# Patient Record
Sex: FEMALE | Race: WHITE | NOT HISPANIC OR LATINO | Employment: PART TIME | URBAN - METROPOLITAN AREA
[De-identification: names, ages, dates, MRNs, and addresses within clinical notes are randomized per-mention and may not be internally consistent; named-entity substitution may affect disease eponyms.]

---

## 2017-01-03 ENCOUNTER — GENERIC CONVERSION - ENCOUNTER (OUTPATIENT)
Dept: OTHER | Facility: OTHER | Age: 67
End: 2017-01-03

## 2017-04-26 ENCOUNTER — OFFICE VISIT (OUTPATIENT)
Dept: URGENT CARE | Age: 67
End: 2017-04-26
Payer: MEDICARE

## 2017-04-26 PROCEDURE — 99213 OFFICE O/P EST LOW 20 MIN: CPT | Performed by: FAMILY MEDICINE

## 2017-04-26 PROCEDURE — G0463 HOSPITAL OUTPT CLINIC VISIT: HCPCS | Performed by: FAMILY MEDICINE

## 2017-05-31 ENCOUNTER — GENERIC CONVERSION - ENCOUNTER (OUTPATIENT)
Dept: OTHER | Facility: OTHER | Age: 67
End: 2017-05-31

## 2017-06-01 ENCOUNTER — TRANSCRIBE ORDERS (OUTPATIENT)
Dept: ADMINISTRATIVE | Age: 67
End: 2017-06-01

## 2017-06-01 ENCOUNTER — APPOINTMENT (OUTPATIENT)
Dept: LAB | Age: 67
End: 2017-06-01
Payer: MEDICARE

## 2017-06-01 DIAGNOSIS — L27.0 DERMATITIS MEDICAMENTOSA DUE TO INGESTED DRUGS: ICD-10-CM

## 2017-06-01 DIAGNOSIS — L27.0 DERMATITIS MEDICAMENTOSA DUE TO INGESTED DRUGS: Primary | ICD-10-CM

## 2017-06-01 DIAGNOSIS — B09 VIRAL EXANTHEM, UNSPECIFIED: ICD-10-CM

## 2017-06-01 LAB
ALBUMIN SERPL BCP-MCNC: 3.8 G/DL (ref 3.5–5)
ALP SERPL-CCNC: 87 U/L (ref 46–116)
ALT SERPL W P-5'-P-CCNC: 11 U/L (ref 12–78)
ANION GAP SERPL CALCULATED.3IONS-SCNC: 5 MMOL/L (ref 4–13)
AST SERPL W P-5'-P-CCNC: 20 U/L (ref 5–45)
BASOPHILS # BLD AUTO: 0.05 THOUSANDS/ΜL (ref 0–0.1)
BASOPHILS NFR BLD AUTO: 1 % (ref 0–1)
BILIRUB SERPL-MCNC: 0.58 MG/DL (ref 0.2–1)
BUN SERPL-MCNC: 12 MG/DL (ref 5–25)
CALCIUM SERPL-MCNC: 8.9 MG/DL (ref 8.3–10.1)
CHLORIDE SERPL-SCNC: 105 MMOL/L (ref 100–108)
CO2 SERPL-SCNC: 32 MMOL/L (ref 21–32)
CREAT SERPL-MCNC: 0.8 MG/DL (ref 0.6–1.3)
EOSINOPHIL # BLD AUTO: 1.32 THOUSAND/ΜL (ref 0–0.61)
EOSINOPHIL NFR BLD AUTO: 21 % (ref 0–6)
ERYTHROCYTE [DISTWIDTH] IN BLOOD BY AUTOMATED COUNT: 13.1 % (ref 11.6–15.1)
GFR SERPL CREATININE-BSD FRML MDRD: >60 ML/MIN/1.73SQ M
GLUCOSE P FAST SERPL-MCNC: 95 MG/DL (ref 65–99)
HCT VFR BLD AUTO: 42.2 % (ref 34.8–46.1)
HGB BLD-MCNC: 13.9 G/DL (ref 11.5–15.4)
LYMPHOCYTES # BLD AUTO: 1.62 THOUSANDS/ΜL (ref 0.6–4.47)
LYMPHOCYTES NFR BLD AUTO: 26 % (ref 14–44)
MCH RBC QN AUTO: 31.8 PG (ref 26.8–34.3)
MCHC RBC AUTO-ENTMCNC: 32.9 G/DL (ref 31.4–37.4)
MCV RBC AUTO: 97 FL (ref 82–98)
MONOCYTES # BLD AUTO: 0.65 THOUSAND/ΜL (ref 0.17–1.22)
MONOCYTES NFR BLD AUTO: 11 % (ref 4–12)
NEUTROPHILS # BLD AUTO: 2.58 THOUSANDS/ΜL (ref 1.85–7.62)
NEUTS SEG NFR BLD AUTO: 41 % (ref 43–75)
NRBC BLD AUTO-RTO: 0 /100 WBCS
PLATELET # BLD AUTO: 211 THOUSANDS/UL (ref 149–390)
PMV BLD AUTO: 11 FL (ref 8.9–12.7)
POTASSIUM SERPL-SCNC: 4.1 MMOL/L (ref 3.5–5.3)
PROT SERPL-MCNC: 7 G/DL (ref 6.4–8.2)
RBC # BLD AUTO: 4.37 MILLION/UL (ref 3.81–5.12)
SODIUM SERPL-SCNC: 142 MMOL/L (ref 136–145)
WBC # BLD AUTO: 6.22 THOUSAND/UL (ref 4.31–10.16)

## 2017-06-01 PROCEDURE — 85025 COMPLETE CBC W/AUTO DIFF WBC: CPT

## 2017-06-01 PROCEDURE — 80053 COMPREHEN METABOLIC PANEL: CPT

## 2017-06-01 PROCEDURE — 36415 COLL VENOUS BLD VENIPUNCTURE: CPT

## 2017-08-26 ENCOUNTER — OFFICE VISIT (OUTPATIENT)
Dept: URGENT CARE | Age: 67
End: 2017-08-26
Payer: MEDICARE

## 2017-08-26 PROCEDURE — 99213 OFFICE O/P EST LOW 20 MIN: CPT | Performed by: FAMILY MEDICINE

## 2017-08-26 PROCEDURE — G0463 HOSPITAL OUTPT CLINIC VISIT: HCPCS | Performed by: FAMILY MEDICINE

## 2017-12-05 ENCOUNTER — ALLSCRIPTS OFFICE VISIT (OUTPATIENT)
Dept: OTHER | Facility: OTHER | Age: 67
End: 2017-12-05

## 2017-12-05 DIAGNOSIS — M81.0 AGE-RELATED OSTEOPOROSIS WITHOUT CURRENT PATHOLOGICAL FRACTURE: ICD-10-CM

## 2017-12-05 DIAGNOSIS — E78.5 HYPERLIPIDEMIA: ICD-10-CM

## 2017-12-05 DIAGNOSIS — Z12.31 ENCOUNTER FOR SCREENING MAMMOGRAM FOR MALIGNANT NEOPLASM OF BREAST: ICD-10-CM

## 2017-12-05 DIAGNOSIS — E55.9 VITAMIN D DEFICIENCY: ICD-10-CM

## 2018-01-11 NOTE — PROGRESS NOTES
Assessment    1  Parkinson's disease (332 0) (G20)   · Dr Roseanne Chao,  neuro   2  Anxiety (300 00) (F41 9)   3  Esophagitis, reflux (530 11) (K21 0)   4  Encounter for preventive health examination (V70 0) (Z00 00)   5  Osteoporosis (733 00) (M81 0)    Plan  Anxiety    · (1) TSH; Status:Active; Requested for:29Nov2016;   Need for pneumococcal vaccination    · Prevnar 13 Intramuscular Suspension  Osteoporosis    · (1) VITAMIN D 25-HYDROXY; Status:Active; Requested for:29Nov2016;    · * DXA BONE DENSITY SPINE HIP AND PELVIS; Status:Canceled - Scheduling;   Parkinson's disease    · Carbidopa-Levodopa-Entacapone -200 MG Oral Tablet; TAKE 1 TABLET 3  TIMES DAILY   · (1) CBC/PLT/DIFF; Status:Active; Requested for:29Nov2016;    · (1) COMPREHENSIVE METABOLIC PANEL; Status:Active; Requested for:29Nov2016;   Screening, lipid    · (1) LIPID PANEL, FASTING; Status:Active; Requested for:29Nov2016;     Discussion/Summary  health maintenance visit Currently, she eats an adequate diet  cervical cancer screening is current Breast cancer screening: mammogram has been ordered  Colorectal cancer screening: colorectal cancer screening is current  Osteoporosis screening: bone mineral density testing has been ordered  Screening lab work includes hemoglobin, glucose, lipid profile, thyroid function testing and 25-hydroxyvitamin D  The immunizations are needed  Advice and education were given regarding aerobic exercise  Patient discussion: discussed with the patient  1  Parkinson's disease  Sees neuro q6 months  2  Osteoporosis  Not on supplements, due for bone density  3  GERD  Discussed diet, may take PPI prn   4  Anxiety  Minimal use of Xanax  5  HM  Prevnar today, declined flu vaccine  Mammogram due, PAPs updated  Retrieve colonoscopy report (need records release)  Follow up in 1 yr or prn  Chief Complaint  pt here for physical      History of Present Illness  HM, Adult Female:  The patient is being seen for a health maintenance evaluation  General Health: The patient's health since the last visit is described as fair  She has regular dental visits  She denies vision problems  Immunizations status: not up to date  Lifestyle:  She does not have any weight concerns  She does not exercise regularly  Reproductive health:  she reports normal menses  Screening: Cervical cancer screening includes a pap smear performed   Breast cancer screening includes a mammogram performed last year  Colorectal cancer screening includes a colonoscopy performed within the past ten years  Metabolic screening includes lipid profile performed within the past five years, uncertain timing of her last glucose screening, uncertain timing of her last thyroid function test and DEXA performed 2013  Cardiovascular risk factors: no obesity and no sedentary lifestyle  Safety elements used: seat belt  HPI: Ms Joseluis Guevara feels well, no complaints  Her ropinirole was recently adjusted by her neurologist, sees them every 6 months  She denies any actual falls, has several trips and stumbles  She reports her legs sometimes gets left behind and her body is still moving forward  She has occasional reflux symptoms, takes Prilosec for a week with good relief  Gastroesophageal Reflux Disease (Brief): The patient is being seen for a routine clinic follow-up of gastroesophageal reflux disease  Symptoms:  heartburn  No associated symptoms are reported  Current treatment includes proton pump inhibitors  By report, there is good compliance with treatment and good symptom control  Parkinsons Disease (Brief): The patient is being seen for a routine clinic follow-up of Parkinson's disease  Symptoms:  no frequent falls and no gait disturbance  Osteoporosis (Follow-Up): She has no complications  Symptoms: The patient is currently asymptomatic     Home precautions: Osteoporosis counseling and education was reviewed with patient and caregivers, including prevention of falls  Medications: The patient is not currently on any medication for her osteoporosis  The patient is due for a DXA  Review of Systems    Constitutional: not feeling poorly and not feeling tired  Eyes: no eyesight problems  ENT: no nasal discharge  Cardiovascular: no chest pain, no palpitations and no lower extremity edema  Respiratory: no shortness of breath and no cough  Gastrointestinal: no abdominal pain and no constipation  Genitourinary: no dysuria and no incontinence  Musculoskeletal: no arthralgias  Integumentary: no rashes  Neurological: no headache and no dizziness  Psychiatric: no sleep disturbances  Endocrine: muscle weakness, but as noted in HPI  no feelings of weakness      Active Problems    1  Anxiety (300 00) (F41 9)   2  Discharge of vagina (623 5) (N89 8)   3  Encounter for screening mammogram for breast cancer (V76 12) (Z12 31)   4  Esophagitis, reflux (530 11) (K21 0)   5  Laboratory examination ordered as part of a routine general medical examination   (V72 62) (Z00 00)   6  Menopause (627 2)   7  Osteoporosis (733 00) (M81 0)   8  Parkinson's disease (332 0) (G20)   9  Visit for screening mammogram (V76 12) (Z12 31)   10   Well female exam with routine gynecological exam (V72 31) (Z01 419)    Past Medical History    · History of Diverticulosis (562 10) (K57 90)   · History of Exposure to bat without known bite (V01 9) (Z20 9)   · History of External Hemorrhoids (455 3)   · History of edema (V13 89) (Y43 328)   · History of Shoulder pain, acute, right (719 41) (M25 511)   · History of Urge incontinence of urine (788 31) (N39 41)    Surgical History    · History of  Section   · History of Colonoscopy (Fiberoptic)   · History of Oral Surgery Tooth Extraction   · History of Tonsillectomy    Family History  Mother    · Family history of Family Health Status Of Mother - Alive   · Family history of Frontal lobe dementia   · Family history of Osteoporosis (V17 81)  Family History    · Family history of Congestive Heart Failure   · Family history of Father  At Age 76    Social History    · Being A Social Drinker   · Exercise habits   · Marital History - Currently    · Never A Smoker   · Working Part-time    Current Meds   1  Amantadine HCl - 100 MG Oral Tablet; Therapy: [id number] to Recorded   2  Azilect 1 MG Oral Tablet; Therapy: 09KYO1637 to (Evaluate:49Ysc7683) Recorded   3  Carbidopa-Levodopa  MG Oral Tablet; TAKE 0 5 TABLET 3 TIMES DAILY; Therapy: 41Iei1629 to Recorded   4  LORazepam 0 5 MG Oral Tablet; TAKE 1/2 TO 1 TABLET TWICE DAILY AS NEEDED; Therapy: 38VOY8900 to (Evaluate:2017); Last Rx:2016 Ordered   5  Omeprazole 20 MG Oral Capsule Delayed Release; Therapy: (Recorded:2016) to Recorded   6  ROPINIRole HCl ER 2 MG Oral Tablet Extended Release 24 Hour; ONE TABLET WITH 8   MG TO EQUAL 10MG; Therapy: 64VQX0847 to Recorded    Allergies    1  No Known Drug Allergies    Vitals   Recorded: 67WVB3391 02:26PM   Heart Rate 78   Respiration 18   Systolic 434   Diastolic 78   Height 5 ft 4 in   Weight 132 lb 2 08 oz   BMI Calculated 22 68   BSA Calculated 1 65   O2 Saturation 98     Physical Exam    Constitutional   General appearance: No acute distress, well appearing and well nourished  comfortable, clothing appropriate and well hydrated  Head and Face   Head and face: Normal     Eyes   Pupils and irises: Equal, round, reactive to light  Ears, Nose, Mouth, and Throat   External inspection of ears and nose: Normal     Otoscopic examination: Tympanic membranes translucent with normal light reflex  Canals patent without erythema  Nasal mucosa, septum, and turbinates: Normal without edema or erythema  Oropharynx: Normal with no erythema, edema, exudate or lesions  Neck   Neck: Supple, symmetric, trachea midline, no masses  Thyroid: Normal, no thyromegaly      Pulmonary   Respiratory effort: No increased work of breathing or signs of respiratory distress  Auscultation of lungs: Clear to auscultation  Cardiovascular   Auscultation of heart: Normal rate and rhythm, normal S1 and S2, no murmurs  Examination of extremities for edema and/or varicosities: Normal     Abdomen   Abdomen: Non-tender, no masses  Lymphatic   Palpation of lymph nodes in neck: No lymphadenopathy  no posterior cervical node enlargement and no supraclavicular node enlargement  Palpation of lymph nodes in other areas: No lymphadenopathy  no anterior cervical node enlargement and no submandibular node enlargement  Musculoskeletal   Gait and station: Normal     Skin   Skin and subcutaneous tissue: Normal without rashes or lesions  Neurologic   Cranial nerves: Cranial nerves II-XII intact  Cortical function: Normal mental status      Psychiatric   Orientation to person, place, and time: Normal     Mood and affect: Normal        Signatures   Electronically signed by : Fabian Horn MD; Nov 29 2016  4:42PM EST                       (Author)

## 2018-01-12 NOTE — RESULT NOTES
Message   Dr Joseph Holloway is out of office but blood test results are back   kidney, blood count & thyroid test results look fine  liver test results and cholesterol results look okay as well with cholesterol level of 207  vitamin D level is moderately low at 10 (normal is )  is patient taking vitamin D currently? if yes, what dose? if no, recommend prescription vitamin D medication once a week for 12 weeks  let me know     Verified Results  (1) CBC/PLT/DIFF 34HFJ2786 09:17AM Marylee Paterson   TW Order Number: [id number]     Test Name Result Flag Reference   WBC COUNT 5 87 Thousand/uL  4 31-10 16   RBC COUNT 4 43 Million/uL  3 81-5 12   HEMOGLOBIN 13 7 g/dL  11 5-15 4   HEMATOCRIT 41 7 %  34 8-46  1   MCV 94 fL  82-98   MCH 30 9 pg  26 8-34 3   MCHC 32 9 g/dL  31 4-37 4   RDW 12 1 %  11 6-15 1   MPV 11 0 fL  8 9-12 7   PLATELET COUNT 902 Thousands/uL  149-390   nRBC AUTOMATED 0 /100 WBCs     NEUTROPHILS RELATIVE PERCENT 58 %  43-75   LYMPHOCYTES RELATIVE PERCENT 23 %  14-44   MONOCYTES RELATIVE PERCENT 11 %  4-12   EOSINOPHILS RELATIVE PERCENT 7 % H 0-6   BASOPHILS RELATIVE PERCENT 1 %  0-1   NEUTROPHILS ABSOLUTE COUNT 3 44 Thousands/?L  1 85-7 62   LYMPHOCYTES ABSOLUTE COUNT 1 34 Thousands/?L  0 60-4 47   MONOCYTES ABSOLUTE COUNT 0 63 Thousand/?L  0 17-1 22   EOSINOPHILS ABSOLUTE COUNT 0 40 Thousand/?L  0 00-0 61   BASOPHILS ABSOLUTE COUNT 0 05 Thousands/?L  0 00-0 10   - Patient Instructions: This bloodwork is non-fasting  Please drink two glasses of water morning of bloodwork  - Patient Instructions: This bloodwork is non-fasting  Please drink two glasses of water morning of bloodwork       (1) COMPREHENSIVE METABOLIC PANEL 02MKN3760 02:65MF Marylee Paterson   TW Order Number: [id number]     Test Name Result Flag Reference   GLUCOSE,RANDM 94 mg/dL     If the patient is fasting, the ADA then defines impaired fasting glucose as > 100 mg/dL and diabetes as > or equal to 123 mg/dL  SODIUM 140 mmol/L  136-145   POTASSIUM 4 1 mmol/L  3 5-5 3   CHLORIDE 105 mmol/L  100-108   CARBON DIOXIDE 29 mmol/L  21-32   ANION GAP (CALC) 6 mmol/L  4-13   BLOOD UREA NITROGEN 13 mg/dL  5-25   CREATININE 0 86 mg/dL  0 60-1 30   Standardized to IDMS reference method   CALCIUM 8 9 mg/dL  8 3-10 1   BILI, TOTAL 0 66 mg/dL  0 20-1 00   ALK PHOSPHATAS 117 U/L H    ALT (SGPT) 11 U/L L 12-78   AST(SGOT) 16 U/L  5-45   ALBUMIN 3 8 g/dL  3 5-5 0   TOTAL PROTEIN 7 5 g/dL  6 4-8 2   eGFR Non-African American      >60 0 ml/min/1 73sq m   - Patient Instructions: This is a fasting blood test  Water,black tea or black  coffee only after 9:00pm the night before test Drink 2 glasses of water the morning of test - Patient Instructions: This bloodwork is non-fasting  Please drink two glasses of   water morning of bloodwork  National Kidney Disease Education Program recommendations are as follows:  GFR calculation is accurate only with a steady state creatinine  Chronic Kidney disease less than 60 ml/min/1 73 sq  meters  Kidney failure less than 15 ml/min/1 73 sq  meters  (1) LIPID PANEL, FASTING [id number] 09:17AM Domingo GUILLORY Order Number: [id number]     Test Name Result Flag Reference   CHOLESTEROL 207 mg/dL H    HDL,DIRECT 69 mg/dL H 40-60   Specimen collection should occur prior to Metamizole administration due to the potential for falsely depressed results  LDL CHOLESTEROL CALCULATED 110 mg/dL H 0-100   - Patient Instructions: This is a fasting blood test  Water,black tea or black  coffee only after 9:00pm the night before test   Drink 2 glasses of water the morning of test     - Patient Instructions: This is a fasting blood test  Water,black tea or black  coffee only after 9:00pm the night before test Drink 2 glasses of water the morning of test - Patient Instructions: This bloodwork is non-fasting  Please drink two glasses of   water morning of bloodwork  Triglyceride:         Normal              <150 mg/dl       Borderline High    150-199 mg/dl       High               200-499 mg/dl       Very High          >499 mg/dl  Cholesterol:         Desirable        <200 mg/dl      Borderline High  200-239 mg/dl      High             >239 mg/dl  HDL Cholesterol:        High    >59 mg/dL      Low     <41 mg/dL  LDL CALCULATED:    This screening LDL is a calculated result  It does not have the accuracy of the Direct Measured LDL in the monitoring of patients with hyperlipidemia and/or statin therapy  Direct Measure LDL (QOV590) must be ordered separately in these patients  TRIGLYCERIDES 141 mg/dL  <=150   Specimen collection should occur prior to N-Acetylcysteine or Metamizole administration due to the potential for falsely depressed results  (1) TSH [id number] 09:17AM Patti GUILLORY Order Number: [id number]     Test Name Result Flag Reference   TSH 3 140 uIU/mL  0 358-3 740   - Patient Instructions: This bloodwork is non-fasting  Please drink two glasses of water morning of bloodwork  - Patient Instructions: This is a fasting blood test  Water,black tea or black  coffee only after 9:00pm the night before test Drink 2 glasses of water the morning of test - Patient Instructions: This bloodwork is non-fasting  Please drink two glasses of   water morning of bloodwork  Patients undergoing fluorescein dye angiography may retain small amounts of fluorescein in the body for 48-72 hours post procedure  Samples containing fluorescein can produce falsely depressed TSH values  If the patient had this procedure,a specimen should be resubmitted post fluorescein clearance            The recommended reference ranges for TSH during pregnancy are as follows:  First trimester 0 1 to 2 5 uIU/mL  Second trimester  0 2 to 3 0 uIU/mL  Third trimester 0 3 to 3 0 uIU/m     (1) VITAMIN D 25-HYDROXY [id number] 09:17AM Patti GUILLORY Order Number: [id number]     Test Name Result Flag Reference   VIT D 25-HYDROX 10 7 ng/mL L 30 0-100 0   This assay is a certified procedure of the CDC Vitamin D Standardization Certification Program (VDSCP)     Deficiency <20ng/ml   Insufficiency 20-30ng/ml   Sufficient  ng/ml     *Patients undergoing fluorescein dye angiography may retain small amounts of fluorescein in the body for 48-72 hours post procedure  Samples containing fluorescein can produce falsely elevated Vitamin D values  If the patient had this procedure, a specimen should be resubmitted post fluorescein clearance

## 2018-01-16 NOTE — MISCELLANEOUS
Message   Recorded as Task   Date: 12/30/2016 05:30 PM, Created By: Valeria Jimenez   Task Name: Call Patient with results   Assigned To: Colin Del Rio   Regarding Patient: Francheska Valderrama, Status: In Progress   Comment:    Colin Del Rio - 30 Dec 2016 5:30 PM     Patient Phone: [phone]      Dr Tim Pereira is out of office but blood test results are back   kidney, blood count & thyroid test results look fine  liver test results and cholesterol results look okay as well with cholesterol level of 207  vitamin D level is moderately low at 10 (normal is )  is patient taking vitamin D currently? if yes, what dose? if no, recommend prescription vitamin D medication once a week for 12 weeks    let me know   Jocelynn Dubose - 03 Jan 2017 8:33 AM     TASK REASSIGNED: Previously Assigned To Jocelynn Awad - 56 Jan 2017 9:06 AM     TASK IN PROGRESS   Jocelynn Dubose - 03 Jan 2017 9:08 AM     TASK EDITED  pt notified and states no she is not taking vit D currently and would like the prescription for the weekly Vitamin D sent to [facility] (retail on file)        Plan  Osteoporosis, Vitamin D deficiency    · Vitamin D (Ergocalciferol) 06913 UNIT Oral Capsule; TAKE 1 CAPSULE Weekly  FOR TOTAL OF 12 WEEKS    Signatures   Electronically signed by : Walt Baig DO; Felix  3 2017  9:10AM EST                       (Author)

## 2018-01-23 VITALS
SYSTOLIC BLOOD PRESSURE: 118 MMHG | OXYGEN SATURATION: 98 % | WEIGHT: 132.13 LBS | HEART RATE: 70 BPM | HEIGHT: 64 IN | DIASTOLIC BLOOD PRESSURE: 70 MMHG | TEMPERATURE: 97.8 F | RESPIRATION RATE: 18 BRPM | BODY MASS INDEX: 22.56 KG/M2

## 2018-01-23 NOTE — PROGRESS NOTES
Assessment    1  Encounter for preventive health examination (V70 0) (Z00 00)   2  Parkinson's disease (332 0) (G20)   · Dr Kaitlin Burgess,  neuro   3  Osteoporosis (733 00) (M81 0)   4  Hyperlipidemia (272 4) (E78 5)   5  Vitamin D deficiency (268 9) (E55 9)    Plan  Encounter for screening mammogram for breast cancer    · * MAMMO SCREENING BILATERAL W CAD; Status:Active; Requested for:43Asr1194;   Hyperlipidemia    · (1) CBC/PLT/DIFF; Status:Active; Requested for:31Tha0459;    · (1) COMPREHENSIVE METABOLIC PANEL; Status:Active; Requested for:47Ncp9021;    · (1) LIPID PANEL, FASTING; Status:Active; Requested for:89Uay8520;    · (1) TSH; Status:Active; Requested for:63Cba6896;   Need for immunization against influenza    · Fluzone High-Dose 0 5 ML Intramuscular Suspension Prefilled Syringe  Osteoporosis, Vitamin D deficiency    · (1) VITAMIN D 25-HYDROXY; Status:Active; Requested for:80Owr2476;   Parkinson's disease    · From  Amantadine HCl - 100 MG Oral Tablet  To Amantadine HCl - 100 MG  Oral Tablet Take 1 tablet daily   · From  Azilect 1 MG Oral Tablet  To Rasagiline Mesylate 1 MG Oral Tablet  (Azilect) TAKE 1 TABLET DAILY   · From  Carbidopa-Levodopa-Entacapone -200 MG Oral Tablet TAKE 1  TABLET 3 TIMES DAILY To Carbidopa-Levodopa-Entacapone -200 MG Oral  Tablet TAKE 1 TABLET 5 TIMES DAILY   · From  ROPINIRole HCl ER 8 MG Oral Tablet Extended Release 24 Hour  To  ROPINIRole HCl ER 8 MG Oral Tablet Extended Release 24 Hour TAKE 1 TABLET Daily    Discussion/Summary  health maintenance visit Currently, she eats an adequate diet  cervical cancer screening is current Breast cancer screening: mammogram has been ordered  Colorectal cancer screening: colorectal cancer screening is current and the next colonoscopy is due ? Dara Soulier Osteoporosis screening: bone mineral density testing is current  Screening lab work includes hemoglobin, glucose, lipid profile, thyroid function testing and 25-hydroxyvitamin D   The immunizations are needed and immunizations will be given as outlined in the orders  Advice and education were given regarding aerobic exercise and vitamin D supplements  Patient discussion: discussed with the patient  1  Parkinson's disease  Sees neuro q6 months  2  Osteoporosis, vitamin-D deficiency, Hx of fracture  Strongly recommend treatment, discussed treatment options  Encouraged to take calcium and vitamin-D supplements daily  Discuss weight-bearing exercises  Bone density due in 2018  3  Hyperlipidemia  Not on medication, due for lipids  4  GERD  Minimal symptoms  5  Anxiety  6  HM  Flu vaccine today  Mammogram due, PAPs updated  ?colonoscopy report (need records release)  Follow up in 1 yr or prn  Chief Complaint  pt is here for annual physical      History of Present Illness  HM, Adult Female: The patient is being seen for a health maintenance evaluation  General Health: The patient's health since the last visit is described as fair  Immunizations status: not up to date  Lifestyle:  She does not have any weight concerns  She does not exercise regularly  Reproductive health:  she reports normal menses  Screening: Cervical cancer screening includes a pap smear performed last year  Breast cancer screening includes a mammogram performed last year  Colorectal cancer screening includes a colonoscopy performed within the past ten years  Metabolic screening includes lipid profile performed within the past five years, glucose screening performed last year, thyroid function test performed last year and DEXA performed within the past five years  Cardiovascular risk factors: no hypertension, no obesity and no sedentary lifestyle  Safety elements used: seat belt  HPI: Ms Cesia Olivier is feeling well  She reports that she fractured her sternum sometime ago  She reports she was pressing on the chair leaning over to get something when she fell   She is reluctant to take any medication for her osteoporosis  She takes calcium daily, did not continue on daily D3 supplements  She sees her neurologist every 6 months  She takes her carbidopa about 5 times a day  Denies any chest pain, shortness of breath, palpitations or other symptoms  Parkinsons Disease (Brief): The patient is being seen for a routine clinic follow-up of Parkinson's disease  Symptoms:  tremor at rest, but no frequent falls  Hyperlipidemia (Follow-Up): She has no significant interval events  Symptoms: The patient is currently asymptomatic  The patient is due for a lipid panel  Vitamin D Deficiency: The patient is being seen for follow-up of vitamin D deficiency  Disease type: vitamin D deficiency  The patient is not currently being treated for this problem  By report, there is poor compliance with treatment  The patient is currently asymptomatic  Review of Systems    Constitutional: not feeling poorly and not feeling tired  Eyes: no eyesight problems  Cardiovascular: no chest pain, no palpitations and no lower extremity edema  Respiratory: no shortness of breath and no cough  Gastrointestinal: no abdominal pain and no constipation  Genitourinary: no dysuria and no incontinence  Musculoskeletal: no arthralgias  Integumentary: no rashes  Neurological: no headache, no dizziness, no limb weakness and no difficulty walking  Psychiatric: no sleep disturbances  no feelings of weakness      Active Problems    1  Anxiety (300 00) (F41 9)   2  Encounter for screening mammogram for breast cancer (V76 12) (Z12 31)   3  Esophagitis, reflux (530 11) (K21 0)   4  Laboratory examination ordered as part of a routine general medical examination   (V72 62) (Z00 00)   5  Menopause (627 2)   6  Osteoporosis (733 00) (M81 0)   7  Parkinson's disease (332 0) (G20)   8  Vitamin D deficiency (268 9) (E55 9)   9   Well female exam with routine gynecological exam (V72 31) (Z01 419)    Past Medical History    · History of Diverticulosis (562 10) (K57 90)   · History of Dry scalp (701 1) (R23 8)   · History of Exposure to bat without known bite (V01 9) (Z20 9)   · History of External Hemorrhoids (455 3)   · History of edema (V13 89) (M35 639)   · History of herpes zoster (V12 09) (Z86 19)   · History of vaginal discharge (V13 29) (Z87 42)   · History of Internal hordeolum of left eye (373 12) (H00 026)   · History of Rash (782 1) (R21)   · History of Shoulder pain, acute, right (719 41) (M25 511)   · History of Sternal fracture (807 2) (S22 20XA)   · History of Urge incontinence of urine (788 31) (N39 41)    Surgical History    · History of  Section   · History of Colonoscopy (Fiberoptic)   · History of Oral Surgery Tooth Extraction   · History of Tonsillectomy    Family History  Mother    · Family history of Family Health Status Of Mother - Alive   · Family history of Frontal lobe dementia   · Family history of Osteoporosis (V17 81)  Family History    · Family history of Congestive Heart Failure   · Family history of Father  At Age 76    Social History    · Denies alcohol consumption (V49 89) (Z78 9)   · Denied: History of Drug use   · Exercise habits   · Marital History - Currently    · Never A Smoker   · Working Part-time   · teaches at [employer], [address]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    Current Meds   1  Amantadine HCl - 100 MG Oral Tablet; Therapy: 86WFZ9387 to Recorded   2  Azilect 1 MG Oral Tablet; Therapy: 19EDV6265 to (Evaluate:00Nxg7065) Recorded   3  Calcium-Magnesium-Vitamin D [id number] MG-MG-UNIT TABS; Therapy: (Claudis All) to Recorded   4  Carbidopa-Levodopa-Entacapone -200 MG Oral Tablet; TAKE 1 TABLET 3 TIMES   DAILY; Therapy: 56LIP4115 to (Evaluate:34Aww9984); Last Rx:2016 Ordered   5  LORazepam 0 5 MG Oral Tablet; TAKE 1/2 TO 1 TABLET TWICE DAILY AS NEEDED; Therapy: 15LHN6595 to (Evaluate:2017); Last Rx:95Bza8593 Ordered   6   Omeprazole 20 MG Oral Capsule Delayed Release; Therapy: (Recorded:29Nov2016) to Recorded   7  ROPINIRole HCl ER 8 MG Oral Tablet Extended Release 24 Hour; Therapy: (Young Frieze) to Recorded   8  Vitamin D (Ergocalciferol) 41544 UNIT Oral Capsule; TAKE 1 CAPSULE Weekly FOR   TOTAL OF 12 WEEKS; Therapy: 83MNV7549 to (Last Rx:03Jan2017)  Requested for: 95JID0815 Ordered    Allergies    1  Doxycycline Monohydrate CAPS   2  Sulfa Antibiotics    Vitals   Recorded: 39HYE6573 02:00PM   Temperature 97 8 F   Heart Rate 70   Respiration 18   Systolic 311   Diastolic 70   Height 5 ft 3 75 in   Weight 132 lb 2 oz   BMI Calculated 22 86   BSA Calculated 1 64   O2 Saturation 98     Physical Exam    Constitutional   General appearance: No acute distress, well appearing and well nourished  appears healthy, comfortable, clothing appropriate and well hydrated  Head and Face   Head and face: Normal     Eyes   Pupils and irises: Equal, round, reactive to light  Ears, Nose, Mouth, and Throat   External inspection of ears and nose: Normal     Otoscopic examination: Tympanic membranes translucent with normal light reflex  Canals patent without erythema  Oropharynx: Normal with no erythema, edema, exudate or lesions  Neck   Neck: Supple, symmetric, trachea midline, no masses  Pulmonary   Respiratory effort: No increased work of breathing or signs of respiratory distress  Auscultation of lungs: Clear to auscultation  Cardiovascular   Auscultation of heart: Normal rate and rhythm, normal S1 and S2, no murmurs  Examination of extremities for edema and/or varicosities: Normal     Abdomen   Abdomen: Non-tender, no masses  Lymphatic   Palpation of lymph nodes in neck: No lymphadenopathy  Musculoskeletal   Gait and station: Normal     Skin   Skin and subcutaneous tissue: Normal without rashes or lesions  Neurologic   Cortical function: Normal mental status      Psychiatric   Orientation to person, place, and time: Normal     Mood and affect: Normal  Signatures   Electronically signed by : Mikhail Vang MD; Dec  5 2017  2:54PM EST                       (Author)

## 2018-03-27 ENCOUNTER — APPOINTMENT (OUTPATIENT)
Dept: LAB | Age: 68
End: 2018-03-27
Payer: MEDICARE

## 2018-03-27 ENCOUNTER — TRANSCRIBE ORDERS (OUTPATIENT)
Dept: ADMINISTRATIVE | Age: 68
End: 2018-03-27

## 2018-03-27 ENCOUNTER — HOSPITAL ENCOUNTER (OUTPATIENT)
Dept: RADIOLOGY | Age: 68
Discharge: HOME | End: 2018-03-27
Payer: MEDICARE

## 2018-03-27 DIAGNOSIS — E55.9 VITAMIN D DEFICIENCY: ICD-10-CM

## 2018-03-27 DIAGNOSIS — E78.5 HYPERLIPIDEMIA: ICD-10-CM

## 2018-03-27 DIAGNOSIS — Z12.31 ENCOUNTER FOR SCREENING MAMMOGRAM FOR MALIGNANT NEOPLASM OF BREAST: ICD-10-CM

## 2018-03-27 DIAGNOSIS — M81.0 AGE-RELATED OSTEOPOROSIS WITHOUT CURRENT PATHOLOGICAL FRACTURE: ICD-10-CM

## 2018-03-27 LAB
25(OH)D3 SERPL-MCNC: 23.6 NG/ML (ref 30–100)
ALBUMIN SERPL BCP-MCNC: 4 G/DL (ref 3.5–5)
ALP SERPL-CCNC: 94 U/L (ref 46–116)
ALT SERPL W P-5'-P-CCNC: <6 U/L (ref 12–78)
ANION GAP SERPL CALCULATED.3IONS-SCNC: 7 MMOL/L (ref 4–13)
AST SERPL W P-5'-P-CCNC: 14 U/L (ref 5–45)
BASOPHILS # BLD AUTO: 0.05 THOUSANDS/ΜL (ref 0–0.1)
BASOPHILS NFR BLD AUTO: 1 % (ref 0–1)
BILIRUB SERPL-MCNC: 0.54 MG/DL (ref 0.2–1)
BUN SERPL-MCNC: 17 MG/DL (ref 5–25)
CALCIUM SERPL-MCNC: 8.7 MG/DL (ref 8.3–10.1)
CHLORIDE SERPL-SCNC: 103 MMOL/L (ref 100–108)
CHOLEST SERPL-MCNC: 192 MG/DL (ref 50–200)
CO2 SERPL-SCNC: 30 MMOL/L (ref 21–32)
CREAT SERPL-MCNC: 0.89 MG/DL (ref 0.6–1.3)
EOSINOPHIL # BLD AUTO: 0.37 THOUSAND/ΜL (ref 0–0.61)
EOSINOPHIL NFR BLD AUTO: 7 % (ref 0–6)
ERYTHROCYTE [DISTWIDTH] IN BLOOD BY AUTOMATED COUNT: 12.3 % (ref 11.6–15.1)
GFR SERPL CREATININE-BSD FRML MDRD: 68 ML/MIN/1.73SQ M
GLUCOSE P FAST SERPL-MCNC: 92 MG/DL (ref 65–99)
HCT VFR BLD AUTO: 42.4 % (ref 34.8–46.1)
HDLC SERPL-MCNC: 59 MG/DL (ref 40–60)
HGB BLD-MCNC: 14.2 G/DL (ref 11.5–15.4)
LDLC SERPL CALC-MCNC: 98 MG/DL (ref 0–100)
LYMPHOCYTES # BLD AUTO: 1.5 THOUSANDS/ΜL (ref 0.6–4.47)
LYMPHOCYTES NFR BLD AUTO: 27 % (ref 14–44)
MCH RBC QN AUTO: 31.4 PG (ref 26.8–34.3)
MCHC RBC AUTO-ENTMCNC: 33.5 G/DL (ref 31.4–37.4)
MCV RBC AUTO: 94 FL (ref 82–98)
MONOCYTES # BLD AUTO: 0.5 THOUSAND/ΜL (ref 0.17–1.22)
MONOCYTES NFR BLD AUTO: 9 % (ref 4–12)
NEUTROPHILS # BLD AUTO: 3.08 THOUSANDS/ΜL (ref 1.85–7.62)
NEUTS SEG NFR BLD AUTO: 56 % (ref 43–75)
NRBC BLD AUTO-RTO: 0 /100 WBCS
PLATELET # BLD AUTO: 211 THOUSANDS/UL (ref 149–390)
PMV BLD AUTO: 11.1 FL (ref 8.9–12.7)
POTASSIUM SERPL-SCNC: 3.9 MMOL/L (ref 3.5–5.3)
PROT SERPL-MCNC: 7.3 G/DL (ref 6.4–8.2)
RBC # BLD AUTO: 4.52 MILLION/UL (ref 3.81–5.12)
SODIUM SERPL-SCNC: 140 MMOL/L (ref 136–145)
TRIGL SERPL-MCNC: 173 MG/DL
TSH SERPL DL<=0.05 MIU/L-ACNC: 4.51 UIU/ML (ref 0.36–3.74)
WBC # BLD AUTO: 5.51 THOUSAND/UL (ref 4.31–10.16)

## 2018-03-27 PROCEDURE — 80061 LIPID PANEL: CPT

## 2018-03-27 PROCEDURE — 82306 VITAMIN D 25 HYDROXY: CPT

## 2018-03-27 PROCEDURE — 84443 ASSAY THYROID STIM HORMONE: CPT

## 2018-03-27 PROCEDURE — 85025 COMPLETE CBC W/AUTO DIFF WBC: CPT

## 2018-03-27 PROCEDURE — 36415 COLL VENOUS BLD VENIPUNCTURE: CPT

## 2018-03-27 PROCEDURE — 80053 COMPREHEN METABOLIC PANEL: CPT

## 2018-03-27 PROCEDURE — 77067 SCR MAMMO BI INCL CAD: CPT

## 2018-03-28 ENCOUNTER — TELEPHONE (OUTPATIENT)
Dept: INTERNAL MEDICINE CLINIC | Facility: CLINIC | Age: 68
End: 2018-03-28

## 2018-03-28 NOTE — TELEPHONE ENCOUNTER
Thyroid test a bit abnormal, if feeling well, would just monitor  If feeling more tired, slugging or constipated, can start medication  Make sure taking vitamin D3 2000 units daily  Triglycerides (kind of cholesterol), is a bit high, rest of cholesterol within normal     Rest of labs ok

## 2018-07-23 ENCOUNTER — TELEPHONE (OUTPATIENT)
Dept: INTERNAL MEDICINE CLINIC | Facility: CLINIC | Age: 68
End: 2018-07-23

## 2018-07-23 PROBLEM — F41.1 ANXIETY STATE: Status: ACTIVE | Noted: 2018-01-03

## 2018-07-23 PROBLEM — E55.9 VITAMIN D DEFICIENCY: Status: ACTIVE | Noted: 2017-01-03

## 2018-07-23 PROBLEM — E78.5 HYPERLIPIDEMIA: Status: ACTIVE | Noted: 2017-12-05

## 2018-07-23 NOTE — TELEPHONE ENCOUNTER
I received orders to check for vitamins and minerals ordered by your nutritionist      I would not order all these tests, can have them order it

## 2018-07-24 NOTE — TELEPHONE ENCOUNTER
Patient notified  Patient states for insurance purposes it would need to be ordered by family doctor

## 2019-01-07 ENCOUNTER — TELEPHONE (OUTPATIENT)
Dept: INTERNAL MEDICINE CLINIC | Facility: CLINIC | Age: 69
End: 2019-01-07

## 2019-05-28 ENCOUNTER — OFFICE VISIT (OUTPATIENT)
Dept: INTERNAL MEDICINE CLINIC | Facility: CLINIC | Age: 69
End: 2019-05-28
Payer: MEDICARE

## 2019-05-28 VITALS
TEMPERATURE: 97.7 F | SYSTOLIC BLOOD PRESSURE: 124 MMHG | BODY MASS INDEX: 22.4 KG/M2 | WEIGHT: 131.2 LBS | RESPIRATION RATE: 18 BRPM | DIASTOLIC BLOOD PRESSURE: 70 MMHG | OXYGEN SATURATION: 98 % | HEIGHT: 64 IN | HEART RATE: 80 BPM

## 2019-05-28 DIAGNOSIS — Z00.00 HEALTH MAINTENANCE EXAMINATION: ICD-10-CM

## 2019-05-28 DIAGNOSIS — Z11.59 NEED FOR HEPATITIS C SCREENING TEST: ICD-10-CM

## 2019-05-28 DIAGNOSIS — E55.9 VITAMIN D DEFICIENCY: ICD-10-CM

## 2019-05-28 DIAGNOSIS — R94.6 ABNORMAL THYROID FUNCTION TEST: ICD-10-CM

## 2019-05-28 DIAGNOSIS — K21.00 ESOPHAGITIS, REFLUX: ICD-10-CM

## 2019-05-28 DIAGNOSIS — E78.49 OTHER HYPERLIPIDEMIA: Primary | ICD-10-CM

## 2019-05-28 DIAGNOSIS — G20 PARKINSON DISEASE (HCC): ICD-10-CM

## 2019-05-28 DIAGNOSIS — Z12.31 ENCOUNTER FOR SCREENING MAMMOGRAM FOR BREAST CANCER: ICD-10-CM

## 2019-05-28 DIAGNOSIS — M81.8 OTHER OSTEOPOROSIS WITHOUT CURRENT PATHOLOGICAL FRACTURE: ICD-10-CM

## 2019-05-28 PROCEDURE — 99214 OFFICE O/P EST MOD 30 MIN: CPT | Performed by: INTERNAL MEDICINE

## 2019-05-28 PROCEDURE — G0438 PPPS, INITIAL VISIT: HCPCS | Performed by: INTERNAL MEDICINE

## 2019-05-28 RX ORDER — CALCIUM CARBONATE 200(500)MG
1 TABLET,CHEWABLE ORAL 2 TIMES DAILY
Qty: 60 TABLET | Refills: 0
Start: 2019-05-28 | End: 2020-07-02

## 2019-05-28 RX ORDER — AMANTADINE HYDROCHLORIDE 100 MG/1
100 CAPSULE, GELATIN COATED ORAL DAILY
COMMUNITY
Start: 2018-09-12 | End: 2020-11-12 | Stop reason: SDUPTHER

## 2019-05-28 RX ORDER — RASAGILINE 1 MG/1
1 TABLET ORAL DAILY
COMMUNITY
Start: 2012-11-28 | End: 2020-11-12 | Stop reason: SDUPTHER

## 2019-05-28 RX ORDER — OMEPRAZOLE 20 MG/1
20 CAPSULE, DELAYED RELEASE ORAL DAILY PRN
Qty: 90 CAPSULE | Refills: 0 | Status: SHIPPED | OUTPATIENT
Start: 2019-05-28 | End: 2019-08-23 | Stop reason: SDUPTHER

## 2019-05-28 RX ORDER — ACETAMINOPHEN 160 MG
2000 TABLET,DISINTEGRATING ORAL DAILY
Qty: 90 CAPSULE | Refills: 0
Start: 2019-05-28 | End: 2019-08-09

## 2019-05-28 RX ORDER — LORAZEPAM 1 MG/1
0.5 TABLET ORAL AS NEEDED
COMMUNITY
Start: 2019-04-24 | End: 2020-05-11 | Stop reason: SDUPTHER

## 2019-05-28 RX ORDER — CARBIDOPA, LEVODOPA AND ENTACAPONE 12.5; 200; 5 MG/1; MG/1; MG/1
TABLET, FILM COATED ORAL
Refills: 11 | COMMUNITY
Start: 2019-05-17 | End: 2020-11-12 | Stop reason: SDUPTHER

## 2019-05-28 RX ORDER — MELOXICAM 15 MG/1
TABLET ORAL
COMMUNITY
Start: 2018-09-06 | End: 2019-07-12 | Stop reason: HOSPADM

## 2019-05-28 RX ORDER — OMEPRAZOLE 20 MG/1
CAPSULE, DELAYED RELEASE ORAL
COMMUNITY
End: 2019-05-28 | Stop reason: SDUPTHER

## 2019-05-28 RX ORDER — ROPINIROLE 8 MG/1
8 TABLET, FILM COATED, EXTENDED RELEASE ORAL
COMMUNITY
Start: 2019-05-06 | End: 2020-11-12 | Stop reason: SDUPTHER

## 2019-07-01 ENCOUNTER — TELEPHONE (OUTPATIENT)
Dept: INTERNAL MEDICINE CLINIC | Facility: CLINIC | Age: 69
End: 2019-07-01

## 2019-07-01 DIAGNOSIS — M25.551 PAIN OF BOTH HIP JOINTS: Primary | ICD-10-CM

## 2019-07-01 DIAGNOSIS — M25.552 PAIN OF BOTH HIP JOINTS: Primary | ICD-10-CM

## 2019-07-01 RX ORDER — TIZANIDINE 2 MG/1
2 TABLET ORAL 2 TIMES DAILY PRN
Qty: 30 TABLET | Refills: 0 | Status: ON HOLD | OUTPATIENT
Start: 2019-07-01 | End: 2019-07-07 | Stop reason: CLARIF

## 2019-07-01 RX ORDER — CYCLOBENZAPRINE HCL 5 MG
5 TABLET ORAL 3 TIMES DAILY PRN
Qty: 30 TABLET | Refills: 0 | Status: SHIPPED | OUTPATIENT
Start: 2019-07-01 | End: 2019-07-07

## 2019-07-01 NOTE — TELEPHONE ENCOUNTER
Patient notified  Patient states she was hoping to get an order for a muscle relaxant for the pain  Can this be ordered as well? States they did a CT scan in the ER

## 2019-07-01 NOTE — TELEPHONE ENCOUNTER
I sent a muscle relaxant to the pharmacy, needed to change this because it interacts with your Azilect  If the alternative one reacts also, the other muscle relaxant while likely be the same

## 2019-07-01 NOTE — TELEPHONE ENCOUNTER
Patient has been taking Ibuprofen 600mg every 4 hours and it doesn't seem to be helping  She would like to try the muscle relaxant  She also wants to know if Tylenol would help at all either  [facility]

## 2019-07-01 NOTE — TELEPHONE ENCOUNTER
You can take ibuprofen 200 to 400 mg 3 times a day for 3 days then as needed for pain  Please take with food  Afterwards, may take as needed only  I can also give you a muscle relaxant to see if that will help, but if it is mainly on the hips, ibuprofen should help with the inflammation

## 2019-07-01 NOTE — TELEPHONE ENCOUNTER
You can alternate Tylenol and ibuprofen, please take ibuprofen with food  I can refer you to physical therapy and / or Ortho for your hips    Did they do x-rays at the ER?

## 2019-07-01 NOTE — TELEPHONE ENCOUNTER
Patient called and she was in the ER @ [facility] bilateral for pain   She said it was muscular and was given only tylenlol OTC and to use bengay      Tylenol is not helping with her pain and was looking to see if she can get something a little stronger

## 2019-07-07 ENCOUNTER — HOSPITAL ENCOUNTER (INPATIENT)
Facility: HOSPITAL | Age: 69
LOS: 4 days | Discharge: HOME | DRG: 517 | End: 2019-07-12
Attending: EMERGENCY MEDICINE | Admitting: INTERNAL MEDICINE
Payer: MEDICARE

## 2019-07-07 ENCOUNTER — APPOINTMENT (EMERGENCY)
Dept: RADIOLOGY | Facility: HOSPITAL | Age: 69
DRG: 517 | End: 2019-07-07
Payer: MEDICARE

## 2019-07-07 DIAGNOSIS — M25.551 RIGHT HIP PAIN: Primary | ICD-10-CM

## 2019-07-07 DIAGNOSIS — M54.50 RIGHT LOW BACK PAIN: ICD-10-CM

## 2019-07-07 DIAGNOSIS — M54.9 BACK PAIN: ICD-10-CM

## 2019-07-07 PROBLEM — R26.2 AMBULATORY DYSFUNCTION: Status: ACTIVE | Noted: 2019-07-07

## 2019-07-07 LAB
ALBUMIN SERPL BCP-MCNC: 3.7 G/DL (ref 3.5–5)
ALP SERPL-CCNC: 103 U/L (ref 46–116)
ALT SERPL W P-5'-P-CCNC: 9 U/L (ref 12–78)
AMPHETAMINES SERPL QL SCN: NEGATIVE
ANION GAP SERPL CALCULATED.3IONS-SCNC: 6 MMOL/L (ref 4–13)
AST SERPL W P-5'-P-CCNC: 15 U/L (ref 5–45)
BARBITURATES UR QL: NEGATIVE
BENZODIAZ UR QL: NEGATIVE
BILIRUB SERPL-MCNC: 0.46 MG/DL (ref 0.2–1)
BUN SERPL-MCNC: 17 MG/DL (ref 5–25)
CALCIUM SERPL-MCNC: 8.7 MG/DL (ref 8.3–10.1)
CHLORIDE SERPL-SCNC: 107 MMOL/L (ref 100–108)
CO2 SERPL-SCNC: 27 MMOL/L (ref 21–32)
COCAINE UR QL: NEGATIVE
CREAT SERPL-MCNC: 0.76 MG/DL (ref 0.6–1.3)
ERYTHROCYTE [DISTWIDTH] IN BLOOD BY AUTOMATED COUNT: 11.8 % (ref 11.6–15.1)
GFR SERPL CREATININE-BSD FRML MDRD: 81 ML/MIN/1.73SQ M
GLUCOSE SERPL-MCNC: 105 MG/DL (ref 65–140)
HCT VFR BLD AUTO: 41.1 % (ref 34.8–46.1)
HGB BLD-MCNC: 13.7 G/DL (ref 11.5–15.4)
MCH RBC QN AUTO: 31 PG (ref 26.8–34.3)
MCHC RBC AUTO-ENTMCNC: 33.3 G/DL (ref 31.4–37.4)
MCV RBC AUTO: 93 FL (ref 82–98)
METHADONE UR QL: NEGATIVE
OPIATES UR QL SCN: NEGATIVE
PCP UR QL: NEGATIVE
PLATELET # BLD AUTO: 243 THOUSANDS/UL (ref 149–390)
PMV BLD AUTO: 10 FL (ref 8.9–12.7)
POTASSIUM SERPL-SCNC: 3.7 MMOL/L (ref 3.5–5.3)
PROT SERPL-MCNC: 6.9 G/DL (ref 6.4–8.2)
RBC # BLD AUTO: 4.42 MILLION/UL (ref 3.81–5.12)
SODIUM SERPL-SCNC: 140 MMOL/L (ref 136–145)
THC UR QL: NEGATIVE
WBC # BLD AUTO: 8.94 THOUSAND/UL (ref 4.31–10.16)

## 2019-07-07 PROCEDURE — 99284 EMERGENCY DEPT VISIT MOD MDM: CPT

## 2019-07-07 PROCEDURE — 80053 COMPREHEN METABOLIC PANEL: CPT | Performed by: PHYSICIAN ASSISTANT

## 2019-07-07 PROCEDURE — 80307 DRUG TEST PRSMV CHEM ANLYZR: CPT | Performed by: PHYSICIAN ASSISTANT

## 2019-07-07 PROCEDURE — 99284 EMERGENCY DEPT VISIT MOD MDM: CPT | Performed by: EMERGENCY MEDICINE

## 2019-07-07 PROCEDURE — 85027 COMPLETE CBC AUTOMATED: CPT | Performed by: PHYSICIAN ASSISTANT

## 2019-07-07 PROCEDURE — 99220 PR INITIAL OBSERVATION CARE/DAY 70 MINUTES: CPT | Performed by: INTERNAL MEDICINE

## 2019-07-07 PROCEDURE — 73502 X-RAY EXAM HIP UNI 2-3 VIEWS: CPT

## 2019-07-07 RX ORDER — PANTOPRAZOLE SODIUM 20 MG/1
20 TABLET, DELAYED RELEASE ORAL
Status: DISCONTINUED | OUTPATIENT
Start: 2019-07-08 | End: 2019-07-12 | Stop reason: HOSPADM

## 2019-07-07 RX ORDER — OXYCODONE HYDROCHLORIDE 5 MG/1
2.5 TABLET ORAL EVERY 6 HOURS PRN
Status: DISCONTINUED | OUTPATIENT
Start: 2019-07-07 | End: 2019-07-09

## 2019-07-07 RX ORDER — CALCIUM CARBONATE 200(500)MG
500 TABLET,CHEWABLE ORAL 2 TIMES DAILY
Status: DISCONTINUED | OUTPATIENT
Start: 2019-07-07 | End: 2019-07-12 | Stop reason: HOSPADM

## 2019-07-07 RX ORDER — CARBIDOPA, LEVODOPA AND ENTACAPONE 12.5; 200; 5 MG/1; MG/1; MG/1
1 TABLET, FILM COATED ORAL
Status: DISCONTINUED | OUTPATIENT
Start: 2019-07-07 | End: 2019-07-07

## 2019-07-07 RX ORDER — MELATONIN
2000 DAILY
Status: DISCONTINUED | OUTPATIENT
Start: 2019-07-07 | End: 2019-07-12 | Stop reason: HOSPADM

## 2019-07-07 RX ORDER — LORAZEPAM 0.5 MG/1
0.5 TABLET ORAL DAILY PRN
Status: DISCONTINUED | OUTPATIENT
Start: 2019-07-07 | End: 2019-07-12 | Stop reason: HOSPADM

## 2019-07-07 RX ORDER — RASAGILINE 1 MG/1
1 TABLET ORAL DAILY
Status: DISCONTINUED | OUTPATIENT
Start: 2019-07-07 | End: 2019-07-12 | Stop reason: HOSPADM

## 2019-07-07 RX ORDER — ENTACAPONE 200 MG/1
200 TABLET ORAL
Status: DISCONTINUED | OUTPATIENT
Start: 2019-07-07 | End: 2019-07-12 | Stop reason: HOSPADM

## 2019-07-07 RX ORDER — ACETAMINOPHEN 325 MG/1
975 TABLET ORAL EVERY 8 HOURS SCHEDULED
Status: DISCONTINUED | OUTPATIENT
Start: 2019-07-07 | End: 2019-07-12 | Stop reason: HOSPADM

## 2019-07-07 RX ORDER — ROPINIROLE 8 MG/1
8 TABLET, FILM COATED, EXTENDED RELEASE ORAL
Status: DISCONTINUED | OUTPATIENT
Start: 2019-07-07 | End: 2019-07-12 | Stop reason: HOSPADM

## 2019-07-07 RX ORDER — MUSCLE RUB CREAM 100; 150 MG/G; MG/G
CREAM TOPICAL 4 TIMES DAILY PRN
Status: DISCONTINUED | OUTPATIENT
Start: 2019-07-07 | End: 2019-07-12 | Stop reason: HOSPADM

## 2019-07-07 RX ORDER — ACETAMINOPHEN 325 MG/1
650 TABLET ORAL ONCE
Status: COMPLETED | OUTPATIENT
Start: 2019-07-07 | End: 2019-07-07

## 2019-07-07 RX ORDER — METHOCARBAMOL 500 MG/1
500 TABLET, FILM COATED ORAL EVERY 6 HOURS SCHEDULED
Status: DISCONTINUED | OUTPATIENT
Start: 2019-07-07 | End: 2019-07-09

## 2019-07-07 RX ORDER — TIZANIDINE 4 MG/1
2 TABLET ORAL 2 TIMES DAILY PRN
Status: DISCONTINUED | OUTPATIENT
Start: 2019-07-07 | End: 2019-07-12 | Stop reason: HOSPADM

## 2019-07-07 RX ORDER — LIDOCAINE 50 MG/G
2 PATCH TOPICAL DAILY
Status: DISCONTINUED | OUTPATIENT
Start: 2019-07-07 | End: 2019-07-12 | Stop reason: HOSPADM

## 2019-07-07 RX ORDER — AMANTADINE HYDROCHLORIDE 100 MG/1
100 CAPSULE, GELATIN COATED ORAL DAILY
Status: DISCONTINUED | OUTPATIENT
Start: 2019-07-07 | End: 2019-07-12 | Stop reason: HOSPADM

## 2019-07-07 RX ORDER — IBUPROFEN 400 MG/1
400 TABLET ORAL ONCE
Status: COMPLETED | OUTPATIENT
Start: 2019-07-07 | End: 2019-07-07

## 2019-07-07 RX ADMIN — CARBIDOPA AND LEVODOPA 0.5 TABLET: 25; 100 TABLET ORAL at 21:51

## 2019-07-07 RX ADMIN — ACETAMINOPHEN 975 MG: 325 TABLET ORAL at 21:50

## 2019-07-07 RX ADMIN — ENTACAPONE 200 MG: 200 TABLET ORAL at 19:05

## 2019-07-07 RX ADMIN — OXYCODONE HYDROCHLORIDE 2.5 MG: 5 TABLET ORAL at 23:32

## 2019-07-07 RX ADMIN — LIDOCAINE 2 PATCH: 50 PATCH CUTANEOUS at 16:02

## 2019-07-07 RX ADMIN — ACETAMINOPHEN 975 MG: 325 TABLET ORAL at 16:01

## 2019-07-07 RX ADMIN — ACETAMINOPHEN 650 MG: 325 TABLET ORAL at 10:55

## 2019-07-07 RX ADMIN — METHOCARBAMOL 500 MG: 500 TABLET, FILM COATED ORAL at 18:30

## 2019-07-07 RX ADMIN — ROPINIROLE HYDROCHLORIDE 8 MG: 8 TABLET, FILM COATED, EXTENDED RELEASE ORAL at 21:52

## 2019-07-07 RX ADMIN — ENOXAPARIN SODIUM 40 MG: 40 INJECTION SUBCUTANEOUS at 16:02

## 2019-07-07 RX ADMIN — CARBIDOPA AND LEVODOPA 0.5 TABLET: 25; 100 TABLET ORAL at 17:07

## 2019-07-07 RX ADMIN — ENTACAPONE 200 MG: 200 TABLET ORAL at 21:52

## 2019-07-07 RX ADMIN — OXYCODONE HYDROCHLORIDE 2.5 MG: 5 TABLET ORAL at 17:19

## 2019-07-07 RX ADMIN — IBUPROFEN 400 MG: 400 TABLET ORAL at 10:57

## 2019-07-07 RX ADMIN — CARBIDOPA AND LEVODOPA 0.5 TABLET: 25; 100 TABLET ORAL at 19:05

## 2019-07-07 RX ADMIN — ENTACAPONE 200 MG: 200 TABLET ORAL at 17:07

## 2019-07-07 RX ADMIN — METHOCARBAMOL 500 MG: 500 TABLET, FILM COATED ORAL at 16:01

## 2019-07-07 NOTE — ED NOTES
When attempting to walk patient without her husbands assistance, patient was too uncomfortable and unable to let go of husbands arm  Patient states she's unable to go home with this much pain  Dr Jabari Almonte made aware        Anderson Scheuermann  07/07/19 0420

## 2019-07-07 NOTE — NURSING NOTE
Pt called out c/o "hot flash" and "I feel like I'm going to pass out "  Pt appears anxious  BP 90/60 (sitting); HR 66; 97 9  Pain 10/10 with movement  Pt assisted to lay back in bed  Pt also reports dizziness has happened prior to admission with severe pain  Per MAR and dayshift DAVIN Ventura Samples, pt received scheduled robaxin upon arrival to floor at [phone] and again at scheduled time of 1830 with prn dose of 2 5 oxycodone at 1719  N  Lanny Kent PA-C notified  At bedside to evaluate pt  Pt reports after several minutes in bed her dizziness has subsided

## 2019-07-07 NOTE — QUICK NOTE
Was contacted by patient's nurse that patient complained of feeling like she was going to pass out  BP 90/60 sitting, HR 66, temp 97 9  I came up to see the patient  Patient seen with her daughter and nurse present  Patient reports that this episode happened this evening when she was sitting up saying goodbye to her family who was leaving  She reports that she was having these episodes for the last few days, especially when her pain is bad  Lying, her current pain level is a 0/10  Pain gets up to 15/10  She reports the pain beginning around her waist line b/l and occasionally shoots up her back  She denies bowel or bladder incontinence  Pain does not go down her legs  Denies numbness or tingling  Pain is worse with movement  Her above symptoms are improved since she laid down  Of note, patient also received 500mg Robaxin at 16:01 and 18:30 as well as oxy 2 5mg at 17:19  On exam, patient is anxious appearing  She reports feeling anxious, but no more so than what she would expect being in her situation - daughter agrees patient not more anxious  Heart RRR  Lungs CTA  Abdomen soft and non-tender  Speech is clear  Tongue midline  Patient reports equal and intact sensation to light touch on b/l UE and LE  Shoulder shrug,  strength, elbow flexion and extension, and hip flexion 5/5 b/l  Flexing hips active and her keeping up against resistance off the bed does not exacerbate pain  Right hip/pelvix XR on admission with no acute osseous abnormality  No labs were drawn  Will check labs now  Check XR thoracolumbar spine

## 2019-07-07 NOTE — ASSESSMENT & PLAN NOTE
Back pain of 2-3 weeks duration worsened by weight-bearing and ambulation  Limiting her activities of daily living  Likely musculoskeletal  Will provided with analgesics, muscle relaxants, heating pad, lidocaine patch  Supportive care  Physical therapy

## 2019-07-07 NOTE — H&P
H&P- Krunal Martell 1950, 80 y o  female MRN: [id number]    Unit/Bed#: [facility] 925-01 Encounter: [id number]    Primary Care Provider: Amandeep Alves MD   Date and time admitted to hospital: 7/7/2019  9:57 AM        * Back pain  Assessment & Plan  Back pain of 2-3 weeks duration worsened by weight-bearing and ambulation  Limiting her activities of daily living  Likely musculoskeletal  Will provided with analgesics, muscle relaxants, heating pad, lidocaine patch  Supportive care  Physical therapy    Ambulatory dysfunction  Assessment & Plan  Worsened due to back pain  Safe ambulation fall precautions  Physical therapy    Parkinson's disease ([facility] [address] )  Assessment & Plan  Continue antiparkinsonian medications  Supportive care    Osteoporosis  Assessment & Plan  Continue vit D, calcium      VTE Prophylaxis: Enoxaparin (Lovenox)  / sequential compression device   Code Status:  Full code  POLST: There is no POLST form on file for this patient (pre-hospital)  Discussion with family:  Discussed with the patient, spouse at bedside updated    Anticipated Length of Stay:  Patient will be admitted on an Observation basis with an anticipated length of stay of  Less than 2 midnights  Chief Complaint:      Low back pain, hip pain  Ambulatory dysfunction    History of Present Illness:    Krunal Martell is a 80 y o  female who presents with hip pain, low back pain especially on the right side with ambulatory dysfunction since 2-3 weeks  Patient presents with worsening hip pain especially on the right side which is now radiating to her back which is worsened by mobility, weight-bearing  She reports pain is severe, no radiation down the legs to her lower extremities or feet, she describes the pain as "electric shocks, pins being pricked into her back" patient reports no aggravating or relieving factors  She has tried NSAIDs with minimal relief  She reports inability to bear weight and ambulate due to the pain  She also reports pain is limiting her activities of daily living  Denies lower extremity weakness or sensory symptoms  Denies history of fever chills sweats or constitutional symptoms  Denies urinary symptoms  Denies abdominal pain nausea vomiting or diarrhea  Denies rash arthritis arthralgia myalgias  Denies chest pain shortness of breath palpitations presyncope syncope  Denies cough chest tightness wheezing  Review of Systems:    Review of Systems   All other systems reviewed and are negative  Past Medical and Surgical History:     Past Medical History:   Diagnosis Date    GERD (gastroesophageal reflux disease)     Parkinson disease ([facility] [address] )        Past Surgical History:   Procedure Laterality Date    TONSILLECTOMY         Meds/Allergies:    Prior to Admission medications    Medication Sig Start Date End Date Taking?  Authorizing Provider   amantadine (SYMMETREL) 100 mg capsule Take 100 mg by mouth daily 9/12/18  Yes Historical Provider, MD   calcium carbonate (TUMS) 500 mg chewable tablet Chew 1 tablet (500 mg total) 2 (two) times a day 5/28/19  Yes Devang Lechuga MD   carbidopa-levodopa-entacapone (STALEVO) 12 5- MG per tablet TAKE 1 TABLET BY MOUTH 6 (SIX) TIMES A DAY  5/17/19  Yes Historical Provider, MD   Cholecalciferol (VITAMIN D3) 2000 units capsule Take 1 capsule (2,000 Units total) by mouth daily 5/28/19  Yes Devang Lechuga MD   COD LIVER OIL PO Take by mouth   Yes Historical Provider, MD   LORazepam (ATIVAN) 1 mg tablet Take 0 5 mg by mouth 4/24/19  Yes Historical Provider, MD   meloxicam (MOBIC) 15 mg tablet TAKE 1 TABLET (15 MG TOTAL) BY MOUTH AS NEEDED FOR MODERATE PAIN (PAIN SCORE 4-6)  9/6/18  Yes Historical Provider, MD   omeprazole (PriLOSEC) 20 mg delayed release capsule Take 1 capsule (20 mg total) by mouth daily as needed (reflux) 5/28/19  Yes Devang Lechuga MD   rasagiline (AZILECT) 1 MG Take 1 mg by mouth daily 11/28/12  Yes Historical Provider, MD rOPINIRole (REQUIP XL) 8 MG 24 hr tablet Take 8 mg by mouth 5/6/19 5/5/20 Yes Historical Provider, MD   tiZANidine (ZANAFLEX) 2 mg tablet Take 1 tablet (2 mg total) by mouth 2 (two) times a day as needed for muscle spasms 7/1/19 7/7/19 Yes Sergio Hughes MD   cyclobenzaprine (FLEXERIL) 5 mg tablet Take 1 tablet (5 mg total) by mouth 3 (three) times a day as needed for muscle spasms 7/1/19 7/7/19  Sergio Hughes MD     I have reviewed home medications with patient personally  Allergies: Allergies   Allergen Reactions    Sulfa Antibiotics      Other reaction(s): family history - anaphylaxis  Category: Allergy;     Doxycycline Rash     Category:  Allergy;        Social History:     Marital Status: /Civil Union   Occupation:   Patient Pre-hospital Living Situation: home  Patient Pre-hospital Level of Mobility:  Ambulatory dysfunction  Patient Pre-hospital Diet Restrictions: no  Substance Use History:   Social History     Substance and Sexual Activity   Alcohol Use Yes    Comment: Rare     Social History     Tobacco Use   Smoking Status Never Smoker   Smokeless Tobacco Never Used     Social History     Substance and Sexual Activity   Drug Use Never       Family History:    Family History   Problem Relation Age of Onset    Dementia Mother     Alcohol abuse Neg Hx     Mental illness Neg Hx     Substance Abuse Neg Hx     Depression Neg Hx        Physical Exam:     Vitals:   Blood Pressure: 110/89 (07/07/19 1330)  Pulse: 80 (07/07/19 1330)  Temperature: 98 7 °F (37 1 °C) (07/07/19 1126)  Temp Source: Oral (07/07/19 1126)  Respirations: 18 (07/07/19 1330)  Weight - Scale: 59 9 kg (132 lb) (07/07/19 0954)  SpO2: 98 % (07/07/19 1330)    Physical Exam    Comfortably lying in bed  Mucous members are moist  Neck supple  Lungs clear to auscultation diminished breath sounds bases  Heart sounds S1 and S2 noted, no murmurs appreciable  Abdomen soft nontender no organs palpable  Awake alert obeys simple commands  Pulses noted  Musculoskeletal - low back tenderness, para muscle spasm noted  No rash  Pulses noted  No pedal edema        Additional Data:     Lab Results: I have personally reviewed pertinent reports  Imaging: I have personally reviewed pertinent reports  XR hip/pelv 2-3 vws right   Final Result by Vera Hanson MD (07/07 1330)      No acute osseous abnormality  Workstation performed: [id number]               Allscripts / Epic Records Reviewed: Yes     ** Please Note: This note has been constructed using a voice recognition system   **

## 2019-07-07 NOTE — ED ATTENDING ATTESTATION
Lisa Ballard, saw and evaluated the patient  I have discussed the patient with the resident/non-physician practitioner and agree with the resident's/non-physician practitioner's findings, Plan of Care, and MDM as documented in the resident's/non-physician practitioner's note, except where noted  All available labs and Radiology studies were reviewed  I was present for key portions of any procedure(s) performed by the resident/non-physician practitioner and I was immediately available to provide assistance  At this point I agree with the current assessment done in the Emergency Department  I have conducted an independent evaluation of this patient a history and physical is as follows:    80-year-old female accompanied by her   Eleven days ago the patient began having some mild right hip discomfort, and discomfort in her right low back area, worse when she tries to get up or ambulate, better with remaining still  No nausea or vomiting, no fever, no chills, no bladder or bowel incontinence, saddle anesthesia, no leg weakness  No dysuria or hematuria  She was taking ibuprofen occasionally without significant relief  She normally ambulates independently  One week ago she went to an outside emergency department, reportedly had unremarkable CT laboratory studies and urinalysis, was discharged with a hip strain, instructed to use Tylenol and ibuprofen alternating which she has been doing without significant relief  She has undergone 2 chiropractic treatments which has been mildly but not completely helpful  She also called her primary care physician and was started on a muscle relaxer has taken 3 doses, which has not been effective  She presents today because she noticed increasing difficulty getting up from a seated position, was unable to ambulate because the pain and her  had to help her into the vehicle  At rest she has no discomfort      General:  Patient is well-appearing  Head: Atraumatic  Eyes:  Conjunctiva pink, Extraocular muscle intact  ENT:  Mucous members are moist  Neck:  Supple  Cardiac:  S1-S2, without murmurs  Lungs:  Clear to auscultation bilaterally  Abdomen:  Soft, nontender, normal bowel sounds, no CVA tenderness, no tympany, no rigidity, no guarding  Extremities:  She has no midline thoracic, lumbar sacral tenderness or step-offs or deformities  She has slight discomfort at the extremes of flexion of the right hip, no pain with extension, no pain with internal or external rotation, no warmth or redness the hip  There is no bony tenderness to the hip, femur, knee, tibia, fibula, foot or ankle  She can do a straight leg raise  Reflexes are 2/4 at the bilateral knees and ankles  Toes are downgoing  Neurologic:  Awake, fluent speech, normal comprehension  Skin:  Pink warm and dry  Psychiatric:  Alert, pleasant, cooperative    Right hip x-ray interpreted by me shows no fracture, no dislocation, no acute Pathology  After oral analgesia patient was unable to ambulate independently  It is unsafe for her to go home        Critical Care Time  Procedures

## 2019-07-07 NOTE — ED PROVIDER NOTES
History  Chief Complaint   Patient presents with    Hip Pain     pt c/o hip pain and low back pain since last wednesday  was seen at [facility] and told it was muscular  states pain has gotten worse    Back Pain     Ms Bernardo Bonner is a 80 y o  F with a PMHx of osteoporosis and Parkinson's disease who presents with R hip pain that radiates to the lower back  Describes that it is worse with getting up, standing, and walking  Improves with rest  The pain has been present on-and-off for months, but was getting better until 11 days ago (Wednesday)  This morning, it got so bad that she required assistance by her  to ambulate (she can usually ambulate on her own)  She has tried tylenol, 2 chiropractic treatments, and tizanidine with minimal relief  She has history of fractures including a sternal fracture after leaning on a chair and a vertebral fracture incidentally found on a CT scan done at [facility] for abdominal pain 1 week ago  She has negative ROS otherwise  Hip Pain   Associated symptoms: no rash    Back Pain   Associated symptoms: no numbness and no weakness        Prior to Admission Medications   Prescriptions Last Dose Informant Patient Reported? Taking? COD LIVER OIL PO 7/7/2019 at Unknown time Self Yes Yes   Sig: Take by mouth   Cholecalciferol (VITAMIN D3) 2000 units capsule 7/7/2019 at Unknown time  No Yes   Sig: Take 1 capsule (2,000 Units total) by mouth daily   LORazepam (ATIVAN) 1 mg tablet Past Month at Unknown time Self Yes Yes   Sig: Take 0 5 mg by mouth   amantadine (SYMMETREL) 100 mg capsule 7/7/2019 at Unknown time Self Yes Yes   Sig: Take 100 mg by mouth daily   calcium carbonate (TUMS) 500 mg chewable tablet Past Week at Unknown time  No Yes   Sig: Chew 1 tablet (500 mg total) 2 (two) times a day   carbidopa-levodopa-entacapone (STALEVO) 12 5- MG per tablet 7/7/2019 at Unknown time Self Yes Yes   Sig: TAKE 1 TABLET BY MOUTH 6 (SIX) TIMES A DAY     meloxicam (MOBIC) 15 mg tablet Past Week at Unknown time Self Yes Yes   Sig: TAKE 1 TABLET (15 MG TOTAL) BY MOUTH AS NEEDED FOR MODERATE PAIN (PAIN SCORE 4-6)  omeprazole (PriLOSEC) 20 mg delayed release capsule 7/7/2019 at Unknown time  No Yes   Sig: Take 1 capsule (20 mg total) by mouth daily as needed (reflux)   rOPINIRole (REQUIP XL) 8 MG 24 hr tablet 7/7/2019 at Unknown time Self Yes Yes   Sig: Take 8 mg by mouth   rasagiline (AZILECT) 1 MG 7/7/2019 at Unknown time Self Yes Yes   Sig: Take 1 mg by mouth daily      Facility-Administered Medications: None       Past Medical History:   Diagnosis Date    GERD (gastroesophageal reflux disease)     Parkinson disease ([address] )        Past Surgical History:   Procedure Laterality Date    TONSILLECTOMY         Family History   Problem Relation Age of Onset    Dementia Mother     Alcohol abuse Neg Hx     Mental illness Neg Hx     Substance Abuse Neg Hx     Depression Neg Hx      I have reviewed and agree with the history as documented  Social History     Tobacco Use    Smoking status: Never Smoker    Smokeless tobacco: Never Used   Substance Use Topics    Alcohol use: Yes     Comment: Rare    Drug use: Never        Review of Systems   Constitutional: Negative  HENT: Negative  Eyes: Negative  Respiratory: Negative  Cardiovascular: Negative  Genitourinary: Negative  Musculoskeletal: Positive for arthralgias (R hip pain) and back pain  Negative for joint swelling  Skin: Negative for color change, rash and wound  Neurological: Positive for tremors (Parkinsons)  Negative for weakness and numbness  History of Parkinson's disease  Later, upon walking, described a shooting "lightning" pain from her R lower back and to her R buttock   Psychiatric/Behavioral: Negative  All other systems reviewed and are negative        Physical Exam  ED Triage Vitals [07/07/19 0954]   Temperature Pulse Respirations Blood Pressure SpO2   98 1 °F (36 7 °C) 86 20 125/75 98 %      Temp Source Heart Rate Source Patient Position - Orthostatic VS BP Location FiO2 (%)   Tympanic Monitor Sitting Left arm --      Pain Score       Worst Possible Pain             Orthostatic Vital Signs  Vitals:    07/07/19 0954 07/07/19 1126 07/07/19 1230 07/07/19 1330   BP: 125/75 103/64 116/70 110/89   Pulse: 86 63 69 80   Patient Position - Orthostatic VS: Sitting Lying         Physical Exam   Constitutional: She is oriented to person, place, and time  She appears well-developed and well-nourished  HENT:   Head: Normocephalic and atraumatic  Eyes: EOM are normal    Neck: Normal range of motion  Neck supple  Cardiovascular: Normal rate, regular rhythm and normal heart sounds  Exam reveals no friction rub  No murmur heard  Pulmonary/Chest: Effort normal and breath sounds normal  No respiratory distress  She has no wheezes  She has no rales  Abdominal: Soft  Bowel sounds are normal  She exhibits no distension  There is no tenderness  There is no guarding  Musculoskeletal: Normal range of motion  She exhibits no edema, tenderness or deformity  Right shoulder: She exhibits pain  She exhibits normal range of motion, no bony tenderness, no swelling, no deformity, no laceration, normal pulse and normal strength  ROM normal on passive extension of R hip, although pain is present at the end-point of hip flexion  No tenderness on palpation of spine or hip where pain is present  Great pain and difficulty with ambulation  Neurological: She is alert and oriented to person, place, and time  No sensory deficit  Coordination normal    Pt has history of Parkinson's disease   Skin: Skin is warm  No rash noted  No erythema  Psychiatric: She has a normal mood and affect   Her behavior is normal  Judgment and thought content normal        ED Medications  Medications   amantadine (SYMMETREL) capsule 100 mg (100 mg Oral Not Given 7/7/19 7528)   LORazepam (ATIVAN) tablet 0 5 mg (has no administration in time range)   pantoprazole (PROTONIX) EC tablet 20 mg (has no administration in time range)   calcium carbonate (TUMS) chewable tablet 500 mg (has no administration in time range)   cholecalciferol (VITAMIN D3) tablet 2,000 Units (2,000 Units Oral Not Given 7/7/19 1559)   tiZANidine (ZANAFLEX) tablet 2 mg (has no administration in time range)   rasagiline (AZILECT) tablet 1 mg (1 mg Oral Not Given 7/7/19 1600)   rOPINIRole (REQUIP XL) 24 hr tablet 8 mg (has no administration in time range)   enoxaparin (LOVENOX) subcutaneous injection 40 mg (40 mg Subcutaneous Given 7/7/19 1602)   acetaminophen (TYLENOL) tablet 975 mg (975 mg Oral Given 7/7/19 1601)   oxyCODONE (ROXICODONE) IR tablet 2 5 mg (has no administration in time range)   menthol-methyl salicylate (BENGAY) 61-84 % cream (has no administration in time range)   lidocaine (LIDODERM) 5 % patch 2 patch (2 patches Topical Medication Applied 7/7/19 1602)   methocarbamol (ROBAXIN) tablet 500 mg (500 mg Oral Given 7/7/19 1601)   carbidopa-levodopa (SINEMET)  mg per tablet 0 5 tablet (has no administration in time range)   entacapone (COMTAN) tablet 200 mg (has no administration in time range)   acetaminophen (TYLENOL) tablet 650 mg (650 mg Oral Given 7/7/19 1055)   ibuprofen (MOTRIN) tablet 400 mg (400 mg Oral Given 7/7/19 1057)       Diagnostic Studies  Results Reviewed     None                 XR hip/pelv 2-3 vws right   Final Result by Lisa Coon MD (07/07 1330)      No acute osseous abnormality  Workstation performed: [id number]               Procedures  Procedures        ED Course           Identification of Seniors at Risk      Most Recent Value   (ISAR) Identification of Seniors at Risk   Before the illness or injury that brought you to the Emergency, did you need someone to help you on a regular basis?   0 Filed at: 07/07/2019 0956   In the last 24 hours, have you needed more help than usual?  1 Filed at: 07/07/2019 7898   Have you been hospitalized for one or more nights during the past 6 months? 0 Filed at: 07/07/2019 0956   In general, do you see well?  0 Filed at: 07/07/2019 0956   In general, do you have serious problems with your memory? 0 Filed at: 07/07/2019 5947   Do you take more than three different medications every day? 1 Filed at: 07/07/2019 0956   ISAR Score  2 Filed at: 07/07/2019 8939                          [facility]  Number of Diagnoses or Management Options  Right hip pain:   Right low back pain:   Diagnosis management comments: DDx: Hip fracture, muscular pain, vs sciatica  Given patient's history of osteoporosis and fracture, it is recommended that she be evaluated for possible R hip fracture through XR  Patient has been given advil 400mg and acetaminophen 650mg and continues to have significant pain  XR of right hip appears normal  Patient will be admitted to observation       Disposition  Final diagnoses:   Right hip pain   Right low back pain     Time reflects when diagnosis was documented in both MDM as applicable and the Disposition within this note     Time User Action Codes Description Comment    7/7/2019 12:29 PM Antoni Irving [M25 551] Right hip pain     7/7/2019 12:29 PM Nathan, [address] [place] [address] [M54 5] Right low back pain       ED Disposition     ED Disposition Condition Date/Time Comment    Admit Stable Sun Jul 7, 2019 12:41 PM Case was discussed with VAHID and the patient's admission status was agreed to be Admission Status: observation status to the service of Dr Ap Jeronimo          Follow-up Information    None         Current Discharge Medication List      CONTINUE these medications which have NOT CHANGED    Details   amantadine (SYMMETREL) 100 mg capsule Take 100 mg by mouth daily      calcium carbonate (TUMS) 500 mg chewable tablet Chew 1 tablet (500 mg total) 2 (two) times a day  Qty: 60 tablet, Refills: 0    Associated Diagnoses: Other osteoporosis without current pathological fracture      carbidopa-levodopa-entacapone (STALEVO) 12 5- MG per tablet TAKE 1 TABLET BY MOUTH 6 (SIX) TIMES A DAY  Refills: 11      Cholecalciferol (VITAMIN D3) 2000 units capsule Take 1 capsule (2,000 Units total) by mouth daily  Qty: 90 capsule, Refills: 0    Associated Diagnoses: Other osteoporosis without current pathological fracture; Vitamin D deficiency      COD LIVER OIL PO Take by mouth      LORazepam (ATIVAN) 1 mg tablet Take 0 5 mg by mouth      meloxicam (MOBIC) 15 mg tablet TAKE 1 TABLET (15 MG TOTAL) BY MOUTH AS NEEDED FOR MODERATE PAIN (PAIN SCORE 4-6)  omeprazole (PriLOSEC) 20 mg delayed release capsule Take 1 capsule (20 mg total) by mouth daily as needed (reflux)  Qty: 90 capsule, Refills: 0    Associated Diagnoses: Esophagitis, reflux      rasagiline (AZILECT) 1 MG Take 1 mg by mouth daily      rOPINIRole (REQUIP XL) 8 MG 24 hr tablet Take 8 mg by mouth         STOP taking these medications       tiZANidine (ZANAFLEX) 2 mg tablet Comments:   Reason for Stopping:             No discharge procedures on file  ED Provider  Attending physically available and evaluated Suze Fisher I managed the patient along with the ED Attending      Electronically Signed by         Christianne Morgan MD  07/07/19 Tamie Morgan MD  07/07/19 Tamie Morgan MD  07/07/19 7845

## 2019-07-08 ENCOUNTER — APPOINTMENT (OUTPATIENT)
Dept: RADIOLOGY | Facility: HOSPITAL | Age: 69
DRG: 517 | End: 2019-07-08
Payer: MEDICARE

## 2019-07-08 LAB
ANION GAP SERPL CALCULATED.3IONS-SCNC: 4 MMOL/L (ref 4–13)
BUN SERPL-MCNC: 18 MG/DL (ref 5–25)
CALCIUM SERPL-MCNC: 9.1 MG/DL (ref 8.3–10.1)
CHLORIDE SERPL-SCNC: 108 MMOL/L (ref 100–108)
CO2 SERPL-SCNC: 25 MMOL/L (ref 21–32)
CREAT SERPL-MCNC: 0.69 MG/DL (ref 0.6–1.3)
GFR SERPL CREATININE-BSD FRML MDRD: 90 ML/MIN/1.73SQ M
GLUCOSE SERPL-MCNC: 92 MG/DL (ref 65–140)
PLATELET # BLD AUTO: 254 THOUSANDS/UL (ref 149–390)
PMV BLD AUTO: 10.1 FL (ref 8.9–12.7)
POTASSIUM SERPL-SCNC: 3.7 MMOL/L (ref 3.5–5.3)
SODIUM SERPL-SCNC: 137 MMOL/L (ref 136–145)

## 2019-07-08 PROCEDURE — 80048 BASIC METABOLIC PNL TOTAL CA: CPT | Performed by: INTERNAL MEDICINE

## 2019-07-08 PROCEDURE — 85049 AUTOMATED PLATELET COUNT: CPT | Performed by: INTERNAL MEDICINE

## 2019-07-08 PROCEDURE — 72080 X-RAY EXAM THORACOLMB 2/> VW: CPT

## 2019-07-08 PROCEDURE — 99232 SBSQ HOSP IP/OBS MODERATE 35: CPT | Performed by: INTERNAL MEDICINE

## 2019-07-08 RX ORDER — HYDROMORPHONE HCL/PF 1 MG/ML
0.2 SYRINGE (ML) INJECTION EVERY 4 HOURS PRN
Status: DISCONTINUED | OUTPATIENT
Start: 2019-07-08 | End: 2019-07-09

## 2019-07-08 RX ADMIN — ENOXAPARIN SODIUM 40 MG: 40 INJECTION SUBCUTANEOUS at 09:04

## 2019-07-08 RX ADMIN — VITAMIN D, TAB 1000IU (100/BT) 2000 UNITS: 25 TAB at 09:02

## 2019-07-08 RX ADMIN — ENTACAPONE 200 MG: 200 TABLET ORAL at 06:00

## 2019-07-08 RX ADMIN — CARBIDOPA AND LEVODOPA 0.5 TABLET: 25; 100 TABLET ORAL at 12:48

## 2019-07-08 RX ADMIN — METHOCARBAMOL 500 MG: 500 TABLET, FILM COATED ORAL at 17:55

## 2019-07-08 RX ADMIN — TIZANIDINE 2 MG: 4 TABLET ORAL at 12:51

## 2019-07-08 RX ADMIN — LIDOCAINE 2 PATCH: 50 PATCH CUTANEOUS at 09:04

## 2019-07-08 RX ADMIN — METHOCARBAMOL 500 MG: 500 TABLET, FILM COATED ORAL at 00:11

## 2019-07-08 RX ADMIN — AMANTADINE HYDROCHLORIDE 100 MG: 100 CAPSULE, LIQUID FILLED ORAL at 09:03

## 2019-07-08 RX ADMIN — CARBIDOPA AND LEVODOPA 0.5 TABLET: 25; 100 TABLET ORAL at 06:00

## 2019-07-08 RX ADMIN — ENTACAPONE 200 MG: 200 TABLET ORAL at 19:59

## 2019-07-08 RX ADMIN — ENTACAPONE 200 MG: 200 TABLET ORAL at 16:21

## 2019-07-08 RX ADMIN — METHOCARBAMOL 500 MG: 500 TABLET, FILM COATED ORAL at 12:08

## 2019-07-08 RX ADMIN — CARBIDOPA AND LEVODOPA 0.5 TABLET: 25; 100 TABLET ORAL at 09:02

## 2019-07-08 RX ADMIN — ACETAMINOPHEN 975 MG: 325 TABLET ORAL at 22:12

## 2019-07-08 RX ADMIN — ENTACAPONE 200 MG: 200 TABLET ORAL at 22:12

## 2019-07-08 RX ADMIN — OXYCODONE HYDROCHLORIDE 2.5 MG: 5 TABLET ORAL at 12:07

## 2019-07-08 RX ADMIN — CARBIDOPA AND LEVODOPA 0.5 TABLET: 25; 100 TABLET ORAL at 19:59

## 2019-07-08 RX ADMIN — ENTACAPONE 200 MG: 200 TABLET ORAL at 12:48

## 2019-07-08 RX ADMIN — CARBIDOPA AND LEVODOPA 0.5 TABLET: 25; 100 TABLET ORAL at 16:21

## 2019-07-08 RX ADMIN — PANTOPRAZOLE SODIUM 20 MG: 20 TABLET, DELAYED RELEASE ORAL at 05:22

## 2019-07-08 RX ADMIN — OXYCODONE HYDROCHLORIDE 2.5 MG: 5 TABLET ORAL at 05:59

## 2019-07-08 RX ADMIN — OXYCODONE HYDROCHLORIDE 2.5 MG: 5 TABLET ORAL at 19:58

## 2019-07-08 RX ADMIN — ENTACAPONE 200 MG: 200 TABLET ORAL at 09:04

## 2019-07-08 RX ADMIN — ACETAMINOPHEN 975 MG: 325 TABLET ORAL at 16:21

## 2019-07-08 RX ADMIN — ACETAMINOPHEN 975 MG: 325 TABLET ORAL at 05:22

## 2019-07-08 RX ADMIN — CARBIDOPA AND LEVODOPA 0.5 TABLET: 25; 100 TABLET ORAL at 22:14

## 2019-07-08 RX ADMIN — METHOCARBAMOL 500 MG: 500 TABLET, FILM COATED ORAL at 05:22

## 2019-07-08 NOTE — PHYSICAL THERAPY NOTE
Physical Therapy Cancellation Note      PT orders received, chart reviewed  Pt currently pending orthopedic surgery consult and thoracolumbar XR review  PT to continue to follow and evaluate pending results when able      Carmen Franco, PT

## 2019-07-08 NOTE — SOCIAL WORK
Initial interview:     CM met with the patient and her Dtr Diane, at bedside, to review the CM role and discuss possible dc needs  Pt lives with  in a 2 story home in [place], [place]  [address]  2nd floor bedroom and bathroom  Pt is independent, works part-time and drives  Pt uses a walking stick, as needed  No other DME  No hx of VNA  or IP rehab  Pt denied drug, etoh or mental illness history  No POA or LW  Prescriptions are filled at [place] [address]  Main contact: Dtjhon Bedolla [phone]    JA Plan - lives w , home via Dtr  [facility] consult and PT/OT evals for dc needs  CM reviewed d/c planning process including the following: identifying help at home, patient preference for d/c planning needs, Discharge Lounge, [facility] Meds to Bed program, availability of treatment team to discuss questions or concerns patient and/or family may have regarding understanding medications and recognizing signs and symptoms once discharged  CM also encouraged patient to follow up with all recommended appointments after discharge  Patient advised of importance for patient and family to participate in managing patients medical well being

## 2019-07-08 NOTE — PLAN OF CARE
Problem: CARDIOVASCULAR - ADULT  Goal: Maintains optimal cardiac output and hemodynamic stability  Description  INTERVENTIONS:  - Monitor I/O, vital signs and rhythm  - Monitor for S/S and trends of decreased cardiac output i e  bleeding, hypotension  - Administer and titrate ordered vasoactive medications to optimize hemodynamic stability  - Assess quality of pulses, skin color and temperature  - Assess for signs of decreased coronary artery perfusion - ex   Angina  - Instruct patient to report change in severity of symptoms  Outcome: Progressing  Goal: Absence of cardiac dysrhythmias or at baseline rhythm  Description  INTERVENTIONS:  - Continuous cardiac monitoring, monitor vital signs, obtain 12 lead EKG if indicated  - Administer antiarrhythmic and heart rate control medications as ordered  - Monitor electrolytes and administer replacement therapy as ordered  Outcome: Progressing     Problem: RESPIRATORY - ADULT  Goal: Achieves optimal ventilation and oxygenation  Description  INTERVENTIONS:  - Assess for changes in respiratory status  - Assess for changes in mentation and behavior  - Position to facilitate oxygenation and minimize respiratory effort  - Oxygen administration by appropriate delivery method based on oxygen saturation (per order) or ABGs  - Initiate smoking cessation education as indicated  - Encourage broncho-pulmonary hygiene including cough, deep breathe, Incentive Spirometry  - Assess the need for suctioning and aspirate as needed  - Assess and instruct to report SOB or any respiratory difficulty  - Respiratory Therapy support as indicated  Outcome: Progressing     Problem: METABOLIC, FLUID AND ELECTROLYTES - ADULT  Goal: Electrolytes maintained within normal limits  Description  INTERVENTIONS:  - Monitor labs and assess patient for signs and symptoms of electrolyte imbalances  - Administer electrolyte replacement as ordered  - Monitor response to electrolyte replacements, including repeat lab results as appropriate  - Instruct patient on fluid and nutrition as appropriate  Outcome: Progressing  Goal: Fluid balance maintained  Description  INTERVENTIONS:  - Monitor labs and assess for signs and symptoms of volume excess or deficit  - Monitor I/O and WT  - Instruct patient on fluid and nutrition as appropriate  Outcome: Progressing  Goal: Glucose maintained within target range  Description  INTERVENTIONS:  - Monitor Blood Glucose as ordered  - Assess for signs and symptoms of hyperglycemia and hypoglycemia  - Administer ordered medications to maintain glucose within target range  - Assess nutritional intake and initiate nutrition service referral as needed  Outcome: Progressing     Problem: SKIN/TISSUE INTEGRITY - ADULT  Goal: Skin integrity remains intact  Description  INTERVENTIONS  - Identify patients at risk for skin breakdown  - Assess and monitor skin integrity  - Assess and monitor nutrition and hydration status  - Monitor labs (i e  albumin)  - Assess for incontinence   - Turn and reposition patient  - Assist with mobility/ambulation  - Relieve pressure over bony prominences  - Avoid friction and shearing  - Provide appropriate hygiene as needed including keeping skin clean and dry  - Evaluate need for skin moisturizer/barrier cream  - Collaborate with interdisciplinary team (i e  Nutrition, Rehabilitation, etc )   - Patient/family teaching  Outcome: Progressing     Problem: MUSCULOSKELETAL - ADULT  Goal: Maintain or return mobility to safest level of function  Description  INTERVENTIONS:  - Assess patient's ability to carry out ADLs; assess patient's baseline for ADL function and identify physical deficits which impact ability to perform ADLs (bathing, care of mouth/teeth, toileting, grooming, dressing, etc )  - Assess/evaluate cause of self-care deficits   - Assess range of motion  - Assess patient's mobility; develop plan if impaired  - Assess patient's need for assistive devices and provide as appropriate  - Encourage maximum independence but intervene and supervise when necessary  - Involve family in performance of ADLs  - Assess for home care needs following discharge   - Request OT consult to assist with ADL evaluation and planning for discharge  - Provide patient education as appropriate  Outcome: Progressing  Goal: Maintain proper alignment of affected body part  Description  INTERVENTIONS:  - Support, maintain and protect limb and body alignment  - Provide pt/fam with appropriate education  Outcome: Progressing     Problem: PAIN - ADULT  Goal: Verbalizes/displays adequate comfort level or baseline comfort level  Description  Interventions:  - Encourage patient to monitor pain and request assistance  - Assess pain using appropriate pain scale  - Administer analgesics based on type and severity of pain and evaluate response  - Implement non-pharmacological measures as appropriate and evaluate response  - Consider cultural and social influences on pain and pain management  - Notify physician/advanced practitioner if interventions unsuccessful or patient reports new pain  Outcome: Progressing     Problem: INFECTION - ADULT  Goal: Absence or prevention of progression during hospitalization  Description  INTERVENTIONS:  - Assess and monitor for signs and symptoms of infection  - Monitor lab/diagnostic results  - Monitor all insertion sites, i e  indwelling lines, tubes, and drains  - Monitor endotracheal (as able) and nasal secretions for changes in amount and color  - [place] appropriate cooling/warming therapies per order  - Administer medications as ordered  - Instruct and encourage patient and family to use good hand hygiene technique  - Identify and instruct in appropriate isolation precautions for identified infection/condition  Outcome: Progressing  Goal: Absence of fever/infection during neutropenic period  Description  INTERVENTIONS:  - Monitor WBC  - Implement neutropenic guidelines  Outcome: Progressing     Problem: SAFETY ADULT  Goal: Patient will remain free of falls  Description  INTERVENTIONS:  - Assess patient frequently for physical needs  -  Identify cognitive and physical deficits and behaviors that affect risk of falls    -  [place] fall precautions as indicated by assessment   - Educate patient/family on patient safety including physical limitations  - Instruct patient to call for assistance with activity based on assessment  - Modify environment to reduce risk of injury  - Consider OT/PT consult to assist with strengthening/mobility  Outcome: Progressing  Goal: Maintain or return to baseline ADL function  Description  INTERVENTIONS:  -  Assess patient's ability to carry out ADLs; assess patient's baseline for ADL function and identify physical deficits which impact ability to perform ADLs (bathing, care of mouth/teeth, toileting, grooming, dressing, etc )  - Assess/evaluate cause of self-care deficits   - Assess range of motion  - Assess patient's mobility; develop plan if impaired  - Assess patient's need for assistive devices and provide as appropriate  - Encourage maximum independence but intervene and supervise when necessary  ¯ Involve family in performance of ADLs  ¯ Assess for home care needs following discharge   ¯ Request OT consult to assist with ADL evaluation and planning for discharge  ¯ Provide patient education as appropriate  Outcome: Progressing  Goal: Maintain or return mobility status to optimal level  Description  INTERVENTIONS:  - Assess patient's baseline mobility status (ambulation, transfers, stairs, etc )    - Identify cognitive and physical deficits and behaviors that affect mobility  - Identify mobility aids required to assist with transfers and/or ambulation (gait belt, sit-to-stand, lift, walker, cane, etc )  - [place] fall precautions as indicated by assessment  - Record patient progress and toleration of activity level on Mobility SBAR; progress patient to next Phase/Stage  - Instruct patient to call for assistance with activity based on assessment  - Request Rehabilitation consult to assist with strengthening/weightbearing, etc   Outcome: Progressing     Problem: DISCHARGE PLANNING  Goal: Discharge to home or other facility with appropriate resources  Description  INTERVENTIONS:  - Identify barriers to discharge w/patient and caregiver  - Arrange for needed discharge resources and transportation as appropriate  - Identify discharge learning needs (meds, wound care, etc )  - Arrange for interpretive services to assist at discharge as needed  - Refer to Case Management Department for coordinating discharge planning if the patient needs post-hospital services based on physician/advanced practitioner order or complex needs related to functional status, cognitive ability, or social support system  Outcome: Progressing     Problem: Knowledge Deficit  Goal: Patient/family/caregiver demonstrates understanding of disease process, treatment plan, medications, and discharge instructions  Description  Complete learning assessment and assess knowledge base    Interventions:  - Provide teaching at level of understanding  - Provide teaching via preferred learning methods  Outcome: Progressing

## 2019-07-08 NOTE — ASSESSMENT & PLAN NOTE
Back pain of 2-3 weeks duration worsened by weight-bearing and ambulation  Limiting her activities of daily living  Likely musculoskeletal  Will provided with analgesics, muscle relaxants, heating pad, lidocaine patch  Supportive care  Physical therapy  Consult Ortho  MRI

## 2019-07-08 NOTE — OCCUPATIONAL THERAPY NOTE
Occupational Therapy Cancellation Note    Orders received, chart reviewed  Pt currently pending read of X-ray thoracolumbar spine and pending orthopedic surgery consult  OT to hold pending results & pending any activity/WB restrictions   Thank you    Arash Solano, OT

## 2019-07-08 NOTE — UTILIZATION REVIEW
Initial Clinical Review    Admission: Date/Time/Statement: OBSERVATION 7/7/19 @ 1242    Orders Placed This Encounter   Procedures    Place in Observation     Standing Status:   Standing     Number of Occurrences:   1     Order Specific Question:   Admitting Physician     Answer:   Eliseo Russell     Order Specific Question:   Level of Care     Answer:   Med Surg [16]     ED Arrival Information     Expected Arrival Acuity Means of Arrival Escorted By Service Admission Type    - 7/7/2019 09:41 Urgent Walk-In Self Hospitalist Urgent    Arrival Complaint    -        Chief Complaint   Patient presents with    Hip Pain     pt c/o hip pain and low back pain since last wednesday  was seen at [facility] and told it was muscular  states pain has gotten worse    Back Pain     Assessment/Plan:    Rosie Briones is a 80 y o  female who presents with hip pain, low back pain especially on the right side with ambulatory dysfunction since 2-3 weeks  Patient presents with worsening hip pain especially on the right side which is now radiating to her back which is worsened by mobility, weight-bearing  She reports pain is severe, no radiation down the legs to her lower extremities or feet, she describes the pain as "electric shocks, pins being pricked into her back" patient reports no aggravating or relieving factors  She has tried NSAIDs with minimal relief  She reports inability to bear weight and ambulate due to the pain  She also reports pain is limiting her activities of daily living  Denies lower extremity weakness or sensory symptoms  Denies history of fever chills sweats or constitutional symptoms  Denies urinary symptoms  Denies abdominal pain nausea vomiting or diarrhea  Denies rash arthritis arthralgia myalgias  Denies chest pain shortness of breath palpitations presyncope syncope  Denies cough chest tightness wheezing      7/8 AFTERNOON - DIAGNOSTICS STILL PENDING - XRAY SPINE, MRI LS SPINE     * Back pain  Assessment & Plan  Back pain of 2-3 weeks duration worsened by weight-bearing and ambulation  Limiting her activities of daily living  Likely musculoskeletal  Will provided with analgesics, muscle relaxants, heating pad, lidocaine patch  Supportive care  Physical therapy     Ambulatory dysfunction  Assessment & Plan  Worsened due to back pain  Safe ambulation fall precautions  Physical therapy     Parkinson's disease ([facility] [address] )  Assessment & Plan  Continue antiparkinsonian medications  Supportive care     Osteoporosis  Assessment & Plan  Continue vit D, calcium      VTE Prophylaxis: Enoxaparin (Lovenox)  / sequential compression device   Code Status:  Full code  Anticipated Length of Stay:  Patient will be admitted on an Observation basis with an anticipated length of stay of  Less than 2 midnights        ED Triage Vitals [07/07/19 0954]   Temperature Pulse Respirations Blood Pressure SpO2   98 1 °F (36 7 °C) 86 20 125/75 98 %      Temp Source Heart Rate Source Patient Position - Orthostatic VS BP Location FiO2 (%)   Tympanic Monitor Sitting Left arm --      Pain Score       Worst Possible Pain        Wt Readings from Last 1 Encounters:   07/07/19 61 4 kg (135 lb 5 8 oz)     Additional Vital Signs:   07/07/19 2205  98 8 °F (37 1 °C)  82  20  114/67  95 %     07/07/19 1900  97 9 °F (36 6 °C)  66    90/60       07/07/19 1330    80  18  110/89  98 %  None (Room air)   07/07/19 1230    69  20  116/70  97 %  None (Room air)   07/07/19 1126  98 7 °F (37 1 °C)  63  20  103/64  97 %  None (Room air)   07/07/19 0954  98 1 °F (36 7 °C)  86  20  125/75  98 %  None (Room air)     Pertinent Labs/Diagnostic Test Results:     7/7 XRAY R HIP -  No acute osseous abnormality      7/7 XRAY SPINE THORACOLUMBAR - PENDING     Results from last 7 days   Lab Units 07/08/19  0552 07/07/19  2054   WBC Thousand/uL  --  8 94   HEMOGLOBIN g/dL  --  13 7   HEMATOCRIT %  --  41 1   PLATELETS Thousands/uL 254 243         Results from last 7 days   Lab Units 07/08/19  0553 07/07/19 2054   SODIUM mmol/L 137 140   POTASSIUM mmol/L 3 7 3 7   CHLORIDE mmol/L 108 107   CO2 mmol/L 25 27   ANION GAP mmol/L 4 6   BUN mg/dL 18 17   CREATININE mg/dL 0 69 0 76   EGFR ml/min/1 73sq m 90 81   CALCIUM mg/dL 9 1 8 7     Results from last 7 days   Lab Units 07/07/19  2054   AST U/L 15   ALT U/L 9*   ALK PHOS U/L 103   TOTAL PROTEIN g/dL 6 9   ALBUMIN g/dL 3 7   TOTAL BILIRUBIN mg/dL 0 46     Results from last 7 days   Lab Units 07/08/19  0553 07/07/19 2054   GLUCOSE RANDOM mg/dL 92 105     Results from last 7 days   Lab Units 07/07/19  2246   AMPH/METH  Negative   BARBITURATE UR  Negative   BENZODIAZEPINE UR  Negative   COCAINE UR  Negative   METHADONE URINE  Negative   OPIATE UR  Negative   PCP UR  Negative   THC UR  Negative     ED Treatment:   Medication Administration from 07/07/2019 0941 to 07/07/2019 1429       Date/Time Order Dose Route Action     07/07/2019 1055 acetaminophen (TYLENOL) tablet 650 mg 650 mg Oral Given     07/07/2019 1057 ibuprofen (MOTRIN) tablet 400 mg 400 mg Oral Given        Past Medical History:   Diagnosis Date    GERD (gastroesophageal reflux disease)     Parkinson disease ([facility])      Present on Admission:   Parkinson's disease ([address] )   Pain of both hip joints   Osteoporosis   Hyperlipidemia   Anxiety state    Admitting Diagnosis: Back pain [M54 9]  Right hip pain [M25 551]  Right low back pain [M54 5]     Age/Sex: 80 y o  female     Admission Orders:    Current Facility-Administered Medications:  acetaminophen 975 mg Oral Q8H [facility]    amantadine 100 mg Oral Daily    calcium carbonate 500 mg Oral BID    carbidopa-levodopa 0 5 tablet Oral 6x Daily    cholecalciferol 2,000 Units Oral Daily    enoxaparin 40 mg Subcutaneous Daily    entacapone 200 mg Oral 6x Daily    lidocaine 2 patch Topical Daily    LORazepam 0 5 mg Oral Daily PRN    menthol-methyl salicylate  Apply externally 4x Daily PRN    methocarbamol 500 mg Oral Q6H [facility] oxyCODONE 2 5 mg Oral Q6H PRN x2 7/7 and x 1 7/8   pantoprazole 20 mg Oral Early Morning    rasagiline 1 mg Oral Daily    rOPINIRole 8 mg Oral HS    tiZANidine 2 mg Oral BID PRN      OBSERVATION   SCDs  XRAY SPINE THORACOLUMBAR   REGULAR DIET   PT/OT EVAL/TX       Network Utilization Review Department  Phone: [phone]; Fax [phone]  [email or web address]  ATTENTION: Please call with any questions or concerns to [phone]  and carefully listen to the prompts so that you are directed to the right person  Send all requests for admission clinical reviews, approved or denied determinations and any other requests to fax [phone]   All voicemails are confidential

## 2019-07-08 NOTE — PROGRESS NOTES
Patient complaining of 10/10 pain in lower back  Morning medications given along with her prn dose of oxycodone  Patient resting comfortably in bed, no other complaints  Call button within reach and bed alarm on

## 2019-07-08 NOTE — RESTORATIVE TECHNICIAN NOTE
Restorative Specialist Mobility Note       Activity: Other (Comment)(Educated/encouraged pt to ambulate with assistance 3-4 x's/day, pt refused 2* waiting for x-ray results RN [address] aware   Pt callbell, phone/tray within reach )       Jo SCHULER, Restorative Technician, [employer]

## 2019-07-08 NOTE — PHYSICIAN ADVISOR
Current patient class: Observation  The patient is currently on Hospital Day: 2 at [address] [place] [address]      This patient was originally admitted to the hospital under observation status  After admission the patient was reevaluated and determined to require further hospitalization  The patient is now documented to require at least a 2nd midnight in the hospital  As such the patient is now expected to satisfy the 2 midnight benchmark and is therefore eligible for inpatient admission  After review of the relevant documentation, labs, vital signs and test results, the patient is appropriate for INPATIENT ADMISSION  Admission to the hospital as an inpatient is a complex decision making process which requires the practitioner to consider the patients presenting complaint, history and physical examination and all relevant testing  With this in mind, in this case, the patient was deemed appropriate for INPATIENT ADMISSION  After review of the documentation and testing available at the time of the admission I concur with this clinical determination of medical necessity  Rationale is as follows: The patient is a 52-year-old female who presented to the emergency room on 07/07/2019 with a chief complaint severe hip and back pain  This was progressive  Patient also has history of Parkinson's disease  She has difficulty with weight-bearing due to pain  MRI of the lumbar spine has been ordered and Neurosurgery consultation is pending  PT and OT services are consulted  Inpatient level of care is appropriate  Patient is expected to remain hospitalized over 2 midnights      The patients vitals on arrival were ED Triage Vitals [07/07/19 0954]   Temperature Pulse Respirations Blood Pressure SpO2   98 1 °F (36 7 °C) 86 20 125/75 98 %      Temp Source Heart Rate Source Patient Position - Orthostatic VS BP Location FiO2 (%)   Tympanic Monitor Sitting Left arm --      Pain Score Worst Possible Pain           Past Medical History:   Diagnosis Date    GERD (gastroesophageal reflux disease)     Parkinson disease ([address] )      Past Surgical History:   Procedure Laterality Date    TONSILLECTOMY         The patient was admitted to the hospital at N/A on N/A for the following diagnosis:  Back pain [M54 9]  Right hip pain [M25 551]  Right low back pain [M54 5]    Consults have been placed to:   IP CONSULT TO NEUROSURGERY    Vitals:    07/07/19 2205 07/07/19 2224 07/08/19 0725 07/08/19 1520   BP: 114/67  107/64 119/61   BP Location:   Left arm Left arm   Pulse: 82  79 79   Resp: 20  18 18   Temp: 98 8 °F (37 1 °C)  97 5 °F (36 4 °C) 97 7 °F (36 5 °C)   TempSrc:   Oral Oral   SpO2: 95%  96% 97%   Weight:  61 4 kg (135 lb 5 8 oz)     Height:  5' 3" (1 6 m)         Most recent labs:    Recent Labs     07/07/19 2054 07/08/19  0552 07/08/19  0553   WBC 8 94  --   --    HGB 13 7  --   --    HCT 41 1  --   --     254  --    K 3 7  --  3 7   CALCIUM 8 7  --  9 1   BUN 17  --  18   CREATININE 0 76  --  0 69   AST 15  --   --    ALT 9*  --   --    ALKPHOS 103  --   --        Scheduled Meds:  Current Facility-Administered Medications:  acetaminophen 975 mg Oral Q8H [facility] Nancy Courtney MD   amantadine 100 mg Oral Daily Nancy Courtney MD   calcium carbonate 500 mg Oral BID Nancy Courtney MD   carbidopa-levodopa 0 5 tablet Oral 6x Daily Nancy Courtney MD   cholecalciferol 2,000 Units Oral Daily Nancy Courtney MD   enoxaparin 40 mg Subcutaneous Daily Nancy Courtney MD   entacapone 200 mg Oral 6x Daily Nancy Courtney MD   HYDROmorphone 0 2 mg Intravenous Q4H PRN Hetul Vincent, DO   lidocaine 2 patch Topical Daily Nancy Courtney MD   LORazepam 0 5 mg Oral Daily PRN Nancy Courtney MD   menthol-methyl salicylate  Apply externally 4x Daily PRN Nancy Courtney MD   methocarbamol 500 mg Oral Q6H [facility] Nancy Courtney MD   oxyCODONE 2 5 mg Oral Q6H PRN Nathan Munguia MD   pantoprazole 20 mg Oral Early Morning Nathan Munguia MD   rasagiline 1 mg Oral Daily Nathan Munguia MD   rOPINIRole 8 mg Oral HS Nathan Munguia MD   tiZANidine 2 mg Oral BID PRN Nathan Munguia MD     Continuous Infusions:   PRN Meds:  HYDROmorphone    LORazepam    menthol-methyl salicylate    oxyCODONE    tiZANidine    Surgical procedures (if appropriate):

## 2019-07-08 NOTE — PROGRESS NOTES
Progress Note - Marcos Lan 1950, 80 y o  female MRN: [id number]    Unit/Bed#: [facility] 925-01 Encounter: [id number]    Primary Care Provider: Jeffrey West MD   Date and time admitted to hospital: 2019  9:57 AM        * Back pain  Assessment & Plan  Back pain of 2-3 weeks duration worsened by weight-bearing and ambulation  Limiting her activities of daily living  Likely musculoskeletal  Will provided with analgesics, muscle relaxants, heating pad, lidocaine patch  Supportive care  Physical therapy  Consult Ortho  MRI    Ambulatory dysfunction  Assessment & Plan  Worsened due to back pain  Safe ambulation fall precautions  Physical therapy    Parkinson's disease ([facility] [address] )  Assessment & Plan  Continue antiparkinsonian medications  Supportive care    Osteoporosis  Assessment & Plan  Continue vit D, calcium      VTE Pharmacologic Prophylaxis:   Pharmacologic: Enoxaparin (Lovenox)  Mechanical VTE Prophylaxis in Place: No    Patient Centered Rounds: I have performed bedside rounds with nursing staff today  Time Spent for Care: 15 minutes  More than 50% of total time spent on counseling and coordination of care as described above  Current Length of Stay: 0 day(s)    Current Patient Status: Observation   Certification Statement: The patient will continue to require additional inpatient hospital stay due to Need to monitor symptoms    Discharge Plan:  Consult Ortho for continued home going pain  Code Status: Level 1 - Full Code      Subjective:   No acute distress    Objective:     Vitals:   Temp (24hrs), Av 1 °F (36 7 °C), Min:97 5 °F (36 4 °C), Max:98 8 °F (37 1 °C)    Temp:  [97 5 °F (36 4 °C)-98 8 °F (37 1 °C)] 97 5 °F (36 4 °C)  HR:  [66-82] 79  Resp:  [18-20] 18  BP: ()/(60-67) 107/64  SpO2:  [95 %-96 %] 96 %  Body mass index is 23 98 kg/m²  Input and Output Summary (last 24 hours):        Intake/Output Summary (Last 24 hours) at 2019 1527  Last data filed at 2019 1300  Gross per 24 hour   Intake 300 ml   Output 850 ml   Net -550 ml       Physical Exam:     Physical Exam   Constitutional: She is oriented to person, place, and time  HENT:   Head: Normocephalic and atraumatic  Cardiovascular: Exam reveals no friction rub  No murmur heard  Pulmonary/Chest: Effort normal  No stridor  No respiratory distress  Abdominal: Soft  Bowel sounds are normal  She exhibits no distension  There is no tenderness  Musculoskeletal: She exhibits no edema  Neurological: She is alert and oriented to person, place, and time  Additional Data:     Labs:    Results from last 7 days   Lab Units 07/08/19  0552 07/07/19 2054   WBC Thousand/uL  --  8 94   HEMOGLOBIN g/dL  --  13 7   HEMATOCRIT %  --  41 1   PLATELETS Thousands/uL 254 243     Results from last 7 days   Lab Units 07/08/19  0553 07/07/19 2054   POTASSIUM mmol/L 3 7 3 7   CHLORIDE mmol/L 108 107   CO2 mmol/L 25 27   BUN mg/dL 18 17   CREATININE mg/dL 0 69 0 76   CALCIUM mg/dL 9 1 8 7   ALK PHOS U/L  --  103   ALT U/L  --  9*   AST U/L  --  15           * I Have Reviewed All Lab Data Listed Above  * Additional Pertinent Lab Tests Reviewed:  All Labs Within Last 24 Hours Reviewed        Recent Cultures (last 7 days):           Last 24 Hours Medication List:     Current Facility-Administered Medications:  acetaminophen 975 mg Oral Q8H 900 Daniel Rosario MD   amantadine 100 mg Oral Daily David De La Garza MD   calcium carbonate 500 mg Oral BID David De La Garza MD   carbidopa-levodopa 0 5 tablet Oral 6x Daily David De La Garza MD   cholecalciferol 2,000 Units Oral Daily David De La Garza MD   enoxaparin 40 mg Subcutaneous Daily David De La Garza MD   entacapone 200 mg Oral 6x Daily David De La Garza MD   lidocaine 2 patch Topical Daily David De La Garza MD   LORazepam 0 5 mg Oral Daily PRN David De La Garza MD   menthol-methyl salicylate  Apply externally 4x Daily PRN Matilde Mayda Peralta MD   methocarbamol 500 mg Oral Q6H [facility] London Patton MD   oxyCODONE 2 5 mg Oral Q6H PRN London Patton MD   pantoprazole 20 mg Oral Early Morning London Patton MD   rasagiline 1 mg Oral Daily London Patton MD   rOPINIRole 8 mg Oral HS London Patton MD   tiZANidine 2 mg Oral BID PRN London Patton MD        Today, Patient Was Seen By: Amanda Moore DO    ** Please Note: Dictation voice to text software may have been used in the creation of this document   **

## 2019-07-09 ENCOUNTER — APPOINTMENT (INPATIENT)
Dept: RADIOLOGY | Facility: HOSPITAL | Age: 69
DRG: 517 | End: 2019-07-09
Payer: MEDICARE

## 2019-07-09 PROCEDURE — 99232 SBSQ HOSP IP/OBS MODERATE 35: CPT | Performed by: INTERNAL MEDICINE

## 2019-07-09 PROCEDURE — 99222 1ST HOSP IP/OBS MODERATE 55: CPT | Performed by: NEUROLOGICAL SURGERY

## 2019-07-09 PROCEDURE — 72148 MRI LUMBAR SPINE W/O DYE: CPT

## 2019-07-09 RX ORDER — OXYCODONE HYDROCHLORIDE 5 MG/1
TABLET ORAL
Status: COMPLETED
Start: 2019-07-09 | End: 2019-07-09

## 2019-07-09 RX ORDER — OXYCODONE HYDROCHLORIDE 5 MG/1
2.5 TABLET ORAL EVERY 4 HOURS PRN
Status: DISCONTINUED | OUTPATIENT
Start: 2019-07-09 | End: 2019-07-12 | Stop reason: HOSPADM

## 2019-07-09 RX ORDER — SENNOSIDES 8.6 MG
1 TABLET ORAL
Status: DISCONTINUED | OUTPATIENT
Start: 2019-07-09 | End: 2019-07-12 | Stop reason: HOSPADM

## 2019-07-09 RX ORDER — PANTOPRAZOLE SODIUM 20 MG/1
TABLET, DELAYED RELEASE ORAL
Status: COMPLETED
Start: 2019-07-09 | End: 2019-07-09

## 2019-07-09 RX ORDER — POLYETHYLENE GLYCOL 3350 17 G/17G
17 POWDER, FOR SOLUTION ORAL DAILY PRN
Status: DISCONTINUED | OUTPATIENT
Start: 2019-07-09 | End: 2019-07-12 | Stop reason: HOSPADM

## 2019-07-09 RX ORDER — METHOCARBAMOL 500 MG/1
TABLET, FILM COATED ORAL
Status: COMPLETED
Start: 2019-07-09 | End: 2019-07-09

## 2019-07-09 RX ORDER — DOCUSATE SODIUM 100 MG/1
100 CAPSULE, LIQUID FILLED ORAL 2 TIMES DAILY
Status: DISCONTINUED | OUTPATIENT
Start: 2019-07-09 | End: 2019-07-12 | Stop reason: HOSPADM

## 2019-07-09 RX ORDER — OXYCODONE HYDROCHLORIDE 5 MG/1
5 TABLET ORAL EVERY 4 HOURS PRN
Status: DISCONTINUED | OUTPATIENT
Start: 2019-07-09 | End: 2019-07-12 | Stop reason: HOSPADM

## 2019-07-09 RX ORDER — LORAZEPAM 1 MG/1
1 TABLET ORAL ONCE AS NEEDED
Status: COMPLETED | OUTPATIENT
Start: 2019-07-09 | End: 2019-07-09

## 2019-07-09 RX ORDER — ACETAMINOPHEN 325 MG/1
TABLET ORAL
Status: COMPLETED
Start: 2019-07-09 | End: 2019-07-09

## 2019-07-09 RX ORDER — HYDROMORPHONE HCL/PF 1 MG/ML
0.2 SYRINGE (ML) INJECTION EVERY 4 HOURS PRN
Status: DISCONTINUED | OUTPATIENT
Start: 2019-07-09 | End: 2019-07-12 | Stop reason: HOSPADM

## 2019-07-09 RX ADMIN — PANTOPRAZOLE SODIUM 20 MG: 20 TABLET, DELAYED RELEASE ORAL at 05:59

## 2019-07-09 RX ADMIN — ACETAMINOPHEN 975 MG: 325 TABLET ORAL at 22:15

## 2019-07-09 RX ADMIN — ENTACAPONE 200 MG: 200 TABLET ORAL at 22:15

## 2019-07-09 RX ADMIN — HYDROMORPHONE HYDROCHLORIDE 0.2 MG: 1 INJECTION, SOLUTION INTRAMUSCULAR; INTRAVENOUS; SUBCUTANEOUS at 11:36

## 2019-07-09 RX ADMIN — METHOCARBAMOL 500 MG: 500 TABLET, FILM COATED ORAL at 12:59

## 2019-07-09 RX ADMIN — CARBIDOPA AND LEVODOPA 0.5 TABLET: 25; 100 TABLET ORAL at 16:18

## 2019-07-09 RX ADMIN — OXYCODONE HYDROCHLORIDE: 5 TABLET ORAL at 04:15

## 2019-07-09 RX ADMIN — ENTACAPONE 200 MG: 200 TABLET ORAL at 16:18

## 2019-07-09 RX ADMIN — ENTACAPONE 200 MG: 200 TABLET ORAL at 09:06

## 2019-07-09 RX ADMIN — METHOCARBAMOL 500 MG: 500 TABLET, FILM COATED ORAL at 05:58

## 2019-07-09 RX ADMIN — ACETAMINOPHEN 975 MG: 325 TABLET ORAL at 15:32

## 2019-07-09 RX ADMIN — ENTACAPONE 200 MG: 200 TABLET ORAL at 13:00

## 2019-07-09 RX ADMIN — CARBIDOPA AND LEVODOPA 0.5 TABLET: 25; 100 TABLET ORAL at 06:00

## 2019-07-09 RX ADMIN — OXYCODONE HYDROCHLORIDE 5 MG: 5 TABLET ORAL at 15:32

## 2019-07-09 RX ADMIN — OXYCODONE HYDROCHLORIDE 2.5 MG: 5 TABLET ORAL at 04:15

## 2019-07-09 RX ADMIN — ACETAMINOPHEN 975 MG: 325 TABLET ORAL at 05:58

## 2019-07-09 RX ADMIN — CARBIDOPA AND LEVODOPA 0.5 TABLET: 25; 100 TABLET ORAL at 18:14

## 2019-07-09 RX ADMIN — LORAZEPAM 1 MG: 1 TABLET ORAL at 18:14

## 2019-07-09 RX ADMIN — ENTACAPONE 200 MG: 200 TABLET ORAL at 06:00

## 2019-07-09 RX ADMIN — METHOCARBAMOL 500 MG: 500 TABLET, FILM COATED ORAL at 00:01

## 2019-07-09 RX ADMIN — VITAMIN D, TAB 1000IU (100/BT) 2000 UNITS: 25 TAB at 09:05

## 2019-07-09 RX ADMIN — OXYCODONE HYDROCHLORIDE 2.5 MG: 5 TABLET ORAL at 10:16

## 2019-07-09 RX ADMIN — ENOXAPARIN SODIUM 40 MG: 40 INJECTION SUBCUTANEOUS at 09:05

## 2019-07-09 RX ADMIN — CARBIDOPA AND LEVODOPA 0.5 TABLET: 25; 100 TABLET ORAL at 09:22

## 2019-07-09 RX ADMIN — AMANTADINE HYDROCHLORIDE 100 MG: 100 CAPSULE, LIQUID FILLED ORAL at 09:07

## 2019-07-09 RX ADMIN — LIDOCAINE 2 PATCH: 50 PATCH CUTANEOUS at 09:05

## 2019-07-09 RX ADMIN — ENTACAPONE 200 MG: 200 TABLET ORAL at 18:15

## 2019-07-09 RX ADMIN — CARBIDOPA AND LEVODOPA 0.5 TABLET: 25; 100 TABLET ORAL at 12:59

## 2019-07-09 RX ADMIN — CARBIDOPA AND LEVODOPA 0.5 TABLET: 25; 100 TABLET ORAL at 22:15

## 2019-07-09 RX ADMIN — OXYCODONE HYDROCHLORIDE 5 MG: 5 TABLET ORAL at 22:17

## 2019-07-09 NOTE — NURSING NOTE
[facility]  Accudoses were down, medication was linked

## 2019-07-09 NOTE — OCCUPATIONAL THERAPY NOTE
Occupational Therapy Cancellation Note    Orders received, chart reviewed  Pt pending MRI lumbar spine & neurosurgery consult  OT to hold pending results & attempt evaluation as medically appropriate      Imelda Maguire, OT

## 2019-07-09 NOTE — PLAN OF CARE
Problem: CARDIOVASCULAR - ADULT  Goal: Maintains optimal cardiac output and hemodynamic stability  Description  INTERVENTIONS:  - Monitor I/O, vital signs and rhythm  - Monitor for S/S and trends of decreased cardiac output i e  bleeding, hypotension  - Administer and titrate ordered vasoactive medications to optimize hemodynamic stability  - Assess quality of pulses, skin color and temperature  - Assess for signs of decreased coronary artery perfusion - ex   Angina  - Instruct patient to report change in severity of symptoms  Outcome: Progressing  Goal: Absence of cardiac dysrhythmias or at baseline rhythm  Description  INTERVENTIONS:  - Continuous cardiac monitoring, monitor vital signs, obtain 12 lead EKG if indicated  - Administer antiarrhythmic and heart rate control medications as ordered  - Monitor electrolytes and administer replacement therapy as ordered  Outcome: Progressing     Problem: RESPIRATORY - ADULT  Goal: Achieves optimal ventilation and oxygenation  Description  INTERVENTIONS:  - Assess for changes in respiratory status  - Assess for changes in mentation and behavior  - Position to facilitate oxygenation and minimize respiratory effort  - Oxygen administration by appropriate delivery method based on oxygen saturation (per order) or ABGs  - Initiate smoking cessation education as indicated  - Encourage broncho-pulmonary hygiene including cough, deep breathe, Incentive Spirometry  - Assess the need for suctioning and aspirate as needed  - Assess and instruct to report SOB or any respiratory difficulty  - Respiratory Therapy support as indicated  Outcome: Progressing     Problem: METABOLIC, FLUID AND ELECTROLYTES - ADULT  Goal: Electrolytes maintained within normal limits  Description  INTERVENTIONS:  - Monitor labs and assess patient for signs and symptoms of electrolyte imbalances  - Administer electrolyte replacement as ordered  - Monitor response to electrolyte replacements, including repeat lab results as appropriate  - Instruct patient on fluid and nutrition as appropriate  Outcome: Progressing  Goal: Fluid balance maintained  Description  INTERVENTIONS:  - Monitor labs and assess for signs and symptoms of volume excess or deficit  - Monitor I/O and WT  - Instruct patient on fluid and nutrition as appropriate  Outcome: Progressing  Goal: Glucose maintained within target range  Description  INTERVENTIONS:  - Monitor Blood Glucose as ordered  - Assess for signs and symptoms of hyperglycemia and hypoglycemia  - Administer ordered medications to maintain glucose within target range  - Assess nutritional intake and initiate nutrition service referral as needed  Outcome: Progressing     Problem: SKIN/TISSUE INTEGRITY - ADULT  Goal: Skin integrity remains intact  Description  INTERVENTIONS  - Identify patients at risk for skin breakdown  - Assess and monitor skin integrity  - Assess and monitor nutrition and hydration status  - Monitor labs (i e  albumin)  - Assess for incontinence   - Turn and reposition patient  - Assist with mobility/ambulation  - Relieve pressure over bony prominences  - Avoid friction and shearing  - Provide appropriate hygiene as needed including keeping skin clean and dry  - Evaluate need for skin moisturizer/barrier cream  - Collaborate with interdisciplinary team (i e  Nutrition, Rehabilitation, etc )   - Patient/family teaching  Outcome: Progressing     Problem: MUSCULOSKELETAL - ADULT  Goal: Maintain or return mobility to safest level of function  Description  INTERVENTIONS:  - Assess patient's ability to carry out ADLs; assess patient's baseline for ADL function and identify physical deficits which impact ability to perform ADLs (bathing, care of mouth/teeth, toileting, grooming, dressing, etc )  - Assess/evaluate cause of self-care deficits   - Assess range of motion  - Assess patient's mobility; develop plan if impaired  - Assess patient's need for assistive devices and provide as appropriate  - Encourage maximum independence but intervene and supervise when necessary  - Involve family in performance of ADLs  - Assess for home care needs following discharge   - Request OT consult to assist with ADL evaluation and planning for discharge  - Provide patient education as appropriate  Outcome: Progressing  Goal: Maintain proper alignment of affected body part  Description  INTERVENTIONS:  - Support, maintain and protect limb and body alignment  - Provide pt/fam with appropriate education  Outcome: Progressing     Problem: PAIN - ADULT  Goal: Verbalizes/displays adequate comfort level or baseline comfort level  Description  Interventions:  - Encourage patient to monitor pain and request assistance  - Assess pain using appropriate pain scale  - Administer analgesics based on type and severity of pain and evaluate response  - Implement non-pharmacological measures as appropriate and evaluate response  - Consider cultural and social influences on pain and pain management  - Notify physician/advanced practitioner if interventions unsuccessful or patient reports new pain  Outcome: Progressing     Problem: INFECTION - ADULT  Goal: Absence or prevention of progression during hospitalization  Description  INTERVENTIONS:  - Assess and monitor for signs and symptoms of infection  - Monitor lab/diagnostic results  - Monitor all insertion sites, i e  indwelling lines, tubes, and drains  - Monitor endotracheal (as able) and nasal secretions for changes in amount and color  - [place] appropriate cooling/warming therapies per order  - Administer medications as ordered  - Instruct and encourage patient and family to use good hand hygiene technique  - Identify and instruct in appropriate isolation precautions for identified infection/condition  Outcome: Progressing  Goal: Absence of fever/infection during neutropenic period  Description  INTERVENTIONS:  - Monitor WBC  - Implement neutropenic guidelines  Outcome: Progressing     Problem: SAFETY ADULT  Goal: Patient will remain free of falls  Description  INTERVENTIONS:  - Assess patient frequently for physical needs  -  Identify cognitive and physical deficits and behaviors that affect risk of falls    -  [place] fall precautions as indicated by assessment   - Educate patient/family on patient safety including physical limitations  - Instruct patient to call for assistance with activity based on assessment  - Modify environment to reduce risk of injury  - Consider OT/PT consult to assist with strengthening/mobility  Outcome: Progressing  Goal: Maintain or return to baseline ADL function  Description  INTERVENTIONS:  -  Assess patient's ability to carry out ADLs; assess patient's baseline for ADL function and identify physical deficits which impact ability to perform ADLs (bathing, care of mouth/teeth, toileting, grooming, dressing, etc )  - Assess/evaluate cause of self-care deficits   - Assess range of motion  - Assess patient's mobility; develop plan if impaired  - Assess patient's need for assistive devices and provide as appropriate  - Encourage maximum independence but intervene and supervise when necessary  ¯ Involve family in performance of ADLs  ¯ Assess for home care needs following discharge   ¯ Request OT consult to assist with ADL evaluation and planning for discharge  ¯ Provide patient education as appropriate  Outcome: Progressing  Goal: Maintain or return mobility status to optimal level  Description  INTERVENTIONS:  - Assess patient's baseline mobility status (ambulation, transfers, stairs, etc )    - Identify cognitive and physical deficits and behaviors that affect mobility  - Identify mobility aids required to assist with transfers and/or ambulation (gait belt, sit-to-stand, lift, walker, cane, etc )  - [place] fall precautions as indicated by assessment  - Record patient progress and toleration of activity level on Mobility SBAR; progress patient to next Phase/Stage  - Instruct patient to call for assistance with activity based on assessment  - Request Rehabilitation consult to assist with strengthening/weightbearing, etc   Outcome: Progressing     Problem: DISCHARGE PLANNING  Goal: Discharge to home or other facility with appropriate resources  Description  INTERVENTIONS:  - Identify barriers to discharge w/patient and caregiver  - Arrange for needed discharge resources and transportation as appropriate  - Identify discharge learning needs (meds, wound care, etc )  - Arrange for interpretive services to assist at discharge as needed  - Refer to Case Management Department for coordinating discharge planning if the patient needs post-hospital services based on physician/advanced practitioner order or complex needs related to functional status, cognitive ability, or social support system  Outcome: Progressing     Problem: Knowledge Deficit  Goal: Patient/family/caregiver demonstrates understanding of disease process, treatment plan, medications, and discharge instructions  Description  Complete learning assessment and assess knowledge base  Interventions:  - Provide teaching at level of understanding  - Provide teaching via preferred learning methods  Outcome: Progressing     Problem: Potential for Falls  Goal: Patient will remain free of falls  Description  INTERVENTIONS:  - Assess patient frequently for physical needs  -  Identify cognitive and physical deficits and behaviors that affect risk of falls    -  [place] fall precautions as indicated by assessment   - Educate patient/family on patient safety including physical limitations  - Instruct patient to call for assistance with activity based on assessment  - Modify environment to reduce risk of injury  - Consider OT/PT consult to assist with strengthening/mobility  Outcome: Progressing

## 2019-07-09 NOTE — PHYSICAL THERAPY NOTE
Physical Therapy Cancellation Note    PT ORDERS RECEIVED, CHART REVIEWED  PATIENT IS PENDING MRI OF THE LUMBAR SPINE AND NEUROSURGERY CONSULT  PT WILL CONTINUE TO FOLLOW AND EVALUATE WHEN APPROPRIATE      Amanda Ceballos PT, DPT

## 2019-07-09 NOTE — CONSULTS
Consult- Holly Meyer 1950, 80 y o  female MRN: [id number]    [facility] Encounter: [id number]    Primary Care Provider: Devora Miller MD   Date and time admitted to hospital: 7/7/2019  9:57 AM      Inpatient consult to Neurosurgery  Consult performed by: Marina Pugh PA-C  Consult ordered by: El Morgan DO          * Back pain  Assessment & Plan  Patient reports she has bilateral hip pain as the main cause for her pain with intermittent radiation to her back  · Exam: GCS 15, AAO X 3, no TTP of cervical, thoracic/lumbar spine  BARBOZA, strength BLE: HF 4/5 pain inhibition, KF/PF/DF 5/5, BUE 4-5/5 pain inhibition, sensation intact to LT/PP X 4, reflexes intact, no drift bilat  · Continue regular neurologic checks  · Imaging reviewed personally and by attending  Final results as below  · Xray of Thoracolumbar spine 7/8/19: T12 and L1 mild decreased loss of height noted  Final read pending  MRI of Lumbar spine ordered  Will review when completed  Pain control per primary team    · Eval and mobilize per PT/OT  · DVT PPX: SCDs  Lovenox   · At this time no neurosurgical intervention is anticipated  Conservative management with pain management while awaiting MRI of lumbar spine is recommended  · Neurosurgery will see patient when imaging completed  Please call with any questions or concerns  History of Present Illness   History, ROS and PFSH  HPI: Holly Meyer is a 80 y o  female with PMH including Parrkin's disease, GERD who presented to with acute bilateral hip pain with intermittent radiation to her back  Patient denies radiation of pain down bilateral legs  Patient reports last  about November, she twisted the wrong way and had bilateral hip pain  Patient reports that she was fine for several months  Patient reports that about 2 weeks ago she was busy cleaning and gardening when the pain in her hips increased again    Patient reports sometimes the pain radiates to her back  Patient reports that at home she she ambulates independently without a cane or a walker  Patient denies weakness in bilateral hands or legs  She reports that some of the movement in her legs is limited by the pain  At present patient rates her bilateral hip pain with some radiation to her back as 15/10 on the pain scale  Patient reports her pain is made worse with movement  She reports she gets some relief with pain meds  Patient denies loss in control of her bowel or bladder movement  Patient denies chest pain, shortness of breath, nausea, vomiting, dysuria, or abdominal pain  Review of Systems   Constitutional: Negative for chills and fever  HENT: Negative for hearing loss  Eyes: Negative for pain and visual disturbance  Respiratory: Negative for chest tightness and shortness of breath  Cardiovascular: Negative for chest pain and palpitations  Gastrointestinal: Negative for abdominal pain, nausea and vomiting  Genitourinary: Negative for dysuria  Musculoskeletal: Negative for neck pain and neck stiffness  Bilateral hip pain with intermittent radiation to back  Skin: Negative for pallor and rash  Neurological: Negative for dizziness, light-headedness and numbness  Psychiatric/Behavioral: Negative for agitation, behavioral problems, confusion and decreased concentration         Historical Information   Past Medical History:   Diagnosis Date    GERD (gastroesophageal reflux disease)     Parkinson disease (HCC)      Past Surgical History:   Procedure Laterality Date    TONSILLECTOMY       Social History     Substance and Sexual Activity   Alcohol Use Yes    Comment: Rare     Social History     Substance and Sexual Activity   Drug Use Never     Social History     Tobacco Use   Smoking Status Never Smoker   Smokeless Tobacco Never Used     Family History   Problem Relation Age of Onset    Dementia Mother     Alcohol abuse Neg Hx     Mental illness Neg Hx     Substance Abuse Neg Hx     Depression Neg Hx        Meds/Allergies   all current active meds have been reviewed, current meds:   Current Facility-Administered Medications   Medication Dose Route Frequency    acetaminophen (TYLENOL) tablet 975 mg  975 mg Oral Q8H [address]    amantadine (SYMMETREL) capsule 100 mg  100 mg Oral Daily    calcium carbonate (TUMS) chewable tablet 500 mg  500 mg Oral BID    carbidopa-levodopa (SINEMET)  mg per tablet 0 5 tablet  0 5 tablet Oral 6x Daily    cholecalciferol (VITAMIN D3) tablet 2,000 Units  2,000 Units Oral Daily    docusate sodium (COLACE) capsule 100 mg  100 mg Oral BID    enoxaparin (LOVENOX) subcutaneous injection 40 mg  40 mg Subcutaneous Daily    entacapone (COMTAN) tablet 200 mg  200 mg Oral 6x Daily    HYDROmorphone (DILAUDID) injection 0 2 mg  0 2 mg Intravenous Q4H PRN    lidocaine (LIDODERM) 5 % patch 2 patch  2 patch Topical Daily    LORazepam (ATIVAN) tablet 0 5 mg  0 5 mg Oral Daily PRN    menthol-methyl salicylate (BENGAY) 17-88 % cream   Apply externally 4x Daily PRN    oxyCODONE (ROXICODONE) IR tablet 2 5 mg  2 5 mg Oral Q4H PRN    oxyCODONE (ROXICODONE) IR tablet 5 mg  5 mg Oral Q4H PRN    pantoprazole (PROTONIX) EC tablet 20 mg  20 mg Oral Early Morning    polyethylene glycol (MIRALAX) packet 17 g  17 g Oral Daily PRN    rasagiline (AZILECT) tablet 1 mg  1 mg Oral Daily    rOPINIRole (REQUIP XL) 24 hr tablet 8 mg  8 mg Oral HS    senna (SENOKOT) tablet 8 6 mg  1 tablet Oral HS    tiZANidine (ZANAFLEX) tablet 2 mg  2 mg Oral BID PRN    and PTA meds:   Prior to Admission Medications   Prescriptions Last Dose Informant Patient Reported? Taking?    COD LIVER OIL PO 7/7/2019 at Unknown time Self Yes Yes   Sig: Take by mouth   Cholecalciferol (VITAMIN D3) 2000 units capsule 7/7/2019 at Unknown time  No Yes   Sig: Take 1 capsule (2,000 Units total) by mouth daily   LORazepam (ATIVAN) 1 mg tablet Past Month at Unknown time Self Yes Yes Sig: Take 0 5 mg by mouth   amantadine (SYMMETREL) 100 mg capsule 7/7/2019 at Unknown time Self Yes Yes   Sig: Take 100 mg by mouth daily   calcium carbonate (TUMS) 500 mg chewable tablet Past Week at Unknown time  No Yes   Sig: Chew 1 tablet (500 mg total) 2 (two) times a day   carbidopa-levodopa-entacapone (STALEVO) 12 5- MG per tablet 7/7/2019 at Unknown time Self Yes Yes   Sig: TAKE 1 TABLET BY MOUTH 6 (SIX) TIMES A DAY  meloxicam (MOBIC) 15 mg tablet Past Week at Unknown time Self Yes Yes   Sig: TAKE 1 TABLET (15 MG TOTAL) BY MOUTH AS NEEDED FOR MODERATE PAIN (PAIN SCORE 4-6)  omeprazole (PriLOSEC) 20 mg delayed release capsule 7/7/2019 at Unknown time  No Yes   Sig: Take 1 capsule (20 mg total) by mouth daily as needed (reflux)   rOPINIRole (REQUIP XL) 8 MG 24 hr tablet 7/7/2019 at Unknown time Self Yes Yes   Sig: Take 8 mg by mouth   rasagiline (AZILECT) 1 MG 7/7/2019 at Unknown time Self Yes Yes   Sig: Take 1 mg by mouth daily      Facility-Administered Medications: None     Allergies   Allergen Reactions    Sulfa Antibiotics      Other reaction(s): family history - anaphylaxis  Category: Allergy;     Doxycycline Rash     Category: Allergy;        Objective   I/O       07/07 0701 - 07/08 0700 07/08 0701 - 07/09 0700 07/09 0701 - 07/10 0700    P  O   300 180    Total Intake(mL/kg)  300 (4 9) 180 (2 9)    Urine (mL/kg/hr) 750 100 (0 1)     Stool  0     Total Output 750 100     Net -750 +200 +180           Unmeasured Urine Occurrence  3 x 1 x    Unmeasured Stool Occurrence  1 x           Physical Exam   Constitutional: She is oriented to person, place, and time  She appears well-developed and well-nourished  No distress  HENT:   Head: Normocephalic and atraumatic  Eyes: Pupils are equal, round, and reactive to light  EOM are normal    Neck: Normal range of motion  Neck supple  No tracheal deviation present  Cardiovascular: Normal rate  Pulmonary/Chest: Effort normal    Abdominal: Soft  There is no tenderness  There is no guarding  Musculoskeletal: She exhibits no edema  Neurological: She is alert and oriented to person, place, and time  GCS 15, AAO X 3, no TTP of cervical, thoracic/lumbar spine  BARBOZA, strength BLE: HF 4/5 -pain inhibition, KF/PF/DF 5/5, BUE 4-5/5 -pain inhibition, sensation intact to LT/PP X 4, reflexes intact, no drift bilat  Skin: Skin is warm and dry  No rash noted  No pallor  Psychiatric: Her behavior is normal  Thought content normal      Neurologic Exam     Mental Status   Oriented to person, place, and time  Cranial Nerves     CN III, IV, VI   Pupils are equal, round, and reactive to light  Extraocular motions are normal        Vitals:Blood pressure 122/77, pulse 93, temperature 97 7 °F (36 5 °C), resp  rate 18, height 5' 3" (1 6 m), weight 61 4 kg (135 lb 5 8 oz), SpO2 98 %  ,Body mass index is 23 98 kg/m²  Lab Results:   Results from last 7 days   Lab Units 07/08/19  0552 07/07/19  2054   WBC Thousand/uL  --  8 94   HEMOGLOBIN g/dL  --  13 7   HEMATOCRIT %  --  41 1   PLATELETS Thousands/uL 254 243     Results from last 7 days   Lab Units 07/08/19  0553 07/07/19  2054   POTASSIUM mmol/L 3 7 3 7   CHLORIDE mmol/L 108 107   CO2 mmol/L 25 27   BUN mg/dL 18 17   CREATININE mg/dL 0 69 0 76   CALCIUM mg/dL 9 1 8 7   ALK PHOS U/L  --  103   ALT U/L  --  9*   AST U/L  --  15                   Imaging Studies: I have personally reviewed pertinent reports  and I have personally reviewed pertinent films in PACS     Xr Hip/pelv 2-3 Vws Right    Result Date: 7/7/2019  Impression: No acute osseous abnormality  Workstation performed: [id number]       EKG, Pathology, and Other Studies: I have personally reviewed pertinent reports        VTE Prophylaxis: Sequential compression device (Venodyne)  and Enoxaparin (Lovenox)    Code Status: Level 1 - Full Code  Advance Directive and Living Will:      Power of :    POLST:      Counseling / Coordination of Care  I spent 45 minutes with the patient  PLEASE NOTE:  This encounter may have been completed utilizing the [employer] Direct Speech Voice Recognition Software  Grammatical errors, random word insertions, pronoun errors and incomplete sentences are occasional consequences of the system due to software limitations, ambient noise and hardware issues  These may be missed even after proof reading prior to affixing electronic signature  Please do not hesitate to contact me directly if you have any questions or concerns about the content, text or information contained within the body of this dictation

## 2019-07-09 NOTE — ASSESSMENT & PLAN NOTE
Back pain of 2-3 weeks duration worsened by weight-bearing and ambulation  Unclear etiology  Suspect musculoskeletal  No alarming symptoms  Pending MRI lumbar spine  Follow-up on MRI  Await official read of thoracolumbar x-ray  Pain control with p r n  Oxycodone 2 5 and 5 mg for moderate to severe pain and Dilaudid IV for breakthrough pain  Continue lidocaine patch, aqua K, scheduled Tylenol  P r n  Muscle relaxant  PT OT evaluation

## 2019-07-09 NOTE — PROGRESS NOTES
Progress Note - Nicholas Cobb 1950, 80 y o  female MRN: [id number]    Unit/Bed#: [facility] 925-01 Encounter: [id number]    Primary Care Provider: Estefani Garcia MD   Date and time admitted to hospital: 7/7/2019  9:57 AM        * Back pain  Assessment & Plan  Back pain of 2-3 weeks duration worsened by weight-bearing and ambulation  Unclear etiology  Suspect musculoskeletal  No alarming symptoms  Pending MRI lumbar spine  Follow-up on MRI  Await official read of thoracolumbar x-ray  Pain control with p r n  Oxycodone 2 5 and 5 mg for moderate to severe pain and Dilaudid IV for breakthrough pain  Continue lidocaine patch, aqua K, scheduled Tylenol  P r n  Muscle relaxant  PT OT evaluation  Ambulatory dysfunction  Assessment & Plan  Worsened due to back pain  Safe ambulation fall precautions  Physical therapy    Parkinson's disease ([facility] [address] )  Assessment & Plan  Continue antiparkinsonian medications  Supportive care    Osteoporosis  Assessment & Plan  Continue vit D, calcium        VTE Pharmacologic Prophylaxis:   Pharmacologic: Enoxaparin (Lovenox)  Mechanical VTE Prophylaxis in Place: Yes    Patient Centered Rounds: I have performed bedside rounds with nursing staff today  Discussions with Specialists or Other Care Team Provider:      Education and Discussions with Family / Patient:  Discussed plan of care with patient and  at bedside    Time Spent for Care: 30 minutes  More than 50% of total time spent on counseling and coordination of care as described above  Current Length of Stay: 1 day(s)    Current Patient Status: Inpatient   Certification Statement: The patient will continue to require additional inpatient hospital stay due to Not medically stable    Discharge Plan:  Pending PT evaluation and MRI    Code Status: Level 1 - Full Code      Subjective:   Continues to have low back pain  Poorly managed with 2 5 mg oxycodone  No focal weakness or numbness    No fever or chills  No bowel bladder incontinence  Objective:     Vitals:   Temp (24hrs), Av 7 °F (36 5 °C), Min:97 7 °F (36 5 °C), Max:97 7 °F (36 5 °C)    Temp:  [97 7 °F (36 5 °C)] 97 7 °F (36 5 °C)  HR:  [42-93] 42  Resp:  [18] 18  BP: (113-122)/(70-77) 113/70  SpO2:  [98 %-99 %] 99 %  Body mass index is 23 98 kg/m²  Input and Output Summary (last 24 hours): Intake/Output Summary (Last 24 hours) at 2019 1748  Last data filed at 2019 1345  Gross per 24 hour   Intake 180 ml   Output    Net 180 ml       Physical Exam:     Physical Exam   Constitutional: She is oriented to person, place, and time  No distress  Eyes: Pupils are equal, round, and reactive to light  Cardiovascular: Normal rate, regular rhythm and normal heart sounds  No murmur heard  Pulmonary/Chest: Effort normal and breath sounds normal  No respiratory distress  She has no wheezes  She has no rales  Abdominal: Soft  Bowel sounds are normal  She exhibits no distension  There is no tenderness  Musculoskeletal: She exhibits no edema  No midline back tenderness   Neurological: She is alert and oriented to person, place, and time  motor strength 4/5 bilaterally in lower extremities  Skin: Skin is warm  Additional Data:     Labs:    Results from last 7 days   Lab Units 19  0552 19  2054   WBC Thousand/uL  --  8 94   HEMOGLOBIN g/dL  --  13 7   HEMATOCRIT %  --  41 1   PLATELETS Thousands/uL 254 243     Results from last 7 days   Lab Units 19  0553 19  2054   SODIUM mmol/L 137 140   POTASSIUM mmol/L 3 7 3 7   CHLORIDE mmol/L 108 107   CO2 mmol/L 25 27   BUN mg/dL 18 17   CREATININE mg/dL 0 69 0 76   ANION GAP mmol/L 4 6   CALCIUM mg/dL 9 1 8 7   ALBUMIN g/dL  --  3 7   TOTAL BILIRUBIN mg/dL  --  0 46   ALK PHOS U/L  --  103   ALT U/L  --  9*   AST U/L  --  15   GLUCOSE RANDOM mg/dL 92 105                           * I Have Reviewed All Lab Data Listed Above    * Additional Pertinent Lab Tests Reviewed: All Labs Within Last 24 Hours Reviewed    Imaging:    XR hip/pelv 2-3 vws right   Final Result by Matthew Heaton MD (07/07 1330)      No acute osseous abnormality  Workstation performed: [id number]         XR spine thoracolumbar 2 vw    (Results Pending)   MRI lumbar spine wo contrast    (Results Pending)       Recent Cultures (last 7 days):           Last 24 Hours Medication List:     Current Facility-Administered Medications:  acetaminophen 975 mg Oral Q8H [address] Eleanor Velasquez MD   amantadine 100 mg Oral Daily Eleanor Velasquez MD   calcium carbonate 500 mg Oral BID Eleanor Velasquez MD   carbidopa-levodopa 0 5 tablet Oral 6x Daily Eleanor Velasquez MD   cholecalciferol 2,000 Units Oral Daily Eleanor Velasquez MD   docusate sodium 100 mg Oral BID Jeremiah Alonzo MD   enoxaparin 40 mg Subcutaneous Daily Eleanor Velasquez MD   entacapone 200 mg Oral 6x Daily Eleanor Velasquez MD   HYDROmorphone 0 2 mg Intravenous Q4H PRN Jeremiah Alonzo MD   lidocaine 2 patch Topical Daily Eleanor Velasquez MD   LORazepam 0 5 mg Oral Daily PRN Eleanor Velasquez MD   LORazepam 1 mg Oral Once PRN Shanti Cameron PA-C   menthol-methyl salicylate  Apply externally 4x Daily PRN Eleanor Velasquez MD   oxyCODONE 2 5 mg Oral Q4H PRN Jeremiah Alonzo MD   oxyCODONE 5 mg Oral Q4H PRN Jeremiah Alonzo MD   pantoprazole 20 mg Oral Early Morning Eleanor Velasquez MD   polyethylene glycol 17 g Oral Daily PRN Jeremiah Alonzo MD   rasagiline 1 mg Oral Daily Eleanor Velasquez MD   rOPINIRole 8 mg Oral HS Eleanor Velasquez MD   senna 1 tablet Oral HS Jeremiah Alonzo MD   tiZANidine 2 mg Oral BID PRN Eleanor Velasquez MD        Today, Patient Was Seen By: Jeremiah Alonzo MD    ** Please Note: Dictation voice to text software may have been used in the creation of this document   **

## 2019-07-09 NOTE — UTILIZATION REVIEW
OBSERVATION 07/07/2019 @ 1242, CONVERTED TO INPATIENT ADMISSION 07/08/2019 @ 1930, DUE TO FURTHER DIAGNOSTIC WORKUP, REQUIRING AT LEAST 2 MIDNIGHT STAY  The patient is a 43-year-old female who presented to the emergency room on 07/07/2019 with a chief complaint severe hip and back pain  This was progressive  Patient also has history of Parkinson's disease  She has difficulty with weight-bearing due to pain  MRI of the lumbar spine has been ordered and Neurosurgery consultation is pending  PT and OT services are consulted  Inpatient level of care is appropriate  Patient is expected to remain hospitalized over 2 midnights     07/08/19 1930  Inpatient Admission Once     Transfer Service: General Medicine       Question Answer Comment   Admitting Physician MAGDALENE KAM    Level of Care Med Surg    Estimated length of stay More than 2 Midnights    Certification I certify that inpatient services are medically necessary for this patient for a duration of greater than two midnights  See H&P and MD Progress Notes for additional information about the patient's course of treatment

## 2019-07-09 NOTE — ASSESSMENT & PLAN NOTE
Patient reports she has bilateral hip pain as the main cause for her pain with intermittent radiation to her back  · Exam: GCS 15, AAO X 3, no TTP of cervical, thoracic/lumbar spine  BARBOZA, strength BLE: HF 4/5 pain inhibition, KF/PF/DF 5/5, BUE 4-5/5 pain inhibition, sensation intact to LT/PP X 4, reflexes intact, no drift bilat  · Continue regular neurologic checks  · Imaging reviewed personally and by attending  Final results as below  · Xray of Thoracolumbar spine 7/8/19: T12 and L1 mild decreased loss of height noted  Final read pending  MRI of Lumbar spine ordered  Will review when completed  Pain control per primary team    · Eval and mobilize per PT/OT  · DVT PPX: SCDs  Lovenox   · At this time no neurosurgical intervention is anticipated  Conservative management with pain management while awaiting MRI of lumbar spine is recommended  · Neurosurgery will see patient when imaging completed  Please call with any questions or concerns

## 2019-07-10 ENCOUNTER — APPOINTMENT (INPATIENT)
Dept: RADIOLOGY | Facility: HOSPITAL | Age: 69
DRG: 517 | End: 2019-07-10
Attending: RADIOLOGY
Payer: MEDICARE

## 2019-07-10 ENCOUNTER — ANESTHESIA (INPATIENT)
Dept: PERIOP | Facility: HOSPITAL | Age: 69
DRG: 517 | End: 2019-07-10
Payer: MEDICARE

## 2019-07-10 ENCOUNTER — ANESTHESIA EVENT (INPATIENT)
Dept: PERIOP | Facility: HOSPITAL | Age: 69
DRG: 517 | End: 2019-07-10
Payer: MEDICARE

## 2019-07-10 LAB
APTT PPP: 30 SECONDS (ref 23–37)
INR PPP: 1.03 (ref 0.84–1.19)
PROTHROMBIN TIME: 13.1 SECONDS (ref 11.6–14.5)

## 2019-07-10 PROCEDURE — 99233 SBSQ HOSP IP/OBS HIGH 50: CPT | Performed by: RADIOLOGY

## 2019-07-10 PROCEDURE — 22513 PERQ VERTEBRAL AUGMENTATION: CPT | Performed by: RADIOLOGY

## 2019-07-10 PROCEDURE — C1713 ANCHOR/SCREW BN/BN,TIS/BN: HCPCS

## 2019-07-10 PROCEDURE — 85730 THROMBOPLASTIN TIME PARTIAL: CPT | Performed by: RADIOLOGY

## 2019-07-10 PROCEDURE — 22515 PERQ VERTEBRAL AUGMENTATION: CPT | Performed by: RADIOLOGY

## 2019-07-10 PROCEDURE — 0QU03JZ SUPPLEMENT LUMBAR VERTEBRA WITH SYNTHETIC SUBSTITUTE, PERCUTANEOUS APPROACH: ICD-10-PCS | Performed by: RADIOLOGY

## 2019-07-10 PROCEDURE — 0PU43JZ SUPPLEMENT THORACIC VERTEBRA WITH SYNTHETIC SUBSTITUTE, PERCUTANEOUS APPROACH: ICD-10-PCS | Performed by: RADIOLOGY

## 2019-07-10 PROCEDURE — 0PS43ZZ REPOSITION THORACIC VERTEBRA, PERCUTANEOUS APPROACH: ICD-10-PCS | Performed by: RADIOLOGY

## 2019-07-10 PROCEDURE — 85610 PROTHROMBIN TIME: CPT | Performed by: RADIOLOGY

## 2019-07-10 PROCEDURE — 99232 SBSQ HOSP IP/OBS MODERATE 35: CPT | Performed by: INTERNAL MEDICINE

## 2019-07-10 PROCEDURE — 0QS03ZZ REPOSITION LUMBAR VERTEBRA, PERCUTANEOUS APPROACH: ICD-10-PCS | Performed by: RADIOLOGY

## 2019-07-10 RX ORDER — NEOSTIGMINE METHYLSULFATE 1 MG/ML
INJECTION INTRAVENOUS AS NEEDED
Status: DISCONTINUED | OUTPATIENT
Start: 2019-07-10 | End: 2019-07-10 | Stop reason: SURG

## 2019-07-10 RX ORDER — DIPHENHYDRAMINE HYDROCHLORIDE 50 MG/ML
12.5 INJECTION INTRAMUSCULAR; INTRAVENOUS ONCE AS NEEDED
Status: DISCONTINUED | OUTPATIENT
Start: 2019-07-10 | End: 2019-07-10 | Stop reason: HOSPADM

## 2019-07-10 RX ORDER — FENTANYL CITRATE/PF 50 MCG/ML
25 SYRINGE (ML) INJECTION
Status: DISCONTINUED | OUTPATIENT
Start: 2019-07-10 | End: 2019-07-10 | Stop reason: HOSPADM

## 2019-07-10 RX ORDER — HYDROMORPHONE HCL/PF 1 MG/ML
0.4 SYRINGE (ML) INJECTION
Status: DISCONTINUED | OUTPATIENT
Start: 2019-07-10 | End: 2019-07-10 | Stop reason: HOSPADM

## 2019-07-10 RX ORDER — LIDOCAINE HYDROCHLORIDE 10 MG/ML
INJECTION, SOLUTION INFILTRATION; PERINEURAL AS NEEDED
Status: DISCONTINUED | OUTPATIENT
Start: 2019-07-10 | End: 2019-07-10 | Stop reason: SURG

## 2019-07-10 RX ORDER — SODIUM CHLORIDE, SODIUM LACTATE, POTASSIUM CHLORIDE, CALCIUM CHLORIDE 600; 310; 30; 20 MG/100ML; MG/100ML; MG/100ML; MG/100ML
INJECTION, SOLUTION INTRAVENOUS CONTINUOUS PRN
Status: DISCONTINUED | OUTPATIENT
Start: 2019-07-10 | End: 2019-07-10 | Stop reason: SURG

## 2019-07-10 RX ORDER — PROPOFOL 10 MG/ML
INJECTION, EMULSION INTRAVENOUS AS NEEDED
Status: DISCONTINUED | OUTPATIENT
Start: 2019-07-10 | End: 2019-07-10 | Stop reason: SURG

## 2019-07-10 RX ORDER — CEFAZOLIN SODIUM 1 G/3ML
INJECTION, POWDER, FOR SOLUTION INTRAMUSCULAR; INTRAVENOUS AS NEEDED
Status: DISCONTINUED | OUTPATIENT
Start: 2019-07-10 | End: 2019-07-10 | Stop reason: SURG

## 2019-07-10 RX ORDER — GLYCOPYRROLATE 0.2 MG/ML
INJECTION INTRAMUSCULAR; INTRAVENOUS AS NEEDED
Status: DISCONTINUED | OUTPATIENT
Start: 2019-07-10 | End: 2019-07-10 | Stop reason: SURG

## 2019-07-10 RX ORDER — DEXAMETHASONE SODIUM PHOSPHATE 10 MG/ML
INJECTION, SOLUTION INTRAMUSCULAR; INTRAVENOUS AS NEEDED
Status: DISCONTINUED | OUTPATIENT
Start: 2019-07-10 | End: 2019-07-10 | Stop reason: SURG

## 2019-07-10 RX ORDER — ONDANSETRON 2 MG/ML
INJECTION INTRAMUSCULAR; INTRAVENOUS AS NEEDED
Status: DISCONTINUED | OUTPATIENT
Start: 2019-07-10 | End: 2019-07-10 | Stop reason: SURG

## 2019-07-10 RX ORDER — ONDANSETRON 2 MG/ML
4 INJECTION INTRAMUSCULAR; INTRAVENOUS ONCE AS NEEDED
Status: DISCONTINUED | OUTPATIENT
Start: 2019-07-10 | End: 2019-07-10 | Stop reason: HOSPADM

## 2019-07-10 RX ORDER — ROCURONIUM BROMIDE 10 MG/ML
INJECTION, SOLUTION INTRAVENOUS AS NEEDED
Status: DISCONTINUED | OUTPATIENT
Start: 2019-07-10 | End: 2019-07-10 | Stop reason: SURG

## 2019-07-10 RX ORDER — FENTANYL CITRATE 50 UG/ML
INJECTION, SOLUTION INTRAMUSCULAR; INTRAVENOUS AS NEEDED
Status: DISCONTINUED | OUTPATIENT
Start: 2019-07-10 | End: 2019-07-10 | Stop reason: SURG

## 2019-07-10 RX ADMIN — NEOSTIGMINE METHYLSULFATE 2 MG: 1 INJECTION, SOLUTION INTRAVENOUS at 17:27

## 2019-07-10 RX ADMIN — OXYCODONE HYDROCHLORIDE 5 MG: 5 TABLET ORAL at 20:11

## 2019-07-10 RX ADMIN — ENTACAPONE 200 MG: 200 TABLET ORAL at 09:31

## 2019-07-10 RX ADMIN — ACETAMINOPHEN 975 MG: 325 TABLET ORAL at 22:47

## 2019-07-10 RX ADMIN — PHENYLEPHRINE HYDROCHLORIDE 100 MCG: 10 INJECTION INTRAVENOUS at 17:00

## 2019-07-10 RX ADMIN — FENTANYL CITRATE 25 MCG: 50 INJECTION, SOLUTION INTRAMUSCULAR; INTRAVENOUS at 17:38

## 2019-07-10 RX ADMIN — DEXAMETHASONE SODIUM PHOSPHATE 4 MG: 10 INJECTION, SOLUTION INTRAMUSCULAR; INTRAVENOUS at 16:20

## 2019-07-10 RX ADMIN — ONDANSETRON 4 MG: 2 INJECTION INTRAMUSCULAR; INTRAVENOUS at 16:20

## 2019-07-10 RX ADMIN — ACETAMINOPHEN 975 MG: 325 TABLET ORAL at 13:38

## 2019-07-10 RX ADMIN — ENTACAPONE 200 MG: 200 TABLET ORAL at 22:50

## 2019-07-10 RX ADMIN — PANTOPRAZOLE SODIUM 20 MG: 20 TABLET, DELAYED RELEASE ORAL at 06:16

## 2019-07-10 RX ADMIN — PROPOFOL 120 MG: 10 INJECTION, EMULSION INTRAVENOUS at 16:00

## 2019-07-10 RX ADMIN — GLYCOPYRROLATE 0.4 MG: 0.2 INJECTION, SOLUTION INTRAMUSCULAR; INTRAVENOUS at 17:27

## 2019-07-10 RX ADMIN — PHENYLEPHRINE HYDROCHLORIDE 100 MCG: 10 INJECTION INTRAVENOUS at 16:37

## 2019-07-10 RX ADMIN — CARBIDOPA AND LEVODOPA 0.5 TABLET: 25; 100 TABLET ORAL at 06:16

## 2019-07-10 RX ADMIN — CARBIDOPA AND LEVODOPA 0.5 TABLET: 25; 100 TABLET ORAL at 16:00

## 2019-07-10 RX ADMIN — OXYCODONE HYDROCHLORIDE 5 MG: 5 TABLET ORAL at 10:04

## 2019-07-10 RX ADMIN — CARBIDOPA AND LEVODOPA 0.5 TABLET: 25; 100 TABLET ORAL at 09:29

## 2019-07-10 RX ADMIN — TIZANIDINE 2 MG: 4 TABLET ORAL at 09:45

## 2019-07-10 RX ADMIN — RASAGILINE 1 MG: 1 TABLET ORAL at 09:30

## 2019-07-10 RX ADMIN — ROPINIROLE HYDROCHLORIDE 8 MG: 8 TABLET, FILM COATED, EXTENDED RELEASE ORAL at 22:50

## 2019-07-10 RX ADMIN — FENTANYL CITRATE 25 MCG: 50 INJECTION, SOLUTION INTRAMUSCULAR; INTRAVENOUS at 16:42

## 2019-07-10 RX ADMIN — PHENYLEPHRINE HYDROCHLORIDE 100 MCG: 10 INJECTION INTRAVENOUS at 16:17

## 2019-07-10 RX ADMIN — AMANTADINE HYDROCHLORIDE 100 MG: 100 CAPSULE, LIQUID FILLED ORAL at 09:31

## 2019-07-10 RX ADMIN — ROCURONIUM BROMIDE 20 MG: 10 INJECTION, SOLUTION INTRAVENOUS at 16:23

## 2019-07-10 RX ADMIN — SODIUM CHLORIDE, SODIUM LACTATE, POTASSIUM CHLORIDE, AND CALCIUM CHLORIDE: .6; .31; .03; .02 INJECTION, SOLUTION INTRAVENOUS at 15:51

## 2019-07-10 RX ADMIN — OXYCODONE HYDROCHLORIDE 5 MG: 5 TABLET ORAL at 13:51

## 2019-07-10 RX ADMIN — ACETAMINOPHEN 975 MG: 325 TABLET ORAL at 06:17

## 2019-07-10 RX ADMIN — VITAMIN D, TAB 1000IU (100/BT) 2000 UNITS: 25 TAB at 09:29

## 2019-07-10 RX ADMIN — ENTACAPONE 200 MG: 200 TABLET ORAL at 16:00

## 2019-07-10 RX ADMIN — FENTANYL CITRATE 50 MCG: 50 INJECTION, SOLUTION INTRAMUSCULAR; INTRAVENOUS at 16:00

## 2019-07-10 RX ADMIN — ENTACAPONE 200 MG: 200 TABLET ORAL at 06:17

## 2019-07-10 RX ADMIN — CEFAZOLIN SODIUM 1000 MG: 1 INJECTION, POWDER, FOR SOLUTION INTRAMUSCULAR; INTRAVENOUS at 16:30

## 2019-07-10 RX ADMIN — ROPINIROLE HYDROCHLORIDE 8 MG: 8 TABLET, FILM COATED, EXTENDED RELEASE ORAL at 00:00

## 2019-07-10 RX ADMIN — PHENYLEPHRINE HYDROCHLORIDE 100 MCG: 10 INJECTION INTRAVENOUS at 17:04

## 2019-07-10 RX ADMIN — LIDOCAINE HYDROCHLORIDE 50 MG: 10 INJECTION, SOLUTION INFILTRATION; PERINEURAL at 16:00

## 2019-07-10 RX ADMIN — CARBIDOPA AND LEVODOPA 0.5 TABLET: 25; 100 TABLET ORAL at 13:39

## 2019-07-10 RX ADMIN — CARBIDOPA AND LEVODOPA 0.5 TABLET: 25; 100 TABLET ORAL at 22:47

## 2019-07-10 RX ADMIN — PHENYLEPHRINE HYDROCHLORIDE 100 MCG: 10 INJECTION INTRAVENOUS at 16:46

## 2019-07-10 RX ADMIN — SENNOSIDES 8.6 MG: 8.6 TABLET, FILM COATED ORAL at 22:47

## 2019-07-10 RX ADMIN — OXYCODONE HYDROCHLORIDE 5 MG: 5 TABLET ORAL at 06:17

## 2019-07-10 RX ADMIN — DOCUSATE SODIUM 100 MG: 100 CAPSULE, LIQUID FILLED ORAL at 18:00

## 2019-07-10 RX ADMIN — ROCURONIUM BROMIDE 10 MG: 10 INJECTION, SOLUTION INTRAVENOUS at 16:43

## 2019-07-10 RX ADMIN — ROCURONIUM BROMIDE 30 MG: 10 INJECTION, SOLUTION INTRAVENOUS at 16:00

## 2019-07-10 RX ADMIN — ENTACAPONE 200 MG: 200 TABLET ORAL at 13:39

## 2019-07-10 NOTE — PLAN OF CARE
Problem: CARDIOVASCULAR - ADULT  Goal: Maintains optimal cardiac output and hemodynamic stability  Description  INTERVENTIONS:  - Monitor I/O, vital signs and rhythm  - Monitor for S/S and trends of decreased cardiac output i e  bleeding, hypotension  - Administer and titrate ordered vasoactive medications to optimize hemodynamic stability  - Assess quality of pulses, skin color and temperature  - Assess for signs of decreased coronary artery perfusion - ex   Angina  - Instruct patient to report change in severity of symptoms  Outcome: Progressing  Goal: Absence of cardiac dysrhythmias or at baseline rhythm  Description  INTERVENTIONS:  - Continuous cardiac monitoring, monitor vital signs, obtain 12 lead EKG if indicated  - Administer antiarrhythmic and heart rate control medications as ordered  - Monitor electrolytes and administer replacement therapy as ordered  Outcome: Progressing     Problem: RESPIRATORY - ADULT  Goal: Achieves optimal ventilation and oxygenation  Description  INTERVENTIONS:  - Assess for changes in respiratory status  - Assess for changes in mentation and behavior  - Position to facilitate oxygenation and minimize respiratory effort  - Oxygen administration by appropriate delivery method based on oxygen saturation (per order) or ABGs  - Initiate smoking cessation education as indicated  - Encourage broncho-pulmonary hygiene including cough, deep breathe, Incentive Spirometry  - Assess the need for suctioning and aspirate as needed  - Assess and instruct to report SOB or any respiratory difficulty  - Respiratory Therapy support as indicated  Outcome: Progressing     Problem: METABOLIC, FLUID AND ELECTROLYTES - ADULT  Goal: Electrolytes maintained within normal limits  Description  INTERVENTIONS:  - Monitor labs and assess patient for signs and symptoms of electrolyte imbalances  - Administer electrolyte replacement as ordered  - Monitor response to electrolyte replacements, including repeat lab results as appropriate  - Instruct patient on fluid and nutrition as appropriate  Outcome: Progressing  Goal: Fluid balance maintained  Description  INTERVENTIONS:  - Monitor labs and assess for signs and symptoms of volume excess or deficit  - Monitor I/O and WT  - Instruct patient on fluid and nutrition as appropriate  Outcome: Progressing  Goal: Glucose maintained within target range  Description  INTERVENTIONS:  - Monitor Blood Glucose as ordered  - Assess for signs and symptoms of hyperglycemia and hypoglycemia  - Administer ordered medications to maintain glucose within target range  - Assess nutritional intake and initiate nutrition service referral as needed  Outcome: Progressing     Problem: SKIN/TISSUE INTEGRITY - ADULT  Goal: Skin integrity remains intact  Description  INTERVENTIONS  - Identify patients at risk for skin breakdown  - Assess and monitor skin integrity  - Assess and monitor nutrition and hydration status  - Monitor labs (i e  albumin)  - Assess for incontinence   - Turn and reposition patient  - Assist with mobility/ambulation  - Relieve pressure over bony prominences  - Avoid friction and shearing  - Provide appropriate hygiene as needed including keeping skin clean and dry  - Evaluate need for skin moisturizer/barrier cream  - Collaborate with interdisciplinary team (i e  Nutrition, Rehabilitation, etc )   - Patient/family teaching  Outcome: Progressing     Problem: MUSCULOSKELETAL - ADULT  Goal: Maintain or return mobility to safest level of function  Description  INTERVENTIONS:  - Assess patient's ability to carry out ADLs; assess patient's baseline for ADL function and identify physical deficits which impact ability to perform ADLs (bathing, care of mouth/teeth, toileting, grooming, dressing, etc )  - Assess/evaluate cause of self-care deficits   - Assess range of motion  - Assess patient's mobility; develop plan if impaired  - Assess patient's need for assistive devices and provide as appropriate  - Encourage maximum independence but intervene and supervise when necessary  - Involve family in performance of ADLs  - Assess for home care needs following discharge   - Request OT consult to assist with ADL evaluation and planning for discharge  - Provide patient education as appropriate  Outcome: Progressing  Goal: Maintain proper alignment of affected body part  Description  INTERVENTIONS:  - Support, maintain and protect limb and body alignment  - Provide pt/fam with appropriate education  Outcome: Progressing     Problem: PAIN - ADULT  Goal: Verbalizes/displays adequate comfort level or baseline comfort level  Description  Interventions:  - Encourage patient to monitor pain and request assistance  - Assess pain using appropriate pain scale  - Administer analgesics based on type and severity of pain and evaluate response  - Implement non-pharmacological measures as appropriate and evaluate response  - Consider cultural and social influences on pain and pain management  - Notify physician/advanced practitioner if interventions unsuccessful or patient reports new pain  Outcome: Progressing     Problem: INFECTION - ADULT  Goal: Absence or prevention of progression during hospitalization  Description  INTERVENTIONS:  - Assess and monitor for signs and symptoms of infection  - Monitor lab/diagnostic results  - Monitor all insertion sites, i e  indwelling lines, tubes, and drains  - Monitor endotracheal (as able) and nasal secretions for changes in amount and color  - [place] appropriate cooling/warming therapies per order  - Administer medications as ordered  - Instruct and encourage patient and family to use good hand hygiene technique  - Identify and instruct in appropriate isolation precautions for identified infection/condition  Outcome: Progressing  Goal: Absence of fever/infection during neutropenic period  Description  INTERVENTIONS:  - Monitor WBC  - Implement neutropenic guidelines  Outcome: Progressing     Problem: SAFETY ADULT  Goal: Patient will remain free of falls  Description  INTERVENTIONS:  - Assess patient frequently for physical needs  -  Identify cognitive and physical deficits and behaviors that affect risk of falls    -  Felch fall precautions as indicated by assessment   - Educate patient/family on patient safety including physical limitations  - Instruct patient to call for assistance with activity based on assessment  - Modify environment to reduce risk of injury  - Consider OT/PT consult to assist with strengthening/mobility  Outcome: Progressing  Goal: Maintain or return to baseline ADL function  Description  INTERVENTIONS:  -  Assess patient's ability to carry out ADLs; assess patient's baseline for ADL function and identify physical deficits which impact ability to perform ADLs (bathing, care of mouth/teeth, toileting, grooming, dressing, etc )  - Assess/evaluate cause of self-care deficits   - Assess range of motion  - Assess patient's mobility; develop plan if impaired  - Assess patient's need for assistive devices and provide as appropriate  - Encourage maximum independence but intervene and supervise when necessary  ¯ Involve family in performance of ADLs  ¯ Assess for home care needs following discharge   ¯ Request OT consult to assist with ADL evaluation and planning for discharge  ¯ Provide patient education as appropriate  Outcome: Progressing  Goal: Maintain or return mobility status to optimal level  Description  INTERVENTIONS:  - Assess patient's baseline mobility status (ambulation, transfers, stairs, etc )    - Identify cognitive and physical deficits and behaviors that affect mobility  - Identify mobility aids required to assist with transfers and/or ambulation (gait belt, sit-to-stand, lift, walker, cane, etc )  - Felch fall precautions as indicated by assessment  - Record patient progress and toleration of activity level on Mobility SBAR; progress patient to next Phase/Stage  - Instruct patient to call for assistance with activity based on assessment  - Request Rehabilitation consult to assist with strengthening/weightbearing, etc   Outcome: Progressing     Problem: DISCHARGE PLANNING  Goal: Discharge to home or other facility with appropriate resources  Description  INTERVENTIONS:  - Identify barriers to discharge w/patient and caregiver  - Arrange for needed discharge resources and transportation as appropriate  - Identify discharge learning needs (meds, wound care, etc )  - Arrange for interpretive services to assist at discharge as needed  - Refer to Case Management Department for coordinating discharge planning if the patient needs post-hospital services based on physician/advanced practitioner order or complex needs related to functional status, cognitive ability, or social support system  Outcome: Progressing     Problem: Knowledge Deficit  Goal: Patient/family/caregiver demonstrates understanding of disease process, treatment plan, medications, and discharge instructions  Description  Complete learning assessment and assess knowledge base  Interventions:  - Provide teaching at level of understanding  - Provide teaching via preferred learning methods  Outcome: Progressing     Problem: Potential for Falls  Goal: Patient will remain free of falls  Description  INTERVENTIONS:  - Assess patient frequently for physical needs  -  Identify cognitive and physical deficits and behaviors that affect risk of falls    -  [place] fall precautions as indicated by assessment   - Educate patient/family on patient safety including physical limitations  - Instruct patient to call for assistance with activity based on assessment  - Modify environment to reduce risk of injury  - Consider OT/PT consult to assist with strengthening/mobility  Outcome: Progressing

## 2019-07-10 NOTE — ASSESSMENT & PLAN NOTE
Patient reports she has bilateral hip pain and back pain with Acute/subacute age indeterminate T4,T5, T12 and L1 compression fxs  POD 1 s/p Kyphoplasty T12  And L1  · Exam: GCS 15, AAO X 3, mild TTP of thoracolumbar spine around incision site  BARBOZA, strength BUE 5/5, BLE: HF 4/5 pain inhibition, KF/PF/DF 5/5, sensation intact to LT X 4, reflexes intact, no drift bilat  · Small bilateral posterior thoracolumbar incisions, CDI  Dressings removed  · Continue regular neurologic checks  · Imaging reviewed personally and by attending  Final results as below  · Xray of Thoracolumbar spine 7/8/19: Age-indeterminate superior endplate compression fractures of T4, T5 and T12  Age inderterminate L1 compression fracture  · MRI of Lumbar spine 7/9/19: Acute/subacute compression fractures at the superior endplate of S82 and the inferior endplate of the L1 vertebral bodies  Mild bulging of the posterior cortex as well as osteophytic ridging at T11-T12 results in mild canal stenosis and indentation upon the thecal sac  Mild thickening of the cauda equina nerve roots may represent mild arachnoiditis in the setting of trauma or secondary to another inflammatory process  Diffuse annular disc bulge with osteophytic ridging and bilateral facet arthrosis resulting in mild right neural foraminal stenosis at the L4-L5 level    Pain control per primary team    · Eval and mobilize per PT/OT  · DVT PPX: SCDs  Okay for pharm dvt ppx from neurosurgery standpoint  · TLSO brace to use p r n  For comfort  · Discussed with pt post procedure instructions and precautions  Pt showed understanding  · No further neurosurgical intervention is anticipated at this time  · Neurosurgery will sign off and see patient as needed during the remainder of her hospitalization  Patient has a follow-up appointment scheduled with neurosurgery on Aug 9th 2019, no additional imaging required for this apt   Please call with any questions or concerns

## 2019-07-10 NOTE — ANESTHESIA PREPROCEDURE EVALUATION
Review of Systems/Medical History  Patient summary reviewed        Cardiovascular  Hyperlipidemia,    Pulmonary       GI/Hepatic    GERD ,             Endo/Other     GYN       Hematology   Musculoskeletal       Neurology      Comment: Parkinsons Psychology   Anxiety,              Physical Exam    Airway    Mallampati score: II  TM Distance: >3 FB  Neck ROM: full     Dental       Cardiovascular      Pulmonary      Other Findings        Anesthesia Plan  ASA Score- 2     Anesthesia Type- general with ASA Monitors  Additional Monitors:   Airway Plan: ETT  Plan Factors-    Induction- intravenous  Postoperative Plan-     Informed Consent- Anesthetic plan and risks discussed with patient  I personally reviewed this patient with the CRNA  Discussed and agreed on the Anesthesia Plan with the CRNA  Oksana Vidal

## 2019-07-10 NOTE — OCCUPATIONAL THERAPY NOTE
Occupational Therapy Cancellation Note    Orders received, chart reviewed  Per EMR, neurosurgery to perform IR kyphoplasty/vertebroplasty today  OT to sign off - please re-consult post-op as appropriate with updated activity orders   Thank you    Camila Lema OT

## 2019-07-10 NOTE — ASSESSMENT & PLAN NOTE
Patient reports she has bilateral hip pain with intermittent radiation to her back with Acute/subacute T12 and L1 mild compression fxs  · Exam: GCS 15, AAO X 3, no TTP of cervical, thoracic/lumbar spine  BARBOZA, strength BLE: HF 4/5 pain inhibition, KF/PF/DF 5/5, BUE 4-5/5 pain inhibition, sensation intact to LT/PP X 4, reflexes intact, no drift bilat  · Continue regular neurologic checks  · Imaging reviewed personally and by attending  Final results as below  · Xray of Thoracolumbar spine 7/8/19: Age-indeterminate superior endplate compression fractures of T4, T5 and T12  and L1  Age inderterminate L1 compression fracture  · MRI of Lumbar spine 7/9/19: Acute/subacute compression fractures at the superior endplate of O15 and the inferior endplate of the L1 vertebral bodies  Mild bulging of the posterior cortex as well as osteophytic ridging at T11-T12 results in mild canal stenosis and indentation upon the thecal sac  Mild thickening of the cauda equina nerve roots may represent mild arachnoiditis in the setting of trauma or secondary to another inflammatory process  Diffuse annular disc bulge with osteophytic ridging and bilateral facet arthrosis resulting in mild right neural foraminal stenosis at the L4-L5 level    Pain control per primary team    · Eval and mobilize per PT/OT  · DVT PPX: SCDs  Hold pharm dvt ppx for procedure  · Discussed with patient and her family (2 daughters and son) her imaging findings with the thoracolumbar fractures and showed her the MRI/Xrays  Discussed kyphoplasty/vertebroplasty with pt and her family  Pt and family are agreeable to having the procedure  · Pt is anticipated to have kyphoplasty/vertebroplasty with Dr Hernán Rutherford this afternoon for the thoracolumbar fractures  · INR wnl at 1 03    · Neurosurgery will continue to follow and advise  Please call with any questions or concerns

## 2019-07-10 NOTE — PROGRESS NOTES
Progress Note - Rosie Briones 1950, 80 y o  female MRN: [id number]    Unit/Bed#: [facility] 925-01 Encounter: [id number]    Primary Care Provider: Velasquez Salazar MD   Date and time admitted to hospital: 7/7/2019  9:57 AM        * Back pain  Assessment & Plan  Back pain of 2-3 weeks duration worsened by weight-bearing and ambulation  Likely related to acute/ subacute compression fracture as noted on MRI  MRI lumbar spine shows Acute/subacute compression fractures at the superior endplate of K91 and the inferior endplate of the L1 vertebral bodies  Mild bulging of the posterior cortex as well as osteophytic ridging at T11-T12 results in mild canal stenosis and indentation upon the thecal sac  Mild thickening of the cauda equina nerve roots may represent mild arachnoiditis in the setting of trauma or secondary to another inflammatory process  Diffuse annular disc bulge with osteophytic ridging and bilateral facet arthrosis resulting in mild right neural foraminal stenosis at the L4-L5 level      PLAN:  · Neurosurgery on board, plan for kyphoplasty/vertebroplasty with Dr Eric Joyner today  · Pain control with p r n  Oxycodone 2 5 and 5 mg for moderate to severe pain and Dilaudid IV for breakthrough pain  · Continue lidocaine patch, aqua K, scheduled Tylenol  · P r n  Muscle relaxant  · PT OT evaluation postprocedure  Ambulatory dysfunction  Assessment & Plan  Worsened due to back pain  Safe ambulation fall precautions  Physical therapy    Parkinson's disease ([facility] [address] )  Assessment & Plan  Continue antiparkinsonian medications  Supportive care    Osteoporosis  Assessment & Plan  Continue vit D, calcium        VTE Pharmacologic Prophylaxis:   Pharmacologic: Enoxaparin (Lovenox)  Mechanical VTE Prophylaxis in Place: Yes    Patient Centered Rounds: I have performed bedside rounds with nursing staff today      Discussions with Specialists or Other Care Team Provider:      Education and Discussions with Family / Patient:  Discussed plan of care with patient and  at bedside    Time Spent for Care: 30 minutes  More than 50% of total time spent on counseling and coordination of care as described above  Current Length of Stay: 2 day(s)    Current Patient Status: Inpatient   Certification Statement: The patient will continue to require additional inpatient hospital stay due to Not medically stable, plan for kyphoplasty today    Discharge Plan:  Pending kyphoplasty today and PT OT eval    Code Status: Level 1 - Full Code      Subjective:  Back pain is persistent but better controlled as compared to yesterday on changed pain medication regimen  She is nervous about kyphoplasty today  No new symptoms as compared to yesterday  Objective:     Vitals:   Temp (24hrs), Av 7 °F (36 5 °C), Min:97 7 °F (36 5 °C), Max:97 7 °F (36 5 °C)    Temp:  [97 7 °F (36 5 °C)] 97 7 °F (36 5 °C)  HR:  [42-93] 89  Resp:  [18-19] 18  BP: ()/(59-70) 94/59  SpO2:  [96 %-99 %] 96 %  Body mass index is 23 98 kg/m²  Input and Output Summary (last 24 hours): Intake/Output Summary (Last 24 hours) at 7/10/2019 1310  Last data filed at 7/10/2019 1120  Gross per 24 hour   Intake 300 ml   Output 0 ml   Net 300 ml       Physical Exam:     Physical Exam  Constitutional: She is oriented to person, place, and time  No distress  Eyes: Pupils are equal, round, and reactive to light  Cardiovascular: Normal rate, regular rhythm and normal heart sounds  No murmur heard  Pulmonary/Chest: Effort normal and breath sounds normal  No respiratory distress  She has no wheezes  She has no rales  Abdominal: Soft  Bowel sounds are normal  She exhibits no distension  There is no tenderness  Musculoskeletal: She exhibits no edema  No midline back tenderness   Neurological: She is alert and oriented to person, place, and time  No focal motor deficits  Skin: Skin is warm       Additional Data:     Labs:    Results from last 7 days Lab Units 07/08/19  0552 07/07/19 2054   WBC Thousand/uL  --  8 94   HEMOGLOBIN g/dL  --  13 7   HEMATOCRIT %  --  41 1   PLATELETS Thousands/uL 254 243     Results from last 7 days   Lab Units 07/08/19  0553 07/07/19 2054   SODIUM mmol/L 137 140   POTASSIUM mmol/L 3 7 3 7   CHLORIDE mmol/L 108 107   CO2 mmol/L 25 27   BUN mg/dL 18 17   CREATININE mg/dL 0 69 0 76   ANION GAP mmol/L 4 6   CALCIUM mg/dL 9 1 8 7   ALBUMIN g/dL  --  3 7   TOTAL BILIRUBIN mg/dL  --  0 46   ALK PHOS U/L  --  103   ALT U/L  --  9*   AST U/L  --  15   GLUCOSE RANDOM mg/dL 92 105     Results from last 7 days   Lab Units 07/10/19  0504   INR  1 03                       * I Have Reviewed All Lab Data Listed Above  * Additional Pertinent Lab Tests Reviewed: All Labs Within Last 24 Hours Reviewed    Imaging:    MRI lumbar spine wo contrast   Final Result by Grey Thomason MD (07/09 2230)      Acute/subacute compression fractures at the superior endplate of K22 and the inferior endplate of the L1 vertebral bodies  Mild bulging of the posterior cortex as well as osteophytic ridging at T11-T12 results in mild canal stenosis and indentation upon    the thecal sac      Mild thickening of the cauda equina nerve roots may represent mild arachnoiditis in the setting of trauma or secondary to another inflammatory process  Diffuse annular disc bulge with osteophytic ridging and bilateral facet arthrosis resulting in mild right neural foraminal stenosis at the L4-L5 level  Cholelithiasis  Workstation performed: [id number]         XR hip/pelv 2-3 vws right   Final Result by Gretchen Arciniega MD (07/07 1330)      No acute osseous abnormality              Workstation performed: [id number]         XR spine thoracolumbar 2 vw    (Results Pending)   IR kyphoplasty/vertebroplasty    (Results Pending)     Recent Cultures (last 7 days):           Last 24 Hours Medication List:     Current Facility-Administered Medications:  acetaminophen 975 mg Oral Q8H [facility] David De La Garza MD   amantadine 100 mg Oral Daily David De La Garza MD   calcium carbonate 500 mg Oral BID David De La Garza MD   carbidopa-levodopa 0 5 tablet Oral 6x Daily David De La Garza MD   cholecalciferol 2,000 Units Oral Daily David De La Garza MD   docusate sodium 100 mg Oral BID Marciano Bullock MD   entacapone 200 mg Oral 6x Daily David De La Garza MD   HYDROmorphone 0 2 mg Intravenous Q4H PRN Marciano Bullock MD   lidocaine 2 patch Topical Daily David De La Garza MD   LORazepam 0 5 mg Oral Daily PRN David De La Garza MD   menthol-methyl salicylate  Apply externally 4x Daily PRN David De La Garza MD   oxyCODONE 2 5 mg Oral Q4H PRN Marciano Bullock MD   oxyCODONE 5 mg Oral Q4H PRN Marciano Bullock MD   pantoprazole 20 mg Oral Early Morning David De La Garza MD   polyethylene glycol 17 g Oral Daily PRN Marciano Bullock MD   rasagiline 1 mg Oral Daily David De La Garza MD   rOPINIRole 8 mg Oral HS David De La Garza MD   senna 1 tablet Oral HS Marciano Bullock MD   tiZANidine 2 mg Oral BID PRN David De La Garza MD        Today, Patient Was Seen By: Marciano Bullock MD    ** Please Note: Dictation voice to text software may have been used in the creation of this document   **

## 2019-07-10 NOTE — PHYSICAL THERAPY NOTE
Physical Therapy Cancellation Note- pt going for kyphoplasty later today , will defer eval until after procedure   Praful Jiménez, PT

## 2019-07-10 NOTE — ANESTHESIA POSTPROCEDURE EVALUATION
Post-Op Assessment Note    CV Status:  Stable  Pain Score: 3    Pain management: adequate     Mental Status:  Alert   PONV Controlled:  None   Airway Patency:  Patent and adequate  Airway: intubated   Post Op Vitals Reviewed: Yes      Staff: Anesthesiologist           BP (P) 129/67 (07/10/19 1755)    Temp (!) 97 4 °F (36 3 °C) (07/10/19 1755)    Pulse (P) 86 (07/10/19 1755)   Resp (P) 14 (07/10/19 1755)    SpO2 (P) 98 % (07/10/19 1755)

## 2019-07-10 NOTE — RESTORATIVE TECHNICIAN NOTE
Restorative Specialist Mobility Note       Activity: Other (Comment)(Pt currently off the unit at the OR per nursing )    Pam SCHULER, Restorative Technician, [employer]

## 2019-07-10 NOTE — ASSESSMENT & PLAN NOTE
Back pain of 2-3 weeks duration worsened by weight-bearing and ambulation  Likely related to acute/ subacute compression fracture as noted on MRI  MRI lumbar spine shows Acute/subacute compression fractures at the superior endplate of J31 and the inferior endplate of the L1 vertebral bodies  Mild bulging of the posterior cortex as well as osteophytic ridging at T11-T12 results in mild canal stenosis and indentation upon the thecal sac  Mild thickening of the cauda equina nerve roots may represent mild arachnoiditis in the setting of trauma or secondary to another inflammatory process  Diffuse annular disc bulge with osteophytic ridging and bilateral facet arthrosis resulting in mild right neural foraminal stenosis at the L4-L5 level      PLAN:  · Neurosurgery on board, plan for kyphoplasty/vertebroplasty with Dr Tio Bearden today  · Pain control with p r n  Oxycodone 2 5 and 5 mg for moderate to severe pain and Dilaudid IV for breakthrough pain  · Continue lidocaine patch, aqua K, scheduled Tylenol  · P r n  Muscle relaxant  · PT OT evaluation postprocedure

## 2019-07-10 NOTE — PROGRESS NOTES
Progress Note - Claude Cohen 1950, 80 y o  female MRN: [id number]    Unit/Bed#: [facility] [place] 925-01 Encounter: [id number]    Primary Care Provider: Tarah Olivia MD   Date and time admitted to hospital: 7/7/2019  9:57 AM        * Back pain  Assessment & Plan  Patient reports she has bilateral hip pain and back pain with Acute/subacute age indeterminate T4,T5, T12 and L1 compression fxs  · Exam: GCS 15, AAO X 3, no TTP of cervical, thoracic/lumbar spine  BARBOZA, strength BUE 5/5, BLE: HF 4/5 pain inhibition, KF/PF/DF 5/5, sensation intact to LT/PP X 4, reflexes intact, no drift bilat  · Continue regular neurologic checks  · Imaging reviewed personally and by attending  Final results as below  · Xray of Thoracolumbar spine 7/8/19: Age-indeterminate superior endplate compression fractures of T4, T5 and T12  Age inderterminate L1 compression fracture  · MRI of Lumbar spine 7/9/19: Acute/subacute compression fractures at the superior endplate of M97 and the inferior endplate of the L1 vertebral bodies  Mild bulging of the posterior cortex as well as osteophytic ridging at T11-T12 results in mild canal stenosis and indentation upon the thecal sac  Mild thickening of the cauda equina nerve roots may represent mild arachnoiditis in the setting of trauma or secondary to another inflammatory process  Diffuse annular disc bulge with osteophytic ridging and bilateral facet arthrosis resulting in mild right neural foraminal stenosis at the L4-L5 level    Pain control per primary team    · Eval and mobilize per PT/OT  · DVT PPX: SCDs  Hold pharm dvt ppx for procedure  · Discussed with patient and her family (2 daughters and son) her imaging findings with the thoracolumbar fractures and showed her the MRI/Xrays  Discussed kyphoplasty/vertebroplasty with pt and her family  Pt and family are agreeable to having the procedure     · Pt is anticipated to have kyphoplasty/vertebroplasty with Dr Massey Breeding this afternoon for the thoracolumbar fractures  · INR wnl at 1 03    · Neurosurgery will continue to follow and advise  Please call with any questions or concerns  Subjective/Objective   Chief Complaint: "My hips and back hurt"    Subjective:  Patient reports he continues to have right hip pain that is worse than left hip as well as back pain  Patient rates her right hip pain as "15/10" on the pain scale  She rates her back and left hip pain as 6/10 on the pain scale  Patients states that she feels her pain is deep  Patient reports the pain is relieved with pain medication  She reports her pain is made worse with movement  Patient denies any numbness, tingling, or weakness in bilateral arms or legs  Patient reports she has been able to void without concerns  Patient denies any loss in control of her bowel or bladder movement  Patient denies any fever, chills, chest pain, shortness of breath, or abdominal pain  Objective:  Alert and awake, lying in bed, no acute distress  I/O       07/08 0701 - 07/09 0700 07/09 0701 - 07/10 0700    P  O  300 300    Total Intake(mL/kg) 300 (4 9) 300 (4 9)    Urine (mL/kg/hr) 100 (0 1) 0 (0)    Stool 0     Total Output 100 0    Net +200 +300          Unmeasured Urine Occurrence 3 x 4 x    Unmeasured Stool Occurrence 1 x           Invasive Devices     Peripheral Intravenous Line            Peripheral IV 07/07/19 Right;Dorsal (posterior) Forearm 2 days                Physical Exam:  Vitals: Blood pressure 94/59, pulse 89, temperature 97 7 °F (36 5 °C), resp  rate 18, height 5' 3" (1 6 m), weight 61 4 kg (135 lb 5 8 oz), SpO2 96 %  ,Body mass index is 23 98 kg/m²  General appearance: alert, appears stated age, cooperative and no distress  Head: Normocephalic, without obvious abnormality, atraumatic  Eyes: EOMI, PERRL  Neck: supple, symmetrical, trachea midline   Back: no tenderness to percussion or palpation of cervical, thoracic or lumbar spine     Lungs: non labored breathing  Heart: regular heart rate  Neurologic:   Mental status: Alert, oriented X 3, thought content appropriate  Cranial nerves: grossly intact (Cranial nerves II-XII)  Sensory: normal to LT X 4  Motor: moving all extremities without focal weakness, Strength 5/5 throughout except BHF 4/5 secondary to pain inhibition  Reflexes: 2+ and symmetric  Coordination: no drift bilaterally      Lab Results:  Results from last 7 days   Lab Units 07/08/19  0552 07/07/19  2054   WBC Thousand/uL  --  8 94   HEMOGLOBIN g/dL  --  13 7   HEMATOCRIT %  --  41 1   PLATELETS Thousands/uL 254 243     Results from last 7 days   Lab Units 07/08/19  0553 07/07/19  2054   POTASSIUM mmol/L 3 7 3 7   CHLORIDE mmol/L 108 107   CO2 mmol/L 25 27   BUN mg/dL 18 17   CREATININE mg/dL 0 69 0 76   CALCIUM mg/dL 9 1 8 7   ALK PHOS U/L  --  103   ALT U/L  --  9*   AST U/L  --  15             Results from last 7 days   Lab Units 07/10/19  0504   INR  1 03   PTT seconds 30     Imaging Studies: I have personally reviewed pertinent reports  and I have personally reviewed pertinent films in PACS    Xr Hip/pelv 2-3 Vws Right    Result Date: 7/7/2019  Impression: No acute osseous abnormality  Workstation performed: [id number]     Mri Lumbar Spine Wo Contrast    Result Date: 7/9/2019  Impression: Acute/subacute compression fractures at the superior endplate of J87 and the inferior endplate of the L1 vertebral bodies  Mild bulging of the posterior cortex as well as osteophytic ridging at T11-T12 results in mild canal stenosis and indentation upon  the thecal sac Mild thickening of the cauda equina nerve roots may represent mild arachnoiditis in the setting of trauma or secondary to another inflammatory process  Diffuse annular disc bulge with osteophytic ridging and bilateral facet arthrosis resulting in mild right neural foraminal stenosis at the L4-L5 level  Cholelithiasis   Workstation performed: [id number]       EKG, Pathology, and Other Studies: I have personally reviewed pertinent reports  VTE Pharmacologic Prophylaxis: Reason for no pharmacologic prophylaxis Hold for kypho/vertebroplasty this afternoon  VTE Mechanical Prophylaxis: sequential compression device    PLEASE NOTE:  This encounter was completed utilizing the [employer] Direct Speech Voice Recognition Software  Grammatical errors, random word insertions, pronoun errors and incomplete sentences are occasional consequences of the system due to software limitations, ambient noise and hardware issues  These may be missed by proof reading prior to affixing electronic signature  Any questions or concerns about the content, text or information contained within the body of this dictation should be directly addressed to the advanced practitioner or physician for clarification  Please do not hesitate to call me directly if you have any questions or concerns

## 2019-07-10 NOTE — APP STUDENT NOTE
JUSTO STUDENT  Inpatient Progress Note for TRAINING ONLY  Not Part of Legal Medical Record       Progress Note - Corrinne Mcgregor 80 y o  female MRN: [id number]    Unit/Bed#: [facility] 925-01 Encounter: [id number]      Assessment/Plan:    1  Back Pain  Patient with ongoing back pain/Right hip pain x2-3 weeks, very minimal at rest, worsened by ambulation and weight bearing  Not associated with any fevers, chills, saddle anesthesia, numbness/tingling in jayden LE, or decreased sensation  Unclear etiology, however patient believes it stems from a rotational injury in December 2018  - Neurosurgery consulted  - MRI lumbar spine (07/09): "Acute/subacute compression fx at T12 and L1  Mild bulging of posterior cortex, mild canal stenosis and indentation upon thecal sac  Mild thickening of cauda equina- may represent mild arachnoiditis in setting of trauma or other inflammatory process  Diffuse annular disc bulge -- Mild R neural foraminal stenosis"   - Neurosurgery planning to perform IR kyphoplasty/vertebroplasty today  Plan:  - Continue with current supportive/ pain medication regimen:   Lidoderm 5% patch x2, Dilaudid 0 2mg Q4Hr PRN- severe, breakthrough pain, Oxycodone 5mg Q4HR PRN- severe pain, oxycodone 2 5 mg Q4Hr PRN- moderate pain, tizanidine (Zanaflex) 2mg BID PRN- muscle spasms, ativan 0 5mg Qdaily PRN-anxiety  - Appreciate Neurosurgery evaluation  - Patient requesting more information regarding upcoming procedure, she is having reservations in regards to maybe she is moving "too quickly with the procedure b/c she is in pain"  Also requesting that Neurosurgery explain procedure to her family once they arrive  2  Ambulatory dysfunction  Patient has increased back/R hip pain with ambulation  She was able to get to the shower today and wash up, with "10/10 pain"    Plan:  - continue with PT/OT consult as able  - continue with pain management as per above to increase ability of ambulation     3   Parkinson's Disease  Patient has a history of Parkinson's Dz, managed outpatient  Plan:  - continue with outpatient regimen of: carbidopa-levodopa (Sinemet) 25-100mg 6xday, entacapone (Comtan) 200mg 6x/day, rasagiline (Azilect) 1mg Qdaily, and ropinirole 8mg QHS    4  Osteoporosis  Plan:  - Continue with cholecalciferol 2,000U Qdaily  Subjective:   Patient states she feels the same as yesterday, pain medications are not significantly improving her continued "deep ache"  She was able to sleep okay, eat and drink without issues yesterday, but has been NPO since midnight due to pending IR kyphoplasty/vertebroplasty  Patient was able to get self to shower today (with assistance) and shower with a "10/10" back pain  She is not complaining of any numbness/tingling in LE/UE, or spine, no fevers, chills, no N/V, no saddle anesthesia, no urinary/bowel incontinence  Yesterday patient mentioned pain "shooting up her spine", however did not complain of same today  Objective:     Vitals: Blood pressure 94/59, pulse 89, temperature 97 7 °F (36 5 °C), resp  rate 18, height 5' 3" (1 6 m), weight 61 4 kg (135 lb 5 8 oz), SpO2 96 %  ,Body mass index is 23 98 kg/m²  Intake/Output Summary (Last 24 hours) at 7/10/2019 1010  Last data filed at 7/10/2019 0300  Gross per 24 hour   Intake 300 ml   Output 0 ml   Net 300 ml       Physical Exam: Physical Exam   Constitutional: She is oriented to person, place, and time  She appears well-developed and well-nourished  No distress  HENT:   Head: Normocephalic and atraumatic  Right Ear: External ear normal    Left Ear: External ear normal    Eyes: Pupils are equal, round, and reactive to light  Conjunctivae and EOM are normal  Right eye exhibits no discharge  Left eye exhibits no discharge  Neck: Normal range of motion  Neck supple  Cardiovascular: Normal rate and regular rhythm  Exam reveals no gallop and no friction rub  No murmur heard    Pulmonary/Chest: Effort normal and breath sounds normal  No stridor  No respiratory distress  She has no wheezes  Abdominal: Soft  Bowel sounds are normal  She exhibits distension  She exhibits no mass  There is no tenderness  There is no guarding  Musculoskeletal: She exhibits no edema, tenderness or deformity  Not tender to palpation of jayden hips and lower back  Complains of R hip/low back pain with jayden leg raises  No LE edema, rashes, or redness  Hip flexion intact jayden  Did not assess back ROM, internal rotation or external rotation of Hip  Neurological: She is alert and oriented to person, place, and time  Gross sensation intact to jayden LE and jayden UE  Strength 5/5 jayden LE and jayden UE  Normal muscle tone  Cerebellar functions intact as normal finger-to nose test      Did not assess ambulation     Skin: Skin is warm and dry  Capillary refill takes less than 2 seconds  No rash noted  She is not diaphoretic  No erythema  No pallor  Invasive Devices     Peripheral Intravenous Line            Peripheral IV 07/07/19 Right;Dorsal (posterior) Forearm 2 days                Lab, Imaging and other studies: I have personally reviewed pertinent reports      VTE Pharmacologic Prophylaxis: Enoxaparin (Lovenox)  VTE Mechanical Prophylaxis: sequential compression device

## 2019-07-10 NOTE — BRIEF OP NOTE (RAD/CATH)
Procedure name not found  Procedure Note    PATIENT NAME: Corrinne Mcgregor  : 1950  MRN: [id number]     Pre-op Diagnosis:   1  Right hip pain    2  Right low back pain    3  Back pain      Post-op Diagnosis:   1  Right hip pain    2  Right low back pain    3  Back pain        Surgeon:   Juan M Abdi MD  Assistants:     No qualified resident was available, Resident is only observing    Estimated Blood Loss: 5 cc  Findings: Successful T12 and L1 kyphoplasty      Specimens: none    Complications:  none    Anesthesia: General    Juan M Abdi MD     Date: 7/10/2019  Time: 6:17 PM

## 2019-07-11 PROBLEM — S32.010A COMPRESSION FRACTURE OF FIRST LUMBAR VERTEBRA (HCC): Status: ACTIVE | Noted: 2019-07-11

## 2019-07-11 LAB
ANION GAP SERPL CALCULATED.3IONS-SCNC: 4 MMOL/L (ref 4–13)
BUN SERPL-MCNC: 27 MG/DL (ref 5–25)
CALCIUM SERPL-MCNC: 8.9 MG/DL (ref 8.3–10.1)
CHLORIDE SERPL-SCNC: 105 MMOL/L (ref 100–108)
CO2 SERPL-SCNC: 28 MMOL/L (ref 21–32)
CREAT SERPL-MCNC: 0.6 MG/DL (ref 0.6–1.3)
ERYTHROCYTE [DISTWIDTH] IN BLOOD BY AUTOMATED COUNT: 11.8 % (ref 11.6–15.1)
GFR SERPL CREATININE-BSD FRML MDRD: 95 ML/MIN/1.73SQ M
GLUCOSE SERPL-MCNC: 97 MG/DL (ref 65–140)
HCT VFR BLD AUTO: 41.4 % (ref 34.8–46.1)
HGB BLD-MCNC: 14.2 G/DL (ref 11.5–15.4)
MCH RBC QN AUTO: 32.2 PG (ref 26.8–34.3)
MCHC RBC AUTO-ENTMCNC: 34.3 G/DL (ref 31.4–37.4)
MCV RBC AUTO: 94 FL (ref 82–98)
PLATELET # BLD AUTO: 272 THOUSANDS/UL (ref 149–390)
PMV BLD AUTO: 10.3 FL (ref 8.9–12.7)
POTASSIUM SERPL-SCNC: 4 MMOL/L (ref 3.5–5.3)
RBC # BLD AUTO: 4.41 MILLION/UL (ref 3.81–5.12)
SODIUM SERPL-SCNC: 137 MMOL/L (ref 136–145)
WBC # BLD AUTO: 8.44 THOUSAND/UL (ref 4.31–10.16)

## 2019-07-11 PROCEDURE — G8979 MOBILITY GOAL STATUS: HCPCS

## 2019-07-11 PROCEDURE — 97162 PT EVAL MOD COMPLEX 30 MIN: CPT

## 2019-07-11 PROCEDURE — G8988 SELF CARE GOAL STATUS: HCPCS

## 2019-07-11 PROCEDURE — G8978 MOBILITY CURRENT STATUS: HCPCS

## 2019-07-11 PROCEDURE — 85027 COMPLETE CBC AUTOMATED: CPT | Performed by: PHYSICIAN ASSISTANT

## 2019-07-11 PROCEDURE — 97166 OT EVAL MOD COMPLEX 45 MIN: CPT

## 2019-07-11 PROCEDURE — 99024 POSTOP FOLLOW-UP VISIT: CPT | Performed by: RADIOLOGY

## 2019-07-11 PROCEDURE — 99232 SBSQ HOSP IP/OBS MODERATE 35: CPT | Performed by: INTERNAL MEDICINE

## 2019-07-11 PROCEDURE — G8987 SELF CARE CURRENT STATUS: HCPCS

## 2019-07-11 PROCEDURE — G8989 SELF CARE D/C STATUS: HCPCS

## 2019-07-11 PROCEDURE — 80048 BASIC METABOLIC PNL TOTAL CA: CPT | Performed by: PHYSICIAN ASSISTANT

## 2019-07-11 RX ADMIN — DOCUSATE SODIUM 100 MG: 100 CAPSULE, LIQUID FILLED ORAL at 18:09

## 2019-07-11 RX ADMIN — CARBIDOPA AND LEVODOPA 0.5 TABLET: 25; 100 TABLET ORAL at 13:00

## 2019-07-11 RX ADMIN — ACETAMINOPHEN 975 MG: 325 TABLET ORAL at 13:00

## 2019-07-11 RX ADMIN — CARBIDOPA AND LEVODOPA 0.5 TABLET: 25; 100 TABLET ORAL at 05:17

## 2019-07-11 RX ADMIN — ENTACAPONE 200 MG: 200 TABLET ORAL at 13:00

## 2019-07-11 RX ADMIN — CARBIDOPA AND LEVODOPA 0.5 TABLET: 25; 100 TABLET ORAL at 18:08

## 2019-07-11 RX ADMIN — TIZANIDINE 2 MG: 4 TABLET ORAL at 21:23

## 2019-07-11 RX ADMIN — DOCUSATE SODIUM 100 MG: 100 CAPSULE, LIQUID FILLED ORAL at 09:10

## 2019-07-11 RX ADMIN — TIZANIDINE 2 MG: 4 TABLET ORAL at 13:00

## 2019-07-11 RX ADMIN — ENTACAPONE 200 MG: 200 TABLET ORAL at 09:09

## 2019-07-11 RX ADMIN — OXYCODONE HYDROCHLORIDE 2.5 MG: 5 TABLET ORAL at 18:09

## 2019-07-11 RX ADMIN — OXYCODONE HYDROCHLORIDE 2.5 MG: 5 TABLET ORAL at 22:35

## 2019-07-11 RX ADMIN — CARBIDOPA AND LEVODOPA 0.5 TABLET: 25; 100 TABLET ORAL at 09:10

## 2019-07-11 RX ADMIN — ROPINIROLE HYDROCHLORIDE 8 MG: 8 TABLET, FILM COATED, EXTENDED RELEASE ORAL at 21:13

## 2019-07-11 RX ADMIN — ENTACAPONE 200 MG: 200 TABLET ORAL at 21:14

## 2019-07-11 RX ADMIN — PANTOPRAZOLE SODIUM 20 MG: 20 TABLET, DELAYED RELEASE ORAL at 05:16

## 2019-07-11 RX ADMIN — LIDOCAINE 2 PATCH: 50 PATCH CUTANEOUS at 09:11

## 2019-07-11 RX ADMIN — SENNOSIDES 8.6 MG: 8.6 TABLET, FILM COATED ORAL at 21:14

## 2019-07-11 RX ADMIN — ACETAMINOPHEN 975 MG: 325 TABLET ORAL at 21:14

## 2019-07-11 RX ADMIN — OXYCODONE HYDROCHLORIDE 2.5 MG: 5 TABLET ORAL at 05:17

## 2019-07-11 RX ADMIN — OXYCODONE HYDROCHLORIDE 2.5 MG: 5 TABLET ORAL at 11:20

## 2019-07-11 RX ADMIN — ENTACAPONE 200 MG: 200 TABLET ORAL at 05:17

## 2019-07-11 RX ADMIN — ACETAMINOPHEN 975 MG: 325 TABLET ORAL at 05:16

## 2019-07-11 RX ADMIN — ENTACAPONE 200 MG: 200 TABLET ORAL at 15:22

## 2019-07-11 RX ADMIN — CARBIDOPA AND LEVODOPA 0.5 TABLET: 25; 100 TABLET ORAL at 21:13

## 2019-07-11 RX ADMIN — RASAGILINE 1 MG: 1 TABLET ORAL at 09:09

## 2019-07-11 RX ADMIN — VITAMIN D, TAB 1000IU (100/BT) 2000 UNITS: 25 TAB at 09:10

## 2019-07-11 RX ADMIN — AMANTADINE HYDROCHLORIDE 100 MG: 100 CAPSULE, LIQUID FILLED ORAL at 09:10

## 2019-07-11 RX ADMIN — ENTACAPONE 200 MG: 200 TABLET ORAL at 18:08

## 2019-07-11 RX ADMIN — CARBIDOPA AND LEVODOPA 0.5 TABLET: 25; 100 TABLET ORAL at 15:22

## 2019-07-11 NOTE — OCCUPATIONAL THERAPY NOTE
[address]  Evaluation     Patient Name: Marisol Velasco  PQJFK'L Date: 7/11/2019  Problem List  Patient Active Problem List   Diagnosis    Anxiety state    Esophagitis, reflux    Hyperlipidemia    Mild cognitive impairment    Osteoporosis    [facility] disease [facility] [address] [facility]    Vitamin D deficiency    Torticollis    REM sleep behavior disorder    Parkinson's disease ([facility] [address] [facility]    Abnormal thyroid function test    Pain of both hip joints    Back pain    Ambulatory dysfunction    Compression fracture of T12 first lumbar vertebra (HCC)     Past Medical History  Past Medical History:   Diagnosis Date    GERD (gastroesophageal reflux disease)     Parkinson disease ([facility] [address] [facility]      Past Surgical History  Past Surgical History:   Procedure Laterality Date    ARTERIOGRAM N/A 7/10/2019    Procedure: KYPHOPLASTY;  Surgeon: Rosa Witt MD;  [facility]: [facility];  Service: Interventional Radiology    TONSILLECTOMY             07/11/19 1520   Note Type   Note type Eval only   Restrictions/Precautions   Weight Bearing Precautions Per Order No   Braces or Orthoses TLSO  (as needed for comfort)   Other Precautions Pain   Pain Assessment   Pain Assessment 0-10   Pain Score 7   Pain Type Acute pain   Pain Location Hip   Pain Orientation Right   Hospital Pain Intervention(s) Repositioned; Ambulation/increased activity; Rest   Response to Interventions tolerated   Home Living   Type of [facility] Two level;Bed/bath upstairs;Stairs to enter with rails   Bathroom Shower/Tub Tub/shower unit   Bathroom Toilet Standard   Bathroom Equipment   (none)   Home Equipment   (none per pt)   Prior Function   Level of [place] Independent with ADLs and functional mobility   Lives With [place] Help From Family   ADL Assistance Independent   IADLs Independent   Falls in the last 6 months 0   Vocational Part time employment   Comments Pt reports her spouse and adult children will be home throughout the day and able to assist as needed  Lifestyle   Autonomy At baseline pt was completing all ADLs/IADLs IND, ambulating IND w/o an AD, (+) driving, (+) working part time as a teacher  Reciprocal Relationships supportive family   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   Intrinsic Gratification pt enjoys spending time with family   Psychosocial   Psychosocial (WDL) WDL   Subjective   Subjective "I don't have any concerns"   ADL   Eating Assistance 6  Modified independent   Grooming Assistance 6  Modified Independent   UB Bathing Assistance 5  Supervision/Setup   LB Bathing Assistance 5  Supervision/Setup   UB Dressing Assistance 6  Modified independent   UB Dressing Deficit   (able to don/doff TLSO seated EOB MOD IND)   LB Dressing Assistance 5  Supervision/Setup   LB Dressing Deficit Supervision/safety; Increased time to complete  (donned/doffed socks maintaining spinal precautions)   Toileting Assistance  5  Supervision/Setup   Toileting Deficit Supervison/safety; Increased time to complete  (standard toilet)   Bed Mobility   Supine to Sit 5  Supervision   Additional items Increased time required   Additional Comments demo good log roll technique   Transfers   Sit to Stand 5  Supervision   Additional items Increased time required   Stand to Sit 5  Supervision   Additional items Increased time required   Toilet transfer 5  Supervision   Additional items Increased time required;Standard toilet   Functional Mobility   Functional Mobility 5  Supervision   Additional Comments w/in room and bathroom   Additional items Rolling walker   Balance   Static Sitting Good   Dynamic Sitting Fair +   Static Standing Fair   Dynamic Standing Fair   Activity Tolerance   Activity Tolerance Patient tolerated treatment well   Medical Staff Made Aware CM   Nurse Made Aware Spoke to RN - pt appropriate to be seen   RUE Assessment   RUE Assessment WNL   LUE Assessment   LUE Assessment WNL   Hand Function   Gross Motor Coordination Functional   Fine Motor Coordination Functional  (increased time)   Sensation   Light Touch No apparent deficits  (reports no numbness/tingling)   Cognition   Overall Cognitive Status WFL   Arousal/Participation Alert; Cooperative   Attention Within functional limits   Orientation Level Oriented X4   Memory Within functional limits   Following Commands Follows all commands and directions without difficulty   Assessment   Assessment Pt is a 80 y o  female who was [facility] 7/7/[facility] with Back pain   MRI spine: "Acute/subacute compression fractures at the superior endplate of S33 and the inferior endplate of the L1 vertebral bodies  Mild bulging of the posterior cortex as well as osteophytic ridging at T11-T12 results in mild canal stenosis and indentation upon the thecal sac " Pt s/p T12 and L1 kypohoplasty 7/10/2019  Per neurosurgery, TLSO as needed for comfort  Pt's problem list also includes PMH of anxiety, HLD, osteoporosis, Parkinson's disease, esophagitis, mild cognitive impairment, torticollis  At baseline pt was completing all ADLs/IADLs IND, ambulating IND w/o an AD, (+) driving, (+) working part time as a teacher  Pt lives with her spouse in a 2  with 2nd floor set up  Pt with B/L UE strength WNL and intact to light touch  Pt educated on spinal precautions with good understanding and adherence throughout  Pt able to don/doff TLSO IND seated at EOB  Pt able to don/doff socks with SUP seated EOB while maintaining precautions  Currently pt requires MOD IND-SUP for overall ADLS and SUP for functional mobility/transfers  Pt reports her spouse and adult children will be home throughout the day and able to assist as needed  Pt has no further questions/concerns  From OT standpoint, recommend HOME WITH FAMILY SUPPORT upon D/C  Pt reports she may purchase tub grab bar to increase safety, also discussed option of SC  No further acute OT services recommended at this time      Goals   Patient Goals to go home soon   Plan   OT Frequency Eval only   Recommendation   OT Discharge Recommendation Home with family support   OT - OK to Discharge Yes  (when medically appropriate)   Barthel Index   Feeding 10   Bathing 0   Grooming Score 5   Dressing Score 5   Bladder Score 10   Bowels Score 10   Toilet Use Score 5   Transfers (Bed/Chair) Score 10   Mobility (Level Surface) Score 0   Stairs Score 5   Barthel Index Score 60   Modified Reece Scale   Modified Mora Scale 3         Dalia Soto, OT

## 2019-07-11 NOTE — PLAN OF CARE
Problem: CARDIOVASCULAR - ADULT  Goal: Maintains optimal cardiac output and hemodynamic stability  Description  INTERVENTIONS:  - Monitor I/O, vital signs and rhythm  - Monitor for S/S and trends of decreased cardiac output i e  bleeding, hypotension  - Administer and titrate ordered vasoactive medications to optimize hemodynamic stability  - Assess quality of pulses, skin color and temperature  - Assess for signs of decreased coronary artery perfusion - ex   Angina  - Instruct patient to report change in severity of symptoms  Outcome: Progressing  Goal: Absence of cardiac dysrhythmias or at baseline rhythm  Description  INTERVENTIONS:  - Continuous cardiac monitoring, monitor vital signs, obtain 12 lead EKG if indicated  - Administer antiarrhythmic and heart rate control medications as ordered  - Monitor electrolytes and administer replacement therapy as ordered  Outcome: Progressing     Problem: RESPIRATORY - ADULT  Goal: Achieves optimal ventilation and oxygenation  Description  INTERVENTIONS:  - Assess for changes in respiratory status  - Assess for changes in mentation and behavior  - Position to facilitate oxygenation and minimize respiratory effort  - Oxygen administration by appropriate delivery method based on oxygen saturation (per order) or ABGs  - Initiate smoking cessation education as indicated  - Encourage broncho-pulmonary hygiene including cough, deep breathe, Incentive Spirometry  - Assess the need for suctioning and aspirate as needed  - Assess and instruct to report SOB or any respiratory difficulty  - Respiratory Therapy support as indicated  Outcome: Progressing     Problem: METABOLIC, FLUID AND ELECTROLYTES - ADULT  Goal: Electrolytes maintained within normal limits  Description  INTERVENTIONS:  - Monitor labs and assess patient for signs and symptoms of electrolyte imbalances  - Administer electrolyte replacement as ordered  - Monitor response to electrolyte replacements, including repeat lab results as appropriate  - Instruct patient on fluid and nutrition as appropriate  Outcome: Progressing  Goal: Fluid balance maintained  Description  INTERVENTIONS:  - Monitor labs and assess for signs and symptoms of volume excess or deficit  - Monitor I/O and WT  - Instruct patient on fluid and nutrition as appropriate  Outcome: Progressing  Goal: Glucose maintained within target range  Description  INTERVENTIONS:  - Monitor Blood Glucose as ordered  - Assess for signs and symptoms of hyperglycemia and hypoglycemia  - Administer ordered medications to maintain glucose within target range  - Assess nutritional intake and initiate nutrition service referral as needed  Outcome: Progressing     Problem: SKIN/TISSUE INTEGRITY - ADULT  Goal: Skin integrity remains intact  Description  INTERVENTIONS  - Identify patients at risk for skin breakdown  - Assess and monitor skin integrity  - Assess and monitor nutrition and hydration status  - Monitor labs (i e  albumin)  - Assess for incontinence   - Turn and reposition patient  - Assist with mobility/ambulation  - Relieve pressure over bony prominences  - Avoid friction and shearing  - Provide appropriate hygiene as needed including keeping skin clean and dry  - Evaluate need for skin moisturizer/barrier cream  - Collaborate with interdisciplinary team (i e  Nutrition, Rehabilitation, etc )   - Patient/family teaching  Outcome: Progressing     Problem: MUSCULOSKELETAL - ADULT  Goal: Maintain or return mobility to safest level of function  Description  INTERVENTIONS:  - Assess patient's ability to carry out ADLs; assess patient's baseline for ADL function and identify physical deficits which impact ability to perform ADLs (bathing, care of mouth/teeth, toileting, grooming, dressing, etc )  - Assess/evaluate cause of self-care deficits   - Assess range of motion  - Assess patient's mobility; develop plan if impaired  - Assess patient's need for assistive devices and provide as appropriate  - Encourage maximum independence but intervene and supervise when necessary  - Involve family in performance of ADLs  - Assess for home care needs following discharge   - Request OT consult to assist with ADL evaluation and planning for discharge  - Provide patient education as appropriate  Outcome: Progressing  Goal: Maintain proper alignment of affected body part  Description  INTERVENTIONS:  - Support, maintain and protect limb and body alignment  - Provide pt/fam with appropriate education  Outcome: Progressing     Problem: PAIN - ADULT  Goal: Verbalizes/displays adequate comfort level or baseline comfort level  Description  Interventions:  - Encourage patient to monitor pain and request assistance  - Assess pain using appropriate pain scale  - Administer analgesics based on type and severity of pain and evaluate response  - Implement non-pharmacological measures as appropriate and evaluate response  - Consider cultural and social influences on pain and pain management  - Notify physician/advanced practitioner if interventions unsuccessful or patient reports new pain  Outcome: Progressing     Problem: INFECTION - ADULT  Goal: Absence or prevention of progression during hospitalization  Description  INTERVENTIONS:  - Assess and monitor for signs and symptoms of infection  - Monitor lab/diagnostic results  - Monitor all insertion sites, i e  indwelling lines, tubes, and drains  - Monitor endotracheal (as able) and nasal secretions for changes in amount and color  - [place] appropriate cooling/warming therapies per order  - Administer medications as ordered  - Instruct and encourage patient and family to use good hand hygiene technique  - Identify and instruct in appropriate isolation precautions for identified infection/condition  Outcome: Progressing  Goal: Absence of fever/infection during neutropenic period  Description  INTERVENTIONS:  - Monitor WBC  - Implement neutropenic guidelines  Outcome: Progressing     Problem: SAFETY ADULT  Goal: Patient will remain free of falls  Description  INTERVENTIONS:  - Assess patient frequently for physical needs  -  Identify cognitive and physical deficits and behaviors that affect risk of falls    -  [place] fall precautions as indicated by assessment   - Educate patient/family on patient safety including physical limitations  - Instruct patient to call for assistance with activity based on assessment  - Modify environment to reduce risk of injury  - Consider OT/PT consult to assist with strengthening/mobility  Outcome: Progressing  Goal: Maintain or return to baseline ADL function  Description  INTERVENTIONS:  -  Assess patient's ability to carry out ADLs; assess patient's baseline for ADL function and identify physical deficits which impact ability to perform ADLs (bathing, care of mouth/teeth, toileting, grooming, dressing, etc )  - Assess/evaluate cause of self-care deficits   - Assess range of motion  - Assess patient's mobility; develop plan if impaired  - Assess patient's need for assistive devices and provide as appropriate  - Encourage maximum independence but intervene and supervise when necessary  ¯ Involve family in performance of ADLs  ¯ Assess for home care needs following discharge   ¯ Request OT consult to assist with ADL evaluation and planning for discharge  ¯ Provide patient education as appropriate  Outcome: Progressing  Goal: Maintain or return mobility status to optimal level  Description  INTERVENTIONS:  - Assess patient's baseline mobility status (ambulation, transfers, stairs, etc )    - Identify cognitive and physical deficits and behaviors that affect mobility  - Identify mobility aids required to assist with transfers and/or ambulation (gait belt, sit-to-stand, lift, walker, cane, etc )  - Universal fall precautions as indicated by assessment  - Record patient progress and toleration of activity level on Mobility SBAR; progress patient to next Phase/Stage  - Instruct patient to call for assistance with activity based on assessment  - Request Rehabilitation consult to assist with strengthening/weightbearing, etc   Outcome: Progressing     Problem: DISCHARGE PLANNING  Goal: Discharge to home or other facility with appropriate resources  Description  INTERVENTIONS:  - Identify barriers to discharge w/patient and caregiver  - Arrange for needed discharge resources and transportation as appropriate  - Identify discharge learning needs (meds, wound care, etc )  - Arrange for interpretive services to assist at discharge as needed  - Refer to Case Management Department for coordinating discharge planning if the patient needs post-hospital services based on physician/advanced practitioner order or complex needs related to functional status, cognitive ability, or social support system  Outcome: Progressing     Problem: Knowledge Deficit  Goal: Patient/family/caregiver demonstrates understanding of disease process, treatment plan, medications, and discharge instructions  Description  Complete learning assessment and assess knowledge base  Interventions:  - Provide teaching at level of understanding  - Provide teaching via preferred learning methods  Outcome: Progressing     Problem: Potential for Falls  Goal: Patient will remain free of falls  Description  INTERVENTIONS:  - Assess patient frequently for physical needs  -  Identify cognitive and physical deficits and behaviors that affect risk of falls    -  [place] fall precautions as indicated by assessment   - Educate patient/family on patient safety including physical limitations  - Instruct patient to call for assistance with activity based on assessment  - Modify environment to reduce risk of injury  - Consider OT/PT consult to assist with strengthening/mobility  Outcome: Progressing

## 2019-07-11 NOTE — DISCHARGE INSTRUCTIONS
Vertebroplasty   WHAT YOU NEED TO KNOW:   Vertebroplasty is a procedure to fix broken vertebrae  Vertebrae are the round, strong bones that form your spine  DISCHARGE INSTRUCTIONS:   Medicines:   · Medicines may be given to help decrease pain or to treat a bacterial infection  · Take your medicine as directed  Contact your healthcare provider if you think your medicine is not helping or if you have side effects  Tell him or her if you are allergic to any medicine  Keep a list of the medicines, vitamins, and herbs you take  Include the amounts, and when and why you take them  Bring the list or the pill bottles to follow-up visits  Carry your medicine list with you in case of an emergency  Follow up with your healthcare provider as directed: Write down your questions so you remember to ask them during your visits  Contact your healthcare provider if:   · You have a fever  · You have worsening pain, even after you take your medicine  · You have increased trouble walking or moving around  · You have pain in your ribs or lower back  · You have sudden trouble talking  Seek care immediately or call 911 if:   · You feel lightheaded, short of breath, and have chest pain  · You cough up blood  · Your arm or leg feels warm, tender, and painful  It may look swollen and red  · Your procedure area becomes red, warm, and swollen  · You have drainage from the area where your procedure was done  · You are unable to move one or both of your arms or legs  · You are urinating less than usual, or not at all  · You suddenly cannot think clearly  © 2017 [address] Information is for End User's use only and may not be sold, redistributed or otherwise used for commercial purposes  All illustrations and images included in CareNotes® are the copyrighted property of A D A M , Inc  or Tony Valencia  The above information is an  only   It is not intended as medical advice for individual conditions or treatments  Talk to your doctor, nurse or pharmacist before following any medical regimen to see if it is safe and effective for you

## 2019-07-11 NOTE — ORTHOTIC NOTE
Orthotic Note            Date: 7/11/2019      Patient Name: Samy Bolaños   20 mins         Reason for Consult:  Patient Active Problem List   Diagnosis    Anxiety state    Esophagitis, reflux    Hyperlipidemia    Mild cognitive impairment    Osteoporosis    Parkinson disease ([facility] [address] [facility]    Vitamin D deficiency    Torticollis    REM sleep behavior disorder    Parkinson's disease ([facility] [address] [facility]    Abnormal thyroid function test    Pain of both hip joints    Back pain    Ambulatory dysfunction   [employer]  Per [place]  Kyle Bergeron PA-C    Per neurosurgery, orthotech delivered and educated patient on the donning, doffing, cleaning and wearing instructions of [place] TLSO brace  Patient aware that TLSO brace is to be worn on an as needed basis, for comfort  Orthotech then assisted patient to EOB, where TLSO brace was donned  Bilateral waist panels were set to a Size Medium for optimal fit and comfort  Once brace was donned, orthotech assisted patient in ambulating in hallway, then to bedside recliner where she currently sits comfortably  Patient tolerated fitting well and is without questions or concerns at this time  Business card with bracing phone extension left at bedside  DAVIN [place] aware of fitting  Ximena Aguero will continue to follow up as needed  Recommendations:  Please call  in regards to bracing instructions and/or adjustments    César Smoker BS, Restorative Technician, [employer]

## 2019-07-11 NOTE — RESTORATIVE TECHNICIAN NOTE
Restorative Specialist Mobility Note       Activity: Ambulate in king, Ambulate in room, Bathroom privileges, Chair, Dangle, Stand at bedside(Educated/encouraged pt to ambulate with assistance 3-4 x's/day   Pt callbell, phone/tray within reach )     Assistive Device: Front wheel walker, Other (Comment)(TLSO brace on)       Pam SCHULER, Restorative Technician, [employer]

## 2019-07-11 NOTE — PLAN OF CARE
Problem: CARDIOVASCULAR - ADULT  Goal: Maintains optimal cardiac output and hemodynamic stability  Description  INTERVENTIONS:  - Monitor I/O, vital signs and rhythm  - Monitor for S/S and trends of decreased cardiac output i e  bleeding, hypotension  - Administer and titrate ordered vasoactive medications to optimize hemodynamic stability  - Assess quality of pulses, skin color and temperature  - Assess for signs of decreased coronary artery perfusion - ex   Angina  - Instruct patient to report change in severity of symptoms  Outcome: Progressing  Goal: Absence of cardiac dysrhythmias or at baseline rhythm  Description  INTERVENTIONS:  - Continuous cardiac monitoring, monitor vital signs, obtain 12 lead EKG if indicated  - Administer antiarrhythmic and heart rate control medications as ordered  - Monitor electrolytes and administer replacement therapy as ordered  Outcome: Progressing     Problem: RESPIRATORY - ADULT  Goal: Achieves optimal ventilation and oxygenation  Description  INTERVENTIONS:  - Assess for changes in respiratory status  - Assess for changes in mentation and behavior  - Position to facilitate oxygenation and minimize respiratory effort  - Oxygen administration by appropriate delivery method based on oxygen saturation (per order) or ABGs  - Initiate smoking cessation education as indicated  - Encourage broncho-pulmonary hygiene including cough, deep breathe, Incentive Spirometry  - Assess the need for suctioning and aspirate as needed  - Assess and instruct to report SOB or any respiratory difficulty  - Respiratory Therapy support as indicated  Outcome: Progressing     Problem: METABOLIC, FLUID AND ELECTROLYTES - ADULT  Goal: Electrolytes maintained within normal limits  Description  INTERVENTIONS:  - Monitor labs and assess patient for signs and symptoms of electrolyte imbalances  - Administer electrolyte replacement as ordered  - Monitor response to electrolyte replacements, including repeat lab results as appropriate  - Instruct patient on fluid and nutrition as appropriate  Outcome: Progressing  Goal: Fluid balance maintained  Description  INTERVENTIONS:  - Monitor labs and assess for signs and symptoms of volume excess or deficit  - Monitor I/O and WT  - Instruct patient on fluid and nutrition as appropriate  Outcome: Progressing  Goal: Glucose maintained within target range  Description  INTERVENTIONS:  - Monitor Blood Glucose as ordered  - Assess for signs and symptoms of hyperglycemia and hypoglycemia  - Administer ordered medications to maintain glucose within target range  - Assess nutritional intake and initiate nutrition service referral as needed  Outcome: Progressing     Problem: SKIN/TISSUE INTEGRITY - ADULT  Goal: Skin integrity remains intact  Description  INTERVENTIONS  - Identify patients at risk for skin breakdown  - Assess and monitor skin integrity  - Assess and monitor nutrition and hydration status  - Monitor labs (i e  albumin)  - Assess for incontinence   - Turn and reposition patient  - Assist with mobility/ambulation  - Relieve pressure over bony prominences  - Avoid friction and shearing  - Provide appropriate hygiene as needed including keeping skin clean and dry  - Evaluate need for skin moisturizer/barrier cream  - Collaborate with interdisciplinary team (i e  Nutrition, Rehabilitation, etc )   - Patient/family teaching  Outcome: Progressing     Problem: MUSCULOSKELETAL - ADULT  Goal: Maintain or return mobility to safest level of function  Description  INTERVENTIONS:  - Assess patient's ability to carry out ADLs; assess patient's baseline for ADL function and identify physical deficits which impact ability to perform ADLs (bathing, care of mouth/teeth, toileting, grooming, dressing, etc )  - Assess/evaluate cause of self-care deficits   - Assess range of motion  - Assess patient's mobility; develop plan if impaired  - Assess patient's need for assistive devices and provide as appropriate  - Encourage maximum independence but intervene and supervise when necessary  - Involve family in performance of ADLs  - Assess for home care needs following discharge   - Request OT consult to assist with ADL evaluation and planning for discharge  - Provide patient education as appropriate  Outcome: Progressing  Goal: Maintain proper alignment of affected body part  Description  INTERVENTIONS:  - Support, maintain and protect limb and body alignment  - Provide pt/fam with appropriate education  Outcome: Progressing     Problem: PAIN - ADULT  Goal: Verbalizes/displays adequate comfort level or baseline comfort level  Description  Interventions:  - Encourage patient to monitor pain and request assistance  - Assess pain using appropriate pain scale  - Administer analgesics based on type and severity of pain and evaluate response  - Implement non-pharmacological measures as appropriate and evaluate response  - Consider cultural and social influences on pain and pain management  - Notify physician/advanced practitioner if interventions unsuccessful or patient reports new pain  Outcome: Progressing     Problem: INFECTION - ADULT  Goal: Absence or prevention of progression during hospitalization  Description  INTERVENTIONS:  - Assess and monitor for signs and symptoms of infection  - Monitor lab/diagnostic results  - Monitor all insertion sites, i e  indwelling lines, tubes, and drains  - Monitor endotracheal (as able) and nasal secretions for changes in amount and color  - [place] appropriate cooling/warming therapies per order  - Administer medications as ordered  - Instruct and encourage patient and family to use good hand hygiene technique  - Identify and instruct in appropriate isolation precautions for identified infection/condition  Outcome: Progressing  Goal: Absence of fever/infection during neutropenic period  Description  INTERVENTIONS:  - Monitor WBC  - Implement neutropenic guidelines  Outcome: Progressing     Problem: SAFETY ADULT  Goal: Patient will remain free of falls  Description  INTERVENTIONS:  - Assess patient frequently for physical needs  -  Identify cognitive and physical deficits and behaviors that affect risk of falls    -  [place] fall precautions as indicated by assessment   - Educate patient/family on patient safety including physical limitations  - Instruct patient to call for assistance with activity based on assessment  - Modify environment to reduce risk of injury  - Consider OT/PT consult to assist with strengthening/mobility  Outcome: Progressing  Goal: Maintain or return to baseline ADL function  Description  INTERVENTIONS:  -  Assess patient's ability to carry out ADLs; assess patient's baseline for ADL function and identify physical deficits which impact ability to perform ADLs (bathing, care of mouth/teeth, toileting, grooming, dressing, etc )  - Assess/evaluate cause of self-care deficits   - Assess range of motion  - Assess patient's mobility; develop plan if impaired  - Assess patient's need for assistive devices and provide as appropriate  - Encourage maximum independence but intervene and supervise when necessary  ¯ Involve family in performance of ADLs  ¯ Assess for home care needs following discharge   ¯ Request OT consult to assist with ADL evaluation and planning for discharge  ¯ Provide patient education as appropriate  Outcome: Progressing  Goal: Maintain or return mobility status to optimal level  Description  INTERVENTIONS:  - Assess patient's baseline mobility status (ambulation, transfers, stairs, etc )    - Identify cognitive and physical deficits and behaviors that affect mobility  - Identify mobility aids required to assist with transfers and/or ambulation (gait belt, sit-to-stand, lift, walker, cane, etc )  - [place] fall precautions as indicated by assessment  - Record patient progress and toleration of activity level on Mobility SBAR; progress patient to next Phase/Stage  - Instruct patient to call for assistance with activity based on assessment  - Request Rehabilitation consult to assist with strengthening/weightbearing, etc   Outcome: Progressing     Problem: DISCHARGE PLANNING  Goal: Discharge to home or other facility with appropriate resources  Description  INTERVENTIONS:  - Identify barriers to discharge w/patient and caregiver  - Arrange for needed discharge resources and transportation as appropriate  - Identify discharge learning needs (meds, wound care, etc )  - Arrange for interpretive services to assist at discharge as needed  - Refer to Case Management Department for coordinating discharge planning if the patient needs post-hospital services based on physician/advanced practitioner order or complex needs related to functional status, cognitive ability, or social support system  Outcome: Progressing     Problem: Knowledge Deficit  Goal: Patient/family/caregiver demonstrates understanding of disease process, treatment plan, medications, and discharge instructions  Description  Complete learning assessment and assess knowledge base  Interventions:  - Provide teaching at level of understanding  - Provide teaching via preferred learning methods  Outcome: Progressing     Problem: Potential for Falls  Goal: Patient will remain free of falls  Description  INTERVENTIONS:  - Assess patient frequently for physical needs  -  Identify cognitive and physical deficits and behaviors that affect risk of falls    -  [place] fall precautions as indicated by assessment   - Educate patient/family on patient safety including physical limitations  - Instruct patient to call for assistance with activity based on assessment  - Modify environment to reduce risk of injury  - Consider OT/PT consult to assist with strengthening/mobility  Outcome: Progressing

## 2019-07-11 NOTE — ASSESSMENT & PLAN NOTE
Back pain of 2-3 weeks duration worsened by weight-bearing and ambulation  Likely related to acute/ subacute compression fracture as noted on MRI  MRI lumbar spine shows Acute/subacute compression fractures at the superior endplate of U67 and the inferior endplate of the L1 vertebral bodies  Mild bulging of the posterior cortex as well as osteophytic ridging at T11-T12 results in mild canal stenosis and indentation upon the thecal sac  Mild thickening of the cauda equina nerve roots may represent mild arachnoiditis in the setting of trauma or secondary to another inflammatory process  Diffuse annular disc bulge with osteophytic ridging and bilateral facet arthrosis resulting in mild right neural foraminal stenosis at the L4-L5 level      PLAN:  · Neurosurgery on board, status post successful T12-L1 kyphoplasty with Dr Tena Stacy  · Pain control with p r n  Oxycodone 2 5 and 5 mg for moderate to severe pain and Dilaudid IV for breakthrough pain  · Continue lidocaine patch, aqua K, scheduled Tylenol  · P r n  Muscle relaxant  · PT OT evaluation  · Brace per Neurosurgery

## 2019-07-11 NOTE — PLAN OF CARE
Problem: PHYSICAL THERAPY ADULT  Goal: Performs mobility at highest level of function for planned discharge setting  See evaluation for individualized goals  Description  Treatment/Interventions: Functional transfer training, LE strengthening/ROM, Elevations, Therapeutic exercise, Patient/family training, Equipment eval/education, Bed mobility, Gait training  Equipment Recommended: Ahmet Li       See flowsheet documentation for full assessment, interventions and recommendations  Note:   Prognosis: Good  Problem List: Decreased strength, Impaired balance, Decreased mobility, Pain  Assessment: Pt is 80 y o  female seen for PT evaluation s/p admit to [facility] on 7/7/2019 w/ Back pain  PT consulted to assess pt's functional mobility and d/c needs  Order placed for PT eval and tx  Comorbidities affecting pt's physical performance at time of assessment include: bilateral hip pain, ambulation dysfunction, PD  PTA, pt was independent w/ all functional mobility w/ out AD, ambulates community distances and elevations, lives in multi-level home, has 2 SHERRY and works part time  Personal factors affecting pt at time of IE include: ambulating w/ assistive device, stairs to enter home, unable to perform dynamic tasks in community, inability to perform current job functions and inability to perform IADLs  Please find objective findings from PT assessment regarding body systems outlined above with impairments and limitations including weakness, impaired balance, decreased endurance, gait deviations, pain and fall risk  The following objective measures performed on IE also reveal limitations: Barthel Index: 60/100  Pt's clinical presentation is currently evolving seen in pt's presentation of ongoing medical needs for primary diagnosis, assist with donning/doffing TLSO, need for assistive device with ambulation   Pt to benefit from continued PT tx to address deficits as defined above and maximize level of functional independent mobility and consistency  From PT/mobility standpoint, recommendation at time of d/c would be OP PT pending progress in order to facilitate return to PLOF  Barriers to Discharge: None     Recommendation: Outpatient PT     PT - OK to Discharge: Yes(when medically appropriate)    See flowsheet documentation for full assessment

## 2019-07-11 NOTE — PROGRESS NOTES
Progress Note - Bri Juarez 1950, 80 y o  female MRN: [id number]    Unit/Bed#: [facility] 925-01 Encounter: [id number]    Primary Care Provider: Angelito Ibarra MD   Date and time admitted to hospital: 7/7/2019  9:57 AM        * Back pain  Assessment & Plan  Back pain of 2-3 weeks duration worsened by weight-bearing and ambulation  Likely related to acute/ subacute compression fracture as noted on MRI  MRI lumbar spine shows Acute/subacute compression fractures at the superior endplate of E22 and the inferior endplate of the L1 vertebral bodies  Mild bulging of the posterior cortex as well as osteophytic ridging at T11-T12 results in mild canal stenosis and indentation upon the thecal sac  Mild thickening of the cauda equina nerve roots may represent mild arachnoiditis in the setting of trauma or secondary to another inflammatory process  Diffuse annular disc bulge with osteophytic ridging and bilateral facet arthrosis resulting in mild right neural foraminal stenosis at the L4-L5 level      PLAN:  · Neurosurgery on board, status post successful T12-L1 kyphoplasty with Dr Roxana Joshi  · Pain control with p r n  Oxycodone 2 5 and 5 mg for moderate to severe pain and Dilaudid IV for breakthrough pain  · Continue lidocaine patch, aqua K, scheduled Tylenol  · P r n  Muscle relaxant  · PT OT evaluation  · Brace per Neurosurgery  Compression fracture of T12 first lumbar vertebra [facility])  Assessment & Plan  Management and plan mentioned and back pain      Ambulatory dysfunction  Assessment & Plan  Worsened due to back pain and compression fracture  Safe ambulation fall precautions  Physical therapy evaluation    Parkinson's disease ([facility] [address] )  Assessment & Plan  Continue antiparkinsonian medications  Supportive care    Osteoporosis  Assessment & Plan  Continue vit D, calcium        VTE Pharmacologic Prophylaxis:   Pharmacologic: Enoxaparin (Lovenox)  Mechanical VTE Prophylaxis in Place: Yes    Patient Centered Rounds: I have performed bedside rounds with nursing staff today  Discussions with Specialists or Other Care Team Provider:      Education and Discussions with Family / Patient:  Discussed plan of care with the patient    Time Spent for Care: 30 minutes  More than 50% of total time spent on counseling and coordination of care as described above  Current Length of Stay: 3 day(s)    Current Patient Status: Inpatient   Certification Statement: The patient will continue to require additional inpatient hospital stay due to Not medically stable    Discharge Plan:  Likely discharge in next 24-48 hours  Code Status: Level 1 - Full Code      Subjective:   No overnight events  Patient reports significant improvement in her back pain after kyphoplasty yesterday  Pain is down to 2/10 today  She ambulated with minimal assistance to wash from and back  After ambulation, she reports mild right hip pain  Continues to deny any focal weakness or numbness  Objective:     Vitals:   Temp (24hrs), Av 8 °F (36 6 °C), Min:97 2 °F (36 2 °C), Max:98 4 °F (36 9 °C)    Temp:  [97 2 °F (36 2 °C)-98 4 °F (36 9 °C)] 97 9 °F (36 6 °C)  HR:  [] 95  Resp:  [14-22] 16  BP: (108-154)/(61-81) 112/66  SpO2:  [93 %-100 %] 96 %  Body mass index is 23 98 kg/m²  Input and Output Summary (last 24 hours): Intake/Output Summary (Last 24 hours) at 2019 1400  Last data filed at 2019 1305  Gross per 24 hour   Intake 280 ml   Output 0 ml   Net 280 ml       Physical Exam:     Physical Exam  Constitutional: She is oriented to person, place, and time  No distress  Eyes: Pupils are equal, round, and reactive to light  Cardiovascular: Normal rate, regular rhythm and normal heart sounds    No murmur heard  Pulmonary/Chest: Effort normal and breath sounds normal  No respiratory distress  She has no wheezes  She has no rales  Abdominal: Soft  Bowel sounds are normal  She exhibits no distension   There is no tenderness  Musculoskeletal: She exhibits no edema    Neurological: She is alert and oriented to person, place, and time    No focal motor deficits  Skin: Skin is warm      Additional Data:     Labs:    Results from last 7 days   Lab Units 07/11/19  0507   WBC Thousand/uL 8 44   HEMOGLOBIN g/dL 14 2   HEMATOCRIT % 41 4   PLATELETS Thousands/uL 272     Results from last 7 days   Lab Units 07/11/19  0507  07/07/19  2054   SODIUM mmol/L 137   < > 140   POTASSIUM mmol/L 4 0   < > 3 7   CHLORIDE mmol/L 105   < > 107   CO2 mmol/L 28   < > 27   BUN mg/dL 27*   < > 17   CREATININE mg/dL 0 60   < > 0 76   ANION GAP mmol/L 4   < > 6   CALCIUM mg/dL 8 9   < > 8 7   ALBUMIN g/dL  --   --  3 7   TOTAL BILIRUBIN mg/dL  --   --  0 46   ALK PHOS U/L  --   --  103   ALT U/L  --   --  9*   AST U/L  --   --  15   GLUCOSE RANDOM mg/dL 97   < > 105    < > = values in this interval not displayed  Results from last 7 days   Lab Units 07/10/19  0504   INR  1 03                       * I Have Reviewed All Lab Data Listed Above  * Additional Pertinent Lab Tests Reviewed: All Labs Within Last 24 Hours Reviewed    Imaging:    MRI lumbar spine wo contrast   Final Result by Talia Ventura MD (07/09 2230)      Acute/subacute compression fractures at the superior endplate of G80 and the inferior endplate of the L1 vertebral bodies  Mild bulging of the posterior cortex as well as osteophytic ridging at T11-T12 results in mild canal stenosis and indentation upon    the thecal sac      Mild thickening of the cauda equina nerve roots may represent mild arachnoiditis in the setting of trauma or secondary to another inflammatory process  Diffuse annular disc bulge with osteophytic ridging and bilateral facet arthrosis resulting in mild right neural foraminal stenosis at the L4-L5 level  Cholelithiasis           Workstation performed: [id number]         XR spine thoracolumbar 2 vw   Final Result by Christina Spann DO (07/10 1316) Age-indeterminate superior endplate compression fractures of T4, T5 and T12  These were not present on the previous study  Age-indeterminate inferior endplate irregularity of L1  The study was marked in EPIC for significant notification  Workstation performed: [id number]         XR hip/pelv 2-3 vws right   Final Result by Ferdinand Lagos MD (07/07 1330)      No acute osseous abnormality  Workstation performed: [id number]         IR kyphoplasty/vertebroplasty    (Results Pending)       Recent Cultures (last 7 days):           Last 24 Hours Medication List:     Current Facility-Administered Medications:  acetaminophen 975 mg Oral Q8H [facility] Shanti Moren, PA-C   amantadine 100 mg Oral Daily Shanti Moren, PA-C   calcium carbonate 500 mg Oral BID Shanti Moren, PA-C   carbidopa-levodopa 0 5 tablet Oral 6x Daily Shanti Moren, PA-C   cholecalciferol 2,000 Units Oral Daily Shanti Moren, PA-C   docusate sodium 100 mg Oral BID Shanti Moren, PA-C   entacapone 200 mg Oral 6x Daily Shanti Moren, PA-C   HYDROmorphone 0 2 mg Intravenous Q4H PRN Shanti Moren, PA-C   lidocaine 2 patch Topical Daily Shanti Moren, PA-C   LORazepam 0 5 mg Oral Daily PRN Shanti Moren, PA-C   menthol-methyl salicylate  Apply externally 4x Daily PRN Shanti Moren, PA-C   oxyCODONE 2 5 mg Oral Q4H PRN Shanti Moren, PA-C   oxyCODONE 5 mg Oral Q4H PRN Shanti Moren, PA-C   pantoprazole 20 mg Oral Early Morning Shanti Moren, PA-C   polyethylene glycol 17 g Oral Daily PRN Shanti Moren, PA-C   rasagiline 1 mg Oral Daily Shanti Moren, PA-C   rOPINIRole 8 mg Oral HS Shanti Moren, PA-C   senna 1 tablet Oral HS Shanti Moren, PA-C   tiZANidine 2 mg Oral BID PRN Aryan King, PA-C        Today, Patient Was Seen By: Sharron Jones MD    ** Please Note: Dictation voice to text software may have been used in the creation of this document   **

## 2019-07-11 NOTE — PHYSICAL THERAPY NOTE
Physical Therapy Evaluation     Patient's Name: Kenna Sandoval    Admitting Diagnosis  Back pain [M54 9]  Right hip pain [M25 551]  Right low back pain [M54 5]    Problem List  Patient Active Problem List   Diagnosis    Anxiety state    Esophagitis, reflux    Hyperlipidemia    Mild cognitive impairment    Osteoporosis    [facility] disease ([facility] [address] [facility]    Vitamin D deficiency    Torticollis    REM sleep behavior disorder    [facility]    Abnormal thyroid function test    Pain of both hip joints    Back pain    Ambulatory dysfunction       Past Medical History  Past Medical History:   Diagnosis Date    GERD (gastroesophageal reflux disease)     Parkinson disease ([facility] )        Past Surgical History  Past Surgical History:   Procedure Laterality Date    ARTERIOGRAM N/A 7/10/2019    Procedure: KYPHOPLASTY;  Surgeon: Milvia Chavarria MD;  Location: [facility];  Service: Interventional Radiology    TONSILLECTOMY          07/11/19 1230   Note Type   Note type Eval only   Pain Assessment   Pain Assessment 0-10   Pain Score 5   Pain Type Acute pain   Pain Location Hip   Pain Orientation Right   Home Living   Type of [address] Two level;Bed/bath upstairs;Stairs to enter with rails   Home Equipment   (none)   Prior Function   Level of Aurelia Independent with ADLs and functional mobility   Lives With Spouse   Receives Help From Family   ADL Assistance Independent   IADLs Independent   Falls in the last 6 months 0   Vocational Part time employment   Restrictions/Precautions   Weight Bearing Precautions Per Order No   Braces or Orthoses TLSO  (Aspen TLSO for comfort)   Other Precautions Pain   General   Family/Caregiver Present No   Cognition   Overall Cognitive Status WFL   Arousal/Participation Cooperative   Orientation Level Oriented X4   Memory Within functional limits   Following Commands Follows all commands and directions without difficulty   Bed Mobility   Supine to Sit 5 Supervision   Additional items HOB elevated; Bedrails; Increased time required   Sit to Supine 5  Supervision   Additional items HOB elevated; Bedrails; Increased time required   Transfers   Sit to Stand 5  Supervision  (assist to don TLSO at EOB prior to standing)   Additional items Bedrails   Stand to Sit 5  Supervision   Additional items Bedrails;Verbal cues  (Allie to doff TLSO once returned to sitting)   Ambulation/Elevation   Gait pattern Excessively slow; Short stride   Gait Assistance 4  Minimal assist  (CGA)   Additional items Assist x 1   Assistive Device Rolling walker   Distance 75'   Stair Management Assistance 5  Supervision   Additional items Assist x 1   Stair Management Technique One rail R;Reciprocal   Number of Stairs 20   Balance   Static Sitting Good   Static Standing Fair   Ambulatory Fair   Endurance Deficit   Endurance Deficit No   Activity Tolerance   Activity Tolerance Patient tolerated treatment well   Nurse Made Aware ok to see per RN   Assessment   Prognosis Good   Problem List Decreased strength; Impaired balance;Decreased mobility;Pain   Assessment Pt is 80 y o  female seen for PT evaluation s/p admit to [facility] on 7/7/2019 w/ Back pain  PT consulted to assess pt's functional mobility and d/c needs  Order placed for PT eval and tx  Comorbidities affecting pt's physical performance at time of assessment include: bilateral hip pain, ambulation dysfunction, PD  PTA, pt was independent w/ all functional mobility w/ out AD, ambulates community distances and elevations, lives in multi-level home, has 2 SHERRY and works part time  Personal factors affecting pt at time of IE include: ambulating w/ assistive device, stairs to enter home, unable to perform dynamic tasks in community, inability to perform current job functions and inability to perform IADLs   Please find objective findings from PT assessment regarding body systems outlined above with impairments and limitations including weakness, impaired balance, decreased endurance, gait deviations, pain and fall risk  The following objective measures performed on IE also reveal limitations: Barthel Index: 60/100  Pt's clinical presentation is currently evolving seen in pt's presentation of ongoing medical needs for primary diagnosis, assist with donning/doffing TLSO, need for assistive device with ambulation  Pt to benefit from continued PT tx to address deficits as defined above and maximize level of functional independent mobility and consistency  From PT/mobility standpoint, recommendation at time of d/c would be OP PT pending progress in order to facilitate return to PLOF  Barriers to Discharge None   Goals   Patient Goals to go home and have OP PT for back and PD   STG Expiration Date 07/21/19   Short Term Goal #1 In 10 days, Victorinoalex Harperchace will demonstrate 1) bed mobility at mod I level in order to get in/out of bed safely, 2) sit<>stand at mod I level in order to rise/sit from bed, chair, and complete toileting, 3) ambulate 300 feet with RW at mod I level in order to access their home environment, 4) negotiate full flight of stairs with 1 railing at supervision level in order to safely enter/exit their home and access second floor of home, 5) participation in HEP to include but not limited to strengthening, stretching, endurance, balance, and coordination in order to facilitate progress toward the above goals, 6) don/doff TLSO independently or be able to direct caregiver in proper technique  Treatment Day 0   Plan   Treatment/Interventions Functional transfer training;LE strengthening/ROM; Elevations; Therapeutic exercise;Patient/family training;Equipment eval/education; Bed mobility;Gait training   PT Frequency   (3-5x/wk)   Recommendation   Recommendation Outpatient PT   Equipment Recommended Walker   PT - OK to Discharge Yes  (when medically appropriate)   Barthel Index   Feeding 10   Bathing 0   Grooming Score 5   Dressing Score 5 Bladder Score 10   Bowels Score 10   Toilet Use Score 5   Transfers (Bed/Chair) Score 10   Mobility (Level Surface) Score 0   Stairs Score 5   Barthel Index Score 60           Cailin Johns, PT

## 2019-07-11 NOTE — ASSESSMENT & PLAN NOTE
Worsened due to back pain and compression fracture  Safe ambulation fall precautions  Physical therapy evaluation

## 2019-07-11 NOTE — PROGRESS NOTES
Progress Note - Ramon Elina 1950, 80 y o  female MRN: [id number]    Unit/Bed#: [facility] 925-01 Encounter: [id number]    Primary Care Provider: Ethel Clements MD   Date and time admitted to hospital: 7/7/2019  9:57 AM        * Back pain  Assessment & Plan  Patient reports she has bilateral hip pain and back pain with Acute/subacute age indeterminate T4,T5, T12 and L1 compression fxs  POD 1 s/p Kyphoplasty T12  And L1  · Exam: GCS 15, AAO X 3, mild TTP of thoracolumbar spine around incision site  BARBOZA, strength BUE 5/5, BLE: HF 4/5 pain inhibition, KF/PF/DF 5/5, sensation intact to LT X 4, reflexes intact, no drift bilat  · Small bilateral posterior thoracolumbar incisions, CDI  Dressings removed  · Continue regular neurologic checks  · Imaging reviewed personally and by attending  Final results as below  · Xray of Thoracolumbar spine 7/8/19: Age-indeterminate superior endplate compression fractures of T4, T5 and T12  Age inderterminate L1 compression fracture  · MRI of Lumbar spine 7/9/19: Acute/subacute compression fractures at the superior endplate of Y13 and the inferior endplate of the L1 vertebral bodies  Mild bulging of the posterior cortex as well as osteophytic ridging at T11-T12 results in mild canal stenosis and indentation upon the thecal sac  Mild thickening of the cauda equina nerve roots may represent mild arachnoiditis in the setting of trauma or secondary to another inflammatory process  Diffuse annular disc bulge with osteophytic ridging and bilateral facet arthrosis resulting in mild right neural foraminal stenosis at the L4-L5 level    Pain control per primary team    · Eval and mobilize per PT/OT  · DVT PPX: SCDs  Okay for pharm dvt ppx from neurosurgery standpoint  · TLSO brace to use p r n  For comfort  · Discussed with pt post procedure instructions and precautions  Pt showed understanding  · No further neurosurgical intervention is anticipated at this time  · Neurosurgery will sign off and see patient as needed during the remainder of her hospitalization  Patient has a follow-up appointment scheduled with neurosurgery on Aug 9th 2019, no additional imaging required for this apt  Please call with any questions or concerns  Subjective/Objective   Chief Complaint:  "I have only a little pain in my back, my right hip hurts"    Subjective:  Patient reports mild pain in her back  Patient reports she has pain in her right hip that she rates as 7/10 on the pain scale  Patient reports that laying on her side is more comfortable and less painful  Patient denies any new numbness, tingling, or weakness in bilateral arms or legs  Patient reports she has been able to void without loss in control of her bowel or bladder movement  Patient denies any fever, chills, chest pain, shortness of breath, nausea, vomiting, or abdominal pain  Objective:  Alert and awake, lying in bed, no acute distress    I/O       07/09 0701 - 07/10 0700 07/10 0701 - 07/11 0700 07/11 0701 - 07/12 0700    P  O  300 0     Total Intake(mL/kg) 300 (4 9) 0 (0)     Urine (mL/kg/hr) 0 (0) 0 (0)     Stool       Total Output 0 0     Net +300 0            Unmeasured Urine Occurrence 4 x 5 x           Invasive Devices     None                 Physical Exam:  Vitals: Blood pressure 116/76, pulse 104, temperature 97 5 °F (36 4 °C), resp  rate 20, height 5' 3" (1 6 m), weight 61 4 kg (135 lb 5 8 oz), SpO2 97 %  ,Body mass index is 23 98 kg/m²      General appearance: alert, appears stated age, cooperative and no distress  Head: Normocephalic, without obvious abnormality, atraumatic  Eyes: EOMI, PERRL  Neck: supple, symmetrical, trachea midline   Back:  Mild tenderness to percussion or palpation of thoracolumbar spine  Lungs: non labored breathing  Heart: regular heart rate  Neurologic:   Mental status: Alert, oriented x3, thought content appropriate  Cranial nerves: grossly intact (Cranial nerves II-XII)  Sensory: normal to light touch x4  Motor: moving all extremities, strength BUE 5/5, BLE: HF 4/5 pain inhibition, KF/PF/DF 5/5,  Reflexes: intact    Lab Results:  Results from last 7 days   Lab Units 07/11/19  0507 07/08/19  0552 07/07/19  2054   WBC Thousand/uL 8 44  --  8 94   HEMOGLOBIN g/dL 14 2  --  13 7   HEMATOCRIT % 41 4  --  41 1   PLATELETS Thousands/uL 272 254 243     Results from last 7 days   Lab Units 07/11/19  0507 07/08/19  0553 07/07/19  2054   POTASSIUM mmol/L 4 0 3 7 3 7   CHLORIDE mmol/L 105 108 107   CO2 mmol/L 28 25 27   BUN mg/dL 27* 18 17   CREATININE mg/dL 0 60 0 69 0 76   CALCIUM mg/dL 8 9 9 1 8 7   ALK PHOS U/L  --   --  103   ALT U/L  --   --  9*   AST U/L  --   --  15             Results from last 7 days   Lab Units 07/10/19  0504   INR  1 03   PTT seconds 30     Imaging Studies: I have personally reviewed pertinent reports  and I have personally reviewed pertinent films in PACS    Xr Spine Thoracolumbar 2 Vw    Result Date: 7/10/2019  Impression: Age-indeterminate superior endplate compression fractures of T4, T5 and T12  These were not present on the previous study  Age-indeterminate inferior endplate irregularity of L1  The study was marked in EPIC for significant notification  Workstation performed: [id number]     Xr Hip/pelv 2-3 Vws Right    Result Date: 7/7/2019  Impression: No acute osseous abnormality  Workstation performed: [id number]     Mri Lumbar Spine Wo Contrast    Result Date: 7/9/2019  Impression: Acute/subacute compression fractures at the superior endplate of I35 and the inferior endplate of the L1 vertebral bodies  Mild bulging of the posterior cortex as well as osteophytic ridging at T11-T12 results in mild canal stenosis and indentation upon  the thecal sac Mild thickening of the cauda equina nerve roots may represent mild arachnoiditis in the setting of trauma or secondary to another inflammatory process   Diffuse annular disc bulge with osteophytic ridging and bilateral facet arthrosis resulting in mild right neural foraminal stenosis at the L4-L5 level  Cholelithiasis  Workstation performed: [id number]       EKG, Pathology, and Other Studies: I have personally reviewed pertinent reports  VTE Pharmacologic Prophylaxis: Reason for no pharmacologic prophylaxis okay for pharm dvt ppx from neurosurgery standpoint  VTE Mechanical Prophylaxis: sequential compression device    PLEASE NOTE:  This encounter was completed utilizing the [employer] Direct Speech Voice Recognition Software  Grammatical errors, random word insertions, pronoun errors and incomplete sentences are occasional consequences of the system due to software limitations, ambient noise and hardware issues  These may be missed by proof reading prior to affixing electronic signature  Any questions or concerns about the content, text or information contained within the body of this dictation should be directly addressed to the advanced practitioner or physician for clarification  Please do not hesitate to call me directly if you have any questions or concerns

## 2019-07-12 ENCOUNTER — TELEPHONE (OUTPATIENT)
Dept: NEUROSURGERY | Facility: CLINIC | Age: 69
End: 2019-07-12

## 2019-07-12 VITALS
OXYGEN SATURATION: 99 % | SYSTOLIC BLOOD PRESSURE: 113 MMHG | BODY MASS INDEX: 23.98 KG/M2 | RESPIRATION RATE: 18 BRPM | WEIGHT: 135.36 LBS | TEMPERATURE: 97.9 F | HEART RATE: 82 BPM | HEIGHT: 63 IN | DIASTOLIC BLOOD PRESSURE: 73 MMHG

## 2019-07-12 PROCEDURE — 99239 HOSP IP/OBS DSCHRG MGMT >30: CPT | Performed by: INTERNAL MEDICINE

## 2019-07-12 RX ORDER — MUSCLE RUB CREAM 100; 150 MG/G; MG/G
CREAM TOPICAL 4 TIMES DAILY PRN
Refills: 0
Start: 2019-07-12 | End: 2020-07-02 | Stop reason: ALTCHOICE

## 2019-07-12 RX ORDER — OXYCODONE HYDROCHLORIDE 5 MG/1
5 TABLET ORAL EVERY 6 HOURS PRN
Qty: 15 TABLET | Refills: 0 | Status: SHIPPED | OUTPATIENT
Start: 2019-07-12 | End: 2019-08-09

## 2019-07-12 RX ORDER — SENNOSIDES 8.6 MG
1 TABLET ORAL
Qty: 120 EACH | Refills: 0 | Status: SHIPPED | OUTPATIENT
Start: 2019-07-12 | End: 2019-08-09

## 2019-07-12 RX ORDER — POLYETHYLENE GLYCOL 3350 17 G/17G
17 POWDER, FOR SOLUTION ORAL DAILY PRN
Qty: 14 EACH | Refills: 0 | Status: SHIPPED | OUTPATIENT
Start: 2019-07-12 | End: 2019-08-09

## 2019-07-12 RX ORDER — ACETAMINOPHEN 325 MG/1
975 TABLET ORAL EVERY 8 HOURS SCHEDULED
Qty: 63 TABLET | Refills: 0 | Status: SHIPPED | OUTPATIENT
Start: 2019-07-12 | End: 2019-07-19

## 2019-07-12 RX ADMIN — ACETAMINOPHEN 975 MG: 325 TABLET ORAL at 13:03

## 2019-07-12 RX ADMIN — RASAGILINE 1 MG: 1 TABLET ORAL at 09:08

## 2019-07-12 RX ADMIN — ENTACAPONE 200 MG: 200 TABLET ORAL at 09:08

## 2019-07-12 RX ADMIN — ACETAMINOPHEN 975 MG: 325 TABLET ORAL at 06:02

## 2019-07-12 RX ADMIN — OXYCODONE HYDROCHLORIDE 5 MG: 5 TABLET ORAL at 09:08

## 2019-07-12 RX ADMIN — LIDOCAINE 2 PATCH: 50 PATCH CUTANEOUS at 09:09

## 2019-07-12 RX ADMIN — DOCUSATE SODIUM 100 MG: 100 CAPSULE, LIQUID FILLED ORAL at 09:08

## 2019-07-12 RX ADMIN — VITAMIN D, TAB 1000IU (100/BT) 2000 UNITS: 25 TAB at 09:08

## 2019-07-12 RX ADMIN — OXYCODONE HYDROCHLORIDE 5 MG: 5 TABLET ORAL at 13:03

## 2019-07-12 RX ADMIN — ENTACAPONE 200 MG: 200 TABLET ORAL at 06:02

## 2019-07-12 RX ADMIN — PANTOPRAZOLE SODIUM 20 MG: 20 TABLET, DELAYED RELEASE ORAL at 06:03

## 2019-07-12 RX ADMIN — AMANTADINE HYDROCHLORIDE 100 MG: 100 CAPSULE, LIQUID FILLED ORAL at 09:09

## 2019-07-12 RX ADMIN — CARBIDOPA AND LEVODOPA 0.5 TABLET: 25; 100 TABLET ORAL at 09:09

## 2019-07-12 RX ADMIN — CARBIDOPA AND LEVODOPA 0.5 TABLET: 25; 100 TABLET ORAL at 06:02

## 2019-07-12 RX ADMIN — ENTACAPONE 200 MG: 200 TABLET ORAL at 13:03

## 2019-07-12 RX ADMIN — OXYCODONE HYDROCHLORIDE 5 MG: 5 TABLET ORAL at 02:46

## 2019-07-12 RX ADMIN — CARBIDOPA AND LEVODOPA 0.5 TABLET: 25; 100 TABLET ORAL at 13:03

## 2019-07-12 NOTE — ASSESSMENT & PLAN NOTE
Worsened due to back pain and compression fracture  Safe ambulation fall precautions  Physical therapy evaluation>> recommends outpatient PT

## 2019-07-12 NOTE — ASSESSMENT & PLAN NOTE
"Back pain of 2-3 weeks duration worsened by weight-bearing and ambulation  Likely related to acute/ subacute compression fracture as noted on MRI  MRI lumbar spine shows Acute/subacute compression fractures at the superior endplate of R72 and the inferior endplate of the L1 vertebral bodies  Mild bulging of the posterior cortex as well as osteophytic ridging at T11-T12 results in mild canal stenosis and indentation upon the thecal sac  Mild thickening of the cauda equina nerve roots may represent mild arachnoiditis in the setting of trauma or secondary to another inflammatory process  Diffuse annular disc bulge with osteophytic ridging and bilateral facet arthrosis resulting in mild right neural foraminal stenosis at the L4-L5 level  X-ray right hip negative for any acute fracture or dislocation      PLAN:  · Neurosurgery on board, status post successful T12-L1 kyphoplasty with Dr Sukumar Javed  · Pain control with p r n  Oxycodone 2 5 and 5 mg for moderate to severe pain   · Continue lidocaine patch, aqua K, scheduled Tylenol  · P r n  Muscle relaxant  · PT OT evaluation>> recommends outpatient PT and OT  · Brace per Neurosurgery  · Outpatient follow-up with Neurosurgery  · Patient requested outpatient referral to Orthopedics and acute Pain Services for hip pain    "

## 2019-07-12 NOTE — DISCHARGE SUMMARY
"    Discharge- Suze Ar 1950, 80 y o  female MRN: [id number]    Unit/Bed#: [facility] 925-01 Encounter: [id number]    Primary Care Provider: Ni Barnes MD   Date and time admitted to hospital: 7/7/2019  9:57 AM        * Back pain  Assessment & Plan  Back pain of 2-3 weeks duration worsened by weight-bearing and ambulation  Likely related to acute/ subacute compression fracture as noted on MRI  MRI lumbar spine shows Acute/subacute compression fractures at the superior endplate of P74 and the inferior endplate of the L1 vertebral bodies  Mild bulging of the posterior cortex as well as osteophytic ridging at T11-T12 results in mild canal stenosis and indentation upon the thecal sac  Mild thickening of the cauda equina nerve roots may represent mild arachnoiditis in the setting of trauma or secondary to another inflammatory process  Diffuse annular disc bulge with osteophytic ridging and bilateral facet arthrosis resulting in mild right neural foraminal stenosis at the L4-L5 level  X-ray right hip negative for any acute fracture or dislocation      PLAN:  · Neurosurgery on board, status post successful T12-L1 kyphoplasty with Dr Robert Kruger  · Pain control with p r n  Oxycodone 2 5 and 5 mg for moderate to severe pain   · Continue lidocaine patch, aqua K, scheduled Tylenol  · P r n  Muscle relaxant  · PT OT evaluation>> recommends outpatient PT and OT  · Brace per Neurosurgery  · Outpatient follow-up with Neurosurgery  · Patient requested outpatient referral to Orthopedics and acute Pain Services for hip pain  Compression fracture of T12 first lumbar vertebra [facility])  Assessment & Plan  Management and plan mentioned and back pain      Ambulatory dysfunction  Assessment & Plan  Worsened due to back pain and compression fracture  Safe ambulation fall precautions  Physical therapy evaluation>> recommends outpatient PT    Parkinson's disease [facility])  Assessment & Plan  Continue antiparkinsonian " medications  Supportive care    Osteoporosis  Assessment & Plan  Continue vit D, calcium        Discharge Summary - [facility] Internal Medicine    Patient Information: Lorenzo Lewis 80 y o  female MRN: [id number]  [facility] Encounter: [id number]    Discharging Physician / Practitioner: Deann Good MD  PCP: Royer Willis MD  Admission Date: 7/7/2019  Discharge Date: 07/12/19    Reason for Admission:  Back and hip pain    Discharge Diagnoses:     Principal Problem:    Back pain  Active Problems:    Anxiety state    Hyperlipidemia    Osteoporosis    Parkinson's disease ([address] )    Pain of both hip joints    Ambulatory dysfunction    Compression fracture of T12 first lumbar vertebra (HCC)  Resolved Problems:    * No resolved hospital problems  *      Consultations During Hospital Stay:  · Neurosurgery    Procedures Performed:     T12-L1 kyphoplasty    Significant Findings / Test Results:     MRI lumbar spine wo contrast   Final Result by Danni Bronson MD (07/09 2230)      Acute/subacute compression fractures at the superior endplate of A94 and the inferior endplate of the L1 vertebral bodies  Mild bulging of the posterior cortex as well as osteophytic ridging at T11-T12 results in mild canal stenosis and indentation upon    the thecal sac      Mild thickening of the cauda equina nerve roots may represent mild arachnoiditis in the setting of trauma or secondary to another inflammatory process  Diffuse annular disc bulge with osteophytic ridging and bilateral facet arthrosis resulting in mild right neural foraminal stenosis at the L4-L5 level  Cholelithiasis  Workstation performed: [id number]         XR spine thoracolumbar 2 vw   Final Result by Marilynn Woods DO (07/10 0227)      Age-indeterminate superior endplate compression fractures of T4, T5 and T12  These were not present on the previous study  Age-indeterminate inferior endplate irregularity of L1        The study was marked in EPIC for significant notification  Workstation performed: [id number]         XR hip/pelv 2-3 vws right   Final Result by Tiffanie Jacob MD (07/07 1330)      No acute osseous abnormality  Workstation performed: [id number]         IR kyphoplasty/vertebroplasty    (Results Pending)       Hospital Course: Alissa Burt is a 80 y o  female patient who originally presented to the hospital on 7/7/2019 due to back pain and right hip pain  She underwent back and right hip as mentioned above  She was noted to have acute to subacute compression fracture and underwent T12-L1 kyphoplasty with Neurosurgery with significant improvement in her back pain  Her right hip pain remained  X-ray was negative for any acute osseous abnormalities  PT and OT recommended outpatient treatment  Patient was discharged home with outpatient PT OT recs, referral for orthopedics and acute Pain Services for hip pain and consideration of steroid injection if needed  Patient was provided brace per Neurosurgery if needed  Patient will need outpatient follow-up with Neurosurgery  Please refer to detailed assessment and plan above for further details  Condition at Discharge: fair     Discharge Day Visit / Exam:     Subjective:  Reports significant improvement in her back pain but continues to have mild right hip pain  She is okay with the discharge plan today  No focal weakness or numbness  Vitals: Blood Pressure: 113/73 (07/12/19 0733)  Pulse: 82 (07/12/19 0733)  Temperature: 97 9 °F (36 6 °C) (07/12/19 0733)  Temp Source: Oral (07/08/19 1520)  Respirations: 18 (07/12/19 0732)  Height: 5' 3" (160 cm) (07/07/19 2224)  Weight - Scale: 61 4 kg (135 lb 5 8 oz) (07/07/19 2224)  SpO2: 99 % (07/12/19 0733)  Exam:   Physical Exam  Constitutional: She is oriented to person, place, and time  No distress  Eyes: Pupils are equal, round, and reactive to light     Cardiovascular: Normal rate, regular rhythm and normal heart sounds    No murmur heard  Pulmonary/Chest: Effort normal and breath sounds normal  No respiratory distress  She has no wheezes  She has no rales  Abdominal: Soft  Bowel sounds are normal  She exhibits no distension  There is no tenderness  Musculoskeletal: She exhibits no edema    Neurological: She is alert and oriented to person, place, and time    No focal motor deficits  Skin: Skin is warm      Discharge instructions/Information to patient and family:   See after visit summary for information provided to patient and family  Provisions for Follow-Up Care:  See after visit summary for information related to follow-up care and any pertinent home health orders  Disposition:     Home       Discharge Statement:  I spent 40 minutes discharging the patient  This time was spent on the day of discharge  I had direct contact with the patient on the day of discharge  Greater than 50% of the total time was spent examining patient, answering all patient questions, arranging and discussing plan of care with patient as well as directly providing post-discharge instructions  Additional time then spent on discharge activities  Discharge Medications:  See after visit summary for reconciled discharge medications provided to patient and family        ** Please Note: This note has been constructed using a voice recognition system **

## 2019-07-12 NOTE — TELEPHONE ENCOUNTER
07/16/2019-CALLED PT AND LEFT MESSAGE ON MACHINE CONFIRMING 08/09/2019 APT     07/12/2019-SALMA (07/11/2019)SIGNED OFF  PT HAS F/U APT SCHEDULED WITH DR Jacoby Turpin, NO IMAGING REQUIRED      [address] [place] [address]  08/09/2019 APT-NO STUDIES

## 2019-07-12 NOTE — SOCIAL WORK
Referral made to [facility] via [facility] for RW per pt's request

## 2019-07-12 NOTE — SOCIAL WORK
CM informed pt medically stable for d/c today--pt will be d/c with outpt PT script  No other needs identified at this time

## 2019-07-15 ENCOUNTER — TRANSITIONAL CARE MANAGEMENT (OUTPATIENT)
Dept: INTERNAL MEDICINE CLINIC | Facility: CLINIC | Age: 69
End: 2019-07-15

## 2019-07-15 NOTE — PROGRESS NOTES
Left message for pt to cb  Please ask if patient is having any current sx and schedule TCM appt  TCM encounter started

## 2019-07-16 NOTE — PROGRESS NOTES
Patient states she is still having right hip pain, it is very bad pain 10/10  Has appt with orthopedics Friday 7/19/19 but is in a lot of pain and would like to know what she can do? States at discharge she was told the pain would go away but it has got worse  Patient states she does not want to schedule at TCM at this time because she would like to see orthopedics first and then make an appt to follow up       TCM in as refused

## 2019-07-16 NOTE — PROGRESS NOTES
Patient states yes she has been taking tylenol regularly  States yes she has the Oxycodone, when she takes this is does help but only for a short time   States she will be out of it by Thursday and she is concerned because her appt with Ortho is not till Friday

## 2019-07-26 ENCOUNTER — TELEPHONE (OUTPATIENT)
Dept: INTERNAL MEDICINE CLINIC | Facility: CLINIC | Age: 69
End: 2019-07-26

## 2019-07-26 NOTE — TELEPHONE ENCOUNTER
Can see Dr Masha Guerra (in [address] [place] [address]), Dr Blanquita Liao or Dr Rashawn Schmidt    Takes a few months for new patient appointment

## 2019-08-09 ENCOUNTER — OFFICE VISIT (OUTPATIENT)
Dept: NEUROSURGERY | Facility: CLINIC | Age: 69
End: 2019-08-09
Payer: MEDICARE

## 2019-08-09 VITALS
RESPIRATION RATE: 16 BRPM | TEMPERATURE: 98.1 F | DIASTOLIC BLOOD PRESSURE: 59 MMHG | HEIGHT: 63 IN | BODY MASS INDEX: 22.32 KG/M2 | WEIGHT: 126 LBS | HEART RATE: 89 BPM | SYSTOLIC BLOOD PRESSURE: 76 MMHG

## 2019-08-09 DIAGNOSIS — S32.010D CLOSED COMPRESSION FRACTURE OF L1 LUMBAR VERTEBRA WITH ROUTINE HEALING, SUBSEQUENT ENCOUNTER: Primary | ICD-10-CM

## 2019-08-09 PROCEDURE — 99213 OFFICE O/P EST LOW 20 MIN: CPT | Performed by: RADIOLOGY

## 2019-08-09 PROCEDURE — 1123F ACP DISCUSS/DSCN MKR DOCD: CPT | Performed by: RADIOLOGY

## 2019-08-09 RX ORDER — CHOLECALCIFEROL (VITAMIN D3) 125 MCG
2000 CAPSULE ORAL DAILY
COMMUNITY
End: 2022-02-14

## 2019-08-09 NOTE — PROGRESS NOTES
Assessment/Plan:     Diagnoses and all orders for this visit:    Closed compression fracture of L1 lumbar vertebra with routine healing, subsequent encounter    Other orders  -     Cholecalciferol (VITAMIN D3) 2000 units TABS; Take 2,000 Units by mouth daily        Discussion Summary:   Ms Jaime Nation is doing very well following kyphoplasty  No significant pain, and the pain that persists is responsive to conservative treatments  She has no restrictions  No further follow-up is needed at this time  Chief Complaint: Follow-up (hospital follow up )      Patient ID: Kenna Sandoval is a 80 y o  female    HPI   Ms Jaime Nation presents for follow-up of her vertebral compression fractures  She is approximately 1 month status post kyphoplasty at T12 and L1  She reports her back and hip pain are significantly improved  Current pain persists at a 0-1/10 level  States some residual discomfort but able to ambulate and perform activities without any difficulty  Not requiring any pain medications  She is now at PT now as well  She had a significant amount of right hip pain previously as well, now nearly resolved  PT appears to be treating SI joint dysfunction  Denies any incision site redness, drainage, or fevers  Review of Systems   Constitutional: Negative  HENT: Negative  Eyes: Negative  Respiratory: Negative  Cardiovascular: Negative  Gastrointestinal: Negative  Endocrine: Negative  Genitourinary: Negative  Musculoskeletal: Positive for back pain (lower back pain)  Negative for gait problem, myalgias, neck pain and neck stiffness  Skin: Negative  Allergic/Immunologic: Negative  Neurological: Negative  Hematological: Negative  Psychiatric/Behavioral: Negative  I have personally reviewed the MA's review of systems and made changes as necessary      The following portions of the patient's history were reviewed and updated as appropriate: allergies, current medications, past family history, past medical history, past social history, past surgical history and problem list       Active Ambulatory Problems     Diagnosis Date Noted    Anxiety state 01/03/2018    Esophagitis, reflux 11/28/2012    Hyperlipidemia 12/05/2017    Mild cognitive impairment 08/13/2014    Osteoporosis 11/28/2012    Parkinson disease ([facility] ) 05/04/2016    Vitamin D deficiency 01/03/2017    Torticollis 10/21/2009    REM sleep behavior disorder 08/13/2014    Parkinson's disease ([facility] ) 11/28/2012    Abnormal thyroid function test 05/28/2019    Pain of both hip joints 07/01/2019    Back pain 07/07/2019    Ambulatory dysfunction 07/07/2019    Compression fracture of T12 first lumbar vertebra ([facility] ) 07/11/2019     Resolved Ambulatory Problems     Diagnosis Date Noted    No Resolved Ambulatory Problems     Past Medical History:   Diagnosis Date    GERD (gastroesophageal reflux disease)        Past Surgical History:   Procedure Laterality Date    ARTERIOGRAM N/A 7/10/2019    Procedure: KYPHOPLASTY;  Surgeon: Srini Edmond MD;  Location: [facility];  Service: Interventional Radiology    IR KYPHOPLASTY/VERTEBROPLASTY  7/10/2019    TONSILLECTOMY           Vitals:    08/09/19 0823   BP: (!) 76/59   Pulse: 89   Resp: 16   Temp: 98 1 °F (36 7 °C)         Objective:    Physical Exam   Constitutional: She is oriented to person, place, and time  She appears well-developed and well-nourished  HENT:   Head: Normocephalic and atraumatic  Eyes: Pupils are equal, round, and reactive to light  EOM are normal    Musculoskeletal: Normal range of motion  She exhibits no edema, tenderness or deformity  Neurological: She is alert and oriented to person, place, and time  No cranial nerve deficit or sensory deficit  She exhibits normal muscle tone  Coordination abnormal      Neurologic Exam     Mental Status   Oriented to person, place, and time       Cranial Nerves     CN III, IV, VI   Pupils are equal, round, and reactive to light  Extraocular motions are normal        Results/Data:  I have reviewed the results and images from the kyphoplasty in detail with the patient

## 2019-08-23 DIAGNOSIS — K21.00 ESOPHAGITIS, REFLUX: ICD-10-CM

## 2019-08-23 RX ORDER — OMEPRAZOLE 20 MG/1
20 CAPSULE, DELAYED RELEASE ORAL DAILY PRN
Qty: 90 CAPSULE | Refills: 1 | Status: SHIPPED | OUTPATIENT
Start: 2019-08-23 | End: 2020-02-23

## 2019-10-05 ENCOUNTER — HOSPITAL ENCOUNTER (OUTPATIENT)
Dept: RADIOLOGY | Age: 69
Discharge: HOME | End: 2019-10-05
Payer: MEDICARE

## 2019-10-05 ENCOUNTER — TRANSCRIBE ORDERS (OUTPATIENT)
Dept: ADMINISTRATIVE | Age: 69
End: 2019-10-05

## 2019-10-05 ENCOUNTER — APPOINTMENT (OUTPATIENT)
Dept: LAB | Age: 69
End: 2019-10-05
Payer: MEDICARE

## 2019-10-05 VITALS — HEIGHT: 63 IN | WEIGHT: 126 LBS | BODY MASS INDEX: 22.32 KG/M2

## 2019-10-05 DIAGNOSIS — S22.080S T12 COMPRESSION FRACTURE, SEQUELA: ICD-10-CM

## 2019-10-05 DIAGNOSIS — E55.9 AVITAMINOSIS D: ICD-10-CM

## 2019-10-05 DIAGNOSIS — M81.0 SENILE OSTEOPOROSIS: Primary | ICD-10-CM

## 2019-10-05 DIAGNOSIS — M81.0 SENILE OSTEOPOROSIS: ICD-10-CM

## 2019-10-05 DIAGNOSIS — Z11.59 NEED FOR HEPATITIS C SCREENING TEST: ICD-10-CM

## 2019-10-05 DIAGNOSIS — Z12.31 ENCOUNTER FOR SCREENING MAMMOGRAM FOR BREAST CANCER: ICD-10-CM

## 2019-10-05 DIAGNOSIS — M81.8 OTHER OSTEOPOROSIS WITHOUT CURRENT PATHOLOGICAL FRACTURE: ICD-10-CM

## 2019-10-05 LAB
25(OH)D3 SERPL-MCNC: 31.1 NG/ML (ref 30–100)
ALBUMIN SERPL BCP-MCNC: 4.1 G/DL (ref 3.5–5)
ALP SERPL-CCNC: 103 U/L (ref 46–116)
ALT SERPL W P-5'-P-CCNC: 16 U/L (ref 12–78)
ANION GAP SERPL CALCULATED.3IONS-SCNC: 4 MMOL/L (ref 4–13)
AST SERPL W P-5'-P-CCNC: 16 U/L (ref 5–45)
BASOPHILS # BLD AUTO: 0.03 THOUSANDS/ΜL (ref 0–0.1)
BASOPHILS NFR BLD AUTO: 0 % (ref 0–1)
BILIRUB SERPL-MCNC: 0.55 MG/DL (ref 0.2–1)
BUN SERPL-MCNC: 24 MG/DL (ref 5–25)
CALCIUM SERPL-MCNC: 9.6 MG/DL (ref 8.3–10.1)
CHLORIDE SERPL-SCNC: 106 MMOL/L (ref 100–108)
CHOLEST SERPL-MCNC: 222 MG/DL (ref 50–200)
CO2 SERPL-SCNC: 28 MMOL/L (ref 21–32)
CREAT SERPL-MCNC: 0.8 MG/DL (ref 0.6–1.3)
EOSINOPHIL # BLD AUTO: 0.82 THOUSAND/ΜL (ref 0–0.61)
EOSINOPHIL NFR BLD AUTO: 12 % (ref 0–6)
ERYTHROCYTE [DISTWIDTH] IN BLOOD BY AUTOMATED COUNT: 12 % (ref 11.6–15.1)
GFR SERPL CREATININE-BSD FRML MDRD: 77 ML/MIN/1.73SQ M
GLUCOSE P FAST SERPL-MCNC: 94 MG/DL (ref 65–99)
HCT VFR BLD AUTO: 44.7 % (ref 34.8–46.1)
HCV AB SER QL: NORMAL
HDLC SERPL-MCNC: 80 MG/DL (ref 40–60)
HGB BLD-MCNC: 14.4 G/DL (ref 11.5–15.4)
IMM GRANULOCYTES # BLD AUTO: 0.03 THOUSAND/UL (ref 0–0.2)
IMM GRANULOCYTES NFR BLD AUTO: 0 % (ref 0–2)
LDLC SERPL CALC-MCNC: 123 MG/DL (ref 0–100)
LYMPHOCYTES # BLD AUTO: 1.39 THOUSANDS/ΜL (ref 0.6–4.47)
LYMPHOCYTES NFR BLD AUTO: 20 % (ref 14–44)
MCH RBC QN AUTO: 31.4 PG (ref 26.8–34.3)
MCHC RBC AUTO-ENTMCNC: 32.2 G/DL (ref 31.4–37.4)
MCV RBC AUTO: 97 FL (ref 82–98)
MONOCYTES # BLD AUTO: 0.54 THOUSAND/ΜL (ref 0.17–1.22)
MONOCYTES NFR BLD AUTO: 8 % (ref 4–12)
NEUTROPHILS # BLD AUTO: 4.33 THOUSANDS/ΜL (ref 1.85–7.62)
NEUTS SEG NFR BLD AUTO: 60 % (ref 43–75)
NONHDLC SERPL-MCNC: 142 MG/DL
NRBC BLD AUTO-RTO: 0 /100 WBCS
PHOSPHATE SERPL-MCNC: 3.4 MG/DL (ref 2.3–4.1)
PLATELET # BLD AUTO: 224 THOUSANDS/UL (ref 149–390)
PMV BLD AUTO: 10.8 FL (ref 8.9–12.7)
POTASSIUM SERPL-SCNC: 3.6 MMOL/L (ref 3.5–5.3)
PROT SERPL-MCNC: 7.6 G/DL (ref 6.4–8.2)
PTH-INTACT SERPL-MCNC: 34.2 PG/ML (ref 18.4–80.1)
RBC # BLD AUTO: 4.59 MILLION/UL (ref 3.81–5.12)
SODIUM SERPL-SCNC: 138 MMOL/L (ref 136–145)
T4 FREE SERPL-MCNC: 0.77 NG/DL (ref 0.76–1.46)
TRIGL SERPL-MCNC: 96 MG/DL
TSH SERPL DL<=0.05 MIU/L-ACNC: 3.93 UIU/ML (ref 0.36–3.74)
WBC # BLD AUTO: 7.14 THOUSAND/UL (ref 4.31–10.16)

## 2019-10-05 PROCEDURE — 83970 ASSAY OF PARATHORMONE: CPT

## 2019-10-05 PROCEDURE — 84443 ASSAY THYROID STIM HORMONE: CPT | Performed by: INTERNAL MEDICINE

## 2019-10-05 PROCEDURE — 84165 PROTEIN E-PHORESIS SERUM: CPT

## 2019-10-05 PROCEDURE — 36415 COLL VENOUS BLD VENIPUNCTURE: CPT

## 2019-10-05 PROCEDURE — 85025 COMPLETE CBC W/AUTO DIFF WBC: CPT | Performed by: INTERNAL MEDICINE

## 2019-10-05 PROCEDURE — 80061 LIPID PANEL: CPT | Performed by: INTERNAL MEDICINE

## 2019-10-05 PROCEDURE — 82306 VITAMIN D 25 HYDROXY: CPT | Performed by: INTERNAL MEDICINE

## 2019-10-05 PROCEDURE — 84100 ASSAY OF PHOSPHORUS: CPT

## 2019-10-05 PROCEDURE — 84165 PROTEIN E-PHORESIS SERUM: CPT | Performed by: PATHOLOGY

## 2019-10-05 PROCEDURE — 86803 HEPATITIS C AB TEST: CPT

## 2019-10-05 PROCEDURE — 80053 COMPREHEN METABOLIC PANEL: CPT | Performed by: INTERNAL MEDICINE

## 2019-10-05 PROCEDURE — 84439 ASSAY OF FREE THYROXINE: CPT | Performed by: INTERNAL MEDICINE

## 2019-10-05 PROCEDURE — 77080 DXA BONE DENSITY AXIAL: CPT

## 2019-10-05 PROCEDURE — 77067 SCR MAMMO BI INCL CAD: CPT

## 2019-10-07 ENCOUNTER — TELEPHONE (OUTPATIENT)
Dept: INTERNAL MEDICINE CLINIC | Facility: CLINIC | Age: 69
End: 2019-10-07

## 2019-10-07 NOTE — TELEPHONE ENCOUNTER
Lab results:    Cholesterol a bit worse, limit fatty foods and red meat  Continue daily vitamin D  Thyroid test still slightly abnormal, better that last time  Since feeling well, no medication needed    Rest of labs ok

## 2019-10-08 LAB
ALBUMIN SERPL ELPH-MCNC: 4.66 G/DL (ref 3.5–5)
ALBUMIN SERPL ELPH-MCNC: 63.8 % (ref 52–65)
ALPHA1 GLOB SERPL ELPH-MCNC: 0.33 G/DL (ref 0.1–0.4)
ALPHA1 GLOB SERPL ELPH-MCNC: 4.5 % (ref 2.5–5)
ALPHA2 GLOB SERPL ELPH-MCNC: 0.82 G/DL (ref 0.4–1.2)
ALPHA2 GLOB SERPL ELPH-MCNC: 11.2 % (ref 7–13)
BETA GLOB ABNORMAL SERPL ELPH-MCNC: 0.39 G/DL (ref 0.4–0.8)
BETA1 GLOB SERPL ELPH-MCNC: 5.3 % (ref 5–13)
BETA2 GLOB SERPL ELPH-MCNC: 4.7 % (ref 2–8)
BETA2+GAMMA GLOB SERPL ELPH-MCNC: 0.34 G/DL (ref 0.2–0.5)
GAMMA GLOB ABNORMAL SERPL ELPH-MCNC: 0.77 G/DL (ref 0.5–1.6)
GAMMA GLOB SERPL ELPH-MCNC: 10.5 % (ref 12–22)
IGG/ALB SER: 1.76 {RATIO} (ref 1.1–1.8)
PROT PATTERN SERPL ELPH-IMP: ABNORMAL
PROT SERPL-MCNC: 7.3 G/DL (ref 6.4–8.2)

## 2019-11-29 ENCOUNTER — TELEPHONE (OUTPATIENT)
Dept: INTERNAL MEDICINE CLINIC | Facility: CLINIC | Age: 69
End: 2019-11-29

## 2019-11-29 NOTE — TELEPHONE ENCOUNTER
You had the Tdap in 2013 according to our records  If you would like another one, we can give it to you again  Not yet due at this time

## 2019-12-09 ENCOUNTER — TELEPHONE (OUTPATIENT)
Dept: INTERNAL MEDICINE CLINIC | Facility: CLINIC | Age: 69
End: 2019-12-09

## 2019-12-09 DIAGNOSIS — G20 PARKINSON DISEASE (HCC): Primary | ICD-10-CM

## 2019-12-09 NOTE — TELEPHONE ENCOUNTER
Patient states she wants to switch from [facility] to [address]  She called [facility] and she needs a referral from PCP

## 2020-01-15 ENCOUNTER — OFFICE VISIT (OUTPATIENT)
Dept: URGENT CARE | Age: 70
End: 2020-01-15
Payer: MEDICARE

## 2020-01-15 ENCOUNTER — HOSPITAL ENCOUNTER (EMERGENCY)
Facility: HOSPITAL | Age: 70
Discharge: LEFT BEFORE DISCHARGE | End: 2020-01-15
Payer: MEDICARE

## 2020-01-15 VITALS
RESPIRATION RATE: 18 BRPM | OXYGEN SATURATION: 99 % | SYSTOLIC BLOOD PRESSURE: 139 MMHG | DIASTOLIC BLOOD PRESSURE: 68 MMHG | TEMPERATURE: 97.4 F | HEART RATE: 86 BPM

## 2020-01-15 VITALS
DIASTOLIC BLOOD PRESSURE: 68 MMHG | RESPIRATION RATE: 20 BRPM | WEIGHT: 140.6 LBS | OXYGEN SATURATION: 97 % | HEIGHT: 63 IN | TEMPERATURE: 97.4 F | SYSTOLIC BLOOD PRESSURE: 142 MMHG | BODY MASS INDEX: 24.91 KG/M2 | HEART RATE: 84 BPM

## 2020-01-15 DIAGNOSIS — R55 PRE-SYNCOPE: Primary | ICD-10-CM

## 2020-01-15 LAB
ALBUMIN SERPL BCP-MCNC: 3.8 G/DL (ref 3.5–5)
ALP SERPL-CCNC: 117 U/L (ref 46–116)
ALT SERPL W P-5'-P-CCNC: 9 U/L (ref 12–78)
ANION GAP SERPL CALCULATED.3IONS-SCNC: 2 MMOL/L (ref 4–13)
AST SERPL W P-5'-P-CCNC: 14 U/L (ref 5–45)
ATRIAL RATE: 80 BPM
ATRIAL RATE: 80 BPM
BASOPHILS # BLD AUTO: 0.06 THOUSANDS/ΜL (ref 0–0.1)
BASOPHILS NFR BLD AUTO: 1 % (ref 0–1)
BILIRUB SERPL-MCNC: 0.38 MG/DL (ref 0.2–1)
BUN SERPL-MCNC: 23 MG/DL (ref 5–25)
CALCIUM SERPL-MCNC: 9.4 MG/DL (ref 8.3–10.1)
CHLORIDE SERPL-SCNC: 106 MMOL/L (ref 100–108)
CO2 SERPL-SCNC: 32 MMOL/L (ref 21–32)
CREAT SERPL-MCNC: 0.82 MG/DL (ref 0.6–1.3)
EOSINOPHIL # BLD AUTO: 0.32 THOUSAND/ΜL (ref 0–0.61)
EOSINOPHIL NFR BLD AUTO: 4 % (ref 0–6)
ERYTHROCYTE [DISTWIDTH] IN BLOOD BY AUTOMATED COUNT: 11.9 % (ref 11.6–15.1)
GFR SERPL CREATININE-BSD FRML MDRD: 74 ML/MIN/1.73SQ M
GLUCOSE SERPL-MCNC: 119 MG/DL (ref 65–140)
HCT VFR BLD AUTO: 40.7 % (ref 34.8–46.1)
HGB BLD-MCNC: 13.7 G/DL (ref 11.5–15.4)
IMM GRANULOCYTES # BLD AUTO: 0.03 THOUSAND/UL (ref 0–0.2)
IMM GRANULOCYTES NFR BLD AUTO: 0 % (ref 0–2)
LYMPHOCYTES # BLD AUTO: 1.41 THOUSANDS/ΜL (ref 0.6–4.47)
LYMPHOCYTES NFR BLD AUTO: 18 % (ref 14–44)
MCH RBC QN AUTO: 31.9 PG (ref 26.8–34.3)
MCHC RBC AUTO-ENTMCNC: 33.7 G/DL (ref 31.4–37.4)
MCV RBC AUTO: 95 FL (ref 82–98)
MONOCYTES # BLD AUTO: 0.65 THOUSAND/ΜL (ref 0.17–1.22)
MONOCYTES NFR BLD AUTO: 8 % (ref 4–12)
NEUTROPHILS # BLD AUTO: 5.47 THOUSANDS/ΜL (ref 1.85–7.62)
NEUTS SEG NFR BLD AUTO: 69 % (ref 43–75)
NRBC BLD AUTO-RTO: 0 /100 WBCS
P AXIS: 60 DEGREES
P AXIS: 70 DEGREES
PLATELET # BLD AUTO: 232 THOUSANDS/UL (ref 149–390)
PMV BLD AUTO: 10.3 FL (ref 8.9–12.7)
POTASSIUM SERPL-SCNC: 4.3 MMOL/L (ref 3.5–5.3)
PR INTERVAL: 152 MS
PR INTERVAL: 154 MS
PROT SERPL-MCNC: 7.1 G/DL (ref 6.4–8.2)
QRS AXIS: 60 DEGREES
QRS AXIS: 77 DEGREES
QRSD INTERVAL: 84 MS
QRSD INTERVAL: 88 MS
QT INTERVAL: 378 MS
QT INTERVAL: 382 MS
QTC INTERVAL: 435 MS
QTC INTERVAL: 440 MS
RBC # BLD AUTO: 4.29 MILLION/UL (ref 3.81–5.12)
SODIUM SERPL-SCNC: 140 MMOL/L (ref 136–145)
T WAVE AXIS: 47 DEGREES
T WAVE AXIS: 65 DEGREES
TROPONIN I SERPL-MCNC: <0.02 NG/ML
VENTRICULAR RATE: 80 BPM
VENTRICULAR RATE: 80 BPM
WBC # BLD AUTO: 7.94 THOUSAND/UL (ref 4.31–10.16)

## 2020-01-15 PROCEDURE — 93005 ELECTROCARDIOGRAM TRACING: CPT

## 2020-01-15 PROCEDURE — 99213 OFFICE O/P EST LOW 20 MIN: CPT | Performed by: NURSE PRACTITIONER

## 2020-01-15 PROCEDURE — 80053 COMPREHEN METABOLIC PANEL: CPT

## 2020-01-15 PROCEDURE — 36415 COLL VENOUS BLD VENIPUNCTURE: CPT

## 2020-01-15 PROCEDURE — 84484 ASSAY OF TROPONIN QUANT: CPT

## 2020-01-15 PROCEDURE — 85025 COMPLETE CBC W/AUTO DIFF WBC: CPT

## 2020-01-15 PROCEDURE — 93010 ELECTROCARDIOGRAM REPORT: CPT | Performed by: INTERNAL MEDICINE

## 2020-01-15 PROCEDURE — 93005 ELECTROCARDIOGRAM TRACING: CPT | Performed by: NURSE PRACTITIONER

## 2020-01-15 PROCEDURE — G0463 HOSPITAL OUTPT CLINIC VISIT: HCPCS | Performed by: NURSE PRACTITIONER

## 2020-01-15 NOTE — PROGRESS NOTES
[facility] [employer] [facility]        NAME: Elizabeth Mendoza is a 77 y o  female  : 1950    MRN: [id number]  DATE: January 15, 2020  TIME: 7:18 PM    Assessment and Plan   Pre-syncope [R55]  1  Pre-syncope  Transfer to other facility    POCT ECG         Patient Instructions       Patient being sent to the emergency department for further evaluation    Chief Complaint     Chief Complaint   Patient presents with    Ear Fullness     Feels like R ear is clogged for past several months, feels like "water is in her ear "    Dizziness     feels dizzy, states similar episode a few yrs ago where she was admitted for observation  first occurred in Nov , happened 3 times since with today being worse  Started medication Forteo recently and thought dizziness was due to medication, but states she called prescribing doctor whom told her it was not related  Feels like head is spinning and she's going to pass out at times  History of Present Illness       HPI   Presents to clinic complaining of dizziness  States she has been having episodic dizziness since November, intermittent  Today was the worst day  States it feels like she is going to pass out, severity 9/10  Also feels like the room is spinning  Denies chest pain  Has a history of Parkinson's and osteoporosis  Denies any new medications  Her record shows he started medication for osteoporosis in early 2019  Also reports that she has a previous history of syncope a few years ago  States she was kept in the hospital overnight for monitoring and they did not find any specific cause  Denies cardiac disease  Review of Systems   Review of Systems   Constitutional: Negative for chills and fever  Respiratory: Negative for shortness of breath  Cardiovascular: Negative for chest pain  Gastrointestinal: Negative for diarrhea, nausea and vomiting  Neurological: Positive for dizziness  Negative for seizures, syncope and headaches           Current Medications       Current Outpatient Medications:     amantadine (SYMMETREL) 100 mg capsule, Take 100 mg by mouth daily, Disp: , Rfl:     carbidopa-levodopa-entacapone (STALEVO) 12 5- MG per tablet, TAKE 1 TABLET BY MOUTH 6 (SIX) TIMES A DAY , Disp: , Rfl: 11    Cholecalciferol (VITAMIN D3) 2000 units TABS, Take 2,000 Units by mouth daily, Disp: , Rfl:     FORTEO 600 MCG/2 4ML injection, , Disp: , Rfl:     LORazepam (ATIVAN) 1 mg tablet, Take 0 5 mg by mouth, Disp: , Rfl:     omeprazole (PriLOSEC) 20 mg delayed release capsule, TAKE 1 CAPSULE (20 MG TOTAL) BY MOUTH DAILY AS NEEDED (REFLUX), Disp: 90 capsule, Rfl: 1    rasagiline (AZILECT) 1 MG, Take 1 mg by mouth daily, Disp: , Rfl:     rOPINIRole (REQUIP XL) 8 MG 24 hr tablet, Take 8 mg by mouth, Disp: , Rfl:     calcium carbonate (TUMS) 500 mg chewable tablet, Chew 1 tablet (500 mg total) 2 (two) times a day (Patient not taking: Reported on 8/9/2019), Disp: 60 tablet, Rfl: 0    menthol-methyl salicylate (BENGAY) 68-04 % cream, Apply topically 4 (four) times a day as needed (pain) (Patient not taking: Reported on 8/9/2019), Disp: , Rfl: 0    Current Allergies     Allergies as of 01/15/2020 - Reviewed 01/15/2020   Allergen Reaction Noted    Sulfa antibiotics  08/26/2017    Doxycycline Rash 08/26/2017            The following portions of the patient's history were reviewed and updated as appropriate: allergies, current medications, past family history, past medical history, past social history, past surgical history and problem list      Past Medical History:   Diagnosis Date    GERD (gastroesophageal reflux disease)     Osteoporosis     Parkinson disease ([facility] [address] )        Past Surgical History:   Procedure Laterality Date    ARTERIOGRAM N/A 7/10/2019    Procedure: KYPHOPLASTY;  Surgeon: Susanne Huang MD;  Location[facility];  Service: Interventional Radiology    IR KYPHOPLASTY/VERTEBROPLASTY  7/10/2019    TONSILLECTOMY         Family History Problem Relation Age of Onset    Dementia Mother     Heart disease Father     No Known Problems Daughter     No Known Problems Maternal Grandmother     No Known Problems Maternal Grandfather     No Known Problems Paternal Grandmother     Prostate cancer Paternal Grandfather     No Known Problems Daughter     No Known Problems Daughter     Alcohol abuse Neg Hx     Mental illness Neg Hx     Substance Abuse Neg Hx     Depression Neg Hx          Medications have been verified  Objective   /68 (BP Location: Left arm, Patient Position: Sitting, Cuff Size: Standard)   Pulse 84   Temp (!) 97 4 °F (36 3 °C) (Temporal)   Resp 20   Ht 5' 3" (1 6 m)   Wt 63 8 kg (140 lb 9 6 oz)   SpO2 97%   BMI 24 91 kg/m²        Physical Exam     Physical Exam   Constitutional: She appears well-developed  HENT:   Right Ear: External ear normal    Left Ear: External ear normal    Cardiovascular: Normal rate  Patient has intermittent skipped beats   Pulmonary/Chest: Effort normal and breath sounds normal  She has no wheezes  Lymphadenopathy:     She has no cervical adenopathy

## 2020-01-15 NOTE — PATIENT INSTRUCTIONS
Near Syncope   WHAT YOU NEED TO KNOW:   Near syncope, also called presyncope, is the feeling that you may faint (lose consciousness), but you do not  You can control some health conditions that cause near syncope  Your healthcare providers can help you create a plan to manage near syncope and prevent episodes  DISCHARGE INSTRUCTIONS:   Return to the emergency department if:   · You have sudden chest pain  · You have trouble breathing or shortness of breath  · You have vision changes, are sweating, and have nausea while you are sitting or lying down  · You feel dizzy or flushed and your heart is fluttering  · You lose consciousness  · You cannot use your arm, hand, foot, or leg, or it feels weak  · You have trouble speaking or understanding others when they speak  Contact your healthcare provider if:   · You have new or worsening symptoms  · Your heart beats faster or slower than usual      · You have questions or concerns about your condition or care  Follow up with your healthcare provider as directed: You may need more tests to help find the cause of your near syncope episodes  The tests will help healthcare providers plan the best treatment for you  Write down your questions so you remember to ask them during your visits  Manage near syncope:   · Sit or lie down when needed  This includes when you feel dizzy, your throat is getting tight, and your vision changes  Raise your legs above the level of your heart  · Take slow, deep breaths if you start to breathe faster with anxiety or fear  This can help decrease dizziness and the feeling that you might faint  · Keep a record of your near syncope episodes  Include your symptoms and your activity before and after the episode  The record can help your healthcare provider find the cause of your near syncope and help you manage episodes    Prevent a near syncope episode:   · Move slowly and let yourself get used to one position before you move to another position  This is very important when you change from a lying or sitting position to a standing position  Take some deep breaths before you stand up from a lying position  Stand up slowly  Sudden movements may cause a fainting spell  Sit on the side of the bed or couch for a few minutes before you stand up  If you are on bedrest, try to be upright for about 2 hours each day, or as directed  Do not lock your legs if you are standing for a long period of time  Move your legs and bend your knees to keep blood flowing  · Follow your healthcare provider's recommendations  Your provider may  recommend that you drink more liquids to prevent dehydration  You may also need to have more salt to keep your blood pressure from dropping too low and causing syncope  Your provider will tell you how much liquid and sodium to have each day  · Watch for signs of low blood sugar  These include hunger, nervousness, sweating, and fast or fluttery heartbeats  Talk with your healthcare provider about ways to keep your blood sugar level steady  · Check your blood pressure often  This is important if you take medicine to lower your blood pressure  Check your blood pressure when you are lying down and when you are standing  Ask how often to check during the day  Keep a record of your blood pressure numbers  Your healthcare provider may use the record to help plan your treatment  · Do not strain if you are constipated  You may faint if you strain to have a bowel movement  Walking is the best way to get your bowels moving  Eat foods high in fiber to make it easier to have a bowel movement  Good examples are high-fiber cereals, beans, vegetables, and whole-grain breads  Prune juice may help make bowel movements softer  · Do not exercise outside on a hot day  The combination of physical activity and heat can lead to dehydration  This can cause syncope    © 2017 [employer]  Information is for End User's use only and may not be sold, redistributed or otherwise used for commercial purposes  All illustrations and images included in CareNotes® are the copyrighted property of A [employer] M [employer]  or [employer]  The above information is an  only  It is not intended as medical advice for individual conditions or treatments  Talk to your doctor, nurse or pharmacist before following any medical regimen to see if it is safe and effective for you

## 2020-01-16 NOTE — ED NOTES
Patient stated that she did not want to wait any longer  She stated that she would follow up with her PCP tomorrow  Encouraged to return to the ED if symptoms worsened       Usha Fiore RN  01/15/20 [id number]

## 2020-02-23 DIAGNOSIS — K21.00 ESOPHAGITIS, REFLUX: ICD-10-CM

## 2020-02-23 RX ORDER — OMEPRAZOLE 20 MG/1
20 CAPSULE, DELAYED RELEASE ORAL DAILY PRN
Qty: 90 CAPSULE | Refills: 0 | Status: SHIPPED | OUTPATIENT
Start: 2020-02-23 | End: 2020-08-03

## 2020-04-14 ENCOUNTER — TELEPHONE (OUTPATIENT)
Dept: NEUROLOGY | Facility: CLINIC | Age: 70
End: 2020-04-14

## 2020-04-14 ENCOUNTER — TELEMEDICINE (OUTPATIENT)
Dept: NEUROLOGY | Facility: CLINIC | Age: 70
End: 2020-04-14
Payer: MEDICARE

## 2020-04-14 DIAGNOSIS — G20 PARKINSON'S DISEASE (HCC): Primary | ICD-10-CM

## 2020-04-14 DIAGNOSIS — M43.6 TORTICOLLIS: ICD-10-CM

## 2020-04-14 DIAGNOSIS — G20 PARKINSON DISEASE (HCC): ICD-10-CM

## 2020-04-14 PROCEDURE — 99204 OFFICE O/P NEW MOD 45 MIN: CPT | Performed by: PSYCHIATRY & NEUROLOGY

## 2020-04-14 RX ORDER — MELOXICAM 15 MG/1
15 TABLET ORAL DAILY
COMMUNITY
Start: 2020-04-01 | End: 2020-05-11 | Stop reason: SDUPTHER

## 2020-05-01 RX ORDER — MELOXICAM 15 MG/1
15 TABLET ORAL DAILY
Qty: 30 TABLET | Refills: 2 | OUTPATIENT
Start: 2020-05-01

## 2020-05-01 RX ORDER — LORAZEPAM 1 MG/1
0.5 TABLET ORAL AS NEEDED
OUTPATIENT
Start: 2020-05-01

## 2020-05-11 DIAGNOSIS — S32.010D COMPRESSION FRACTURE OF L1 VERTEBRA WITH ROUTINE HEALING, SUBSEQUENT ENCOUNTER: ICD-10-CM

## 2020-05-11 DIAGNOSIS — F41.1 ANXIETY STATE: Primary | ICD-10-CM

## 2020-05-12 RX ORDER — MELOXICAM 15 MG/1
15 TABLET ORAL DAILY PRN
Qty: 30 TABLET | Refills: 0 | Status: SHIPPED | OUTPATIENT
Start: 2020-05-12 | End: 2022-01-13 | Stop reason: ALTCHOICE

## 2020-05-12 RX ORDER — LORAZEPAM 1 MG/1
TABLET ORAL
Qty: 30 TABLET | Refills: 0 | Status: SHIPPED | OUTPATIENT
Start: 2020-05-12 | End: 2021-01-04 | Stop reason: SDUPTHER

## 2020-07-02 ENCOUNTER — OFFICE VISIT (OUTPATIENT)
Dept: INTERNAL MEDICINE CLINIC | Facility: CLINIC | Age: 70
End: 2020-07-02
Payer: MEDICARE

## 2020-07-02 VITALS
RESPIRATION RATE: 18 BRPM | TEMPERATURE: 98.1 F | HEART RATE: 91 BPM | WEIGHT: 136.4 LBS | BODY MASS INDEX: 24.17 KG/M2 | DIASTOLIC BLOOD PRESSURE: 70 MMHG | OXYGEN SATURATION: 98 % | HEIGHT: 63 IN | SYSTOLIC BLOOD PRESSURE: 120 MMHG

## 2020-07-02 DIAGNOSIS — H61.21 IMPACTED CERUMEN OF RIGHT EAR: ICD-10-CM

## 2020-07-02 DIAGNOSIS — Z23 NEED FOR PNEUMOCOCCAL VACCINATION: ICD-10-CM

## 2020-07-02 DIAGNOSIS — G20 PARKINSON DISEASE (HCC): Primary | ICD-10-CM

## 2020-07-02 DIAGNOSIS — E55.9 VITAMIN D DEFICIENCY: ICD-10-CM

## 2020-07-02 DIAGNOSIS — Z00.00 HEALTH MAINTENANCE EXAMINATION: ICD-10-CM

## 2020-07-02 DIAGNOSIS — H91.93 BILATERAL HEARING LOSS, UNSPECIFIED HEARING LOSS TYPE: ICD-10-CM

## 2020-07-02 DIAGNOSIS — F41.1 ANXIETY STATE: ICD-10-CM

## 2020-07-02 DIAGNOSIS — E78.49 OTHER HYPERLIPIDEMIA: ICD-10-CM

## 2020-07-02 DIAGNOSIS — K21.00 ESOPHAGITIS, REFLUX: ICD-10-CM

## 2020-07-02 DIAGNOSIS — H00.011 HORDEOLUM EXTERNUM OF RIGHT UPPER EYELID: ICD-10-CM

## 2020-07-02 DIAGNOSIS — M81.8 OTHER OSTEOPOROSIS WITHOUT CURRENT PATHOLOGICAL FRACTURE: ICD-10-CM

## 2020-07-02 PROCEDURE — 3008F BODY MASS INDEX DOCD: CPT | Performed by: INTERNAL MEDICINE

## 2020-07-02 PROCEDURE — G0009 ADMIN PNEUMOCOCCAL VACCINE: HCPCS | Performed by: INTERNAL MEDICINE

## 2020-07-02 PROCEDURE — 1123F ACP DISCUSS/DSCN MKR DOCD: CPT | Performed by: INTERNAL MEDICINE

## 2020-07-02 PROCEDURE — 1160F RVW MEDS BY RX/DR IN RCRD: CPT | Performed by: INTERNAL MEDICINE

## 2020-07-02 PROCEDURE — 4040F PNEUMOC VAC/ADMIN/RCVD: CPT | Performed by: INTERNAL MEDICINE

## 2020-07-02 PROCEDURE — 1125F AMNT PAIN NOTED PAIN PRSNT: CPT | Performed by: INTERNAL MEDICINE

## 2020-07-02 PROCEDURE — 99214 OFFICE O/P EST MOD 30 MIN: CPT | Performed by: INTERNAL MEDICINE

## 2020-07-02 PROCEDURE — 90732 PPSV23 VACC 2 YRS+ SUBQ/IM: CPT | Performed by: INTERNAL MEDICINE

## 2020-07-02 PROCEDURE — 1170F FXNL STATUS ASSESSED: CPT | Performed by: INTERNAL MEDICINE

## 2020-07-02 PROCEDURE — 1036F TOBACCO NON-USER: CPT | Performed by: INTERNAL MEDICINE

## 2020-07-02 PROCEDURE — G0439 PPPS, SUBSEQ VISIT: HCPCS | Performed by: INTERNAL MEDICINE

## 2020-07-02 RX ORDER — ERYTHROMYCIN 5 MG/G
0.5 OINTMENT OPHTHALMIC EVERY 12 HOURS SCHEDULED
Qty: 3.5 G | Refills: 0 | Status: SHIPPED | OUTPATIENT
Start: 2020-07-02 | End: 2021-01-04 | Stop reason: ALTCHOICE

## 2020-07-02 RX ORDER — PEN NEEDLE, DIABETIC 29 G X1/2"
NEEDLE, DISPOSABLE MISCELLANEOUS
COMMUNITY
Start: 2020-06-29 | End: 2022-02-14

## 2020-07-02 NOTE — PATIENT INSTRUCTIONS
Take omeprazole at least every other day  Monitor left thigh symptoms, we can do an x-ray  Use peroxide drops, 5 drops in your right ear daily for 1 week  Do not use Q-tips

## 2020-07-02 NOTE — PROGRESS NOTES
Assessment and Plan:     Problem List Items Addressed This Visit        Digestive    Esophagitis, reflux     More frequent symptoms, instructed to take omeprazole at least every other day  Nervous and Auditory    Hearing loss, bilateral     Agrees to do hearing test          Relevant Orders    Comprehensive hearing evaluation    Parkinson disease ([facility] [address] ) - Primary     Saw neurology recently, did not increase amantadine  On Sinemet, rasagiline, ropinirole  She will consider DBS  Recommend to look into boxing  Relevant Orders    CBC and differential    Comprehensive metabolic panel       Musculoskeletal and Integument    Osteoporosis     On Forteo, per rheum [facility]  Takes De  Daily  Relevant Orders    Comprehensive metabolic panel       Other    Anxiety state     Intermittent, rare use of lorazepam          Hyperlipidemia     Lipids due, not on medication  Relevant Orders    Comprehensive metabolic panel    Lipid panel    TSH, 3rd generation with Free T4 reflex    Vitamin D deficiency    Relevant Orders    Vitamin D 25 hydroxy      Other Visit Diagnoses     Need for pneumococcal vaccination        Relevant Orders    PNEUMOCOCCAL POLYSACCHARIDE VACCINE 23-VALENT =>3YO SQ IM (Completed)    Impacted cerumen of right ear        Instructed to use peroxide drops daily for the next week  No Q-tips  Hordeolum externum of right upper eyelid        Continue warm worse compress several times a day  Given antibiotic cream to use if no improvement in the next 2-3 days  Relevant Medications    erythromycin (ILOTYCIN) ophthalmic ointment    Health maintenance examination        Repeat mammogram 2021  Pneumovax today  Preventive health issues were discussed with patient, and age appropriate screening tests were ordered as noted in patient's After Visit Summary    Personalized health advice and appropriate referrals for health education or preventive services given if needed, as noted in patient's After Visit Summary       History of Present Illness:     Patient presents for Medicare Annual Wellness visit    Patient Care Team:  Michoacano Puri MD as PCP - General  [facility]      Problem List:     Patient Active Problem List   Diagnosis    Anxiety state    Esophagitis, reflux    Hyperlipidemia    Mild cognitive impairment    Osteoporosis    Parkinson disease ([facility] [address] )    Vitamin D deficiency    Torticollis    REM sleep behavior disorder    Parkinson's disease ([facility] [address] )    Abnormal thyroid function test    Pain of both hip joints    Back pain    Ambulatory dysfunction    Compression fracture of T12 first lumbar vertebra (HCC)    Hearing loss, bilateral      Past Medical and Surgical History:     Past Medical History:   Diagnosis Date    GERD (gastroesophageal reflux disease)     Osteoporosis     Parkinson disease ([facility] [address] )      Past Surgical History:   Procedure Laterality Date    ARTERIOGRAM N/A 7/10/2019    Procedure: KYPHOPLASTY;  Surgeon: Lety Gan MD;  Location:  MAIN OR;  Service: Interventional Radiology    IR KYPHOPLASTY/VERTEBROPLASTY  7/10/2019    TONSILLECTOMY        Family History:     Family History   Problem Relation Age of Onset    Dementia Mother     Heart disease Father     No Known Problems Daughter     No Known Problems Maternal Grandmother     No Known Problems Maternal Grandfather     No Known Problems Paternal Grandmother     Prostate cancer Paternal Grandfather     No Known Problems Daughter     No Known Problems Daughter     Alcohol abuse Neg Hx     Mental illness Neg Hx     Substance Abuse Neg Hx     Depression Neg Hx       Social History:        Social History     Socioeconomic History    Marital status: /Civil Union     Spouse name: None    Number of children: None    Years of education: None    Highest education level: None   Occupational History    None   Social Needs    Financial resource strain: None   [place] insecurity:     Worry: None     Inability: None    Transportation needs:     Medical: None     Non-medical: None   Tobacco Use    Smoking status: Never Smoker    Smokeless tobacco: Never Used   Substance and Sexual Activity    Alcohol use: Not Currently     Comment: Rare    Drug use: Never    Sexual activity: None   Lifestyle    Physical activity:     Days per week: None     Minutes per session: None    Stress: None   Relationships    Social connections:     Talks on phone: None     Gets together: None     Attends Jain service: None     Active member of club or organization: None     Attends meetings of clubs or organizations: None     Relationship status: None    Intimate partner violence:     Fear of current or ex partner: None     Emotionally abused: None     Physically abused: None     Forced sexual activity: None   Other Topics Concern    None   Social History Narrative    5 children      Medications and Allergies:     Current Outpatient Medications   Medication Sig Dispense Refill    amantadine (SYMMETREL) 100 mg capsule Take 100 mg by mouth daily      BD ULTRA-FINE PEN NEEDLES 29G X 12 7MM MISC       carbidopa-levodopa-entacapone (STALEVO) 12 5- MG per tablet TAKE 1 TABLET BY MOUTH 6 (SIX) TIMES A DAY    11    Cholecalciferol (VITAMIN D3) 2000 units TABS Take 2,000 Units by mouth daily      FORTEO 600 MCG/2 4ML injection       LORazepam (ATIVAN) 1 mg tablet Take 1/2 to 1 tab PO daily prn 30 tablet 0    meloxicam (MOBIC) 15 mg tablet Take 1 tablet (15 mg total) by mouth daily as needed for moderate pain 30 tablet 0    omeprazole (PriLOSEC) 20 mg delayed release capsule TAKE 1 CAPSULE (20 MG TOTAL) BY MOUTH DAILY AS NEEDED (REFLUX) 90 capsule 0    rasagiline (AZILECT) 1 MG Take 1 mg by mouth daily      erythromycin (ILOTYCIN) ophthalmic ointment Administer 0 5 inches to the right eye every 12 (twelve) hours 3 5 g 0    rOPINIRole (REQUIP XL) 8 MG 24 hr tablet Take 8 mg by mouth No current facility-administered medications for this visit  Allergies   Allergen Reactions    Sulfa Antibiotics      Other reaction(s): family history - anaphylaxis  Category: Allergy;     Doxycycline Rash     Category: Allergy;       Immunizations:     Immunization History   Administered Date(s) Administered    INFLUENZA 11/30/2019    Influenza Split High Dose Preservative Free IM 12/05/2017, 11/04/2018, 11/30/2019    Pneumococcal Conjugate 13-Valent 11/29/2016    Pneumococcal Polysaccharide PPV23 11/11/2010, 07/02/2020    Rabies Immune Globulin 01/02/2013, 01/10/2013, 01/16/2013, 01/30/2013    Tdap 01/10/2013      Health Maintenance:         Topic Date Due    MAMMOGRAM  10/05/2020    CRC Screening: Colonoscopy  08/12/2029    Hepatitis C Screening  Completed     There are no preventive care reminders to display for this patient  Medicare Health Risk Assessment:     /70   Pulse 91   Temp 98 1 °F (36 7 °C) (Tympanic)   Resp 18   Ht 5' 3" (1 6 m)   Wt 61 9 kg (136 lb 6 4 oz)   SpO2 98%   BMI 24 16 kg/m²      Yuniel Carmelo is here for her Subsequent Wellness visit  Last Medicare Wellness visit information reviewed, patient interviewed and updates made to the record today  Health Risk Assessment:   Patient rates overall health as excellent  Patient feels that their physical health rating is slightly better  Eyesight was rated as slightly worse  Hearing was rated as slightly worse  Patient feels that their emotional and mental health rating is same  Pain experienced in the last 7 days has been some  Patient's pain rating has been 7/10  Patient states that she has experienced no weight loss or gain in last 6 months  Depression Screening:   PHQ-2 Score: 0      Fall Risk Screening: In the past year, patient has experienced: no history of falling in past year      Urinary Incontinence Screening:   Patient has leaked urine accidently in the last six months       Home Safety:  Patient does not have trouble with stairs inside or outside of their home  Patient has working smoke alarms and has working carbon monoxide detector  Home safety hazards include: loose rugs on the floor  Nutrition:   Current diet is Other (please comment)  Vegetarian diet    Medications:   Patient is currently taking over-the-counter supplements  OTC medications include: see medication list  Patient is able to manage medications  Activities of Daily Living (ADLs)/Instrumental Activities of Daily Living (IADLs):   Walk and transfer into and out of bed and chair?: Yes  Dress and groom yourself?: Yes    Bathe or shower yourself?: Yes    Feed yourself? Yes  Do your laundry/housekeeping?: Yes  Manage your money, pay your bills and track your expenses?: Yes  Make your own meals?: Yes    Do your own shopping?: Yes    Previous Hospitalizations:   Any hospitalizations or ED visits within the last 12 months?: Yes    How many hospitalizations have you had in the last year?: 1-2    Advance Care Planning:   Living will: Yes    Durable POA for healthcare:  Yes    Advanced directive: Yes    End of Life Decisions reviewed with patient: Yes      Cognitive Screening:   Provider or family/friend/caregiver concerned regarding cognition?: No    PREVENTIVE SCREENINGS      Cardiovascular Screening:    General: History Lipid Disorder    Due for: Lipid Panel      Diabetes Screening:       Due for: Blood Glucose      Colorectal Cancer Screening:     General: Screening Current      Breast Cancer Screening:     General: Screening Current      Cervical Cancer Screening:    General: Screening Not Indicated      Osteoporosis Screening:    General: History Osteoporosis and Screening Current      Abdominal Aortic Aneurysm (AAA) Screening:        General: Screening Not Indicated      Lung Cancer Screening:     General: Screening Not Indicated      Hepatitis C Screening:    General: Screening Current    Other Counseling Topics:   Regular weightbearing exercise and calcium and vitamin D intake         Jimmie Walker MD

## 2020-07-02 NOTE — PROGRESS NOTES
Assessment/Plan:    Parkinson disease ([email or web address] 75 )  Saw neurology recently, did not increase amantadine  On Sinemet, rasagiline, ropinirole  She will consider DBS  Recommend to look into boxing  Hearing loss, bilateral  Agrees to do hearing test     Esophagitis, reflux  More frequent symptoms, instructed to take omeprazole at least every other day  Anxiety state  Intermittent, rare use of lorazepam     Hyperlipidemia  Lipids due, not on medication  Osteoporosis  On Forteo, per rheum [facility]  Takes De  Daily  Pain of both hip joints  Recommend to take Tylenol daily, limit use of NSAIDs  Diagnoses and all orders for this visit:    Parkinson disease ([facility] [[email or web address] 75 )  -     CBC and differential  -     Comprehensive metabolic panel    Other osteoporosis without current pathological fracture  -     Comprehensive metabolic panel    Bilateral hearing loss, unspecified hearing loss type  -     Comprehensive hearing evaluation; Future    Esophagitis, reflux    Vitamin D deficiency  -     Vitamin D 25 hydroxy    Need for pneumococcal vaccination  -     PNEUMOCOCCAL POLYSACCHARIDE VACCINE 23-VALENT =>3YO SQ IM    Other hyperlipidemia  -     Comprehensive metabolic panel  -     Lipid panel  -     TSH, 3rd generation with Free T4 reflex    Impacted cerumen of right ear  Comments: Instructed to use peroxide drops daily for the next week  No Q-tips  Hordeolum externum of right upper eyelid  Comments:  Continue warm worse compress several times a day  Given antibiotic cream to use if no improvement in the next 2-3 days  Orders:  -     erythromycin (ILOTYCIN) ophthalmic ointment; Administer 0 5 inches to the right eye every 12 (twelve) hours    Anxiety state    Health maintenance examination  Comments:  Repeat mammogram 2021  Pneumovax today  Other orders  -     BD ULTRA-FINE PEN NEEDLES 29G X 12 7MM MISC      Follow up in 6 months or as needed  Subjective:      Patient ID: Nickie Brooks is a 77 y o  female      Mrs  Marily Weaver has several concerns  First, she reports feeling more stressed at home since her  retired  She did take lorazepam recently  Second, she reports intermittent right E discomfort  She admits decreased hearing in both ears  She hears a ringing sound sometimes in the right ear  Third, she reports more frequent nonproductive cough, feels there is something stuck at the back of her throat  She denies any allergy symptoms  She takes omeprazole as needed only  Fourth, she complains of occasional dull ache on her left thigh  It would sometimes occur on both shins  Symptoms are intermittent  She is worried about bone cancer as a side effect of Forteo  She denies any pain or discomfort during ambulation or certain activities  It does not disrupt sleep  No recent injury or falls  Lastly, she noticed a stye about 2 days ago on her right upper eye lid  She has been applying warm moist heat occasionally  She denies any blurring of vision, discharge or pain  She saw neurology recently  She did not increase amantadine  She denies any recent falls  She tried bicycling recently, she was able to do it but felt different, more difficult to balance  The following portions of the patient's history were reviewed and updated as appropriate: allergies, current medications, past medical history, past social history and problem list     Review of Systems   Constitutional: Negative for appetite change and fatigue  HENT: Negative for congestion, ear pain, postnasal drip and rhinorrhea  Eyes: Negative for pain, discharge, itching and visual disturbance  Respiratory: Positive for cough  Negative for chest tightness and shortness of breath  Cardiovascular: Negative for chest pain and leg swelling  Gastrointestinal: Negative for abdominal pain, constipation and diarrhea  Genitourinary: Negative for dysuria and frequency  Musculoskeletal: Positive for myalgias   Negative for arthralgias and gait problem  Skin: Negative for rash and wound  Neurological: Positive for tremors  Negative for dizziness, syncope, numbness and headaches  Psychiatric/Behavioral: Negative for sleep disturbance  The patient is not nervous/anxious  Objective:      /70   Pulse 91   Temp 98 1 °F (36 7 °C) (Tympanic)   Resp 18   Ht 5' 3" (1 6 m)   Wt 61 9 kg (136 lb 6 4 oz)   SpO2 98%   BMI 24 16 kg/m²          Physical Exam   Constitutional: She is oriented to person, place, and time  She appears well-developed and well-nourished  HENT:   Head: Normocephalic and atraumatic  Right Ear: Tympanic membrane and external ear normal  Decreased hearing is noted  Left Ear: Tympanic membrane, external ear and ear canal normal  Decreased hearing is noted  Wax in right EAC   Eyes: Pupils are equal, round, and reactive to light  Right eye exhibits hordeolum  Cardiovascular: Normal rate, regular rhythm and normal heart sounds  Pulmonary/Chest: Effort normal and breath sounds normal  She has no wheezes  Abdominal: Soft  Bowel sounds are normal    Musculoskeletal: She exhibits no edema  Neurological: She is alert and oriented to person, place, and time  She displays tremor  Skin: Skin is warm  No rash noted  Psychiatric: She has a normal mood and affect  Her behavior is normal    Nursing note and vitals reviewed  Labs & imaging results reviewed with patient

## 2020-07-02 NOTE — ASSESSMENT & PLAN NOTE
Saw neurology recently, did not increase amantadine  On Sinemet, rasagiline, ropinirole  She will consider DBS  Recommend to look into boxing

## 2020-07-21 ENCOUNTER — TELEPHONE (OUTPATIENT)
Dept: INTERNAL MEDICINE CLINIC | Facility: CLINIC | Age: 70
End: 2020-07-21

## 2020-07-21 ENCOUNTER — TELEMEDICINE (OUTPATIENT)
Dept: INTERNAL MEDICINE CLINIC | Facility: CLINIC | Age: 70
End: 2020-07-21
Payer: MEDICARE

## 2020-07-21 DIAGNOSIS — G89.29 CHRONIC PAIN OF LEFT KNEE: ICD-10-CM

## 2020-07-21 DIAGNOSIS — G20 PARKINSON DISEASE (HCC): ICD-10-CM

## 2020-07-21 DIAGNOSIS — M79.652 PAIN OF LEFT THIGH: Primary | ICD-10-CM

## 2020-07-21 DIAGNOSIS — M25.562 CHRONIC PAIN OF LEFT KNEE: ICD-10-CM

## 2020-07-21 PROCEDURE — 1170F FXNL STATUS ASSESSED: CPT | Performed by: INTERNAL MEDICINE

## 2020-07-21 PROCEDURE — 1036F TOBACCO NON-USER: CPT | Performed by: INTERNAL MEDICINE

## 2020-07-21 PROCEDURE — 99213 OFFICE O/P EST LOW 20 MIN: CPT | Performed by: INTERNAL MEDICINE

## 2020-07-21 PROCEDURE — 4040F PNEUMOC VAC/ADMIN/RCVD: CPT | Performed by: INTERNAL MEDICINE

## 2020-07-21 PROCEDURE — 1160F RVW MEDS BY RX/DR IN RCRD: CPT | Performed by: INTERNAL MEDICINE

## 2020-07-21 NOTE — TELEPHONE ENCOUNTER
Recommend to be seen to be evaluated, video visit ok  If not, please ask: When did you start having the leg pain? Did you fall or injure it? Does your hip, knee or ankle hurt? Any swelling?

## 2020-07-21 NOTE — PROGRESS NOTES
Virtual Regular Visit      Assessment/Plan:    Problem List Items Addressed This Visit        Nervous and Auditory    Parkinson disease ([address] )     Stable, bicycling regularly  No recent falls  Other Visit Diagnoses     Pain of left thigh    -  Primary    She is concerned for side effects of Forteo, chronic pain/discomfort  Suspect symptoms may be due to lumbar HNP  Relevant Orders    XR femur 2 vw left    Chronic pain of left knee        As above, no recent trauma  Relevant Orders    XR knee 3 vw right non injury             Reason for visit is thigh pain  Chief Complaint   Patient presents with    Leg Pain     L leg    Virtual Regular Visit        Encounter provider Michoacano Puri MD    Provider located at [address] [address]      Recent Visits  No visits were found meeting these conditions  Showing recent visits within past 7 days and meeting all other requirements     Today's Visits  Date Type Provider Dept   07/21/20 [address] [facility], [facility]   07/21/20 Telephone Michoacano Puri, [address] [facility]   Showing today's visits and meeting all other requirements     Future Appointments  Date Type Provider Dept   07/21/20 Telemedicine Michoacano Puri MD [facility]   Showing future appointments within next 150 days and meeting all other requirements        The patient was identified by name and date of birth  Rusaln  was informed that this is a telemedicine visit and that the visit is being conducted through [facility] and patient was informed that this is a secure, HIPAA-compliant platform  She agrees to proceed     My office door was closed  No one else was in the room  She acknowledged consent and understanding of privacy and security of the video platform   The patient has agreed to participate and understands they can discontinue the visit at any time  Patient is aware this is a billable service  Anne Marie Matamoros is a 77 y o  female still has left thigh pain  She is concerned about a mass, maybe a fracture or bone cancer since she is on Forteo  She continues to experience left thigh discomfort, mostly in the side near her knee  She reports feeling initial pressure and uncomfortable at the start of her day, when she gets going symptoms resolved  She reports intermittent low back pain, denies any radicular pain down her buttocks or thigh or leg  She reports symptoms may occur near her knee, denies any actual knee pain, swelling  No recent falls or injury  She has been riding her bicycle, doing well  She denies any falls  Past Medical History:   Diagnosis Date    GERD (gastroesophageal reflux disease)     Osteoporosis     [address] disease ([facility] )        Past Surgical History:   Procedure Laterality Date    ARTERIOGRAM N/A 7/10/2019    Procedure: KYPHOPLASTY;  Surgeon: Merly Dao MD;  [facility];  Service: Interventional Radiology    IR KYPHOPLASTY/VERTEBROPLASTY  7/10/2019    TONSILLECTOMY         Current Outpatient Medications   Medication Sig Dispense Refill    amantadine (SYMMETREL) 100 mg capsule Take 100 mg by mouth daily      BD ULTRA-FINE PEN NEEDLES 29G X 12 7MM MISC       carbidopa-levodopa-entacapone (STALEVO) 12 5- MG per tablet TAKE 1 TABLET BY MOUTH 6 (SIX) TIMES A DAY    11    Cholecalciferol (VITAMIN D3) 2000 units TABS Take 2,000 Units by mouth daily      erythromycin (ILOTYCIN) ophthalmic ointment Administer 0 5 inches to the right eye every 12 (twelve) hours 3 5 g 0    FORTEO 600 MCG/2 4ML injection       LORazepam (ATIVAN) 1 mg tablet Take 1/2 to 1 tab PO daily prn 30 tablet 0    meloxicam (MOBIC) 15 mg tablet Take 1 tablet (15 mg total) by mouth daily as needed for moderate pain 30 tablet 0    omeprazole (PriLOSEC) 20 mg delayed release capsule TAKE 1 CAPSULE (20 MG TOTAL) BY MOUTH DAILY AS NEEDED (REFLUX) 90 capsule 0    rasagiline (AZILECT) 1 MG Take 1 mg by mouth daily      rOPINIRole (REQUIP XL) 8 MG 24 hr tablet Take 8 mg by mouth       No current facility-administered medications for this visit  Allergies   Allergen Reactions    Sulfa Antibiotics      Other reaction(s): family history - anaphylaxis  Category: Allergy;     Doxycycline Rash     Category: Allergy; Review of Systems   Constitutional: Negative for activity change  Musculoskeletal: Positive for arthralgias and back pain  Negative for gait problem and joint swelling  Neurological: Positive for tremors  Negative for weakness  Psychiatric/Behavioral: Negative for sleep disturbance  Video Exam    There were no vitals filed for this visit  Physical Exam   Constitutional: She is oriented to person, place, and time  She appears well-developed  She does not have a sickly appearance  She does not appear ill  No distress  HENT:   Head: Normocephalic  Eyes: Pupils are equal, round, and reactive to light  Pulmonary/Chest: Effort normal    Neurological: She is alert and oriented to person, place, and time  Psychiatric: She has a normal mood and affect  Her behavior is normal         I spent 6 minutes directly with the patient during this visit      [address] [place] [address] acknowledges that she has consented to an online visit or consultation  She understands that the online visit is based solely on information provided by her, and that, in the absence of a face-to-face physical evaluation by the physician, the diagnosis she receives is both limited and provisional in terms of accuracy and completeness  This is not intended to replace a full medical face-to-face evaluation by the physician  Meghan Torres understands and accepts these terms

## 2020-07-22 ENCOUNTER — APPOINTMENT (OUTPATIENT)
Dept: RADIOLOGY | Age: 70
End: 2020-07-22
Payer: MEDICARE

## 2020-07-22 DIAGNOSIS — M79.652 PAIN OF LEFT THIGH: ICD-10-CM

## 2020-07-22 DIAGNOSIS — M25.562 CHRONIC PAIN OF LEFT KNEE: ICD-10-CM

## 2020-07-22 DIAGNOSIS — G89.29 CHRONIC PAIN OF LEFT KNEE: ICD-10-CM

## 2020-07-22 PROCEDURE — 73562 X-RAY EXAM OF KNEE 3: CPT

## 2020-07-22 PROCEDURE — 73552 X-RAY EXAM OF FEMUR 2/>: CPT

## 2020-07-23 ENCOUNTER — APPOINTMENT (OUTPATIENT)
Dept: LAB | Age: 70
End: 2020-07-23
Payer: MEDICARE

## 2020-07-23 ENCOUNTER — OFFICE VISIT (OUTPATIENT)
Dept: AUDIOLOGY | Age: 70
End: 2020-07-23
Payer: MEDICARE

## 2020-07-23 ENCOUNTER — TELEPHONE (OUTPATIENT)
Dept: INTERNAL MEDICINE CLINIC | Facility: CLINIC | Age: 70
End: 2020-07-23

## 2020-07-23 DIAGNOSIS — H90.5 SENSORY HEARING LOSS: Primary | ICD-10-CM

## 2020-07-23 LAB
25(OH)D3 SERPL-MCNC: 18 NG/ML (ref 30–100)
ALBUMIN SERPL BCP-MCNC: 3.6 G/DL (ref 3.5–5)
ALP SERPL-CCNC: 153 U/L (ref 46–116)
ALT SERPL W P-5'-P-CCNC: 9 U/L (ref 12–78)
ANION GAP SERPL CALCULATED.3IONS-SCNC: 7 MMOL/L (ref 4–13)
AST SERPL W P-5'-P-CCNC: 12 U/L (ref 5–45)
BASOPHILS # BLD AUTO: 0.08 THOUSANDS/ΜL (ref 0–0.1)
BASOPHILS NFR BLD AUTO: 2 % (ref 0–1)
BILIRUB SERPL-MCNC: 0.49 MG/DL (ref 0.2–1)
BUN SERPL-MCNC: 16 MG/DL (ref 5–25)
CALCIUM SERPL-MCNC: 9.2 MG/DL (ref 8.3–10.1)
CHLORIDE SERPL-SCNC: 107 MMOL/L (ref 100–108)
CHOLEST SERPL-MCNC: 232 MG/DL (ref 50–200)
CO2 SERPL-SCNC: 27 MMOL/L (ref 21–32)
CREAT SERPL-MCNC: 0.82 MG/DL (ref 0.6–1.3)
EOSINOPHIL # BLD AUTO: 0.4 THOUSAND/ΜL (ref 0–0.61)
EOSINOPHIL NFR BLD AUTO: 7 % (ref 0–6)
ERYTHROCYTE [DISTWIDTH] IN BLOOD BY AUTOMATED COUNT: 11.8 % (ref 11.6–15.1)
GFR SERPL CREATININE-BSD FRML MDRD: 74 ML/MIN/1.73SQ M
GLUCOSE P FAST SERPL-MCNC: 94 MG/DL (ref 65–99)
HCT VFR BLD AUTO: 42.7 % (ref 34.8–46.1)
HDLC SERPL-MCNC: 47 MG/DL
HGB BLD-MCNC: 14 G/DL (ref 11.5–15.4)
IMM GRANULOCYTES # BLD AUTO: 0.02 THOUSAND/UL (ref 0–0.2)
IMM GRANULOCYTES NFR BLD AUTO: 0 % (ref 0–2)
LDLC SERPL CALC-MCNC: 128 MG/DL (ref 0–100)
LYMPHOCYTES # BLD AUTO: 1.24 THOUSANDS/ΜL (ref 0.6–4.47)
LYMPHOCYTES NFR BLD AUTO: 23 % (ref 14–44)
MCH RBC QN AUTO: 31.5 PG (ref 26.8–34.3)
MCHC RBC AUTO-ENTMCNC: 32.8 G/DL (ref 31.4–37.4)
MCV RBC AUTO: 96 FL (ref 82–98)
MONOCYTES # BLD AUTO: 0.54 THOUSAND/ΜL (ref 0.17–1.22)
MONOCYTES NFR BLD AUTO: 10 % (ref 4–12)
NEUTROPHILS # BLD AUTO: 3.15 THOUSANDS/ΜL (ref 1.85–7.62)
NEUTS SEG NFR BLD AUTO: 58 % (ref 43–75)
NONHDLC SERPL-MCNC: 185 MG/DL
NRBC BLD AUTO-RTO: 0 /100 WBCS
PLATELET # BLD AUTO: 218 THOUSANDS/UL (ref 149–390)
PMV BLD AUTO: 11.6 FL (ref 8.9–12.7)
POTASSIUM SERPL-SCNC: 3.7 MMOL/L (ref 3.5–5.3)
PROT SERPL-MCNC: 6.7 G/DL (ref 6.4–8.2)
RBC # BLD AUTO: 4.45 MILLION/UL (ref 3.81–5.12)
SODIUM SERPL-SCNC: 141 MMOL/L (ref 136–145)
T4 FREE SERPL-MCNC: 0.81 NG/DL (ref 0.76–1.46)
TRIGL SERPL-MCNC: 283 MG/DL
TSH SERPL DL<=0.05 MIU/L-ACNC: 4.71 UIU/ML (ref 0.36–3.74)
WBC # BLD AUTO: 5.43 THOUSAND/UL (ref 4.31–10.16)

## 2020-07-23 PROCEDURE — 80061 LIPID PANEL: CPT | Performed by: INTERNAL MEDICINE

## 2020-07-23 PROCEDURE — 92557 COMPREHENSIVE HEARING TEST: CPT | Performed by: AUDIOLOGIST

## 2020-07-23 PROCEDURE — 36415 COLL VENOUS BLD VENIPUNCTURE: CPT | Performed by: INTERNAL MEDICINE

## 2020-07-23 PROCEDURE — 82306 VITAMIN D 25 HYDROXY: CPT | Performed by: INTERNAL MEDICINE

## 2020-07-23 PROCEDURE — 84443 ASSAY THYROID STIM HORMONE: CPT | Performed by: INTERNAL MEDICINE

## 2020-07-23 PROCEDURE — 80053 COMPREHEN METABOLIC PANEL: CPT | Performed by: INTERNAL MEDICINE

## 2020-07-23 PROCEDURE — 84439 ASSAY OF FREE THYROXINE: CPT | Performed by: INTERNAL MEDICINE

## 2020-07-23 PROCEDURE — 92567 TYMPANOMETRY: CPT | Performed by: AUDIOLOGIST

## 2020-07-23 PROCEDURE — 85025 COMPLETE CBC W/AUTO DIFF WBC: CPT | Performed by: INTERNAL MEDICINE

## 2020-07-23 NOTE — TELEPHONE ENCOUNTER
Lab results: Your cholesterol is slightly higher  Limit fatty foods  You can start taking fish oil capsules, 1-2 a day if you'd like  Thyroid test remains slightly abnormal, will just monitor this  Your vitamin D is low, you should be taking vitamin D3 2000 units daily  The rest of your labs are normal     I am still waiting for your x-ray results

## 2020-07-23 NOTE — PROGRESS NOTES
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 family members  Patient does have a family history of hearing loss as her mother utilized hearing aids  Patient denies any recreational or occupational noise exposure  Patient denies any recent ear infections, falls, or head injuries/concussion/whiplash  Patient medical history does include Parkinson's Disease  Today patient denies any otalgia  Does report unilateral fullness in her right ear- does report it is clogged today  Patient also reports bilateral constant ringing tinnitus; reports auditory perceptions of music and bands playing as well  Patient denies any recent dizziness but does report in the past has experienced 2 episodes within the last 2 years  EVALUATION:    Otoscopic Evaluation:   Right Ear: Clear and healthy ear canal and tympanic membrane   Left Ear: Clear and healthy ear canal and tympanic membrane    Tympanometry:   Right: Type B - middle ear disorder   Left: Type A - normal middle ear pressure and compliance    Audiogram Results:  Pure tone testing revealed a normal sloping to mild sensorineural hearing loss bilaterally  SRT and PTA are in agreement indicating good test reliability  Word recognition scores were excellent bilaterally  *see attached audiogram      RECOMMENDATIONS:  Annual hearing eval, Hearing Aid Evaluation and Copy to Patient/Caregiver    PATIENT EDUCATION:   Discussed results and recommendations with the patient at length   Patient was made aware she is a hearing aid candidate for both ears  Patient currently works in a local school part-time  Patient was provided with OVR information; she is aware to contact us once approved and/or denied  If patient is denied OVR services, she will return to our office to further discuss out of pocket options  Patient did voice concern with her right ear - ENT recommendation list was provided to her  Questions were addressed and the patient was encouraged to contact our department should concerns arise        Zoraida Wan , CCC-A  Clinical Audiologist

## 2020-07-27 ENCOUNTER — TELEPHONE (OUTPATIENT)
Dept: INTERNAL MEDICINE CLINIC | Facility: CLINIC | Age: 70
End: 2020-07-27

## 2020-07-27 DIAGNOSIS — R93.89 ABNORMAL FINDING ON IMAGING: Primary | ICD-10-CM

## 2020-07-27 DIAGNOSIS — M79.652 PAIN OF LEFT THIGH: ICD-10-CM

## 2020-07-27 NOTE — TELEPHONE ENCOUNTER
X-ray of your thigh showed a possible small bone lesion (<1 cm)  I discussed this with the radiologist   Since you are having pain and discomfort in the area, recommend to do MRI for further evaluation   I

## 2020-07-28 PROBLEM — R93.89 ABNORMAL FINDING ON IMAGING: Status: ACTIVE | Noted: 2020-07-28

## 2020-07-28 NOTE — TELEPHONE ENCOUNTER
Patient notified  Patient states she would like the MRI ordered  States can mail to her  No need to cb  Provided phone #

## 2020-08-18 ENCOUNTER — HOSPITAL ENCOUNTER (OUTPATIENT)
Dept: RADIOLOGY | Facility: HOSPITAL | Age: 70
Discharge: HOME | End: 2020-08-18
Payer: MEDICARE

## 2020-08-18 ENCOUNTER — TELEPHONE (OUTPATIENT)
Dept: INTERNAL MEDICINE CLINIC | Facility: CLINIC | Age: 70
End: 2020-08-18

## 2020-08-18 ENCOUNTER — OFFICE VISIT (OUTPATIENT)
Dept: OBGYN CLINIC | Facility: HOSPITAL | Age: 70
End: 2020-08-18
Payer: MEDICARE

## 2020-08-18 VITALS
HEART RATE: 89 BPM | HEIGHT: 63 IN | WEIGHT: 137.4 LBS | SYSTOLIC BLOOD PRESSURE: 126 MMHG | BODY MASS INDEX: 24.34 KG/M2 | DIASTOLIC BLOOD PRESSURE: 78 MMHG

## 2020-08-18 DIAGNOSIS — M79.652 PAIN OF LEFT THIGH: ICD-10-CM

## 2020-08-18 DIAGNOSIS — M84.353A: ICD-10-CM

## 2020-08-18 DIAGNOSIS — M84.30XA STRESS REACTION OF BONE: Primary | ICD-10-CM

## 2020-08-18 DIAGNOSIS — R93.89 ABNORMAL FINDING ON IMAGING: Primary | ICD-10-CM

## 2020-08-18 DIAGNOSIS — M79.652 PAIN IN BOTH THIGHS: ICD-10-CM

## 2020-08-18 DIAGNOSIS — R93.89 ABNORMAL FINDING ON IMAGING: ICD-10-CM

## 2020-08-18 DIAGNOSIS — M79.651 PAIN IN BOTH THIGHS: ICD-10-CM

## 2020-08-18 PROCEDURE — 1160F RVW MEDS BY RX/DR IN RCRD: CPT | Performed by: PHYSICIAN ASSISTANT

## 2020-08-18 PROCEDURE — 73720 MRI LWR EXTREMITY W/O&W/DYE: CPT

## 2020-08-18 PROCEDURE — 4040F PNEUMOC VAC/ADMIN/RCVD: CPT | Performed by: PHYSICIAN ASSISTANT

## 2020-08-18 PROCEDURE — 3008F BODY MASS INDEX DOCD: CPT | Performed by: PHYSICIAN ASSISTANT

## 2020-08-18 PROCEDURE — 99203 OFFICE O/P NEW LOW 30 MIN: CPT | Performed by: PHYSICIAN ASSISTANT

## 2020-08-18 PROCEDURE — 1036F TOBACCO NON-USER: CPT | Performed by: PHYSICIAN ASSISTANT

## 2020-08-18 PROCEDURE — A9585 GADOBUTROL INJECTION: HCPCS | Performed by: INTERNAL MEDICINE

## 2020-08-18 RX ADMIN — GADOBUTROL 6 ML: 604.72 INJECTION INTRAVENOUS at 09:00

## 2020-08-18 NOTE — TELEPHONE ENCOUNTER
Spoke to radiologist Dr Euna Goodpasture regarding MRI results  It showed stress fractures on femur, bilateral, left worse  NWB and Ortho consult recommended  I called Ms Vernadine Fothergill and discussed the results  She just received Forteo yesterday  Recommend to stop this for now  Recommend non weight bearing as much as possible due to the stress fractures  No bicycling, walking, limit activities since she lives alone  Agrees to see Ortho

## 2020-08-18 NOTE — PROGRESS NOTES
Assessment/Plan   Diagnoses and all orders for this visit:    Stress reaction of bone    Pain in both thighs    - Cane fitted and dispensed (declines walker)  Partial-weight bearing  - Stay off it as much as possible  - Follow up with Dr Leida Benítez this week or next week  Subjective   Patient ID: Ute Munguia is a 77 y o  female  Vitals:    08/18/20 1519   BP: 126/78   Pulse: 80     70yo female comes in for an evaluation of her b/l thighs  She has a history of osteoporosis and is on Forteo for this  Recently, she started having anterior mid-thigh pain  This has been going on for about 6 weeks  At first it was just mild pain in the left and now she has similar, minimal pain in the right  This increases with weightbearing  The pain is dull in character, mild in severity, pain does not radiate and is not associated with numbness  She has been full weightbearing and walks into the office today          The following portions of the patient's history were reviewed and updated as appropriate: allergies, current medications, past family history, past medical history, past social history, past surgical history and problem list     Review of Systems  Ortho Exam  Past Medical History:   Diagnosis Date    GERD (gastroesophageal reflux disease)     Osteoporosis     Parkinson disease ([facility] [address] )      Past Surgical History:   Procedure Laterality Date    ARTERIOGRAM N/A 7/10/2019    Procedure: KYPHOPLASTY;  Surgeon: Laney Aldridge MD;  [facility];  Service: Interventional Radiology    IR KYPHOPLASTY/VERTEBROPLASTY  7/10/2019    TONSILLECTOMY       Family History   Problem Relation Age of Onset    Dementia Mother     Heart disease Father     No Known Problems Daughter     No Known Problems Maternal Grandmother     No Known Problems Maternal Grandfather     No Known Problems Paternal Grandmother     Prostate cancer Paternal Grandfather     No Known Problems Daughter     No Known Problems Daughter     Alcohol abuse Neg Hx     Mental illness Neg Hx     Substance Abuse Neg Hx     Depression Neg Hx      Social History     Occupational History    Not on file   Tobacco Use    Smoking status: Never Smoker    Smokeless tobacco: Never Used   Substance and Sexual Activity    Alcohol use: Not Currently     Comment: Rare    Drug use: Never    Sexual activity: Not on file       Review of Systems   Constitutional: Negative  HENT: Negative  Eyes: Negative  Respiratory: Negative  Cardiovascular: Negative  Gastrointestinal: Negative  Endocrine: Negative  Genitourinary: Negative  Musculoskeletal: As below      Allergic/Immunologic: Negative  Neurological: Negative  Hematological: Negative  Psychiatric/Behavioral: Negative  Objective   Physical Exam    · Constitutional: Awake, Alert, Oriented  · Eyes: EOMI  · Psych: Mood and affect appropriate  · Heart: regular rate and rhythm  · Lungs: No audible wheezing  · Abdomen: soft  · Lymph: no lymphedema   Bilateral thigh:  - Appearance   No swelling, discoloration, deformity, or ecchymosis  - Palpation   + tenderness over the midshaft femur left > right  She is only tender anteriorly  - ROM   Full and pain free ROM in the knee and hip  - Motor   normal 5/5 in all planes  - NVI distally    I have personally reviewed pertinent films in PACS and my interpretation is stress reaction b/l femoral shafts  Reviewed with MD in office today

## 2020-08-25 ENCOUNTER — OFFICE VISIT (OUTPATIENT)
Dept: OBGYN CLINIC | Facility: HOSPITAL | Age: 70
End: 2020-08-25
Payer: MEDICARE

## 2020-08-25 VITALS
WEIGHT: 137 LBS | BODY MASS INDEX: 24.27 KG/M2 | HEART RATE: 87 BPM | SYSTOLIC BLOOD PRESSURE: 124 MMHG | DIASTOLIC BLOOD PRESSURE: 79 MMHG

## 2020-08-25 DIAGNOSIS — M84.352A STRESS FRACTURE OF LEFT FEMUR, INITIAL ENCOUNTER: Primary | ICD-10-CM

## 2020-08-25 PROCEDURE — 1160F RVW MEDS BY RX/DR IN RCRD: CPT | Performed by: ORTHOPAEDIC SURGERY

## 2020-08-25 PROCEDURE — 4040F PNEUMOC VAC/ADMIN/RCVD: CPT | Performed by: ORTHOPAEDIC SURGERY

## 2020-08-25 PROCEDURE — 1036F TOBACCO NON-USER: CPT | Performed by: ORTHOPAEDIC SURGERY

## 2020-08-25 PROCEDURE — 99213 OFFICE O/P EST LOW 20 MIN: CPT | Performed by: ORTHOPAEDIC SURGERY

## 2020-08-25 RX ORDER — TERIPARATIDE 250 UG/ML
INJECTION, SOLUTION SUBCUTANEOUS
COMMUNITY
Start: 2020-08-12 | End: 2020-11-09 | Stop reason: SDUPTHER

## 2020-08-25 NOTE — PROGRESS NOTES
69 y o female presents for left greater than right thigh pain  Patient states that she has been experiencing progressive discomfort in her left thigh for several months  She describes pain that is associated with weight-bearing particularly when caring her grandchildren  She denies any numbness or tingling  She states that she is beginning to develop similar symptoms on her right thigh  Pain improves at rest   She has a history of osteoporosis and was started on Forteo last fall by her rheumatologist   Patient has previously been seen by her PCP who ordered x-rays and MRI of her bilateral thighs that revealed concern for atypical femur stress fractures  She is here today to discuss these findings and potential treatment options  She has been ambulating with the aid of a cane per the recommendation of previous provider  Review of Systems  Review of systems negative unless otherwise specified in HPI    Past Medical History  Past Medical History:   Diagnosis Date    GERD (gastroesophageal reflux disease)     Osteoporosis     Parkinson disease ([address] )        Past Surgical History  Past Surgical History:   Procedure Laterality Date    ARTERIOGRAM N/A 7/10/2019    Procedure: KYPHOPLASTY;  Surgeon: Lili Downey MD;  [facility];  Service: Interventional Radiology    IR KYPHOPLASTY/VERTEBROPLASTY  7/10/2019    TONSILLECTOMY         Current Medications  Current Outpatient Medications on File Prior to Visit   Medication Sig Dispense Refill    amantadine (SYMMETREL) 100 mg capsule Take 100 mg by mouth daily      BD ULTRA-FINE PEN NEEDLES 29G X 12 7MM MISC       carbidopa-levodopa-entacapone (STALEVO) 12 5- MG per tablet TAKE 1 TABLET BY MOUTH 6 (SIX) TIMES A DAY    11    Cholecalciferol (VITAMIN D3) 2000 units TABS Take 2,000 Units by mouth daily      erythromycin (ILOTYCIN) ophthalmic ointment Administer 0 5 inches to the right eye every 12 (twelve) hours 3 5 g 0    famotidine (PEPCID) 40 MG tablet Take 1 tablet (40 mg total) by mouth daily at bedtime 90 tablet 3    FORTEO 600 MCG/2 4ML injection       LORazepam (ATIVAN) 1 mg tablet Take 1/2 to 1 tab PO daily prn 30 tablet 0    meloxicam (MOBIC) 15 mg tablet Take 1 tablet (15 mg total) by mouth daily as needed for moderate pain 30 tablet 0    omeprazole (PriLOSEC) 40 MG capsule Take 1 capsule (40 mg total) by mouth daily 90 capsule 3    rasagiline (AZILECT) 1 MG Take 1 mg by mouth daily      Forteo 600 MCG/2 4ML SOPN       rOPINIRole (REQUIP XL) 8 MG 24 hr tablet Take 8 mg by mouth       No current facility-administered medications on file prior to visit  Recent Labs [facility])  0   Lab Value Date/Time    HCT 42 7 07/23/2020 0726    HGB 14 0 07/23/2020 0726    WBC 5 43 07/23/2020 0726    INR 1 03 07/10/2019 0504         Physical exam  · General: Awake, Alert, Oriented  · Eyes: Pupils equal, round and reactive to light  · Heart: regular rate and rhythm  · Lungs: No audible wheezing  · Abdomen: soft  left lower extremity  Skin intact, no erythema, no ecchymosis  No reproducible tenderness palpation over thigh  No tenderness palpation over medial or lateral joint line  Full range of motion of knee  Distal extremity warm well perfused  Sensation and motor intact distally    Right lower extremity  Skin intact, no erythema, no ecchymosis  No reproducible tenderness palpation over right thigh  No tenderness palpation of medial or lateral joint lines  Full knee range of motion  Distal extremity warm well perfused and sensate    Procedure  None    Imaging  MRI previously obtained of bilateral femurs reviewed with patient today's visit  They reveal increased signal on T2 weighted image over the distal 3rd bilateral femurs        80-year-old female with left greater than right thigh pain and radiographic demonstration of 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    Long discussion had with patient concerning the etiology of her symptoms and treatment options  Is explained the patient in detail that we are unaware of Forteo being the culprit in causing stress fractures  In fact, it has been documented demonstrated the Forteo is a treatment for atypical femur fractures caused by bisphosphonate therapy  However was also discussed with the patient the potential for discontinuation this medicine concerning the fact that symptoms began few months after initiating this medication  We will defer ultimate management was medication to the prescribing provider however  In terms of treatment and management, patient states that she has symptoms daily however they are not present with every step  It was explained in detail the patient that she should modify her activities to a certain extent to prevent pain however she should not be totally off her feet  We encourage her normal daily activities including biking and yoga  Did advise the patient to closely monitor her symptoms and contact the office if any of her symptoms should worsen  She was instructed to make an appointment for 3 month follow-up however if her symptoms should worsen in the interim, preliminary discussion was had with patient concerning the utility of prophylactic intramedullary nail fixation of her femurs  Patient voiced her understanding was in agreement with this plan      X-rays upon follow-up of bilateral femurs

## 2020-08-27 ENCOUNTER — OFFICE VISIT (OUTPATIENT)
Dept: AUDIOLOGY | Age: 70
End: 2020-08-27

## 2020-08-27 DIAGNOSIS — H90.5 SENSORY HEARING LOSS: Primary | ICD-10-CM

## 2020-08-27 NOTE — PROGRESS NOTES
Progress Note    Name:  David Younger  :  1950  Age:  77 y o  Date of Evaluation: 20     Patient arrived for an HAE  Patient is currently working  Patient voiced she attempted to contact OVR 1x but voiced "There are people worse off"  I discussed at length with her her eligibility for OVR and strongly advised she consider going through them  Patient was briefly informed of the out of pocket costs for hearing aids and decided she will pursuing OVR again  Patient was informed once we hear from Kimberly Guerra, we will then schedule her for an HAE            Zoraida Hinojosa , CCC-A  Clinical Audiologist

## 2020-09-25 NOTE — PROGRESS NOTES
Progress Note    Name:  Ajay Perez  :  1950  Age:  77 y o  Date of Evaluation: 20     Received OVR request  Faxed over audiogram with request for PO for hearing aid evaluation  Once receive PO, patient should return for a hearing aid evaluation         Zoraida Marmolejo , CCC-A  Clinical Audiologist

## 2020-10-12 ENCOUNTER — TELEPHONE (OUTPATIENT)
Dept: OBGYN CLINIC | Facility: HOSPITAL | Age: 70
End: 2020-10-12

## 2020-11-04 ENCOUNTER — TELEPHONE (OUTPATIENT)
Dept: NEUROLOGY | Facility: CLINIC | Age: 70
End: 2020-11-04

## 2020-11-05 ENCOUNTER — TELEPHONE (OUTPATIENT)
Dept: NEUROLOGY | Facility: CLINIC | Age: 70
End: 2020-11-05

## 2020-11-09 ENCOUNTER — TELEMEDICINE (OUTPATIENT)
Dept: NEUROLOGY | Facility: CLINIC | Age: 70
End: 2020-11-09
Payer: MEDICARE

## 2020-11-09 DIAGNOSIS — G20 PARKINSON'S DISEASE (HCC): Primary | ICD-10-CM

## 2020-11-09 PROCEDURE — 99214 OFFICE O/P EST MOD 30 MIN: CPT | Performed by: PSYCHIATRY & NEUROLOGY

## 2020-11-10 ENCOUNTER — OFFICE VISIT (OUTPATIENT)
Dept: OBGYN CLINIC | Facility: HOSPITAL | Age: 70
End: 2020-11-10
Payer: MEDICARE

## 2020-11-10 ENCOUNTER — HOSPITAL ENCOUNTER (OUTPATIENT)
Dept: RADIOLOGY | Facility: HOSPITAL | Age: 70
Discharge: HOME | End: 2020-11-10
Attending: ORTHOPAEDIC SURGERY
Payer: MEDICARE

## 2020-11-10 VITALS
DIASTOLIC BLOOD PRESSURE: 77 MMHG | HEART RATE: 80 BPM | SYSTOLIC BLOOD PRESSURE: 135 MMHG | BODY MASS INDEX: 24.45 KG/M2 | WEIGHT: 138 LBS

## 2020-11-10 DIAGNOSIS — M79.652 PAIN IN BOTH THIGHS: Primary | ICD-10-CM

## 2020-11-10 DIAGNOSIS — M79.651 PAIN IN BOTH THIGHS: Primary | ICD-10-CM

## 2020-11-10 DIAGNOSIS — M79.651 PAIN IN BOTH THIGHS: ICD-10-CM

## 2020-11-10 DIAGNOSIS — M79.652 PAIN IN BOTH THIGHS: ICD-10-CM

## 2020-11-10 DIAGNOSIS — M84.352A STRESS FRACTURE OF LEFT FEMUR, INITIAL ENCOUNTER: ICD-10-CM

## 2020-11-10 DIAGNOSIS — M84.351A STRESS FRACTURE OF SHAFT OF RIGHT FEMUR, INITIAL ENCOUNTER: ICD-10-CM

## 2020-11-10 PROCEDURE — 73552 X-RAY EXAM OF FEMUR 2/>: CPT

## 2020-11-10 PROCEDURE — 99213 OFFICE O/P EST LOW 20 MIN: CPT | Performed by: ORTHOPAEDIC SURGERY

## 2020-11-12 ENCOUNTER — TELEPHONE (OUTPATIENT)
Dept: OBGYN CLINIC | Facility: HOSPITAL | Age: 70
End: 2020-11-12

## 2020-11-12 DIAGNOSIS — G20 PARKINSON'S DISEASE (HCC): Primary | ICD-10-CM

## 2020-11-12 RX ORDER — RASAGILINE 1 MG/1
1 TABLET ORAL DAILY
Qty: 30 EACH | Refills: 2 | Status: SHIPPED | OUTPATIENT
Start: 2020-11-12 | End: 2021-04-13

## 2020-11-12 RX ORDER — AMANTADINE HYDROCHLORIDE 100 MG/1
100 CAPSULE, GELATIN COATED ORAL DAILY
Qty: 30 CAPSULE | Refills: 2 | Status: SHIPPED | OUTPATIENT
Start: 2020-11-12 | End: 2021-02-08

## 2020-11-12 RX ORDER — CARBIDOPA, LEVODOPA AND ENTACAPONE 12.5; 200; 5 MG/1; MG/1; MG/1
1 TABLET, FILM COATED ORAL
Qty: 180 TABLET | Refills: 2 | Status: SHIPPED | OUTPATIENT
Start: 2020-11-12 | End: 2021-02-08

## 2020-11-12 RX ORDER — ROPINIROLE 8 MG/1
8 TABLET, FILM COATED, EXTENDED RELEASE ORAL DAILY
Qty: 30 TABLET | Refills: 2 | Status: SHIPPED | OUTPATIENT
Start: 2020-11-12 | End: 2021-02-15

## 2020-11-20 ENCOUNTER — TELEPHONE (OUTPATIENT)
Dept: NEUROLOGY | Facility: CLINIC | Age: 70
End: 2020-11-20

## 2020-12-29 ENCOUNTER — OFFICE VISIT (OUTPATIENT)
Dept: AUDIOLOGY | Age: 70
End: 2020-12-29

## 2020-12-29 DIAGNOSIS — H90.5 SENSORY HEARING LOSS: Primary | ICD-10-CM

## 2021-01-04 ENCOUNTER — TELEMEDICINE (OUTPATIENT)
Dept: INTERNAL MEDICINE CLINIC | Facility: CLINIC | Age: 71
End: 2021-01-04
Payer: MEDICARE

## 2021-01-04 VITALS — WEIGHT: 128 LBS | HEIGHT: 63 IN | BODY MASS INDEX: 22.68 KG/M2

## 2021-01-04 DIAGNOSIS — E55.9 VITAMIN D DEFICIENCY: ICD-10-CM

## 2021-01-04 DIAGNOSIS — F41.1 ANXIETY STATE: ICD-10-CM

## 2021-01-04 DIAGNOSIS — G20 PARKINSON'S DISEASE (HCC): ICD-10-CM

## 2021-01-04 DIAGNOSIS — Z12.31 ENCOUNTER FOR SCREENING MAMMOGRAM FOR BREAST CANCER: ICD-10-CM

## 2021-01-04 DIAGNOSIS — Z79.899 ENCOUNTER FOR LONG-TERM CURRENT USE OF MEDICATION: ICD-10-CM

## 2021-01-04 DIAGNOSIS — K21.00 GASTROESOPHAGEAL REFLUX DISEASE WITH ESOPHAGITIS, UNSPECIFIED WHETHER HEMORRHAGE: ICD-10-CM

## 2021-01-04 DIAGNOSIS — H91.93 BILATERAL HEARING LOSS, UNSPECIFIED HEARING LOSS TYPE: ICD-10-CM

## 2021-01-04 DIAGNOSIS — M80.80XD LOCALIZED OSTEOPOROSIS WITH CURRENT PATHOLOGICAL FRACTURE WITH ROUTINE HEALING: ICD-10-CM

## 2021-01-04 DIAGNOSIS — M84.353A: ICD-10-CM

## 2021-01-04 DIAGNOSIS — E78.49 OTHER HYPERLIPIDEMIA: Primary | ICD-10-CM

## 2021-01-04 PROCEDURE — 99214 OFFICE O/P EST MOD 30 MIN: CPT | Performed by: INTERNAL MEDICINE

## 2021-01-04 RX ORDER — LORAZEPAM 1 MG/1
TABLET ORAL
Qty: 30 TABLET | Refills: 0 | Status: SHIPPED | OUTPATIENT
Start: 2021-01-04 | End: 2022-01-03

## 2021-01-04 NOTE — ASSESSMENT & PLAN NOTE
Follow up with rheum and Endo to discuss treatment options  Still on Forteo  Repeat bone density not due until 10/21  Stopped bicycling, walks daily  Continue daily Ca and D3

## 2021-01-04 NOTE — PROGRESS NOTES
Virtual Regular Visit      Assessment/Plan:    Problem List Items Addressed This Visit        Digestive    Esophagitis, reflux     Stable, takes PPI in AM, famotidine in PM   Per ENT  Nervous and Auditory    Hearing loss, bilateral     Awaiting hearing aids  Parkinson's disease ([address] )     Stable  Recently saw neurology  On Stalevo, amantadine, rasagiline and ropinirole  Relevant Medications    carbidopa-levodopa (SINEMET)  mg per tablet    Other Relevant Orders    Comprehensive metabolic panel    CBC and differential       Musculoskeletal and Integument    Localized osteoporosis with current pathological fracture with routine healing     Follow up with rheum and Endo to discuss treatment options  Still on Forteo  Repeat bone density not due until 10/21  Stopped bicycling, walks daily  Continue daily Ca and D3  Stress fracture of femur     Followed by Ortho, stable  Other    Anxiety state     Rare use of lorazepam   PDMP reviewed  Relevant Medications    LORazepam (ATIVAN) 1 mg tablet    Hyperlipidemia - Primary     Repeat lipids, not on statin  Relevant Orders    Comprehensive metabolic panel    Lipid panel    TSH, 3rd generation with Free T4 reflex    Vitamin D deficiency    Relevant Orders    Vitamin D 25 hydroxy      Other Visit Diagnoses     Encounter for long-term current use of medication        Relevant Orders    Vitamin B12    Encounter for screening mammogram for breast cancer        Relevant Orders    Mammo screening bilateral w cad        Follow up in 6 months or as needed  Reason for visit is f/u  Chief Complaint   Patient presents with    Follow-up    Virtual Regular Visit        Encounter provider Liset Garcia MD    Provider located at [address] [place] [id number]      Recent Visits  No visits were found meeting these conditions  Showing recent visits within past 7 days and meeting all other requirements     Today's Visits  Date Type Provider Dept   01[facility]04/[facility] [place]   Showing today's visits and meeting all other requirements     Future Appointments  No visits were found meeting these conditions  Showing future appointments within next 150 days and meeting all other requirements        The patient was identified by name and date of birth  Rebekah Sweet was informed that this is a telemedicine visit and that the visit is being conducted through [facility] and patient was informed that this is a secure, HIPAA-compliant platform  She agrees to proceed     My office door was closed  No one else was in the room  She acknowledged consent and understanding of privacy and security of the video platform  The patient has agreed to participate and understands they can discontinue the visit at any time  Patient is aware this is a billable service  Subjective  Rebekah Sweet is a 72 y o  female f/u   She has been feeling well, no new complaints  She had stopped bicycling, tries to walk daily  She reports right thigh pain has improved, still has intermittent left thigh pain  She has an appointment with Dermatology to discuss Forteo, will be seeing Endocrinology as well  She takes meloxicam as needed, twice a month  She denies any reflux symptoms  She takes her medications for Parkinson's regularly, misses a dose sometimes  She has issues with sleep occasionally, would take half tablet of lorazepam as needed  She would take this once a month or so         Past Medical History:   Diagnosis Date    GERD (gastroesophageal reflux disease)     Osteoporosis     Parkinson disease [address] [address] )        Past Surgical History:   Procedure Laterality Date    ARTERIOGRAM N/A 7/10/2019    Procedure: KYPHOPLASTY;  Surgeon: Ayla Jordan MD;  Location: [facility];  Service: Interventional Radiology    IR KYPHOPLASTY/VERTEBROPLASTY  7/10/2019    TONSILLECTOMY         Current Outpatient Medications   Medication Sig Dispense Refill    amantadine (SYMMETREL) 100 mg capsule Take 1 capsule (100 mg total) by mouth daily 30 capsule 2    BD ULTRA-FINE PEN NEEDLES 29G X 12 7MM MISC       Calcium Carbonate-Vit D-Min (CALCIUM 1200 PO) Take 1,200 mg by mouth daily      carbidopa-levodopa-entacapone (STALEVO) 12 5- MG per tablet Take 1 tablet by mouth 6 (six) times a day 180 tablet 2    Cholecalciferol (VITAMIN D3) 2000 units TABS Take 2,000 Units by mouth daily      famotidine (PEPCID) 20 mg tablet Take 2 tablets (40 mg total) by mouth daily at bedtime 180 tablet 3    FORTEO 600 MCG/2 4ML injection Inject under the skin daily       LORazepam (ATIVAN) 1 mg tablet Take 1/2 to 1 tab PO daily prn 30 tablet 0    meloxicam (MOBIC) 15 mg tablet Take 1 tablet (15 mg total) by mouth daily as needed for moderate pain 30 tablet 0    pantoprazole (PROTONIX) 40 mg tablet Take 1 tablet (40 mg total) by mouth daily 30 tablet 3    rasagiline (Azilect) 1 MG Take 1 tablet (1 mg total) by mouth daily 30 each 2    rOPINIRole (REQUIP XL) 8 MG 24 hr tablet Take 1 tablet (8 mg total) by mouth daily 30 tablet 2    carbidopa-levodopa (SINEMET)  mg per tablet   No current facility-administered medications for this visit  Allergies   Allergen Reactions    Sulfa Antibiotics      Other reaction(s): family history - anaphylaxis  Category: Allergy;     Doxycycline Rash     Category: Allergy; Review of Systems   Constitutional: Negative for activity change, appetite change and fatigue  HENT: Negative for congestion and ear pain  Eyes: Negative for visual disturbance  Respiratory: Negative for cough and shortness of breath  Cardiovascular: Negative for chest pain and leg swelling  Gastrointestinal: Negative for abdominal pain, constipation and diarrhea     Genitourinary: Negative for dysuria and frequency  Musculoskeletal: Positive for arthralgias and gait problem  Negative for myalgias  Skin: Negative for rash and wound  Neurological: Negative for dizziness and headaches  Psychiatric/Behavioral: Negative for confusion and sleep disturbance  The patient is not nervous/anxious  Video Exam    Vitals:    01/04/21 0931   Weight: 58 1 kg (128 lb)   Height: 5' 3" (1 6 m)       Physical Exam  Constitutional:       General: She is not in acute distress  Appearance: Normal appearance  She is not ill-appearing  HENT:      Head: Normocephalic and atraumatic  Eyes:      Pupils: Pupils are equal, round, and reactive to light  Pulmonary:      Effort: Pulmonary effort is normal  No respiratory distress  Neurological:      General: No focal deficit present  Mental Status: She is alert and oriented to person, place, and time  Psychiatric:         Mood and Affect: Mood normal          Behavior: Behavior normal           I spent 9 minutes directly with the patient during this visit      [address] [place] [address] acknowledges that she has consented to an online visit or consultation  She understands that the online visit is based solely on information provided by her, and that, in the absence of a face-to-face physical evaluation by the physician, the diagnosis she receives is both limited and provisional in terms of accuracy and completeness  This is not intended to replace a full medical face-to-face evaluation by the physician  Yenny Griffith understands and accepts these terms  Labs & imaging results reviewed with patient

## 2021-01-14 ENCOUNTER — OFFICE VISIT (OUTPATIENT)
Dept: AUDIOLOGY | Age: 71
End: 2021-01-14
Payer: COMMERCIAL

## 2021-01-14 DIAGNOSIS — H90.5 SENSORY HEARING LOSS: Primary | ICD-10-CM

## 2021-01-14 PROCEDURE — V5274 ALD UNSPECIFIED: HCPCS | Performed by: AUDIOLOGIST

## 2021-01-14 PROCEDURE — V5160 DISPENSING FEE BINAURAL: HCPCS | Performed by: AUDIOLOGIST

## 2021-01-14 PROCEDURE — V5261 HEARING AID, DIGIT, BIN, BTE: HCPCS | Performed by: AUDIOLOGIST

## 2021-01-14 NOTE — PROGRESS NOTES
Hearing Aid Fitting    Name:  Sarah Williamson  :  1950  Age:  72 y o  Date of Evaluation: 21     Sarah Williamson is being see today to be fit with new hearing aids  Patient is fit with Arhola Fritz P70-RT hearing aid(s)  Right serial number [id number]  Left serial number [id number]  Warranty date: 2024 (Loss/Damage and repair)  Ear mold Battery  Dome   Right - R 2/M M Open   Left - R 2/M M Open     The hearing aid(s) were adjusted based on the patient's most recent audiogram and patient comfort  Patient noted good sound quality, and was happy with the fitting  Care and cleaning of the hearing aids was reviewed  , dry-aid, and user manual were reviewed with the patient  Insertion and removal of the hearing aids was done  The patient practiced insertion and removal of the devices in the office, they demonstrated excellent ability to manipulate the hearing aids  The patient expressed satisfaction with the hearing aids  Recommendation:   Follow up in two weeks - review dome and filter changing at that time         Zoraida Higgins , [facility]-A  Clinical Audiologist

## 2021-01-22 ENCOUNTER — OFFICE VISIT (OUTPATIENT)
Dept: AUDIOLOGY | Age: 71
End: 2021-01-22

## 2021-01-22 DIAGNOSIS — H90.5 SENSORY HEARING LOSS: Primary | ICD-10-CM

## 2021-01-22 NOTE — PROGRESS NOTES
Hearing Aid Visit:    Name:  Srinath Caldwell  :  1950  Age:  72 y o  Date of Evaluation: 21     Srinath Caldwell is being seen for a hearing aid visit  Patient is fit with Adam Jeffrey P70-RT hearing aid(s)  Right serial number [id number]  Left serial number [id number]  Warranty date: 2024 (Loss/Damage and repair)  Patient reports limited subjective changes while utilizing her hearing aids  Questioning realistic expectations with the use of hearing aids  Action:  Programming today included increasing overall target gain from 84% to 92%  Noise block in speech in noise was increased  Data logging indicated on average ~10 hours of daily usage  Recounseled on the realistic expectations with the use of hearing aids as well as provided effective communication strategies  Recommendations:   Return as scheduled        Refugia Zoraida Dougherty , CCC-A  Clinical Audiologist

## 2021-02-05 ENCOUNTER — OFFICE VISIT (OUTPATIENT)
Dept: AUDIOLOGY | Age: 71
End: 2021-02-05

## 2021-02-05 DIAGNOSIS — H90.5 SENSORY HEARING LOSS: Primary | ICD-10-CM

## 2021-02-05 NOTE — PROGRESS NOTES
Hearing Aid Visit:    Name:  Elizabeth Mendoza  :  1950  Age:  72 y o  Date of Evaluation: 21     Elizabeth Mendoza is being seen for a hearing aid visit  Patient is fit with Phonak Audeo P70-RT hearing aid(s)  Right serial number [id number]  Left serial number [id number]  Warranty date: 2024 (Loss/Damage and repair)  Patient reports no issues or concerns with her hearing aids  Changes made during her last appointment are still meeting her listening needs  Patient reports overall happiness with the use of her hearing aids  Action:  Cleaned and checked both hearing aids  Replaced both wax filters and domes  Listening check indicated both hearing aids to be in good working condition and appropriate for her hearing loss  Patient did voice concern about utilizing her hearing aids during bad weather (rain and snow)  Patient was provided with a dry-aid jar and counseled on how to utilize when in the case her hearing aids become wet (weather or shower related)  Recommendations:   Return in 4 months for routine follow-up; sooner if issues arise        Zoraida Balderas , [facility]-A  Clinical Audiologist

## 2021-02-06 DIAGNOSIS — G20 PARKINSON'S DISEASE (HCC): ICD-10-CM

## 2021-02-08 RX ORDER — CARBIDOPA, LEVODOPA AND ENTACAPONE 12.5; 200; 5 MG/1; MG/1; MG/1
1 TABLET, FILM COATED ORAL
Qty: 540 TABLET | Refills: 3 | Status: SHIPPED | OUTPATIENT
Start: 2021-02-08 | End: 2021-09-21 | Stop reason: SDUPTHER

## 2021-02-08 RX ORDER — AMANTADINE HYDROCHLORIDE 100 MG/1
CAPSULE, GELATIN COATED ORAL
Qty: 90 CAPSULE | Refills: 3 | Status: SHIPPED | OUTPATIENT
Start: 2021-02-08 | End: 2021-09-21 | Stop reason: SDUPTHER

## 2021-02-10 ENCOUNTER — TELEPHONE (OUTPATIENT)
Dept: INTERNAL MEDICINE CLINIC | Facility: CLINIC | Age: 71
End: 2021-02-10

## 2021-02-10 NOTE — TELEPHONE ENCOUNTER
Left message for pt to call back   Forms are completed , there is a $5 charge    Forms in pt [ick up drawer with note for staff

## 2021-02-13 DIAGNOSIS — G20 PARKINSON'S DISEASE (HCC): ICD-10-CM

## 2021-02-15 RX ORDER — ROPINIROLE 8 MG/1
TABLET, FILM COATED, EXTENDED RELEASE ORAL
Qty: 30 TABLET | Refills: 2 | Status: SHIPPED | OUTPATIENT
Start: 2021-02-15 | End: 2021-05-02 | Stop reason: SDUPTHER

## 2021-03-01 DIAGNOSIS — Z23 ENCOUNTER FOR IMMUNIZATION: ICD-10-CM

## 2021-03-09 ENCOUNTER — HOSPITAL ENCOUNTER (OUTPATIENT)
Dept: RADIOLOGY | Facility: HOSPITAL | Age: 71
Discharge: HOME | End: 2021-03-09
Attending: ORTHOPAEDIC SURGERY
Payer: MEDICARE

## 2021-03-09 ENCOUNTER — OFFICE VISIT (OUTPATIENT)
Dept: OBGYN CLINIC | Facility: HOSPITAL | Age: 71
End: 2021-03-09
Payer: MEDICARE

## 2021-03-09 VITALS
BODY MASS INDEX: 24.03 KG/M2 | HEART RATE: 89 BPM | HEIGHT: 63 IN | SYSTOLIC BLOOD PRESSURE: 117 MMHG | DIASTOLIC BLOOD PRESSURE: 71 MMHG | WEIGHT: 135.6 LBS

## 2021-03-09 DIAGNOSIS — M84.352A STRESS FRACTURE OF LEFT FEMUR, INITIAL ENCOUNTER: ICD-10-CM

## 2021-03-09 DIAGNOSIS — M84.351A STRESS FRACTURE OF SHAFT OF RIGHT FEMUR, INITIAL ENCOUNTER: ICD-10-CM

## 2021-03-09 DIAGNOSIS — M79.651 PAIN IN BOTH THIGHS: Primary | ICD-10-CM

## 2021-03-09 DIAGNOSIS — M79.652 PAIN IN BOTH THIGHS: Primary | ICD-10-CM

## 2021-03-09 PROCEDURE — 99213 OFFICE O/P EST LOW 20 MIN: CPT | Performed by: ORTHOPAEDIC SURGERY

## 2021-03-09 PROCEDURE — 73552 X-RAY EXAM OF FEMUR 2/>: CPT

## 2021-03-09 NOTE — PROGRESS NOTES
Assessment:  1  Pain in both thighs     2  Stress fracture of left femur, initial encounter  XR femur 2 vw left   3  Stress fracture of shaft of right femur, initial encounter  XR femur 2 vw right       Plan:     Patient has stress fractures of bilateral femurs but is better since stopping the Forteo  Although patient's radiographs have not changed, her symptoms have greatly improved and she is more active at this time   WBAT bilateral LE   Patient will be starting Prolia in April   Today's x-rays show no interval changes   Follow up in 6 months      Radiographs of bilateral femurs upon next follow-up        The above stated was discussed in layman's terms and the patient expressed understanding  All questions were answered to the patient's satisfaction  Subjective: Angeles Mann is a 72 y o  female who presents today follow up for stress fractures of bilateral femurs  She has hx of osteoporosis and has stopped taking Forte and is starting Prolia in April (  Dr Mesa at [facility])   She states she is doing better and the pain is slightly better  She is able to lift her grandson with nopain  She notes after prolong long walking she has achiness  the next day  Denies any pain rest  Her pain level on a daily basis is 1-2/10         Review of systems negative unless otherwise specified in HPI    Past Medical History:   Diagnosis Date    GERD (gastroesophageal reflux disease)     Osteoporosis     Parkinson disease ([facility] [address] )        Past Surgical History:   Procedure Laterality Date    ARTERIOGRAM N/A 7/10/2019    Procedure: KYPHOPLASTY;  Surgeon: Dylan Garrison MD;  Location:  MAIN OR;  Service: Interventional Radiology    IR KYPHOPLASTY/VERTEBROPLASTY  7/10/2019    TONSILLECTOMY         Family History   Problem Relation Age of Onset    Dementia Mother     Heart disease Father     No Known Problems Daughter     No Known Problems Maternal Grandmother     No Known Problems Maternal Grandfather     No Known Problems Paternal Grandmother     Prostate cancer Paternal Grandfather     No Known Problems Daughter     No Known Problems Daughter     Alcohol abuse Neg Hx     Mental illness Neg Hx     Substance Abuse Neg Hx     Depression Neg Hx        Social History     Occupational History    Not on file   Tobacco Use    Smoking status: Never Smoker    Smokeless tobacco: Never Used   Substance and Sexual Activity    Alcohol use: Not Currently     Comment: Rare    Drug use: Never    Sexual activity: Not on file         Current Outpatient Medications:     amantadine (SYMMETREL) 100 mg capsule, TAKE 1 CAPSULE BY MOUTH EVERY DAY, Disp: 90 capsule, Rfl: 3    BD ULTRA-FINE PEN NEEDLES 29G X 12 7MM MISC, , Disp: , Rfl:     Calcium Carbonate-Vit D-Min (CALCIUM 1200 PO), Take 1,200 mg by mouth daily, Disp: , Rfl:     carbidopa-levodopa (SINEMET)  mg per tablet,  , Disp: , Rfl:     carbidopa-levodopa-entacapone (STALEVO) 12 5- MG per tablet, TAKE 1 TABLET BY MOUTH 6 (SIX) TIMES A DAY, Disp: 540 tablet, Rfl: 3    Cholecalciferol (VITAMIN D3) 2000 units TABS, Take 2,000 Units by mouth daily, Disp: , Rfl:     famotidine (PEPCID) 20 mg tablet, Take 2 tablets (40 mg total) by mouth daily at bedtime, Disp: 180 tablet, Rfl: 3    FORTEO 600 MCG/2 4ML injection, Inject under the skin daily , Disp: , Rfl:     LORazepam (ATIVAN) 1 mg tablet, Take 1/2 to 1 tab PO daily prn, Disp: 30 tablet, Rfl: 0    meloxicam (MOBIC) 15 mg tablet, Take 1 tablet (15 mg total) by mouth daily as needed for moderate pain, Disp: 30 tablet, Rfl: 0    pantoprazole (PROTONIX) 40 mg tablet, Take 1 tablet (40 mg total) by mouth daily, Disp: 30 tablet, Rfl: 3    rasagiline (Azilect) 1 MG, Take 1 tablet (1 mg total) by mouth daily, Disp: 30 each, Rfl: 2    rOPINIRole (REQUIP XL) 8 MG 24 hr tablet, TAKE 1 TABLET BY MOUTH EVERY DAY, Disp: 30 tablet, Rfl: 2    Allergies   Allergen Reactions    Sulfa Antibiotics Other reaction(s): family history - anaphylaxis  Category: Allergy;     Doxycycline Rash     Category: Allergy;             Vitals:    03/09/21 0831   BP: 117/71   Pulse: 89       Objective:            Physical Exam  · General: Awake, Alert, Oriented  · Eyes: Pupils equal, round and reactive to light  · Heart: regular rate and rhythm  · Lungs: No audible wheezing  · Abdomen: soft                    Ortho Exam  Bilateral LE   No lacerations, no abrasions, no open wounds  No erythema   No pain with axial loading   Good strength   No pain with log rolling   Neurovascularly Intact Distally     Diagnostics, reviewed and taken today if performed as documented: The attending physician has personally reviewed the pertinent films in PACS and interpretation is as follows:x-ray bilateral femur demonstrate no interval changes from previous x-rays dated 11/10/2020      Procedures, if performed today:    Procedures    None performed      Scribe Attestation    I,:  Aiden Durbin am acting as a scribe while in the presence of the attending physician :       I,:  Hermilo Paul MD personally performed the services described in this documentation    as scribed in my presence :             Portions of the record may have been created with voice recognition software  Occasional wrong word or "sound a like" substitutions may have occurred due to the inherent limitations of voice recognition software  Read the chart carefully and recognize, using context, where substitutions have occurred

## 2021-03-16 ENCOUNTER — TELEPHONE (OUTPATIENT)
Dept: NEUROLOGY | Facility: CLINIC | Age: 71
End: 2021-03-16

## 2021-04-13 DIAGNOSIS — G20 PARKINSON'S DISEASE (HCC): ICD-10-CM

## 2021-04-13 RX ORDER — RASAGILINE 1 MG/1
TABLET ORAL
Qty: 90 TABLET | Refills: 3 | Status: SHIPPED | OUTPATIENT
Start: 2021-04-13 | End: 2021-09-21 | Stop reason: SDUPTHER

## 2021-05-02 DIAGNOSIS — G20 PARKINSON'S DISEASE (HCC): ICD-10-CM

## 2021-05-03 NOTE — TELEPHONE ENCOUNTER
Pt called requesting ropinirole script, 90 day supply be sent to [facility] pharmacy on file  Rx pending   Pls review and sign off      thanks

## 2021-05-03 NOTE — TELEPHONE ENCOUNTER
Patient called in for refill  States she has been out of this med for 2 days and would like refill ASAP

## 2021-05-04 RX ORDER — ROPINIROLE 8 MG/1
8 TABLET, FILM COATED, EXTENDED RELEASE ORAL DAILY
Qty: 90 TABLET | Refills: 6 | Status: SHIPPED | OUTPATIENT
Start: 2021-05-04 | End: 2022-05-17

## 2021-05-04 RX ORDER — ROPINIROLE 8 MG/1
TABLET, FILM COATED, EXTENDED RELEASE ORAL
Qty: 90 TABLET | OUTPATIENT
Start: 2021-05-04

## 2021-06-04 ENCOUNTER — OFFICE VISIT (OUTPATIENT)
Dept: AUDIOLOGY | Age: 71
End: 2021-06-04

## 2021-06-04 DIAGNOSIS — H90.5 SENSORY HEARING LOSS: Primary | ICD-10-CM

## 2021-06-04 NOTE — PROGRESS NOTES
Hearing Aid Visit:    Name:  Clement Calderon  :  1950  Age:  72 y o  Date of Evaluation: 21     Clement Calderon is being seen for a hearing aid visit       Patient is fit with Phonak Audeo P70-RT hearing aid(s)  Right serial number [id number]  Left serial number [id number]  Warranty date: 2024 (Loss/Damage and repair)        Patient reports no issues or concerns with her hearing aids       Action:  Cleaned and checked both hearing aids  Replaced both wax filters and domes  Listening check indicated both hearing aids to be in good working condition and appropriate for her hearing loss  Patient was provided with dry-aid discs for her   Demonstrated how to replace them  Patient felt comfortable doing this on her own  Patient was also reinstructed on the use of the dry-aid jar  Patient's hearing aids were connected to her iPhone only; review tanner at next appointment  Recommendations: Follow-up for an [facility] in 4 months; sooner if issues/concerns arise        Zoraida Dixon , [facility]-A  Clinical Audiologist

## 2021-06-18 ENCOUNTER — OFFICE VISIT (OUTPATIENT)
Dept: AUDIOLOGY | Age: 71
End: 2021-06-18

## 2021-06-18 DIAGNOSIS — H91.93 BILATERAL HEARING LOSS, UNSPECIFIED HEARING LOSS TYPE: ICD-10-CM

## 2021-06-18 DIAGNOSIS — H90.5 SENSORY HEARING LOSS: Primary | ICD-10-CM

## 2021-06-18 NOTE — PROGRESS NOTES
Hearing Aid Visit:    Name:  Yuriy Marcelino  :  1950  Age:  72 y o  Date of Evaluation: 21     Yuriy Marcelino is being seen for a hearing aid visit  Patient is fit with Reola Canes P70-RT hearing aid(s)  Right serial number [id number]  Left serial number [id number]  Warranty date: 2024 (Loss/Damage and repair)  Patient reports accidentally stepping into the shower with both hearing aids on  Reports left hearing aid is fine but when attempting to charge the right hearing aid a solid red light appears; does not disappear over time  Action:  Checked both hearing aids  Attempted placing right hearing aid in a stock  with similar red light results  Suggested sending both hearing aids in for service (moisture and charging issues)  Patient was willing to send both hearing aids in  Once hearing aids arrive back to clinic, patient to return for an HAV to repair to her cell phone  Recommendations:   Return for HAV once hearing aids Zoraida Rodriguez , CCC-A  Clinical Audiologist

## 2021-07-01 NOTE — PROGRESS NOTES
Progress Note    Name:  Charmaine Castaneda  :  1950  Age:  72 y o  Date of Evaluation: 21     Patients hearing aids arrived back from repair  Vola Marking P70-RT  Right SN: [id number]  Left SN: [id number]  Warranty: 2024    Patient to be scheduled for an HAV with myself to repair to her cell phone         Zoraida Aguilar , CCC-A  Clinical Audiologist

## 2021-07-06 NOTE — PROGRESS NOTES
Progress Note    Name:  Wendy Fuentes  :  1950  Age:  72 y o  Date of Evaluation: 21     Patient's left hearing aid will not charge  Sent hearing aid back in for repair  Once hearing aid returns patient will need a HAV to pair both hearing aids to themselves and to her phone         Zoraida Toscano , CCC-A  Clinical Audiologist

## 2021-07-26 NOTE — PROGRESS NOTES
7-26-21 Hearing aid RFF  Left s/n [id number], warranty date 4/5/24  L/m for patient to schedule HAV with [facility]

## 2021-08-06 ENCOUNTER — OFFICE VISIT (OUTPATIENT)
Dept: AUDIOLOGY | Age: 71
End: 2021-08-06

## 2021-08-06 DIAGNOSIS — H90.5 SENSORY HEARING LOSS, UNILATERAL: Primary | ICD-10-CM

## 2021-08-06 NOTE — PROGRESS NOTES
Hearing Aid Visit:    Name:  Claude Cohen  :  1950  Age:  72 y o  Date of Evaluation: 21     Claude Cohen is being seen for a hearing aid visit  Patient is fit with Blane Courser P70-RT hearing aid(s)  Right serial number [id number]  Left serial number [id number]  Warranty date: 2024 (Loss/Damage and repair)  Patient is here to pick-up her recently repaired hearing aids  Action:  Programmed hearing aids to last [facility] session  Upon insertion patient voiced overall happiness with the hearing aids  Patient instructed to contact myself or the clinic should any further issues arise prior to her scheduled upcoming HAV appointment  Recommendations:   Return as scheduled in September for routine HAV       Zoraida Duque , CCC-A  Clinical Audiologist

## 2021-08-11 ENCOUNTER — RA CDI HCC (OUTPATIENT)
Dept: OTHER | Facility: HOSPITAL | Age: 71
End: 2021-08-11

## 2021-08-11 NOTE — PROGRESS NOTES
[facility]  coding opportunities       Chart reviewed, no opportunity found: CHART REVIEWED, NO OPPORTUNITY FOUND         DX: G20 used conducted through Tre Walker on 1-4-21               Patients insurance company: [employer]

## 2021-08-17 ENCOUNTER — OFFICE VISIT (OUTPATIENT)
Dept: INTERNAL MEDICINE CLINIC | Facility: CLINIC | Age: 71
End: 2021-08-17
Payer: MEDICARE

## 2021-08-17 VITALS
BODY MASS INDEX: 24.27 KG/M2 | TEMPERATURE: 96.4 F | WEIGHT: 137 LBS | SYSTOLIC BLOOD PRESSURE: 132 MMHG | DIASTOLIC BLOOD PRESSURE: 84 MMHG | HEIGHT: 63 IN | HEART RATE: 71 BPM | OXYGEN SATURATION: 98 %

## 2021-08-17 DIAGNOSIS — M84.353S: ICD-10-CM

## 2021-08-17 DIAGNOSIS — R73.01 ABNORMAL FASTING GLUCOSE: ICD-10-CM

## 2021-08-17 DIAGNOSIS — F41.1 ANXIETY STATE: ICD-10-CM

## 2021-08-17 DIAGNOSIS — Z23 NEED FOR SHINGLES VACCINE: ICD-10-CM

## 2021-08-17 DIAGNOSIS — E55.9 VITAMIN D DEFICIENCY: ICD-10-CM

## 2021-08-17 DIAGNOSIS — K21.00 GASTROESOPHAGEAL REFLUX DISEASE WITH ESOPHAGITIS, UNSPECIFIED WHETHER HEMORRHAGE: ICD-10-CM

## 2021-08-17 DIAGNOSIS — Z79.899 ENCOUNTER FOR LONG-TERM CURRENT USE OF MEDICATION: ICD-10-CM

## 2021-08-17 DIAGNOSIS — G20 PARKINSON'S DISEASE (HCC): Primary | ICD-10-CM

## 2021-08-17 DIAGNOSIS — H91.93 BILATERAL HEARING LOSS, UNSPECIFIED HEARING LOSS TYPE: ICD-10-CM

## 2021-08-17 DIAGNOSIS — Z00.00 HEALTH MAINTENANCE EXAMINATION: ICD-10-CM

## 2021-08-17 DIAGNOSIS — Z12.31 ENCOUNTER FOR SCREENING MAMMOGRAM FOR BREAST CANCER: ICD-10-CM

## 2021-08-17 DIAGNOSIS — L98.9 SKIN LESION OF HAND: ICD-10-CM

## 2021-08-17 DIAGNOSIS — M80.80XD LOCALIZED OSTEOPOROSIS WITH CURRENT PATHOLOGICAL FRACTURE WITH ROUTINE HEALING: ICD-10-CM

## 2021-08-17 DIAGNOSIS — M54.12 CERVICAL RADICULOPATHY: ICD-10-CM

## 2021-08-17 DIAGNOSIS — E78.49 OTHER HYPERLIPIDEMIA: ICD-10-CM

## 2021-08-17 PROBLEM — M25.552 PAIN OF BOTH HIP JOINTS: Status: RESOLVED | Noted: 2019-07-01 | Resolved: 2021-08-17

## 2021-08-17 PROBLEM — M25.551 PAIN OF BOTH HIP JOINTS: Status: RESOLVED | Noted: 2019-07-01 | Resolved: 2021-08-17

## 2021-08-17 PROCEDURE — G0439 PPPS, SUBSEQ VISIT: HCPCS | Performed by: INTERNAL MEDICINE

## 2021-08-17 PROCEDURE — 1123F ACP DISCUSS/DSCN MKR DOCD: CPT | Performed by: INTERNAL MEDICINE

## 2021-08-17 PROCEDURE — 99214 OFFICE O/P EST MOD 30 MIN: CPT | Performed by: INTERNAL MEDICINE

## 2021-08-17 RX ORDER — ACETAMINOPHEN 325 MG/1
TABLET ORAL
COMMUNITY
End: 2022-02-14

## 2021-08-17 RX ORDER — ZOSTER VACCINE RECOMBINANT, ADJUVANTED 50 MCG/0.5
0.5 KIT INTRAMUSCULAR ONCE
Qty: 1 EACH | Refills: 1 | Status: SHIPPED | OUTPATIENT
Start: 2021-08-17 | End: 2021-08-17

## 2021-08-17 RX ORDER — OMEPRAZOLE 20 MG/1
TABLET, DELAYED RELEASE ORAL
COMMUNITY
End: 2022-02-14

## 2021-08-17 NOTE — PROGRESS NOTES
Assessment and Plan:     Problem List Items Addressed This Visit     None           Preventive health issues were discussed with patient, and age appropriate screening tests were ordered as noted in patient's After Visit Summary  Personalized health advice and appropriate referrals for health education or preventive services given if needed, as noted in patient's After Visit Summary       History of Present Illness:     Patient presents for Medicare Annual Wellness visit    Patient Care Team:  Dagmar Thomas [facility] PCP - [facility]      Problem List:     Patient Active Problem List   Diagnosis    Anxiety state    Esophagitis, reflux    Hyperlipidemia    Mild cognitive impairment    Localized osteoporosis with current pathological fracture with routine healing    Vitamin D deficiency    Torticollis    REM sleep behavior disorder    Parkinson's disease ([address] )    Abnormal thyroid function test    Pain of both hip joints    Back pain    Ambulatory dysfunction    Compression fracture of T12 first lumbar vertebra (HCC)    Hearing loss, bilateral    Abnormal finding on imaging    Stress fracture of femur      Past Medical and Surgical History:     Past Medical History:   Diagnosis Date    GERD (gastroesophageal reflux disease)     Osteoporosis     Parkinson disease ([address] )      Past Surgical History:   Procedure Laterality Date    ARTERIOGRAM N/A 7/10/2019    Procedure: KYPHOPLASTY;  Surgeon: Joe Juarez MD;  [facility];  Service: Interventional Radiology    IR KYPHOPLASTY/VERTEBROPLASTY  7/10/2019    TONSILLECTOMY        Family History:     Family History   Problem Relation Age of Onset    Dementia Mother     Heart disease Father     No Known Problems Daughter     No Known Problems Maternal Grandmother     No Known Problems Maternal Grandfather     No Known Problems Paternal Grandmother     Prostate cancer Paternal Grandfather     No Known Problems Daughter  No Known Problems Daughter     Alcohol abuse Neg Hx     Mental illness Neg Hx     Substance Abuse Neg Hx     Depression Neg Hx       Social History:     Social History     Socioeconomic History    Marital status: /Civil Union     Spouse name: None    Number of children: None    Years of education: None    Highest education level: None   Occupational History    None   Tobacco Use    Smoking status: Never Smoker    Smokeless tobacco: Never Used   Substance and Sexual Activity    Alcohol use: Not Currently     Comment: Rare    Drug use: Never    Sexual activity: None   Other Topics Concern    None   Social History Narrative    5 children     Social Determinants of Health     Financial Resource Strain:     Difficulty of Paying Living Expenses:    Food Insecurity:     Worried About Running Out of Food in the Last Year:     Ran Out of Food in the Last Year:    Transportation Needs:     Lack of Transportation (Medical):      Lack of Transportation (Non-Medical):    Physical Activity:     Days of Exercise per Week:     Minutes of Exercise per Session:    Stress:     Feeling of Stress :    Social Connections:     Frequency of Communication with Friends and Family:     Frequency of Social Gatherings with Friends and Family:     Attends Restorationist Services:     Active Member of Clubs or Organizations:     Attends Club or Organization Meetings:     Marital Status:    Intimate Partner Violence:     Fear of Current or Ex-Partner:     Emotionally Abused:     Physically Abused:     Sexually Abused:       Medications and Allergies:     Current Outpatient Medications   Medication Sig Dispense Refill    acetaminophen (TYLENOL) 325 mg tablet Take 3 tablets (975 mg total) by mouth every 8 hours for 7 days      amantadine (SYMMETREL) 100 mg capsule TAKE 1 CAPSULE BY MOUTH EVERY DAY 90 capsule 3    BD ULTRA-FINE PEN NEEDLES 29G X 12 7MM MISC       Calcium Carbonate-Vit D-Min (CALCIUM 1200 PO) Take 1,200 mg by mouth daily      carbidopa-levodopa (SINEMET)  mg per tablet    carbidopa-levodopa-entacapone (STALEVO) 12 5- MG per tablet TAKE 1 TABLET BY MOUTH 6 (SIX) TIMES A  tablet 3    Cholecalciferol (VITAMIN D3) 2000 units TABS Take 2,000 Units by mouth daily      famotidine (PEPCID) 20 mg tablet Take 2 tablets (40 mg total) by mouth daily at bedtime 180 tablet 3    FORTEO 600 MCG/2 4ML injection Inject under the skin daily       LORazepam (ATIVAN) 1 mg tablet Take 1/2 to 1 tab PO daily prn 30 tablet 0    meloxicam (MOBIC) 15 mg tablet Take 1 tablet (15 mg total) by mouth daily as needed for moderate pain 30 tablet 0    omeprazole (PriLOSEC OTC) 20 MG tablet       pantoprazole (PROTONIX) 40 mg tablet Take 1 tablet (40 mg total) by mouth daily 30 tablet 3    rasagiline (AZILECT) 1 MG TAKE 1 TABLET BY MOUTH EVERY DAY 90 tablet 3    rOPINIRole (REQUIP XL) 8 MG 24 hr tablet Take 1 tablet (8 mg total) by mouth daily 90 tablet 6     No current facility-administered medications for this visit  Allergies   Allergen Reactions    Sulfa Antibiotics      Other reaction(s): family history - anaphylaxis  Category: Allergy;     Doxycycline Rash     Category:  Allergy;       Immunizations:     Immunization History   Administered Date(s) Administered    INFLUENZA 11/30/2019    Influenza Split High Dose Preservative Free IM 12/05/2017, 11/04/2018, 11/30/2019    Influenza, high dose seasonal 0 7 mL 11/29/2020    Pneumococcal Conjugate 13-Valent 11/29/2016    Pneumococcal Polysaccharide PPV23 11/11/2010, 07/02/2020    Rabies Immune Globulin 01/02/2013, 01/10/2013, 01/16/2013, 01/30/2013    Tdap 01/10/2013      Health Maintenance:         Topic Date Due    Colorectal Cancer Screening  08/12/2029    Hepatitis C Screening  Completed         Topic Date Due    COVID-19 Vaccine (1) Never done    Influenza Vaccine (1) 09/01/2021      Medicare Health Risk Assessment:     There were no vitals taken for this visit  Kaylyn Ervin is here for her Subsequent Wellness visit  Last Medicare Wellness visit information reviewed, patient interviewed and updates made to the record today  Health Risk Assessment:   Patient rates overall health as excellent  Patient feels that their physical health rating is much better  Patient is very satisfied with their life  Eyesight was rated as same  Hearing was rated as same  Patient feels that their emotional and mental health rating is much better  Patients states they are never, rarely angry  Patient states they are never, rarely unusually tired/fatigued  Pain experienced in the last 7 days has been none  Patient states that she has experienced no weight loss or gain in last 6 months  Fall Risk Screening: In the past year, patient has experienced: history of falling in past year    Number of falls: 1  Injured during fall?: No    Feels unsteady when standing or walking?: Yes    Worried about falling?: Yes      Urinary Incontinence Screening:   Patient has leaked urine accidently in the last six months  Home Safety:  Patient does not have trouble with stairs inside or outside of their home  Patient has working smoke alarms and has working carbon monoxide detector  Home safety hazards include: none  Nutrition:   Current diet is Regular  Medications:   Patient is currently taking over-the-counter supplements  OTC medications include: see medication list  Patient is able to manage medications  Activities of Daily Living (ADLs)/Instrumental Activities of Daily Living (IADLs):   Walk and transfer into and out of bed and chair?: Yes  Dress and groom yourself?: Yes    Bathe or shower yourself?: Yes    Feed yourself?  Yes  Do your laundry/housekeeping?: Yes  Manage your money, pay your bills and track your expenses?: Yes  Make your own meals?: Yes    Do your own shopping?: Yes    Previous Hospitalizations:   Any hospitalizations or ED visits within the last 12 months?: No      Advance Care Planning:   Living will: Yes    Durable POA for healthcare: Yes    Advanced directive: Yes    End of Life Decisions reviewed with patient: Yes      Cognitive Screening:   Provider or family/friend/caregiver concerned regarding cognition?: No    PREVENTIVE SCREENINGS      Cardiovascular Screening:    General: History Lipid Disorder    Due for: Lipid Panel      Diabetes Screening:       Due for: Blood Glucose      Colorectal Cancer Screening:     General: Screening Current      Breast Cancer Screening:     General: Screening Current    Due for: Mammogram        Cervical Cancer Screening:    General: Screening Not Indicated      Osteoporosis Screening:    General: History Osteoporosis    Due for: DXA Appendicular      Abdominal Aortic Aneurysm (AAA) Screening:        General: Screening Not Indicated      Lung Cancer Screening:     General: Screening Not Indicated      Hepatitis C Screening:    General: Screening Current    Screening, Brief Intervention, and Referral to Treatment (SBIRT)    Screening  Typical number of drinks in a day: 0  Typical number of drinks in a week: 0  Interpretation: Low risk drinking behavior  Single Item Drug Screening:  How often have you used an illegal drug (including marijuana) or a prescription medication for non-medical reasons in the past year? never    Single Item Drug Screen Score: 0  Interpretation: Negative screen for possible drug use disorder    Other Counseling Topics:   Regular weightbearing exercise and calcium and vitamin D intake         Tripp Mitchell MD

## 2021-08-17 NOTE — PROGRESS NOTES
Assessment/Plan:    Stress fracture of femur  Atypical, healed  Sees Ortho  Localized osteoporosis with current pathological fracture with routine healing  Now on Prolia, taking supplements daily  Bone density due October  Follow up with Endo, rheum  Esophagitis, reflux  Minimal symptoms  Instructed to take famotidine bid prn  Cervical radiculopathy  Check x-ray, would benefit with physical therapy  Anxiety state  Improved, rare use of lorazepam     Parkinson's disease ([facility])  Stable, no medication changes  Follow up with neurology as scheduled  Hyperlipidemia  Lipids due, not on medication  Vitamin D deficiency  Takes D3 daily  Torticollis  More frequent LUE symptoms, as above  Diagnoses and all orders for this visit:    Parkinson's disease ([facility] [address] )  -     CBC and differential    Localized osteoporosis with current pathological fracture with routine healing    Gastroesophageal reflux disease with esophagitis, unspecified whether hemorrhage    Bilateral hearing loss, unspecified hearing loss type    Other hyperlipidemia  -     Lipid panel    Stress fracture of femur, unspecified laterality, sequela    Cervical radiculopathy  -     XR spine cervical complete 4 or 5 vw non injury; Future    Encounter for screening mammogram for breast cancer  -     Mammo screening bilateral w 3d & cad; Future    Need for shingles vaccine  -     Zoster Vac Recomb Adjuvanted (Shingrix) 50 MCG/0 5ML SUSR; Inject 0 5 mL into a muscle once for 1 dose Repeat dose in 2 to 6 months    Vitamin D deficiency    Abnormal fasting glucose  -     Hemoglobin A1C    Encounter for long-term current use of medication  -     Vitamin B12    Anxiety state    Health maintenance examination  Comments:  Received COVID vaccine  Mammogram due  Skin lesion of hand  Comments:  Refer back to dermatology  Other orders  -     omeprazole (PriLOSEC OTC) 20 MG tablet  -     acetaminophen (TYLENOL) 325 mg tablet;  Take 3 tablets (975 mg total) by mouth every 8 hours for 7 days      Follow up in 8 months or as needed  Subjective:      Patient ID: Priya Ivory is a 72 y o  female here for follow up  She reports her fractures have healed, was followed by orthopedics  She does the rowing machine daily, now has a tricycle  She reports she stumbles frequently, fell once recently, no injury  She is now on Prolia  She is upset her neurologist left  She is concerned about a spot on her left hand, ongoing for about a month  No bleeding or scab formation  She reports intermittent left arm burning and numbness, worse when she wakes up in the morning  Denies any symptoms during the day  She feels left hand is weak sometimes  She has not been taking her reflux medication  The following portions of the patient's history were reviewed and updated as appropriate: allergies, current medications, past medical history, past social history and problem list     Review of Systems   Constitutional: Negative for activity change, appetite change and fatigue  HENT: Negative for congestion, ear pain and postnasal drip  Eyes: Negative for visual disturbance  Respiratory: Negative for cough and shortness of breath  Cardiovascular: Negative for chest pain and leg swelling  Gastrointestinal: Negative for abdominal pain, constipation and diarrhea  Genitourinary: Negative for dysuria, frequency and urgency  Musculoskeletal: Positive for gait problem and neck stiffness  Negative for arthralgias and myalgias  Skin: Negative for rash and wound  Neurological: Positive for tremors, weakness and numbness  Negative for dizziness, syncope, speech difficulty and headaches  Hematological: Does not bruise/bleed easily  Psychiatric/Behavioral: Negative for confusion  The patient is not nervous/anxious            Objective:      /84   Pulse 71   Temp (!) 96 4 °F (35 8 °C)   Ht 5' 3" (1 6 m)   Wt 62 1 kg (137 lb)   SpO2 98%   BMI 24 27 kg/m²          Physical Exam  Vitals and nursing note reviewed  Constitutional:       General: She is not in acute distress  Appearance: She is well-developed  HENT:      Head: Normocephalic and atraumatic  Eyes:      Pupils: Pupils are equal, round, and reactive to light  Cardiovascular:      Rate and Rhythm: Normal rate and regular rhythm  Heart sounds: Normal heart sounds  Pulmonary:      Effort: Pulmonary effort is normal       Breath sounds: Normal breath sounds  No wheezing  Abdominal:      General: Bowel sounds are normal       Palpations: Abdomen is soft  Musculoskeletal:         General: No swelling  Cervical back: Spasms and torticollis present  No tenderness  No pain with movement  Decreased range of motion  Skin:     General: Skin is warm  Findings: No rash  Neurological:      General: No focal deficit present  Mental Status: She is alert and oriented to person, place, and time  Sensory: No sensory deficit  Motor: Tremor present  Psychiatric:         Mood and Affect: Mood normal          Behavior: Behavior normal            Labs & imaging results reviewed with patient

## 2021-08-20 ENCOUNTER — OFFICE VISIT (OUTPATIENT)
Dept: AUDIOLOGY | Age: 71
End: 2021-08-20

## 2021-08-20 DIAGNOSIS — H90.5 SENSORY HEARING LOSS, UNILATERAL: Primary | ICD-10-CM

## 2021-08-20 NOTE — PROGRESS NOTES
Hearing Aid Visit:    Name:  Saumya Baumann  :  1950  Age:  72 y o  Date of Evaluation: 21     Saumya Baumann is being seen for a hearing aid visit  Patient is fit with Ino Mays P70-RT hearing aid(s)  Right serial number [id number]  Left serial number [id number]  Warranty date: 2024 (Loss/Damage and repair)  Patient reports a recent development of static noise in both hearing aids; worse in the left  Patient does admit hearing aids were worn during a shower by mistake  Action:  Attempted to change receivers in hopes static would disappear  Unfortunately stat noise remained  Hearing aids to be sent in for repair  Once hearing aids return, patient can pick them up at the   Recommendations:    at  upon [facility]       Zoraida Ferrera , CCC-A  Clinical Audiologist

## 2021-08-23 ENCOUNTER — APPOINTMENT (OUTPATIENT)
Dept: RADIOLOGY | Age: 71
End: 2021-08-23
Payer: MEDICARE

## 2021-08-23 DIAGNOSIS — M54.12 CERVICAL RADICULOPATHY: ICD-10-CM

## 2021-08-23 PROCEDURE — 72050 X-RAY EXAM NECK SPINE 4/5VWS: CPT

## 2021-08-29 ENCOUNTER — TELEPHONE (OUTPATIENT)
Dept: INTERNAL MEDICINE CLINIC | Facility: CLINIC | Age: 71
End: 2021-08-29

## 2021-08-29 DIAGNOSIS — M54.12 CERVICAL RADICULOPATHY: Primary | ICD-10-CM

## 2021-09-01 NOTE — PROGRESS NOTES
Progress Note    Name:  Kenna Sandoval  :  1950  Age:  72 y o  Date of Evaluation: 21     [place] [facility]  Loreta Rios #[id number]  R #[id number]   Warranty 2024  Patient already has appointment  Hearing aids in  in cabinet        [facility]   Clinical Audiologist

## 2021-09-07 ENCOUNTER — HOSPITAL ENCOUNTER (OUTPATIENT)
Dept: RADIOLOGY | Facility: HOSPITAL | Age: 71
Discharge: HOME | End: 2021-09-07
Attending: ORTHOPAEDIC SURGERY
Payer: MEDICARE

## 2021-09-07 ENCOUNTER — OFFICE VISIT (OUTPATIENT)
Dept: OBGYN CLINIC | Facility: HOSPITAL | Age: 71
End: 2021-09-07
Payer: MEDICARE

## 2021-09-07 VITALS
DIASTOLIC BLOOD PRESSURE: 82 MMHG | BODY MASS INDEX: 24.27 KG/M2 | SYSTOLIC BLOOD PRESSURE: 133 MMHG | HEIGHT: 63 IN | HEART RATE: 73 BPM

## 2021-09-07 DIAGNOSIS — M84.351A STRESS FRACTURE OF SHAFT OF RIGHT FEMUR, INITIAL ENCOUNTER: ICD-10-CM

## 2021-09-07 DIAGNOSIS — M84.352A STRESS FRACTURE OF LEFT FEMUR, INITIAL ENCOUNTER: ICD-10-CM

## 2021-09-07 DIAGNOSIS — M84.351A STRESS FRACTURE OF SHAFT OF RIGHT FEMUR, INITIAL ENCOUNTER: Primary | ICD-10-CM

## 2021-09-07 PROCEDURE — 99213 OFFICE O/P EST LOW 20 MIN: CPT | Performed by: ORTHOPAEDIC SURGERY

## 2021-09-07 PROCEDURE — 73552 X-RAY EXAM OF FEMUR 2/>: CPT

## 2021-09-07 NOTE — PROGRESS NOTES
Orthopedics          Sri Marshall 72 y o  female MRN: [id number]      Chief Complaint:   bilateral  thigh pain    HPI:   72 y  o female complaining of bilateral  Thigh pain  Patient has been diagnosed with stress fracture bilateral femur several months ago  Patient has no significant decreasing pain she notes no pain in her thigh regions over the past few months time  Patient states he has all activities without any issues regarding thigh pain  Patient states she has stopped Forteo and is currently taking Prolia  Denies any changes numbness tingling                  Review Of Systems:   · Skin: Normal  · Neuro: See HPI  · Musculoskeletal: See HPI  · All other systems reviewed and are negative    Past Medical History:   Past Medical History:   Diagnosis Date    GERD (gastroesophageal reflux disease)     Osteoporosis     [address] disease [address] [address] )        Past Surgical History:   Past Surgical History:   Procedure Laterality Date    ARTERIOGRAM N/A 7/10/2019    Procedure: KYPHOPLASTY;  Surgeon: Cynthia Uriostegui MD;  [facility];  Service: Interventional Radiology    IR KYPHOPLASTY/VERTEBROPLASTY  7/10/2019    TONSILLECTOMY         Family History:  Family history reviewed and non-contributory  Family History   Problem Relation Age of Onset    Dementia Mother     Heart disease Father     No Known Problems Daughter     No Known Problems Maternal Grandmother     No Known Problems Maternal Grandfather     No Known Problems Paternal Grandmother     Prostate cancer Paternal Grandfather     No Known Problems Daughter     No Known Problems Daughter     Alcohol abuse Neg Hx     Mental illness Neg Hx     Substance Abuse Neg Hx     Depression Neg Hx          Social History:  Social History     Socioeconomic History    Marital status: /Civil Union     Spouse name: None    Number of children: None    Years of education: None    Highest education level: None   Occupational History    None Tobacco Use    Smoking status: Never Smoker    Smokeless tobacco: Never Used   Substance and Sexual Activity    Alcohol use: Not Currently     Comment: Rare    Drug use: Never    Sexual activity: None   Other Topics Concern    None   Social History Narrative    5 children     Social Determinants of Health     Financial Resource Strain:     Difficulty of Paying Living Expenses:    Food Insecurity:     Worried About Running Out of Food in the Last Year:     Ran Out of Food in the Last Year:    Transportation Needs:     Lack of Transportation (Medical):  Lack of Transportation (Non-Medical):    Physical Activity:     Days of Exercise per Week:     Minutes of Exercise per Session:    Stress:     Feeling of Stress :    Social Connections:     Frequency of Communication with Friends and Family:     Frequency of Social Gatherings with Friends and Family:     Attends Church Services:     Active Member of Clubs or Organizations:     Attends Club or Organization Meetings:     Marital Status:    Intimate Partner Violence:     Fear of Current or Ex-Partner:     Emotionally Abused:     Physically Abused:     Sexually Abused: Allergies: Allergies   Allergen Reactions    Sulfa Antibiotics      Other reaction(s): family history - anaphylaxis  Category: Allergy;     Doxycycline Rash     Category:  Allergy;        Labs:  0   Lab Value Date/Time    HCT 42 7 07/23/2020 0726    HCT 40 7 01/15/2020 2053    HCT 44 7 10/05/2019 0946    HGB 14 0 07/23/2020 0726    HGB 13 7 01/15/2020 2053    HGB 14 4 10/05/2019 0946    INR 1 03 07/10/2019 0504    WBC 5 43 07/23/2020 0726    WBC 7 94 01/15/2020 2053    WBC 7 14 10/05/2019 0946       Meds:    Current Outpatient Medications:     amantadine (SYMMETREL) 100 mg capsule, TAKE 1 CAPSULE BY MOUTH EVERY DAY, Disp: 90 capsule, Rfl: 3    Calcium Carbonate-Vit D-Min (CALCIUM 1200 PO), Take 1,200 mg by mouth daily, Disp: , Rfl:     carbidopa-levodopa (SINEMET)  mg per tablet,  , Disp: , Rfl:     carbidopa-levodopa-entacapone (STALEVO) 12 5- MG per tablet, TAKE 1 TABLET BY MOUTH 6 (SIX) TIMES A DAY, Disp: 540 tablet, Rfl: 3    famotidine (PEPCID) 20 mg tablet, Take 2 tablets (40 mg total) by mouth daily at bedtime, Disp: 180 tablet, Rfl: 3    LORazepam (ATIVAN) 1 mg tablet, Take 1/2 to 1 tab PO daily prn, Disp: 30 tablet, Rfl: 0    meloxicam (MOBIC) 15 mg tablet, Take 1 tablet (15 mg total) by mouth daily as needed for moderate pain, Disp: 30 tablet, Rfl: 0    rasagiline (AZILECT) 1 MG, TAKE 1 TABLET BY MOUTH EVERY DAY, Disp: 90 tablet, Rfl: 3    rOPINIRole (REQUIP XL) 8 MG 24 hr tablet, Take 1 tablet (8 mg total) by mouth daily, Disp: 90 tablet, Rfl: 6    acetaminophen (TYLENOL) 325 mg tablet, Take 3 tablets (975 mg total) by mouth every 8 hours for 7 days (Patient not taking: Reported on 9/7/2021), Disp: , Rfl:     BD ULTRA-FINE PEN NEEDLES 29G X 12 7MM MISC, , Disp: , Rfl:     Cholecalciferol (VITAMIN D3) 2000 units TABS, Take 2,000 Units by mouth daily (Patient not taking: Reported on 9/7/2021), Disp: , Rfl:     FORTEO 600 MCG/2 4ML injection, Inject under the skin daily , Disp: , Rfl:     omeprazole (PriLOSEC OTC) 20 MG tablet, , Disp: , Rfl:     pantoprazole (PROTONIX) 40 mg tablet, Take 1 tablet (40 mg total) by mouth daily, Disp: 30 tablet, Rfl: 3      Physical Exam:     General Appearance:    Alert, cooperative, no distress, appears stated age   Head:    Normocephalic, without obvious abnormality, atraumatic   Eyes:    conjunctiva/corneas clear, both eyes        Nose:   Nares normal, septum midline, no drainage    Throat:   Lips normal; teeth and gums normal   Neck:    symmetrical, trachea midline, ;     thyroid:  no enlargement/   Back:     Symmetric, no curvature, ROM normal   Lungs:   No audible wheezing or labored breathing   Chest Wall:    No tenderness or deformity    Heart:    Regular rate and rhythm               Pulses:   2+ and symmetric all extremities   Skin:   Skin color, texture, turgor normal, no rashes or lesions   Neurologic:   normal strength, sensation and reflexes     throughout       Musculoskeletal: bilateral lower extremity  ·   Examination bilateral lower extremities active straight leg raise bilaterally no pain with internal-external rotation hip flexion 90 no pain to palpation of the thigh regions full active knee flexion extension hip flexion adduction abduction strength 5/5 sensation intact distal pulses present    Radiology:   I personally reviewed the films  X-rays bilateral  Femurs reviewed x-rays reveal well-healing femoral shaft stress fractures     _*_*_*_*_*_*_*_*_*_*_*_*_*_*_*_*_*_*_*_*_*_*_*_*_*_*_*_*_*_*_*_*_*_*_*_*_*_*_*_*_*    Assessment:  72 y  o female with bilateral  Femur stress fractures improving symptoms    Plan:   · Weight bearing as tolerated  bilateral lower extremity  ·  case discussed with Dr Bassam Dale   · Activities as tolerated   · Continue medications as prescribed  · Follow up in 6 months or sooner if needed      Esther Butt PA-C

## 2021-09-10 ENCOUNTER — OFFICE VISIT (OUTPATIENT)
Dept: AUDIOLOGY | Age: 71
End: 2021-09-10
Payer: MEDICARE

## 2021-09-10 DIAGNOSIS — H90.5 SENSORY HEARING LOSS, UNILATERAL: Primary | ICD-10-CM

## 2021-09-10 PROCEDURE — 92567 TYMPANOMETRY: CPT | Performed by: AUDIOLOGIST

## 2021-09-10 PROCEDURE — 92557 COMPREHENSIVE HEARING TEST: CPT | Performed by: AUDIOLOGIST

## 2021-09-10 NOTE — PROGRESS NOTES
HEARING EVALUATION/HEARING AID VISIT    Name:  Kenna Sandoval  :  1950  Age:  72 y o  Date of Evaluation: 09/10/21     History: Known Hearing Loss binaurally  Reason for visit: Kenna Sandoval is being seen today at the request of Dr Michelle Chand for an evaluation of hearing  Patient reports no subjective changes with her hearing sensitivities since her last audiogram in   Patient denies any recent ear infections  Today denies any otalgia, tinnitus, or dizziness  Otoscopic Evaluation:   Right Ear: Clear and healthy ear canal and tympanic membrane   Left Ear: Clear and healthy ear canal and tympanic membrane    Tympanometry:   Right: Type A - normal middle ear pressure and compliance   Left: Type A - normal middle ear pressure and compliance    Audiogram Results:  Pure tone testing revealed a normal sloping to moderate sensorineural hearing loss bilaterally  SRT and PTA are in agreement indicating good test reliability  Word recognition scores were excellent bilaterally  *see attached audiogram      Kenna Sandoval is fit with Michalene Bear P70-RT hearing aid(s)  Right serial number [id number]  Left serial number [id number]  Warranty date: 2024 (Loss/Damage and repair)  Patient reports no issues with her recently repaired hearing aids  Does report when not wearing the hearing aids noticeable difficulties understanding those around her  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 and is interested to see how the hearing aids are in that setting  Action:  Programming today included updating her audiogram in the [address] and recalculating her fine tuning  During live listening, patient voiced overall comfort with the sound quality  Ensured patient can still utilize her volume control via her cell phone tanner should she need it; she voiced understanding  Data logging indicated on average ~9 hours of daily use       At this time patient is doing well and hearing appears stable with the exception of improvement in the low frequencies (expected with healthy clean ears today)  Patient encouraged to return in 4-5 months for an HAV with myself; sooner if issues arise        RECOMMENDATIONS:  Annual hearing eval, Return to [facility]  for F/U, Copy to Patient/Caregiver and HAV in 4-5 months      Zoraida Sherwood , CCC-A  Clinical Audiologist

## 2021-09-21 ENCOUNTER — OFFICE VISIT (OUTPATIENT)
Dept: NEUROLOGY | Facility: CLINIC | Age: 71
End: 2021-09-21
Payer: MEDICARE

## 2021-09-21 VITALS — SYSTOLIC BLOOD PRESSURE: 124 MMHG | DIASTOLIC BLOOD PRESSURE: 78 MMHG | BODY MASS INDEX: 24.85 KG/M2 | WEIGHT: 140.3 LBS

## 2021-09-21 DIAGNOSIS — G20 PARKINSON'S DISEASE (HCC): ICD-10-CM

## 2021-09-21 PROCEDURE — 99214 OFFICE O/P EST MOD 30 MIN: CPT | Performed by: PHYSICIAN ASSISTANT

## 2021-09-21 RX ORDER — CARBIDOPA, LEVODOPA AND ENTACAPONE 12.5; 200; 5 MG/1; MG/1; MG/1
1 TABLET, FILM COATED ORAL
Qty: 540 TABLET | Refills: 3 | Status: SHIPPED | OUTPATIENT
Start: 2021-09-21

## 2021-09-21 RX ORDER — RASAGILINE 1 MG/1
1 TABLET ORAL DAILY
Qty: 90 TABLET | Refills: 3 | Status: SHIPPED | OUTPATIENT
Start: 2021-09-21

## 2021-09-21 RX ORDER — CARBIDOPA AND LEVODOPA 50; 200 MG/1; MG/1
TABLET, EXTENDED RELEASE ORAL
Qty: 90 TABLET | Refills: 3 | Status: SHIPPED | OUTPATIENT
Start: 2021-09-21 | End: 2022-04-18

## 2021-09-21 RX ORDER — AMANTADINE HYDROCHLORIDE 100 MG/1
CAPSULE, GELATIN COATED ORAL
Qty: 270 CAPSULE | Refills: 3 | Status: SHIPPED | OUTPATIENT
Start: 2021-09-21 | End: 2022-03-18

## 2021-09-21 NOTE — PROGRESS NOTES
Patient ID: Sheria Jeans is a 72 y o  female  Assessment/Plan:    [facility]'s [facility]  Patient overall doing well other than continued bothersome dyskinesias  She will notice this throughout the day as well as some wearing off more so 1st thing in the morning  She is otherwise doing well and her Parkinson's symptoms are fairly well controlled  Because of the dyskinesias we did discuss the option of increasing her amantadine dose further  She will start by taking amantadine once in the morning and once in the afternoon around noon  If she has some benefit with this we can further increase to 3 times a day ( 1st thing in the morning, 11-12, 2-3)  Because of her off time 1st thing in the morning we also discussed the option of a trial of Sinemet CR before bed  She will start by taking 1 of these instead of her Stalevo  before bed  If however there is no benefit she can certainly return to the Stalevo  If she has no benefit in regards to her dyskinesias with the increased amantadine dose however we may consider either a trial of extended-release amantadine such as Gocovri or amantadine ER or we may also need to consider reducing her ropinirole dose slightly from 8 mg to 6 mg  We also did discuss the option of a trial of Rytary in the future which could certainly continue to help with wearing off, and some studies did show that there were less dyskinesias  Current medications as follows:   Stalevo though the day every 2-3 hours throughout the day   Azilect 1 mg in the morning   ropinirole XL 8 mg before bed   amantadine 100 mg in the morning, noon ( may increase further to 3 times a day)     She remains very active  She goes for bike rides and uses a rower  Subjective: Mandi Grover is a 72year-old woman who presents in follow up for young onset, levodopa responsive Parkinson's disease   To review, symptom onset 1999 (54yo) with left shoulder stiffness, later complicated by motor fluctuations, early wearing off, unpredictable offs, delayed on, mild dyskinesias and freezing of gait (OFF)  At her last visit she was overall stable other than some bothersome freezing and occasional dyskinesias  No changes were made although she did once again discuss a trial of increasing Amantadine  INTERVAL HISTORY:  She is overall stable other than more dyskinesias   She has off time first thing in the AM  As long as she takes the medication at the scheduled times throughout the day she feels on  She will notice the dyskinesias more when she is getting anxious   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[email or web address]   She can perform all of her ADLs on her own  No issues with dressing and showering   She sleeps well at night   She denies any issues with swallowing   She does freeze at times in doorways, more when she is off   No hallucinations  Feels she is still doing well cognitively  Current medications and timing:   - amantadine 100mg daily   - stalevo 12 5/50/200 6 times per day Q2-3 hours   - azilect 1mg daily   - ropinirole XL 8mg daily     Prior medication trials:  selegaline   Sinemet IR  Ropinirole IR        I personally reviewed and updated the ROS  Objective:    Blood pressure 124/78, weight 63 6 kg (140 lb 4 8 oz), not currently breastfeeding  Physical Exam  Constitutional:       General: She is awake  Appearance: Normal appearance  HENT:      Right Ear: Hearing normal       Left Ear: Hearing normal    Eyes:      General: Lids are normal       Extraocular Movements: Extraocular movements intact  Pupils: Pupils are equal, round, and reactive to light  Pulmonary:      Effort: Pulmonary effort is normal    Neurological:      Mental Status: She is alert  Deep Tendon Reflexes: Strength normal    Psychiatric:         Speech: Speech normal          Neurological Exam  Mental Status  Awake and alert  Oriented to person, place and time   Speech is normal     Cranial Nerves  CN III, IV, VI: Extraocular movements intact bilaterally  Normal lids and orbits bilaterally  Pupils equal round and reactive to light bilaterally  CN V:  Right: Facial sensation is normal   Left: Facial sensation is normal on the left  CN VIII:  Right: Hearing is normal   Left: Hearing is normal   CN XI: Shoulder shrug strength is normal     Motor   Strength is 5/5 throughout all four extremities  Sensory  Light touch is normal in upper and lower extremities  Coordination  Right: Finger-to-nose normal   Left: Finger-to-nose normal   Generalized dyskinesias throughout the visit, slightly worse in the trunk and UEs   Gait  Arose from the chair without issues   Overall good stride length, no freezing noted on exam today   Date of exam:  9/21/21         Speech  1    Facial Expression      Rigidity - Neck      Rigidity - Upper Extremity (Right)  0    Rigidity - Upper Extremity (Left)   0    Rigidity - Lower Extremity (Right)  0    Rigidity - Lower Extremity (Left)   0    Finger Taps (Right)   1    Finger Taps (Left)   1    Hand  (Right)  0    Hand  (Left)   0    Pronation/Supination (Right)  0    Pronation/Supination (Left)   1    Heel Taps (Right) 0    Heel Taps(Left) 0    Arising from Chair   0    Gait   1    Postural Stability       Posture 1    Global spontaneity of movement 1    Postural Tremor (Right) 0    Postural Tremor (Left) 0    Kinetic Tremor (Right)  0    Kinetic Tremor (Left)  0    Rest tremor  RUE 0    Rest tremor  LUE 0    Rest tremor  RLE 0    Reset tremor  LLE 0    Lip/Jaw Tremor      Motor Exam Total:              ROS:    Review of Systems   Constitutional: Negative  Negative for appetite change and fever  HENT: Negative  Negative for hearing loss, tinnitus, trouble swallowing and voice change  Eyes: Negative  Negative for photophobia and pain  Respiratory: Negative  Negative for shortness of breath  Cardiovascular: Negative  Negative for palpitations  Gastrointestinal: Negative  Negative for nausea and vomiting  Endocrine: Negative  Negative for cold intolerance  Genitourinary: Negative  Negative for dysuria, frequency and urgency  Musculoskeletal: Positive for gait problem (during off times)  Negative for myalgias and neck pain  Balance issues on her off time  Kink in neck on left side  Dyskinesia throughout the whole body as she has constant movements   Skin: Negative  Negative for rash  Allergic/Immunologic: Negative  Neurological: Positive for numbness (Left arm off and on)  Negative for dizziness, tremors, seizures, syncope, facial asymmetry, speech difficulty, weakness, light-headedness and headaches  Hematological: Negative  Does not bruise/bleed easily  Psychiatric/Behavioral: Negative  Negative for confusion, hallucinations and sleep disturbance  All other systems reviewed and are negative

## 2021-09-21 NOTE — PATIENT INSTRUCTIONS
Patient overall doing well other than continued bothersome dyskinesias  She will notice this throughout the day as well as some wearing off more so 1st thing in the morning  She is otherwise doing well and her Parkinson's symptoms are fairly well controlled  Because of the dyskinesias we did discuss the option of increasing her amantadine dose further  She will start by taking amantadine once in the morning and once in the afternoon around noon  If she has some benefit with this we can further increase to 3 times a day ( 1st thing in the morning, 11-12, 2-3)  Because of her off time 1st thing in the morning we also discussed the option of a trial of Sinemet CR before bed  She will start by taking 1 of these instead of her Stalevo  before bed  If however there is no benefit she can certainly return to the Stalevo  If she has no benefit in regards to her dyskinesias with the increased amantadine dose however we may consider either a trial of extended-release amantadine such as Gocovri or amantadine ER or we may also need to consider reducing her ropinirole dose slightly from 8 mg to 6 mg  We also did discuss the option of a trial of Rytary in the future which could certainly continue to help with wearing off, and some studies did show that there were less dyskinesias  Current medications as follows:   Stalevo though the day every 2-3 hours throughout the day   Azilect 1 mg in the morning   ropinirole XL 8 mg before bed   amantadine 100 mg in the morning, noon ( may increase further to 3 times a day)     She remains very active  She goes for bike rides and uses a rower

## 2021-10-21 ENCOUNTER — APPOINTMENT (OUTPATIENT)
Dept: LAB | Age: 71
End: 2021-10-21
Payer: MEDICARE

## 2021-10-21 DIAGNOSIS — M81.0 SENILE OSTEOPOROSIS: ICD-10-CM

## 2021-10-21 LAB — CALCIUM SERPL-MCNC: 9.9 MG/DL (ref 8.3–10.1)

## 2021-10-21 PROCEDURE — 36415 COLL VENOUS BLD VENIPUNCTURE: CPT

## 2021-10-21 PROCEDURE — 82310 ASSAY OF CALCIUM: CPT

## 2021-12-31 DIAGNOSIS — F41.1 ANXIETY STATE: ICD-10-CM

## 2022-01-03 RX ORDER — LORAZEPAM 1 MG/1
TABLET ORAL
Qty: 30 TABLET | Refills: 0 | Status: SHIPPED | OUTPATIENT
Start: 2022-01-03 | End: 2022-01-13 | Stop reason: SDUPTHER

## 2022-01-13 ENCOUNTER — OFFICE VISIT (OUTPATIENT)
Dept: OBGYN CLINIC | Facility: HOSPITAL | Age: 72
End: 2022-01-13
Payer: MEDICARE

## 2022-01-13 ENCOUNTER — TELEPHONE (OUTPATIENT)
Dept: OBGYN CLINIC | Facility: HOSPITAL | Age: 72
End: 2022-01-13

## 2022-01-13 VITALS
HEIGHT: 63 IN | HEART RATE: 85 BPM | WEIGHT: 140 LBS | BODY MASS INDEX: 24.8 KG/M2 | DIASTOLIC BLOOD PRESSURE: 70 MMHG | SYSTOLIC BLOOD PRESSURE: 114 MMHG

## 2022-01-13 DIAGNOSIS — M54.12 CERVICAL RADICULOPATHY: Primary | ICD-10-CM

## 2022-01-13 PROCEDURE — 99213 OFFICE O/P EST LOW 20 MIN: CPT | Performed by: PHYSICIAN ASSISTANT

## 2022-01-13 RX ORDER — METHOCARBAMOL 500 MG/1
500 TABLET, FILM COATED ORAL 4 TIMES DAILY PRN
Qty: 30 TABLET | Refills: 0 | Status: SHIPPED | OUTPATIENT
Start: 2022-01-13 | End: 2022-02-14

## 2022-01-13 RX ORDER — MELOXICAM 15 MG/1
15 TABLET ORAL DAILY
Qty: 14 TABLET | Refills: 0 | Status: SHIPPED | OUTPATIENT
Start: 2022-01-13 | End: 2022-03-10

## 2022-01-13 NOTE — TELEPHONE ENCOUNTER
I received a Care Request from patient to schedule for:    Where Does it Hurt? left neck and shoulder  Are you considering joint replacement? No   Are you seeking a second opinion? No  If yes, who is your doctor? I lvm to cb to schedule

## 2022-01-13 NOTE — PROGRESS NOTES
Assessment:    Cervical degenerative disc disease, multilevel  Cervical radiculopathy, left   Cervical trapezius muscle spasm      Plan:    Initiate outpatient physical therapy for treatment   Rx Mobic and Robaxin  Heat to the area   Referral to Pain Management for further follow-up        Problem List Items Addressed This Visit        Nervous and Auditory    Cervical radiculopathy - Primary    Relevant Medications    methocarbamol (ROBAXIN) 500 mg tablet    meloxicam (Mobic) 15 mg tablet    Other Relevant Orders    Ambulatory Referral to Physical Therapy    Ambulatory Referral to Pain Management                   Subjective:     Patient ID:  Aamir Pollack is a 80 y o  female    HPI    The patient is a 71-year-old female presenting for evaluation of her left neck region  According to the patient, she has roughly a one month history of left-sided neck/muscle spasms soreness that radiates to her scapular region  There is no injury or trauma that caused this  She states it is generally worse with rotating her head to the left and certain activities, and is usually made better with heat  She has not tried any medications other than Tylenol for this  She has not done any formal physical therapy  She had a similar episode about 3 to 4 months ago where her neck felt stiff and sore with radiation to the left side  She denies any numbness and tingling in her upper extremities  No bowel or bladder incontinence, no saddle anesthesia  No history of surgery on her cervical spine  She does have a history of Parkinson's disease      The following portions of the patient's history were reviewed and updated as appropriate: allergies, current medications, past family history, past medical history, past social history, past surgical history and problem list     Review of Systems     Objective:    Imaging:  Cervical spine x-rays 8/23/2021    The C7-T1 interface is not well visualized due to overlying structures      No fracture       Normal alignment without subluxation      The intervertebral disc spaces from C1 through C7 are preserved  There is mild facet arthrosis on the left      There is mild left-sided neural foraminal narrowing at C3-4, C4-5, and C5-6  There is mild right-sided neural foraminal narrowing at C5-6 though this may be positional      The prevertebral soft tissues are within normal limits        The lung apices are clear      IMPRESSION:     Mild left-sided facet arthrosis with mild neuroforaminal narrowing from C3 through C6          Vitals:    01/13/22 1501   BP: 114/70   Pulse: 85           Physical Exam     Orthopedic Examination:  Cervical spine    Inspection:  No open wounds or erythema  No swelling  No ecchymosis  Palpation:  Left trapezius muscles tender to palpation as is the medial scapula border    Range-of-motion:  Slightly limited rotation and tilt to the left  Strength:  5/5 C5-T1 bilaterally    Sensation:  Intact C5-T1 bilaterally    Special Tests:  Negative Spurling's   No hyperreflexia  Upper extremities are warm and well perfused

## 2022-01-21 ENCOUNTER — EVALUATION (OUTPATIENT)
Dept: PHYSICAL THERAPY | Facility: CLINIC | Age: 72
End: 2022-01-21
Payer: MEDICARE

## 2022-01-21 DIAGNOSIS — M54.12 CERVICAL RADICULOPATHY: ICD-10-CM

## 2022-01-21 PROCEDURE — 97112 NEUROMUSCULAR REEDUCATION: CPT | Performed by: PHYSICAL THERAPIST

## 2022-01-21 PROCEDURE — 97162 PT EVAL MOD COMPLEX 30 MIN: CPT | Performed by: PHYSICAL THERAPIST

## 2022-01-21 NOTE — PROGRESS NOTES
PT Evaluation     Today's date: 2022  Patient name: Charmaine Castaneda  : 1950  MRN: [id number]  Referring provider: Tiffanie Mao  Dx:   Encounter Diagnosis     ICD-10-CM    1  Cervical radiculopathy  M54 12 Ambulatory Referral to Physical Therapy                  Assessment  Assessment details: Pt presents with acute-on-chronic neck pain with a mobility and postural correction classification  She will benefit from skilled PT services to address her impairments in order to decrease pain and restore function  Impairments: abnormal muscle firing, abnormal muscle tone, abnormal or restricted ROM, abnormal movement, activity intolerance, impaired physical strength, lacks appropriate home exercise program, pain with function, poor posture  and poor body mechanics  Understanding of Dx/Px/POC: good   Prognosis: good    Goals  STG - 4 wks  1) pt will improve c/s ROM impairments 50%  2) pt will improve sitting posture impairments 50%  3) pt will improve pain levels 25%    LTG - 8-12 wks  1) pt will demonstrate normalized c/s ROM and sitting posture  2) pt will demonstrate 15 sec DNF endurance  3) pt will improve pain levels 75%    Plan  Planned therapy interventions: joint mobilization, manual therapy, body mechanics training, neuromuscular re-education, patient education, postural training, strengthening, stretching, therapeutic activities, therapeutic exercise, flexibility, functional ROM exercises and home exercise program  Frequency: 2x week  Duration in weeks: 12  Treatment plan discussed with: patient        Subjective Evaluation    History of Present Illness  Mechanism of injury: Pt is a 80 y o  female with PMHx significant for Parkinson's disease, GERD, osteoporosis, s/p T12/L1 kyphoplasty   She reports acute-on-chronic L-sided neck and UT pain 3 days ago  She denies h/a, radicular symptoms, red flags      Pain  Current pain ratin  At best pain ratin  At worst pain rating: 10  Location: L c/s, UT  Quality: sharp, needle-like and pulling  Relieving factors: rest and medications (OTC ibuprofen)      Diagnostic Tests  X-ray: abnormal (mild L C3-6 and R C5-6 foraminal stenosis)  Treatments  No previous or current treatments  Patient Goals  Patient goals for therapy: decreased pain and increased motion          Objective     Postural Observations  Seated posture: poor    Additional Postural Observation Details  O: severe elevated L scap, maintains L SB, severe FH and thoracic kyphosis    Palpation   Left   Hypertonic in the cervical paraspinals, levator scapulae, scalenes, suboccipitals and upper trapezius  Tenderness of the cervical paraspinals, levator scapulae, scalenes and upper trapezius  Right   Hypertonic in the cervical paraspinals, levator scapulae, scalenes and suboccipitals  Active Range of Motion   Cervical/Thoracic Spine       Cervical  Subcranial protraction:  WFL   Subcranial retraction:   Restriction level: maximal  Flexion:  with pain Restriction level: minimal  Extension:  with pain Restriction level: minimal  Left lateral flexion:  with pain Restriction level: moderate  Right lateral flexion:  with pain Restriction level maximal  Left rotation:  with pain Restriction level: moderate  Right rotation:  [facility]    Joint Play     Hypomobile: C2, C3, C4, C5, C6, C7, T1, T2 and T3              Precautions:  PMHx: GERD, osteoporosis, Parkinson's disease, s/p T12/L1 kyphoplasty  Dx: acute-on-chronic neck pain with a mobility and postural correction classification    Manuals 1/21            Gr IV R C2-6 side glides 1x40  ea            Prone C2-6 PA mobs             LC3/4 rotation SNAGs 1x10            STM L UT, post rohit                          Neuro Re-Ed             Sitting posture correction in front of mirror 3 min            4-finger B/L c/s rotation in front of mirror 1x10            4-finger B/L c/s SB in front of mirror 1x10            Supine chin tucks 3"x10 Seated scap retraction 5"x10            B TB ER                          Ther Ex                                                                                                                     Ther Activity                                       Gait Training                                       Modalities

## 2022-01-27 ENCOUNTER — OFFICE VISIT (OUTPATIENT)
Dept: PHYSICAL THERAPY | Facility: CLINIC | Age: 72
End: 2022-01-27
Payer: MEDICARE

## 2022-01-27 DIAGNOSIS — M54.12 CERVICAL RADICULOPATHY: Primary | ICD-10-CM

## 2022-01-27 PROCEDURE — 97112 NEUROMUSCULAR REEDUCATION: CPT | Performed by: PHYSICAL THERAPIST

## 2022-01-27 PROCEDURE — 97140 MANUAL THERAPY 1/> REGIONS: CPT | Performed by: PHYSICAL THERAPIST

## 2022-01-27 NOTE — PROGRESS NOTES
Daily Note     Today's date: 2022  Patient name: Charmaine Castaneda  : 1950  MRN: [id number]  Referring provider: Tiffanie Mao  Dx:   Encounter Diagnosis     ICD-10-CM    1  Cervical radiculopathy  M54 12                   Subjective: Pt reports no pain x 24 hrs, significant improvement overall since IE  Objective: See treatment diary below    Assessment: Pt demonstrated full R rotation and significantly improved L rotation AROM following SNAGs  Plan: Cont  POC  Precautions:  PMHx: GERD, osteoporosis, Parkinson's disease, s/p T12/L1 kyphoplasty  Dx: acute-on-chronic neck pain with a mobility and postural correction classification    Manuals            Gr IV R C2-6 side glides 1x40  ea 1x40 ea           Prone C2-6 PA mobs             L C3/4 rotation SNAGs 1x10 R rot/  1x10  L rot/  2x10           STM L UT, post scalenes  6 min                        Neuro Re-Ed             Sitting posture correction in front of mirror 3 min 3 min           4-finger B/L c/s rotation in front of mirror 1x10 1x15           4-finger B/L c/s SB in front of mirror 1x10 1x15           Supine chin tucks 3"x10 5"x10           Seated scap retraction 5"x10 5"x15           B TB ER  YTB  3x10                        Ther Ex                                                                                                                     Ther Activity                                       Gait Training                                       Modalities

## 2022-01-31 ENCOUNTER — APPOINTMENT (OUTPATIENT)
Dept: PHYSICAL THERAPY | Facility: CLINIC | Age: 72
End: 2022-01-31
Payer: MEDICARE

## 2022-02-03 ENCOUNTER — OFFICE VISIT (OUTPATIENT)
Dept: PHYSICAL THERAPY | Facility: CLINIC | Age: 72
End: 2022-02-03
Payer: MEDICARE

## 2022-02-03 DIAGNOSIS — M54.12 CERVICAL RADICULOPATHY: Primary | ICD-10-CM

## 2022-02-03 PROCEDURE — 97110 THERAPEUTIC EXERCISES: CPT

## 2022-02-03 PROCEDURE — 97140 MANUAL THERAPY 1/> REGIONS: CPT

## 2022-02-03 PROCEDURE — 97112 NEUROMUSCULAR REEDUCATION: CPT

## 2022-02-03 NOTE — PROGRESS NOTES
Daily Note     Today's date: 2/3/2022  Patient name: Chalino Minor  : 1950  MRN: [id number]  Referring provider: Edilma Lam  Dx:   Encounter Diagnosis     ICD-10-CM    1  Cervical radiculopathy  M54 12                   Subjective: Pt reports symptoms are improving and continues to work on mobility  Objective: See treatment diary below    Assessment: Pt demonstrated cervical retraction with correct technique  Needs verbal cues for posture correction with there ex  Plan: Cont  POC  Added UT stretch, overhead stretch and scapular retraction exercises to HEP as noted  Patient was able to demonstrate correctly following instruction  Precautions:  PMHx: GERD, osteoporosis, Parkinson's disease, s/p T12/L1 kyphoplasty  Dx: acute-on-chronic neck pain with a mobility and postural correction classification    Manuals  2/3          Gr IV R C2-6 side glides 1x40  ea 1x40 ea 1x40  ea          Prone C2-6 PA mobs   2x30          L C3/4 rotation SNAGs 1x10 R rot/  1x10  L rot/  2x10 R rot  1x10  L rot  2x10          STM L UT, post scalenes  6 min 6 min                       Neuro Re-Ed             Sitting posture correction in front of mirror 3 min 3 min 3 min          4-finger B/L c/s rotation in front of mirror 1x10 1x15 1x15          4-finger B/L c/s SB in front of mirror 1x10 1x15 1x15          Supine chin tucks 3"x10 5"x10 5:x10          Seated scap retraction 5"x10 5"x15 5'x15          B TB ER  YTB  3x10 YTB  3x10                       Ther Ex             Overhead wall stretch   20"x 3 reps          UT stretch   20"x3 B/L          Standing scapu retrac   x10 reps                                                                           Ther Activity                                       Gait Training                                       Modalities

## 2022-02-08 ENCOUNTER — OFFICE VISIT (OUTPATIENT)
Dept: NEUROLOGY | Facility: CLINIC | Age: 72
End: 2022-02-08
Payer: MEDICARE

## 2022-02-08 VITALS
SYSTOLIC BLOOD PRESSURE: 114 MMHG | BODY MASS INDEX: 24.66 KG/M2 | DIASTOLIC BLOOD PRESSURE: 72 MMHG | TEMPERATURE: 97.8 F | WEIGHT: 139.2 LBS

## 2022-02-08 DIAGNOSIS — G20 PARKINSON'S DISEASE (HCC): Primary | ICD-10-CM

## 2022-02-08 DIAGNOSIS — F41.1 ANXIETY STATE: ICD-10-CM

## 2022-02-08 PROCEDURE — 99215 OFFICE O/P EST HI 40 MIN: CPT | Performed by: PHYSICIAN ASSISTANT

## 2022-02-08 RX ORDER — DENOSUMAB 60 MG/ML
INJECTION SUBCUTANEOUS
COMMUNITY

## 2022-02-08 NOTE — PROGRESS NOTES
Patient ID: Stephane Matamoros is a 80 y o  female  Assessment/Plan:    Parkinson's disease [facility]  Patient with Parkinson's disease complicated by dyskinesias  She unfortunately has not had any significant benefit with the dyskinesias on higher amantadine dosing and these continue to be an issue even throughout the evening for her  Because of this we discussed the option of switching to an extended release form of amantadine to see if perhaps we could get better coverage all day long  This may also help reduce her wearing off  We will switch her to Gocovri at this time  She will remain on amantadine until she receives this medication in the mail  If however she still continues to have bothersome dyskinesias on this medication then we did discuss the option of perhaps starting to reduce some of her Parkinson's medications  We could start by reducing her ropinirole from 8 mg to 6 mg a day  May also consider switching Stalevo to Sherra Harvey in the future to see if this provides a better on without wearing off and perhaps less dyskinesia   She will notice some wearing off but only if she is late for a dose of her Stalevo  Otherwise she feels that her Parkinson's medication is controlling her symptoms fairly well  Patient states that the Azilect is expensive through her insurance  She would be able to get this medication much cheaper with [facility]  She will look on the site for Azilect 1mg 90 pills and print the coupon for the location she would like to use  We did also have a discussion regarding the DBS surgery this may be a good option for her given that she could still have control of her Parkinson's symptoms but she may be able to reduce her medications and in turn reduce her dyskinesias  She is not interested at this very moment in surgery however I did provide her with some information regarding  One of her main concerns today remains bothersome left-sided neck pain    She has had this for quite some time and has seen orthopedic for this pain in the past   They did send her to physical therapy which she is currently in  They also did give her a referral for pain management which I do feel would be beneficial for her at this time  Subjective: Eric Desai is a 80year-old woman who presents in follow up for young onset, levodopa responsive Parkinson's disease  To review, symptom onset 1999 (49yo) with left shoulder stiffness, later complicated by motor fluctuations, early wearing off, unpredictable offs, delayed on, mild dyskinesias and freezing of gait (OFF)  At her last visit she had continued bothersome dyskinesias and her Amantadine dose was increased further  No other changes were made  INTERVAL HISTORY:  She continues to have dyskinesia despite increasing the Amantadine   She has been struggling with some increased neck pain recently, this has been ongoing for months  She was seen by Ortho for this in the past and had PT with no clear benefit  She is in PT now, focusing on the neck pain   She is doing exercise on her own at as well   She will have episodes when she feels that her movements are "super high" and then other times the movements are better   She feels that overall her PD symptoms are well controlled   She has some occasional wearing off between doses if she is late for a dose, when off she has freezing   She can perform all of her ADLs on her own without issues  She is sleeping well at night   No issues with swallowing   No hallucinations      Current medications and timing:   - amantadine 100mg bid   - stalevo 12 5/50/200 6 times per day Q2-3 hours   - azilect 1mg daily   - ropinirole XL 8mg daily      Prior medication trials:  selegaline   Sinemet IR  Ropinirole IR      I personally reviewed and updated the ROS  Total time spent today was 45 minutes   Greater than 50% of total time was spent with the patient and / or family counseling and / or coordinating plan of care  Objective:    Blood pressure 114/72, temperature 97 8 °F (36 6 °C), weight 63 1 kg (139 lb 3 2 oz), not currently breastfeeding  Physical Exam  Constitutional:       General: She is awake  Appearance: Normal appearance  HENT:      Right Ear: Hearing normal       Left Ear: Hearing normal    Eyes:      General: Lids are normal       Extraocular Movements: Extraocular movements intact  Pupils: Pupils are equal, round, and reactive to light  Pulmonary:      Effort: Pulmonary effort is normal    Neurological:      Mental Status: She is alert  Deep Tendon Reflexes: Strength normal    Psychiatric:         Speech: Speech normal          Neurological Exam  Mental Status  Awake and alert  Oriented to person, place and time  Speech is normal     Cranial Nerves  CN III, IV, VI: Extraocular movements intact bilaterally  Normal lids and orbits bilaterally  Pupils equal round and reactive to light bilaterally  CN V:  Right: Facial sensation is normal   Left: Facial sensation is normal on the left  CN VIII:  Right: Hearing is normal   Left: Hearing is normal   CN XI: Shoulder shrug strength is normal     Motor   Strength is 5/5 throughout all four extremities  Sensory  Light touch is normal in upper and lower extremities  Coordination  Right: Finger-to-nose normal   Left: Finger-to-nose normal   Generalized dyskinesias throughout the visit, slightly worse in the trunk and UEs, present when standing as well which did cause some imbalance   Gait  Arose from the chair without issues   Overall good stride length, no freezing noted on exam today                                   Date of exam:  9/21/21 2/8/22           Speech  1  1   Facial Expression        Rigidity - Neck        Rigidity - Upper Extremity (Right)  0  0   Rigidity - Upper Extremity (Left)   0  0   Rigidity - Lower Extremity (Right)  0  0   Rigidity - Lower Extremity (Left)   0  0   Finger Taps (Right) 1  1   Finger Taps (Left)   1  0   Hand  (Right)  0  0   Hand  (Left)   0  0   Pronation/Supination (Right)  0 0   Pronation/Supination (Left)   1  1   Heel Taps (Right) 0  0   Heel Taps(Left) 0  0   Arising from Chair   0  0   Gait   1 1   Postural Stability         Posture 1  1   Global spontaneity of movement 1  1   Postural Tremor (Right) 0  0   Postural Tremor (Left) 0  0   Kinetic Tremor (Right)  0  0   Kinetic Tremor (Left)  0  0   Rest tremor  RUE 0  0   Rest tremor  LUE 0  0   Rest tremor  RLE 0  0   Reset tremor  LLE 0  0   Lip/Jaw Tremor        Motor Exam Total:            ROS:    Review of Systems   Constitutional: Negative  Negative for appetite change and fever  HENT: Negative  Negative for hearing loss, tinnitus, trouble swallowing and voice change  Eyes: Negative  Negative for photophobia and pain  Respiratory: Negative  Negative for shortness of breath  Cardiovascular: Negative  Negative for palpitations  Gastrointestinal: Negative  Negative for nausea and vomiting  Endocrine: Negative  Negative for cold intolerance  Genitourinary: Negative  Negative for dysuria, frequency and urgency  Musculoskeletal: Positive for gait problem (when meds wear off), neck pain and neck stiffness  Negative for myalgias  Involuntary movements     Skin: Negative  Negative for rash  Allergic/Immunologic: Negative  Neurological: Negative for dizziness, tremors, seizures, syncope, facial asymmetry, speech difficulty, weakness, light-headedness, numbness and headaches  Hematological: Negative  Does not bruise/bleed easily  Psychiatric/Behavioral: Negative  Negative for confusion, hallucinations and sleep disturbance  All other systems reviewed and are negative

## 2022-02-08 NOTE — ASSESSMENT & PLAN NOTE
Patient with Parkinson's disease complicated by dyskinesias  She unfortunately has not had any significant benefit with the dyskinesias on higher amantadine dosing and these continue to be an issue even throughout the evening for her  Because of this we discussed the option of switching to an extended release form of amantadine to see if perhaps we could get better coverage all day long  This may also help reduce her wearing off  We will switch her to Gocovri at this time  She will remain on amantadine until she receives this medication in the mail  If however she still continues to have bothersome dyskinesias on this medication then we did discuss the option of perhaps starting to reduce some of her Parkinson's medications  We could start by reducing her ropinirole from 8 mg to 6 mg a day  May also consider switching Stalevo to Peter Yasmine in the future to see if this provides a better on without wearing off and perhaps less dyskinesia   She will notice some wearing off but only if she is late for a dose of her Stalevo  Otherwise she feels that her Parkinson's medication is controlling her symptoms fairly well  Patient states that the Azilect is expensive through her insurance  She would be able to get this medication much cheaper with [facility]  She will look on the site for Azilect 1mg 90 pills and print the coupon for the location she would like to use  We did also have a discussion regarding the DBS surgery this may be a good option for her given that she could still have control of her Parkinson's symptoms but she may be able to reduce her medications and in turn reduce her dyskinesias  She is not interested at this very moment in surgery however I did provide her with some information regarding  One of her main concerns today remains bothersome left-sided neck pain    She has had this for quite some time and has seen orthopedic for this pain in the past   They did send her to physical therapy which she is currently in  They also did give her a referral for pain management which I do feel would be beneficial for her at this time

## 2022-02-08 NOTE — PATIENT INSTRUCTIONS
Patient with Parkinson's disease complicated by dyskinesias  She unfortunately has not had any significant benefit with the dyskinesias on higher amantadine dosing and these continue to be an issue even throughout the evening for her  Because of this we discussed the option of switching to an extended release form of amantadine to see if perhaps we could get better coverage all day long  We will switch her to Gocovri at this time  She will remain on amantadine until she receives this medication in the mail  If however she still continues to have bothersome dyskinesias on this medication then we did discuss the option of perhaps starting to reduce some of her Parkinson's medications  We could start by reducing her ropinirole from 8 mg to 6 mg a day  May also consider switching Stalevo to [place] in the future to see if this provides a better on without wearing off and perhaps less dyskinesia   She will notice some wearing off but only if she is late for a dose of her Stalevo  Otherwise she feels that her Parkinson's medication is controlling her symptoms fairly well  Patient states that the Azilect is expensive through her insurance  She would be able to get this medication much cheaper with [facility]  She will look on the site for Azilect 1mg 90 pills and print the coupon for the location she would like to use  We did also have a discussion regarding the DBS surgery this may be a good option for her given that she could still have control of her Parkinson's symptoms but she may be able to reduce her medications and in turn reduce her dyskinesias  She is not interested at this very moment in surgery however I did provide her with some information regarding  One of her main concerns today remains bothersome left-sided neck pain  She has had this for quite some time and has seen orthopedic for this pain in the past   They did send her to physical therapy which she is currently in    They also did give her a referral for pain management which I do feel would be beneficial for her at this time

## 2022-02-10 ENCOUNTER — APPOINTMENT (OUTPATIENT)
Dept: RADIOLOGY | Age: 72
End: 2022-02-10
Payer: MEDICARE

## 2022-02-10 ENCOUNTER — APPOINTMENT (OUTPATIENT)
Dept: PHYSICAL THERAPY | Facility: CLINIC | Age: 72
End: 2022-02-10
Payer: MEDICARE

## 2022-02-10 ENCOUNTER — OFFICE VISIT (OUTPATIENT)
Dept: URGENT CARE | Age: 72
End: 2022-02-10
Payer: MEDICARE

## 2022-02-10 VITALS
DIASTOLIC BLOOD PRESSURE: 66 MMHG | TEMPERATURE: 98 F | RESPIRATION RATE: 18 BRPM | HEIGHT: 63 IN | BODY MASS INDEX: 24.8 KG/M2 | HEART RATE: 77 BPM | SYSTOLIC BLOOD PRESSURE: 134 MMHG | WEIGHT: 140 LBS

## 2022-02-10 DIAGNOSIS — M54.2 NECK PAIN WITHOUT INJURY: ICD-10-CM

## 2022-02-10 DIAGNOSIS — M54.2 NECK PAIN WITHOUT INJURY: Primary | ICD-10-CM

## 2022-02-10 PROCEDURE — 72050 X-RAY EXAM NECK SPINE 4/5VWS: CPT

## 2022-02-10 PROCEDURE — G0463 HOSPITAL OUTPT CLINIC VISIT: HCPCS | Performed by: NURSE PRACTITIONER

## 2022-02-10 PROCEDURE — 99213 OFFICE O/P EST LOW 20 MIN: CPT | Performed by: NURSE PRACTITIONER

## 2022-02-10 NOTE — PATIENT INSTRUCTIONS
Neck Pain   WHAT YOU NEED TO KNOW:   You may have sudden neck pain that increases quickly  You may instead feel pain build slowly over time  Neck pain may go away in a few days or weeks, or it may continue for months  The pain may come and go, or be worse with certain movements  The pain may be only in your neck, or it may move to your arms, back, or shoulders  You may also have pain that starts in another body area and moves to your neck  Some types of neck pain are permanent  DISCHARGE INSTRUCTIONS:   Return to the emergency department if:   · You have an injury that causes neck pain and shooting pain down your arms or legs  · Your neck pain suddenly becomes severe  · You have neck pain along with numbness, tingling, or weakness in your arms or legs  · You have a stiff neck, a headache, and a fever  Contact your healthcare provider if:   · You have new or worsening symptoms  · Your symptoms continue even after treatment  · You have questions or concerns about your condition or care  Medicines: You may need any of the following:  · NSAIDs  , such as ibuprofen, help decrease swelling, pain, and fever  This medicine is available without a doctor's order  Ask your healthcare provider which medicine to take and how often to take it  Follow directions  NSAIDs can cause stomach bleeding or kidney problems if not taken correctly  If you take blood thinner medicine, always ask if NSAIDs are safe for you  · Acetaminophen  helps decrease pain and fever  Ask your healthcare provider how much to take and how often to take it  Follow directions  Acetaminophen can cause liver damage if not taken correctly  · Steroid medicine  may be used to reduce inflammation  This can help relieve pain caused by swelling  · Take your medicine as directed  Contact your healthcare provider if you think your medicine is not helping or if you have side effects   Tell him or her if you are allergic to any medicine  Keep a list of the medicines, vitamins, and herbs you take  Include the amounts, and when and why you take them  Bring the list or the pill bottles to follow-up visits  Carry your medicine list with you in case of an emergency  Manage or prevent neck pain:   · Rest your neck as directed  Do not make sudden movements, such as turning your head quickly  Your healthcare provider may recommend you wear a cervical collar for a short time  The collar will prevent you from moving your head  This will give your neck time to heal if an injury is causing your neck pain  Ask your healthcare provider when you can return to sports or other normal daily activities  · Apply heat as directed  Heat helps relieve pain and swelling  Use a heat wrap, or soak a small towel in warm water  Wring out the extra water  Apply the heat wrap or towel for 20 minutes every hour, or as directed  · Apply ice as directed  Ice helps relieve pain and swelling, and can help prevent tissue damage  Use an ice pack, or put ice in a bag  Cover the ice pack or back with a towel before you apply it to your neck  Apply the ice pack or ice for 15 minutes every hour, or as directed  Your healthcare provider can tell you how often to apply ice  · Do neck exercises as directed  Neck exercises help strengthen the muscles and increase range of motion  Your healthcare provider will tell you which exercises are right for you  He may give you instructions, or he may recommend that you work with a physical therapist  Your healthcare provider or therapist can make sure you are doing the exercises correctly  · Maintain good posture  Try to keep your head and shoulders lifted when you sit  If you work in front of a computer, make sure the monitor is at the right level  You should not need to look up down to see the screen  You should also not have to lean forward to be able to read what is on the screen   Make sure your keyboard, mouse, and other computer items are placed where you do not have to extend your shoulder to reach them  Get up often if you work in front of a computer or sit for long periods of time  Stretch or walk around to keep your neck muscles loose  Follow up with your healthcare provider as directed: Your healthcare provider may refer you to a specialist if your pain does not get better with treatment  Write down your questions so you remember to ask them during your visits  © Copyright [employer] 2021 Information is for End User's use only and may not be sold, redistributed or otherwise used for commercial purposes  All illustrations and images included in CareNotes® are the copyrighted property of A D A M , Inc  or Roque Ayala   The above information is an  only  It is not intended as medical advice for individual conditions or treatments  Talk to your doctor, nurse or pharmacist before following any medical regimen to see if it is safe and effective for you

## 2022-02-10 NOTE — PROGRESS NOTES
[facility]        NAME: Hermilo Stevens is a 80 y o  female  : 1950    MRN: [id number]  DATE: February 10, 2022  TIME: 2:19 PM    Assessment and Plan   Neck pain without injury [M54 2]  1  Neck pain without injury  XR spine cervical complete 4 or 5 vw non injury    CANCELED: XR neck soft tissue         Patient Instructions     Cont with advil  Follow up with PCP  Official reading for x-rays - pending   Proceed to  ER if symptoms worsen  Chief Complaint     Chief Complaint   Patient presents with    Neck Pain     left sided neck pain since middle of January  Saw [place]  History of Present Illness       HPI   Reports neck pain on and off x 1 month  No trauma  Says she went to ortho and was told it may be arthritis  However the pain is ongoing, intermittent  Says she was prescribed muscle relaxant but she has not been taking it bc the muscle relaxant interferes with her parkinson's medicine  She states the neck pain feels like when she had a spinal fracture on the thoracic spine a few years ago  Hx of osteoporosis  She took advil which seems to help with the pain  Review of Systems   Review of Systems   Musculoskeletal: Positive for arthralgias and neck pain  Negative for joint swelling and neck stiffness  Skin: Negative for color change and wound  Neurological: Positive for tremors (d/t parkinson's)  Negative for weakness and numbness           Current Medications       Current Outpatient Medications:     amantadine (SYMMETREL) 100 mg capsule, Take 1tab in the AM and 1tab at noon (may further increase to 1tab tid), Disp: 270 capsule, Rfl: 3    Calcium Carbonate-Vit D-Min (CALCIUM 1200 PO), Take 1,200 mg by mouth daily, Disp: , Rfl:     carbidopa-levodopa (SINEMET CR)  mg per tablet, Take 1tab qhs, Disp: 90 tablet, Rfl: 3    carbidopa-levodopa-entacapone (STALEVO) 12 5- MG per tablet, Take 1 tablet by mouth 6 (six) times a day, Disp: 540 tablet, Rfl: 3   denosumab (Prolia) 60 mg/mL, , Disp: , Rfl:     famotidine (PEPCID) 20 mg tablet, TAKE 2 TABLETS (40 MG TOTAL) BY MOUTH DAILY AT BEDTIME, Disp: 180 tablet, Rfl: 1    meloxicam (Mobic) 15 mg tablet, Take 1 tablet (15 mg total) by mouth daily (Patient taking differently: Take 15 mg by mouth as needed  ), Disp: 14 tablet, Rfl: 0    rasagiline (AZILECT) 1 MG, Take 1 tablet (1 mg total) by mouth daily, Disp: 90 tablet, Rfl: 3    rOPINIRole (REQUIP XL) 8 MG 24 hr tablet, Take 1 tablet (8 mg total) by mouth daily, Disp: 90 tablet, Rfl: 6    acetaminophen (TYLENOL) 325 mg tablet, Take 3 tablets (975 mg total) by mouth every 8 hours for 7 days (Patient not taking: Reported on 9/7/2021), Disp: , Rfl:     BD ULTRA-FINE PEN NEEDLES 29G X 12 7MM MISC, , Disp: , Rfl:     Cholecalciferol (VITAMIN D3) 2000 units TABS, Take 2,000 Units by mouth daily (Patient not taking: Reported on 9/7/2021), Disp: , Rfl:     FORTEO 600 MCG/2 4ML injection, Inject under the skin daily , Disp: , Rfl:     methocarbamol (ROBAXIN) 500 mg tablet, Take 1 tablet (500 mg total) by mouth 4 (four) times a day as needed for muscle spasms, Disp: 30 tablet, Rfl: 0    omeprazole (PriLOSEC OTC) 20 MG tablet, , Disp: , Rfl:     pantoprazole (PROTONIX) 40 mg tablet, Take 1 tablet (40 mg total) by mouth daily, Disp: 30 tablet, Rfl: 3    Current Allergies     Allergies as of 02/10/2022 - Reviewed 02/10/2022   Allergen Reaction Noted    Sulfa antibiotics  08/26/2017    Doxycycline Rash 08/26/2017            The following portions of the patient's history were reviewed and updated as appropriate: allergies, current medications, past family history, past medical history, past social history, past surgical history and problem list      Past Medical History:   Diagnosis Date    GERD (gastroesophageal reflux disease)     Osteoporosis     Parkinson disease ([facility] [address] )        Past Surgical History:   Procedure Laterality Date    ARTERIOGRAM N/A 7/10/2019 Procedure: KYPHOPLASTY;  Surgeon: Nathan Ovalle MD;  [facility];  Service: Interventional Radiology    IR [address]  7/10/2019    TONSILLECTOMY         Family History   Problem Relation Age of Onset    Dementia Mother     Heart disease Father     No Known Problems Daughter     No Known Problems Maternal Grandmother     No Known Problems Maternal Grandfather     No Known Problems Paternal Grandmother     Prostate cancer Paternal Grandfather     No Known Problems Daughter     No Known Problems Daughter     Alcohol abuse Neg Hx     Mental illness Neg Hx     Substance Abuse Neg Hx     Depression Neg Hx          Medications have been verified  Objective   /66   Pulse 77   Temp 98 °F (36 7 °C) (Temporal)   Resp 18   Ht 5' 3" (1 6 m)   Wt 63 5 kg (140 lb)   BMI 24 80 kg/m²   No LMP recorded  Patient is postmenopausal        Physical Exam     Physical Exam  Constitutional:       Appearance: She is not ill-appearing or diaphoretic  Musculoskeletal:         General: Tenderness (with palpation of the left side of the neck, and at the base of the skull B/L  Lateral movement of the neck with increase in pain with rightward movement ) present  No swelling or deformity  Skin:     Coloration: Skin is not jaundiced  Findings: No bruising or erythema

## 2022-02-11 ENCOUNTER — TELEPHONE (OUTPATIENT)
Dept: OBGYN CLINIC | Facility: MEDICAL CENTER | Age: 72
End: 2022-02-11

## 2022-02-14 ENCOUNTER — OFFICE VISIT (OUTPATIENT)
Dept: INTERNAL MEDICINE CLINIC | Facility: CLINIC | Age: 72
End: 2022-02-14
Payer: MEDICARE

## 2022-02-14 VITALS
HEART RATE: 88 BPM | TEMPERATURE: 98.1 F | OXYGEN SATURATION: 98 % | SYSTOLIC BLOOD PRESSURE: 124 MMHG | DIASTOLIC BLOOD PRESSURE: 80 MMHG | HEIGHT: 63 IN | BODY MASS INDEX: 24.91 KG/M2 | WEIGHT: 140.6 LBS

## 2022-02-14 DIAGNOSIS — M54.12 CERVICAL RADICULOPATHY: Primary | ICD-10-CM

## 2022-02-14 DIAGNOSIS — G20 PARKINSON'S DISEASE (HCC): ICD-10-CM

## 2022-02-14 PROCEDURE — 99213 OFFICE O/P EST LOW 20 MIN: CPT | Performed by: INTERNAL MEDICINE

## 2022-02-14 RX ORDER — GABAPENTIN 100 MG/1
100 CAPSULE ORAL 3 TIMES DAILY
Qty: 90 CAPSULE | Refills: 0 | Status: SHIPPED | OUTPATIENT
Start: 2022-02-14 | End: 2022-03-10 | Stop reason: SDUPTHER

## 2022-02-14 RX ORDER — OMEPRAZOLE 20 MG/1
20 CAPSULE, DELAYED RELEASE ORAL DAILY
COMMUNITY
End: 2022-03-10

## 2022-02-14 RX ORDER — LORAZEPAM 1 MG/1
TABLET ORAL
Qty: 2 TABLET | Refills: 0 | Status: SHIPPED | OUTPATIENT
Start: 2022-02-14 | End: 2022-04-18 | Stop reason: ALTCHOICE

## 2022-02-14 NOTE — PROGRESS NOTES
Assessment/Plan:    Cervical radiculopathy  Ongoing pain, will stop physical therapy until pain subsides  May continue ibuprofen, stopped meloxicam since ibuprofen worked better  Did not take muscle relaxant due to drug interactions  Trial of gabapentin tid, do not take with Azilect  Keep appointment with pain management  MRI ordered, lorazepam prescribed since unable to stay still due to PD  Parkinson's disease ([facility] [address] )  On amantadine, Sinemet and Azilect  Per neurology  Diagnoses and all orders for this visit:    Cervical radiculopathy  -     gabapentin (Neurontin) 100 mg capsule; Take 1 capsule (100 mg total) by mouth 3 (three) times a day  -     MRI cervical spine wo contrast; Future  -     LORazepam (ATIVAN) 1 mg tablet; Take 1 tablet 2 hours prior to MRI, may repeat after 2 hours  Parkinson's disease ([facility] [address] )    Other orders  -     omeprazole (PriLOSEC) 20 mg delayed release capsule; Take 20 mg by mouth daily      Follow up as scheduled or as needed  Subjective:      Patient ID: Clement Calderon is a 80 y o  female c/o neck pain  She complains of more frequent neck pain, especially on the left side  Pain occasionally radiating down her shoulders and back  She has been taking ibuprofen daily  She went to urgent care last week, repeat x-rays done  She was prescribed meloxicam   She tried this for a few days but felt ibuprofen works better  She is unable to take the muscle relaxant due to drug interactions with her Parkinson's medications  She reports pain is worse especially when she moves or with certain positions  She does have an appointment with pain management but not until next month  The following portions of the patient's history were reviewed and updated as appropriate: allergies, current medications, past medical history, past social history and problem list     Review of Systems   Constitutional: Negative for activity change and appetite change     Musculoskeletal: Positive for arthralgias, gait problem, neck pain and neck stiffness  Neurological: Positive for tremors and weakness  Negative for dizziness and headaches  Objective:      /80   Pulse 88   Temp 98 1 °F (36 7 °C)   Ht 5' 3" (1 6 m)   Wt 63 8 kg (140 lb 9 6 oz)   SpO2 98%   BMI 24 91 kg/m²          Physical Exam  Constitutional:       General: She is not in acute distress  Appearance: Normal appearance  She is not ill-appearing  HENT:      Head: Normocephalic and atraumatic  Musculoskeletal:      Cervical back: Spasms and tenderness present  Pain with movement present  Thoracic back: No spasms or tenderness  Neurological:      Mental Status: She is alert  Motor: Tremor present

## 2022-02-14 NOTE — ASSESSMENT & PLAN NOTE
Ongoing pain, will stop physical therapy until pain subsides  May continue ibuprofen, stopped meloxicam since ibuprofen worked better  Did not take muscle relaxant due to drug interactions  Trial of gabapentin tid, do not take with Azilect  Keep appointment with pain management  MRI ordered, lorazepam prescribed since unable to stay still due to PD

## 2022-02-15 ENCOUNTER — CONSULT (OUTPATIENT)
Dept: PAIN MEDICINE | Facility: CLINIC | Age: 72
End: 2022-02-15
Payer: MEDICARE

## 2022-02-15 ENCOUNTER — TELEPHONE (OUTPATIENT)
Dept: NEUROLOGY | Facility: CLINIC | Age: 72
End: 2022-02-15

## 2022-02-15 VITALS — BODY MASS INDEX: 25.34 KG/M2 | HEIGHT: 63 IN | WEIGHT: 143 LBS

## 2022-02-15 DIAGNOSIS — M54.2 NECK PAIN: Primary | ICD-10-CM

## 2022-02-15 DIAGNOSIS — M50.30 DDD (DEGENERATIVE DISC DISEASE), CERVICAL: ICD-10-CM

## 2022-02-15 DIAGNOSIS — M54.12 CERVICAL RADICULITIS: ICD-10-CM

## 2022-02-15 DIAGNOSIS — M47.812 CERVICAL SPONDYLOSIS: ICD-10-CM

## 2022-02-15 PROCEDURE — 99204 OFFICE O/P NEW MOD 45 MIN: CPT | Performed by: ANESTHESIOLOGY

## 2022-02-15 RX ORDER — METHYLPREDNISOLONE 4 MG/1
TABLET ORAL
Qty: 1 EACH | Refills: 0 | Status: SHIPPED | OUTPATIENT
Start: 2022-02-15 | End: 2022-04-18

## 2022-02-15 NOTE — PROGRESS NOTES
Assessment  1  Neck pain    2  Cervical radiculitis    3  DDD (degenerative disc disease), cervical    4  Cervical spondylosis        Plan  78-year-old female with a history of Parkinson's disease, referred by Dr Tim Hall, presenting for initial consultation regarding left-sided neck pain for the past 6 months  She denies any specific trauma or inciting event  X-ray of the cervical spine shows degenerative disc disease and spondylosis  MRI of the lumbar spine was ordered by PCP and is scheduled to be completed within a few weeks  She did try meloxicam, but finds ibuprofen more effective  She did not try a muscle relaxants secondary to interaction with Parkinson's medication  She does take gabapentin 100 mg b i d  but has only been on this dose for a day or so  She has not notice much relief with this  She had tried physical therapy, however this seem to only worsened her pain  The patient's neck pain seems to be mostly myofascial and facet mediated nature  There may be a radicular component although reflexes and strength are preserved in the upper extremities  1  The patient will proceed with MRI of cervical spine as scheduled   2  I will prescribe a Medrol Dosepak for pain relief and she should avoid any NSAIDs while on corticosteroids  She may resume NSAIDs once corticosteroids are completed  3  Patient will continue with her home exercise program  4  I advised the patient to increase her gabapentin to 300 mg q h s  for neuropathic complaints  5  I will follow up the patient in 6 weeks     My impressions and treatment recommendations were discussed in detail with the patient who verbalized understanding and had no further questions  Discharge instructions were provided  I personally saw and examined the patient and I agree with the above discussed plan of care  No orders of the defined types were placed in this encounter      New Medications Ordered This Visit   Medications    methylPREDNISolone 4 MG tablet therapy pack     Sig: Use as directed on package     Dispense:  1 each     Refill:  0       History of Present Illness    Syed Rosario is a 80 y o  female with a history of Parkinson's disease, referred by Dr Jeronimo Franklin, presenting for initial consultation regarding left-sided neck pain for the past 6 months  She denies any specific trauma or inciting event  She denies any pain radiating further into her arms  She denies any numbness, paresthesias, or weakness into the arms  She denies any bladder or bowel incontinence or balance issues  X-ray of the cervical spine shows degenerative disc disease and spondylosis  MRI of the lumbar spine was ordered by PCP and is scheduled to be completed within a few weeks  She did try meloxicam, but finds ibuprofen more effective  She did not try a muscle relaxants secondary to interaction with Parkinson's medication  She does take gabapentin 100 mg b i d  but has only been on this dose for a day or so  She has not notice much relief with this  She had tried physical therapy, however this seem to only worsened her pain  The patient rates her pain severe and nearly constant  The pain is worse in the evening  The pain is described as sharp, throbbing, dull, aching, and pressure  The pain is increased with bending her neck and alleviated with relaxation  Other than as stated above, the patient denies any interval changes in medications, medical condition, mental condition, symptoms, or allergies since the last office visit  I have personally reviewed and/or updated the patient's past medical history, past surgical history, family history, social history, current medications, allergies, and vital signs today       Review of Systems    Patient Active Problem List   Diagnosis    Anxiety state    Esophagitis, reflux    Hyperlipidemia    Mild cognitive impairment    Localized osteoporosis with current pathological fracture with routine healing    Vitamin D deficiency    Torticollis    REM sleep behavior disorder    Parkinson's disease ([address] )    Abnormal thyroid function test    Back pain    Ambulatory dysfunction    Compression fracture of T12 first lumbar vertebra (HCC)    Hearing loss, bilateral    Abnormal finding on imaging    Stress fracture of femur    Cervical radiculopathy    Neck pain    Cervical spondylosis       Past Medical History:   Diagnosis Date    GERD (gastroesophageal reflux disease)     Osteoporosis     Parkinson disease ([facility] [address] )        Past Surgical History:   Procedure Laterality Date    ARTERIOGRAM N/A 7/10/2019    Procedure: KYPHOPLASTY;  Surgeon: Vandana Cornell MD;  [facility]: [facility];  Service: Interventional Radiology    IR KYPHOPLASTY/VERTEBROPLASTY  7/10/2019    TONSILLECTOMY         Family History   Problem Relation Age of Onset    Dementia Mother     Heart disease Father     No Known Problems Daughter     No Known Problems Maternal Grandmother     No Known Problems Maternal Grandfather     No Known Problems Paternal Grandmother     Prostate cancer Paternal Grandfather     No Known Problems Daughter     No Known Problems Daughter     Alcohol abuse Neg Hx     Mental illness Neg Hx     Substance Abuse Neg Hx     Depression Neg Hx        Social History     Occupational History    Not on file   Tobacco Use    Smoking status: Never Smoker    Smokeless tobacco: Never Used   Vaping Use    Vaping Use: Never used   Substance and Sexual Activity    Alcohol use: Not Currently     Comment: Rare    Drug use: Never    Sexual activity: Not on file       Current Outpatient Medications on File Prior to Visit   Medication Sig    amantadine (SYMMETREL) 100 mg capsule Take 1tab in the AM and 1tab at noon (may further increase to 1tab tid)    Calcium Carbonate-Vit D-Min (CALCIUM 1200 PO) Take 1,200 mg by mouth daily    carbidopa-levodopa (SINEMET CR)  mg per tablet Take 1tab qhs    carbidopa-levodopa-entacapone (STALEVO) 12 5- MG per tablet Take 1 tablet by mouth 6 (six) times a day    denosumab (Prolia) 60 mg/mL     famotidine (PEPCID) 20 mg tablet TAKE 2 TABLETS (40 MG TOTAL) BY MOUTH DAILY AT BEDTIME    gabapentin (Neurontin) 100 mg capsule Take 1 capsule (100 mg total) by mouth 3 (three) times a day    LORazepam (ATIVAN) 1 mg tablet Take 1 tablet 2 hours prior to MRI, may repeat after 2 hours   meloxicam (Mobic) 15 mg tablet Take 1 tablet (15 mg total) by mouth daily (Patient taking differently: Take 15 mg by mouth as needed  )    rasagiline (AZILECT) 1 MG Take 1 tablet (1 mg total) by mouth daily    rOPINIRole (REQUIP XL) 8 MG 24 hr tablet Take 1 tablet (8 mg total) by mouth daily    omeprazole (PriLOSEC) 20 mg delayed release capsule Take 20 mg by mouth daily (Patient not taking: Reported on 2/15/2022 )     No current facility-administered medications on file prior to visit  Allergies   Allergen Reactions    Sulfa Antibiotics      Other reaction(s): family history - anaphylaxis  Category: Allergy;     Doxycycline Rash     Category: Allergy; Physical Exam    Ht 5' 3" (1 6 m)   Wt 64 9 kg (143 lb)   BMI 25 33 kg/m²     Constitutional: normal, well developed, well nourished, alert, in no distress and non-toxic and no overt pain behavior  Eyes: anicteric  HEENT: grossly intact  Neck: supple, symmetric, trachea midline and no masses   Pulmonary:even and unlabored  Cardiovascular:No edema or pitting edema present  Skin:Normal without rashes or lesions and well hydrated  Psychiatric:Mood and affect appropriate  Neurologic:Cranial Nerves II-XII grossly intact  Musculoskeletal:normal gait  Left cervical paraspinals and trapezius tender to palpation ropy in texture  Full range of motion of cervical spine in all planes  Bilateral biceps, triceps, and brachioradialis reflexes were 2/4 and symmetrical   Negative Blanca's reflex bilaterally    Bilateral upper extremity strength 5/5 in all muscle groups  Sensation intact to light touch in C5 through T1 dermatomes bilaterally  Negative Spurling's bilaterally  Imaging  CERVICAL SPINE     INDICATION:   M54 2: Cervicalgia      COMPARISON:  Cervical spine x-rays 8/23/2021     VIEWS:  XR SPINE CERVICAL COMPLETE 4 OR 5 VW NON INJURY         FINDINGS:     No fracture       There is curvature of the cervical spine to the right  No subluxation      Scattered mild degenerative changes  Scattered facet arthritic changes bilaterally       Mild neural foraminal narrowing suggested bilaterally extending from C3 to C6/C7      The prevertebral soft tissues are within normal limits        The lung apices are clear      IMPRESSION:     No acute osseous abnormality      Degenerative changes as above

## 2022-02-15 NOTE — TELEPHONE ENCOUNTER
Margarita Jones calling to inform that PA required for Gocovri  Bejarano: Ciaran Herrera    PA initiated

## 2022-02-15 NOTE — PATIENT INSTRUCTIONS
Methylprednisolone (By mouth)   Methylprednisolone (meth-il-pred-NIS-oh-lone)  Treats inflammation, severe allergies, flare-ups of ongoing illnesses, and many other medical problems  May also be used to decrease some symptoms of cancer  This medicine is a corticosteroid  Brand Name(s): Medrol, Medrol Dosepak, Methylpred-DP   There may be other brand names for this medicine  When This Medicine Should Not Be Used: This medicine is not right for everyone  Do not use it if you had an allergic reaction to methylprednisolone, or if you have a fungus infection  How to Use This Medicine:   Tablet  · Take your medicine as directed  Your dose may need to be changed several times to find what works best for you  · If you are using this medicine for an ongoing illness, your dose may need to be changed occasionally  Some people take this medicine only every other day, which helps to decrease side effects  · Take your medicine in the morning, unless your doctor tells you otherwise  · It is best to take this medicine with food or milk  · Missed dose: Take a dose as soon as you remember  If it is almost time for your next dose, wait until then and take a regular dose  Do not take extra medicine to make up for a missed dose  · Store the medicine in a closed container at room temperature, away from heat, moisture, and direct light  Drugs and Foods to Avoid:   Ask your doctor or pharmacist before using any other medicine, including over-the-counter medicines, vitamins, and herbal products  · Some medicines can affect how methylprednisolone works  Tell your doctor if you are using any of the following:  ? Cyclosporine, ketoconazole, phenobarbital, phenytoin, rifampin, troleandomycin  ? Aspirin, especially in high doses  ? Blood thinner (including warfarin)  ? Diabetes medicine  · This medicine may interfere with vaccines  Ask your doctor before you get a flu shot or any other vaccines    Warnings While Using This Medicine: · Tell your doctor if you are pregnant or breastfeeding, or if you have kidney disease, liver disease (including cirrhosis), adrenal gland problems, heart failure, high blood pressure, diabetes, osteoporosis, blood clotting problems, thyroid problems, mental health problems (including depression), myasthenia gravis, or stomach or bowel problems (including ulcer or diverticulitis)  Tell your doctor if you have an infection (including herpes eye infection, tuberculosis, or threadworm)  Also tell your doctor if you have a recent exposure to chickenpox or measles  · This medicine may cause the following problems:  ? Increased risk of infection  ? Changes in mood or behavior  ? High blood pressure  ? Adrenal gland problems  ? Eye problems or changes in vision (including cataracts or glaucoma)  ? Bone problems (including osteoporosis)  ? Slow growth in children  ? Increased risk for cancer (including Kaposi's sarcoma)  · If you use this medicine for a long time, tell your doctor about any extra stress or anxiety in your life, including other health concerns and emotional stress  Your dose might need to be changed for a short time while you have extra stress  · Do not stop using this medicine suddenly  Your doctor will need to slowly decrease your dose before you stop it completely  · Tell any doctor or dentist who treats you that you are using this medicine  This medicine may affect certain medical test results  · Your doctor will do lab tests at regular visits to check on the effects of this medicine  Keep all appointments  · Keep all medicine out of the reach of children  Never share your medicine with anyone    Possible Side Effects While Using This Medicine:   Call your doctor right away if you notice any of these side effects:  · Allergic reaction: Itching or hives, swelling in your face or hands, swelling or tingling in your mouth or throat, chest tightness, trouble breathing  · Bone pain, decrease in height  · Dark freckles, skin color changes, coldness, weakness, tiredness, weight loss  · Depression, unusual thoughts, feelings, or behaviors, trouble sleeping  · Fever, chills, cough, sore throat, body aches  · Severe stomach pain, nausea, vomiting, or red or black stools  · Skin changes or growths  · Swelling in your hands, ankles, or feet, rapid weight gain  · Trouble seeing, blurred vision or other changes in vision, eye pain, headache  · Unusual bleeding or bruising  If you notice these less serious side effects, talk with your doctor:   · Increased appetite  · Round, puffy face  · Weight gain around your neck, upper back, breast, face, or waist  If you notice other side effects that you think are caused by this medicine, tell your doctor  Call your doctor for medical advice about side effects  You may report side effects to FDA at 1-967-FDA-8797    © Copyright [employer] 2021 Information is for End User's use only and may not be sold, redistributed or otherwise used for commercial purposes  The above information is an  only  It is not intended as medical advice for individual conditions or treatments  Talk to your doctor, nurse or pharmacist before following any medical regimen to see if it is safe and effective for you

## 2022-02-17 ENCOUNTER — TELEPHONE (OUTPATIENT)
Dept: NEUROLOGY | Facility: CLINIC | Age: 72
End: 2022-02-17

## 2022-02-17 NOTE — TELEPHONE ENCOUNTER
gocovri access support called and states that free trial of gocovri was requested by our office    She states that [facility] reached out to the pt to schedule delivery but unable to reach pt   she asked that we call pt and ask her to call crys onboard [phone]   or Owen Arciniega at [phone]    Left detailed message for pt on cell per communication consent regarding above

## 2022-02-20 ENCOUNTER — PATIENT MESSAGE (OUTPATIENT)
Dept: PAIN MEDICINE | Facility: CLINIC | Age: 72
End: 2022-02-20

## 2022-02-21 NOTE — TELEPHONE ENCOUNTER
Please inquire how much gabapentin the patient is currently taking as I did advise her to increase her gabapentin during her consultation    We may need to increase this further for better pain control

## 2022-02-21 NOTE — TELEPHONE ENCOUNTER
From: Everett Sun  To: Luisa Castro DO  Sent: 2/20/2022 4:24 PM EST  Subject: pain    Dr Radha Oswald,  I am taking all the meds you gave me and they helped in some areas but I still have SEARING   pain at what seems to be the core around  the spot behind left ear and where my upper jaw meets my skull or whatever it  meets  I was able to move my MRI up to this friday  Is there something more I can take  for the pain  I finished the prednisolone and I am taking the gabapentin plus 600mg of advil  Please advise quickly    Sherrill Gottlieb

## 2022-02-24 NOTE — TELEPHONE ENCOUNTER
Letter received from [facility] abdoulaye stating "Our records show that this is not a member that we process claims for"  New PA submitted to [facility]  Awaiting determination      Key: [id number]

## 2022-02-25 ENCOUNTER — HOSPITAL ENCOUNTER (OUTPATIENT)
Dept: RADIOLOGY | Facility: IMAGING CENTER | Age: 72
Discharge: HOME | End: 2022-02-25
Payer: MEDICARE

## 2022-02-25 DIAGNOSIS — M54.12 CERVICAL RADICULOPATHY: ICD-10-CM

## 2022-02-25 PROCEDURE — 72141 MRI NECK SPINE W/O DYE: CPT

## 2022-02-25 PROCEDURE — G1004 CDSM NDSC: HCPCS

## 2022-02-28 ENCOUNTER — TELEPHONE (OUTPATIENT)
Dept: PAIN MEDICINE | Facility: CLINIC | Age: 72
End: 2022-02-28

## 2022-02-28 ENCOUNTER — TELEPHONE (OUTPATIENT)
Dept: INTERNAL MEDICINE CLINIC | Facility: CLINIC | Age: 72
End: 2022-02-28

## 2022-02-28 NOTE — TELEPHONE ENCOUNTER
[facility] approved  "Approved  This drug has been approved under the Member's Medicare Part D benefit  Approved quantity: 60 <> per 30 day(s)  You may fill up to a 90 day supply except for those on Specialty Tier 5, which can be filled up to a 30 day supply   Please call the pharmacy to process the prescription claim  "

## 2022-02-28 NOTE — TELEPHONE ENCOUNTER
MRI showed mild stenosis in the areas causing your symptoms, left sided pain  How is your pain since we started gabapentin? We can adjust this if needed

## 2022-02-28 NOTE — TELEPHONE ENCOUNTER
Pt called in to let the doctor that the MRI was completed  Please be advised thank you    Pt can be reached @   [phone] once the results are final

## 2022-02-28 NOTE — TELEPHONE ENCOUNTER
RN attempted to contact pt  Detailed VMMLOM (ok per medical communication consent) advising that it may take up to 7 days for MRI to be read and this office will contact when results are final  Provided with CB# and OH

## 2022-02-28 NOTE — TELEPHONE ENCOUNTER
Pt aware, states pain is better since starting gabapentin, wants to wait and see what pain management suggest, ok with current dosing

## 2022-03-09 ENCOUNTER — OFFICE VISIT (OUTPATIENT)
Dept: URGENT CARE | Age: 72
End: 2022-03-09
Payer: MEDICARE

## 2022-03-09 VITALS
OXYGEN SATURATION: 99 % | RESPIRATION RATE: 18 BRPM | TEMPERATURE: 98.1 F | HEART RATE: 80 BPM | DIASTOLIC BLOOD PRESSURE: 90 MMHG | SYSTOLIC BLOOD PRESSURE: 140 MMHG

## 2022-03-09 DIAGNOSIS — H04.123 DRY EYES: Primary | ICD-10-CM

## 2022-03-09 PROCEDURE — 99213 OFFICE O/P EST LOW 20 MIN: CPT

## 2022-03-09 PROCEDURE — G0463 HOSPITAL OUTPT CLINIC VISIT: HCPCS

## 2022-03-09 RX ORDER — ERYTHROMYCIN 5 MG/G
0.5 OINTMENT OPHTHALMIC
Qty: 3.5 G | Refills: 0 | Status: SHIPPED | OUTPATIENT
Start: 2022-03-09 | End: 2022-04-18

## 2022-03-10 ENCOUNTER — OFFICE VISIT (OUTPATIENT)
Dept: PAIN MEDICINE | Facility: CLINIC | Age: 72
End: 2022-03-10
Payer: MEDICARE

## 2022-03-10 VITALS — BODY MASS INDEX: 24.8 KG/M2 | HEIGHT: 63 IN | WEIGHT: 140 LBS

## 2022-03-10 DIAGNOSIS — S32.010D COMPRESSION FRACTURE OF L1 VERTEBRA WITH ROUTINE HEALING, SUBSEQUENT ENCOUNTER: ICD-10-CM

## 2022-03-10 DIAGNOSIS — M54.2 NECK PAIN: Primary | ICD-10-CM

## 2022-03-10 DIAGNOSIS — M54.12 CERVICAL RADICULOPATHY: ICD-10-CM

## 2022-03-10 DIAGNOSIS — M47.812 CERVICAL SPONDYLOSIS: ICD-10-CM

## 2022-03-10 PROCEDURE — 99214 OFFICE O/P EST MOD 30 MIN: CPT | Performed by: NURSE PRACTITIONER

## 2022-03-10 RX ORDER — IBUPROFEN 800 MG/1
800 TABLET ORAL EVERY 8 HOURS PRN
Qty: 90 TABLET | Refills: 1 | Status: SHIPPED | OUTPATIENT
Start: 2022-03-10 | End: 2022-04-18

## 2022-03-10 RX ORDER — GABAPENTIN 100 MG/1
100 CAPSULE ORAL 3 TIMES DAILY
Qty: 90 CAPSULE | Refills: 0 | Status: SHIPPED | OUTPATIENT
Start: 2022-03-10

## 2022-03-10 NOTE — PROGRESS NOTES
Assessment:  1  Neck pain    2  Cervical radiculopathy    3  Compression fracture of L1 vertebra with routine healing, subsequent encounter    4  Cervical spondylosis        Plan:  1  Offered to schedule the patient for left cervical medial branch blocks however she declines and would like to give this further thought   2  Patient may continue ibuprofen p r n  and should not exceed more than 2400 mg daily  A prescription was provided today   3  Patient may continue gabapentin 300 mg q h s  as prescribed  This medication was refilled today   4  Patient will follow-up a p r n  basis at this time    M*Modal software was used to dictate this note  It may contain errors with dictating incorrect words or incorrect spelling  Please contact the provider directly with any questions  History of Present Illness: The patient is a 80 y o  female with a history of Parkinson's diesase last seen on 2/15/22 who presents for a follow up office visit in regards to chronic axial left sided neck pain that is non radiating into the upper extremities secondary to cervical spondylosis  The patient denies bowel or bladder incontinence or balance issues  MRI of the cervical spine reveals multilevel facet arthropathy without stenosis or nerve compression  She takes OTC advil with some relief  She also takes gabapentin 100 mg 3 times a day  She is not interested in muscle relaxants  The patient rates her pain at 5/10 on the numeric pain rating scale  She constantly has pain in the evening which is described as dull aching, sharp, cramping and pressure like    I have personally reviewed and/or updated the patient's past medical history, past surgical history, family history, social history, current medications, allergies, and vital signs today  Review of Systems:    Review of Systems   Respiratory: Negative for shortness of breath  Cardiovascular: Negative for chest pain     Gastrointestinal: Negative for constipation, diarrhea, nausea and vomiting  Musculoskeletal: Negative for arthralgias, gait problem, joint swelling and myalgias  Skin: Negative for rash  Neurological: Negative for dizziness, seizures and weakness  All other systems reviewed and are negative          Past Medical History:   Diagnosis Date    GERD (gastroesophageal reflux disease)     Osteoporosis     Parkinson disease ([facility] [address] )        Past Surgical History:   Procedure Laterality Date    ARTERIOGRAM N/A 7/10/2019    Procedure: KYPHOPLASTY;  Surgeon: Dean Mansfield MD;  [facility];  Service: Interventional Radiology    IR KYPHOPLASTY/VERTEBROPLASTY  7/10/2019    TONSILLECTOMY         Family History   Problem Relation Age of Onset    Dementia Mother     Heart disease Father     No Known Problems Daughter     No Known Problems Maternal Grandmother     No Known Problems Maternal Grandfather     No Known Problems Paternal Grandmother     Prostate cancer Paternal Grandfather     No Known Problems Daughter     No Known Problems Daughter     Alcohol abuse Neg Hx     Mental illness Neg Hx     Substance Abuse Neg Hx     Depression Neg Hx        Social History     Occupational History    Not on file   Tobacco Use    Smoking status: Never Smoker    Smokeless tobacco: Never Used   Vaping Use    Vaping Use: Never used   Substance and Sexual Activity    Alcohol use: Not Currently     Comment: Rare    Drug use: Never    Sexual activity: Not on file         Current Outpatient Medications:     gabapentin (Neurontin) 100 mg capsule, Take 1 capsule (100 mg total) by mouth 3 (three) times a day, Disp: 90 capsule, Rfl: 0    amantadine (SYMMETREL) 100 mg capsule, Take 1tab in the AM and 1tab at noon (may further increase to 1tab tid), Disp: 270 capsule, Rfl: 3    Calcium Carbonate-Vit D-Min (CALCIUM 1200 PO), Take 1,200 mg by mouth daily, Disp: , Rfl:     carbidopa-levodopa (SINEMET CR)  mg per tablet, Take 1tab qhs, Disp: 90 tablet, Rfl: 3    carbidopa-levodopa-entacapone (STALEVO) 12 5- MG per tablet, Take 1 tablet by mouth 6 (six) times a day, Disp: 540 tablet, Rfl: 3    denosumab (Prolia) 60 mg/mL, , Disp: , Rfl:     erythromycin (ILOTYCIN) ophthalmic ointment, Administer 0 5 inches to both eyes daily at bedtime, Disp: 3 5 g, Rfl: 0    famotidine (PEPCID) 20 mg tablet, TAKE 2 TABLETS (40 MG TOTAL) BY MOUTH DAILY AT BEDTIME, Disp: 180 tablet, Rfl: 1    ibuprofen (MOTRIN) 800 mg tablet, Take 1 tablet (800 mg total) by mouth every 8 (eight) hours as needed for mild pain, Disp: 90 tablet, Rfl: 1    LORazepam (ATIVAN) 1 mg tablet, Take 1 tablet 2 hours prior to MRI, may repeat after 2 hours  , Disp: 2 tablet, Rfl: 0    methylPREDNISolone 4 MG tablet therapy pack, Use as directed on package, Disp: 1 each, Rfl: 0    rasagiline (AZILECT) 1 MG, Take 1 tablet (1 mg total) by mouth daily, Disp: 90 tablet, Rfl: 3    rOPINIRole (REQUIP XL) 8 MG 24 hr tablet, Take 1 tablet (8 mg total) by mouth daily, Disp: 90 tablet, Rfl: 6    Allergies   Allergen Reactions    Sulfa Antibiotics      Other reaction(s): family history - anaphylaxis  Category: Allergy;     Doxycycline Rash     Category: Allergy; Physical Exam:    Ht 5' 3" (1 6 m)   Wt 63 5 kg (140 lb)   BMI 24 80 kg/m²     Constitutional:normal, well developed, well nourished, alert, in no distress and non-toxic and no overt pain behavior  Eyes:anicteric  HEENT:grossly intact  Neck:supple, symmetric, trachea midline and no masses   Pulmonary:even and unlabored  Cardiovascular:No edema or pitting edema present  Skin:Normal without rashes or lesions and well hydrated  Psychiatric:Mood and affect appropriate  Neurologic:Cranial Nerves II-XII grossly intact  Musculoskeletal:normal gait  Left cervical paraspinal and trapezii musculature tender palpation    Negative Spurling's bilaterally      Imaging  No orders to display     MRI CERVICAL SPINE WITHOUT CONTRAST   INDICATION: [id number] 12: Radiculopathy, cervical region  COMPARISON: X-rays of the cervical spine dated 2/10/2022   Also compared with x-rays of the thoracic spine dated 7/8/2019 and outside report for thoracic spine CT dated 5/17/2016  TECHNIQUE: Sagittal T1, sagittal T2, sagittal inversion recovery, axial   T2, axial 2D merge   IMAGE QUALITY: Limited evaluation of the neural foramen due to motion artifact on axial imaging  FINDINGS:   ALIGNMENT: No subluxation    No cervical compression  Chronic superior endplate compression fractures, mild at T3 and T4, moderate at T5 are again seen  MARROW SIGNAL: Normal marrow signal is identified within the visualized bony structures  No discrete marrow lesion  CERVICAL AND VISUALIZED THORACIC CORD: Normal signal within the visualized cord  PREVERTEBRAL AND PARASPINAL SOFT TISSUES: Normal    VISUALIZED POSTERIOR FOSSA: The visualized posterior fossa demonstrates no abnormal signal    CERVICAL DISC SPACES: Mild disc space narrowing at C4-5 and C5-C6  C2-C3: No disc herniation or canal stenosis  Facet arthropathy, left worse than right  C3-C4: No disc herniation or canal stenosis  Bilateral facet arthropathy  C4-C5: Disc osteophyte complex and facet arthropathy  Mild canal stenosis  C5-C6: Disc osteophyte complex and facet arthropathy  Mild canal stenosis  C6-C7: No disc herniation or canal stenosis  Left facet arthropathy  C7-T1: No disc herniation or canal stenosis  Mild facet arthropathy  UPPER THORACIC DISC SPACES: No disc herniation or canal stenosis  Facet arthropathy  IMPRESSION:   Mild canal stenosis at C4-5 and C5-C6  Multilevel facet arthropathy  Evaluation of the neural foramen is limited due to motion artifact  Stable chronic upper thoracic compression fractures      No orders of the defined types were placed in this encounter

## 2022-03-10 NOTE — PATIENT INSTRUCTIONS
Cervical Facet Block   WHAT YOU NEED TO KNOW:   A cervical facet block is a procedure to inject medicine at the facet joints in your cervical (neck) spine  Facet joints are found at the back of each vertebrae  WHILE YOU ARE HERE:   Before your procedure:   · Informed consent  is a legal document that explains the tests, treatments, or procedures that you may need  Informed consent means you understand what will be done and can make decisions about what you want  You give your permission when you sign the consent form  You can have someone sign this form for you if you are not able to sign it  You have the right to understand your medical care in words you know  Before you sign the consent form, understand the risks and benefits of what will be done  Make sure all your questions are answered  · Local anesthesia  is medicine injected into the skin  It is used to numb the area and dull your pain during your procedure  You may still feel pressure or pushing during the procedure  · An IV  is a small tube placed in your vein that is used to give you medicine or liquids  During your procedure:   · You will lie on your stomach, with your head and body slightly turned to the side  Your healthcare provider will insert a thin needle near your cervical spine and into the facet joint  He or she will use an x-ray with contrast liquid or a CT scan to help guide the needle  · Your provider will place the needle tip inside or just outside the facet joint and inject the medicine  The medicine may include steroids and anesthesia  Your healthcare provider may inject medicine into more than one area  · Bandages will be placed over the areas where the needles were inserted  After your procedure: You will be taken to a recovery room to rest  Healthcare providers will watch you closely for any problems  Do not get out of bed until your healthcare provider says it is okay   When healthcare providers see that you are okay, you may be able to go home  · Bandages will cover the procedure area  The bandages keep the area clean and dry to prevent infection  A healthcare provider may remove the bandages soon after your procedure to check the injection sites  · Medicines may be given to treat pain, swelling, or fever, or to prevent an infection  RISKS:   You may have bleeding at the injection site  Nerves, blood vessels, ligaments, muscles, and bones near your spine may be damaged  The medicine may spread past the facet joint and cause numbness in other areas  You may have trouble breathing  Even after you have this procedure, you may still have shoulder or back pain  You may also develop a headache from the procedure  CARE AGREEMENT:   You have the right to help plan your care  Learn about your health condition and how it may be treated  Discuss treatment options with your healthcare providers to decide what care you want to receive  You always have the right to refuse treatment  © Copyright [employer] 2022 Information is for End User's use only and may not be sold, redistributed or otherwise used for commercial purposes  All illustrations and images included in CareNotes® are the copyrighted property of [employer]  or [facility] [employer]   The above information is an  only  It is not intended as medical advice for individual conditions or treatments  Talk to your doctor, nurse or pharmacist before following any medical regimen to see if it is safe and effective for you

## 2022-03-10 NOTE — PROGRESS NOTES
[facility]        NAME: Brandie Mar is a 80 y o  female  : 1950    MRN: [id number]  DATE: 2022  TIME: 8:13 PM    Assessment and Plan   Dry eyes [T33 643]  1  Dry eyes  erythromycin (ILOTYCIN) ophthalmic ointment   Initial impression is xeropthalmia, other considerations include viral conjunctivitis and uveitis  Patient believes she may have been prescribed steroid eye drops in the past for similar complaint, and has an appointment with ophthalmology in May  At this point, will treat conservatively with antibiotic ophthalmic ointment to avoid infection, continue with lubricating eye drops  Patient Instructions   Schedule appointment with PCP as soon as possible  If unable to get appointment, or if vision changes, eye pain or worsening symptoms, seek immediate evaluation at nearest emergency department  Follow up with PCP in 3-5 days  Proceed to  ER if symptoms worsen  Chief Complaint     Chief Complaint   Patient presents with    Eye Problem     b/l burning x yesterday, denie injury          History of Present Illness       Patient is a 80 y o  female who presents for chief complaint of bilateral eye dryness and redness since yesterday  She reports that this has happened to her in the past, but is unsure of the cause  She states that there was some road work being done around her home yesterday, and wonders if some dirt or debris may have gotten in the air and irritated her eyes  She has been using OTC lubricating drops but does not recall the name  Denies eye pain, vision changes, headache, fever, rash  She does report a foreign body sensation, but denies any actual trauma or abrasion to either eye  Review of Systems   Review of Systems   Constitutional: Negative for chills and fever  HENT: Negative for congestion, ear pain, nosebleeds, postnasal drip, sinus pressure, sinus pain, sneezing, sore throat and tinnitus      Eyes: Positive for photophobia, redness and itching  Negative for pain, discharge and visual disturbance  Respiratory: Negative for cough, shortness of breath, wheezing and stridor  Cardiovascular: Negative for chest pain and palpitations  Gastrointestinal: Negative for abdominal pain and vomiting  Genitourinary: Negative for dysuria and hematuria  Musculoskeletal: Negative for arthralgias and back pain  Skin: Negative for color change and rash  Allergic/Immunologic: Negative  Negative for environmental allergies  Neurological: Negative  Negative for dizziness, seizures, syncope, facial asymmetry, light-headedness, numbness and headaches  Hematological: Negative  Psychiatric/Behavioral: Negative  All other systems reviewed and are negative  Current Medications       Current Outpatient Medications:     amantadine (SYMMETREL) 100 mg capsule, Take 1tab in the AM and 1tab at noon (may further increase to 1tab tid), Disp: 270 capsule, Rfl: 3    Calcium Carbonate-Vit D-Min (CALCIUM 1200 PO), Take 1,200 mg by mouth daily, Disp: , Rfl:     carbidopa-levodopa (SINEMET CR)  mg per tablet, Take 1tab qhs, Disp: 90 tablet, Rfl: 3    carbidopa-levodopa-entacapone (STALEVO) 12 5- MG per tablet, Take 1 tablet by mouth 6 (six) times a day, Disp: 540 tablet, Rfl: 3    denosumab (Prolia) 60 mg/mL, , Disp: , Rfl:     erythromycin (ILOTYCIN) ophthalmic ointment, Administer 0 5 inches to both eyes daily at bedtime, Disp: 3 5 g, Rfl: 0    famotidine (PEPCID) 20 mg tablet, TAKE 2 TABLETS (40 MG TOTAL) BY MOUTH DAILY AT BEDTIME, Disp: 180 tablet, Rfl: 1    gabapentin (Neurontin) 100 mg capsule, Take 1 capsule (100 mg total) by mouth 3 (three) times a day, Disp: 90 capsule, Rfl: 0    LORazepam (ATIVAN) 1 mg tablet, Take 1 tablet 2 hours prior to MRI, may repeat after 2 hours  , Disp: 2 tablet, Rfl: 0    meloxicam (Mobic) 15 mg tablet, Take 1 tablet (15 mg total) by mouth daily (Patient taking differently: Take 15 mg by mouth as needed  ), Disp: 14 tablet, Rfl: 0    methylPREDNISolone 4 MG tablet therapy pack, Use as directed on package, Disp: 1 each, Rfl: 0    omeprazole (PriLOSEC) 20 mg delayed release capsule, Take 20 mg by mouth daily (Patient not taking: Reported on 2/15/2022 ), Disp: , Rfl:     rasagiline (AZILECT) 1 MG, Take 1 tablet (1 mg total) by mouth daily, Disp: 90 tablet, Rfl: 3    rOPINIRole (REQUIP XL) 8 MG 24 hr tablet, Take 1 tablet (8 mg total) by mouth daily, Disp: 90 tablet, Rfl: 6    Current Allergies     Allergies as of 03/09/2022 - Reviewed 03/09/2022   Allergen Reaction Noted    Sulfa antibiotics  08/26/2017    Doxycycline Rash 08/26/2017            The following portions of the patient's history were reviewed and updated as appropriate: allergies, current medications, past family history, past medical history, past social history, past surgical history and problem list      Past Medical History:   Diagnosis Date    GERD (gastroesophageal reflux disease)     Osteoporosis     Parkinson disease ([facility] [address] )        Past Surgical History:   Procedure Laterality Date    ARTERIOGRAM N/A 7/10/2019    Procedure: KYPHOPLASTY;  Surgeon: Brett Simmons MD;  [facility];  Service: Interventional Radiology    IR KYPHOPLASTY/VERTEBROPLASTY  7/10/2019    TONSILLECTOMY         Family History   Problem Relation Age of Onset    Dementia Mother     Heart disease Father     No Known Problems Daughter     No Known Problems Maternal Grandmother     No Known Problems Maternal Grandfather     No Known Problems Paternal Grandmother     Prostate cancer Paternal Grandfather     No Known Problems Daughter     No Known Problems Daughter     Alcohol abuse Neg Hx     Mental illness Neg Hx     Substance Abuse Neg Hx     Depression Neg Hx          Medications have been verified          Objective   /90   Pulse 80   Temp 98 1 °F (36 7 °C)   Resp 18   SpO2 99%        Physical Exam     Physical Exam  Vitals and nursing note reviewed  Constitutional:       General: She is not in acute distress  Appearance: Normal appearance  She is not ill-appearing  HENT:      Head: Normocephalic and atraumatic  Right Ear: Tympanic membrane normal       Left Ear: Tympanic membrane normal       Nose: Nose normal  No congestion or rhinorrhea  Mouth/Throat:      Mouth: Mucous membranes are moist    Eyes:      General:         Right eye: No foreign body, discharge or hordeolum  Left eye: No foreign body, discharge or hordeolum  Extraocular Movements: Extraocular movements intact  Right eye: Normal extraocular motion and no nystagmus  Left eye: Normal extraocular motion and no nystagmus  Conjunctiva/sclera:      Right eye: Right conjunctiva is injected  No chemosis, exudate or hemorrhage  Left eye: Left conjunctiva is injected  No chemosis, exudate or hemorrhage  Pupils: Pupils are equal, round, and reactive to light  Cardiovascular:      Rate and Rhythm: Normal rate and regular rhythm  Pulses: Normal pulses  Heart sounds: Normal heart sounds  No murmur heard  No friction rub  No gallop  Pulmonary:      Effort: Pulmonary effort is normal  No respiratory distress  Breath sounds: Normal breath sounds  No stridor  No wheezing, rhonchi or rales  Abdominal:      General: Bowel sounds are normal       Palpations: Abdomen is soft  Tenderness: There is no abdominal tenderness  There is no guarding or rebound  Musculoskeletal:         General: Normal range of motion  Cervical back: Normal range of motion and neck supple  No tenderness  Skin:     General: Skin is warm and dry  Capillary Refill: Capillary refill takes less than 2 seconds  Neurological:      General: No focal deficit present  Mental Status: She is alert and oriented to person, place, and time  Cranial Nerves: No cranial nerve deficit     Psychiatric:         Mood and Affect: Mood normal          Behavior: Behavior normal

## 2022-03-11 ENCOUNTER — APPOINTMENT (OUTPATIENT)
Dept: RADIOLOGY | Facility: AMBULARY SURGERY CENTER | Age: 72
End: 2022-03-11
Attending: ORTHOPAEDIC SURGERY
Payer: MEDICARE

## 2022-03-11 ENCOUNTER — OFFICE VISIT (OUTPATIENT)
Dept: OBGYN CLINIC | Facility: CLINIC | Age: 72
End: 2022-03-11
Payer: MEDICARE

## 2022-03-11 VITALS
DIASTOLIC BLOOD PRESSURE: 71 MMHG | HEART RATE: 72 BPM | BODY MASS INDEX: 25.09 KG/M2 | HEIGHT: 63 IN | WEIGHT: 141.6 LBS | SYSTOLIC BLOOD PRESSURE: 118 MMHG

## 2022-03-11 DIAGNOSIS — M79.651 PAIN IN BOTH THIGHS: ICD-10-CM

## 2022-03-11 DIAGNOSIS — M79.651 PAIN IN BOTH THIGHS: Primary | ICD-10-CM

## 2022-03-11 DIAGNOSIS — M79.652 PAIN IN BOTH THIGHS: ICD-10-CM

## 2022-03-11 DIAGNOSIS — M79.652 PAIN IN BOTH THIGHS: Primary | ICD-10-CM

## 2022-03-11 PROCEDURE — 73552 X-RAY EXAM OF FEMUR 2/>: CPT

## 2022-03-11 PROCEDURE — 99213 OFFICE O/P EST LOW 20 MIN: CPT | Performed by: ORTHOPAEDIC SURGERY

## 2022-03-11 RX ORDER — AMANTADINE 137 MG/1
CAPSULE, COATED PELLETS ORAL
COMMUNITY
Start: 2022-03-08 | End: 2022-03-18

## 2022-03-11 NOTE — PROGRESS NOTES
Orthopedics          Toby Cortez 80 y o  female MRN: [id number]      Chief Complaint:   Follow-up for bilateral femoral stress fractures    HPI:   80 y  o female presents office regarding bilateral femoral stress fracture follow-up  Patient states he has had no pain in her bilateral thigh region is back to all activities without issues  Patient states she has started Prolia not had any issues with her bilateral femurs at this time  Patient states he is back to all activities regarding her lower extremities she denies any pain at this time over the past few months time in her bilateral lower extremities  Denies any changes numbness tingling                  Review Of Systems:   · Skin: Normal  · Neuro: See HPI  · Musculoskeletal: See HPI  · All other systems reviewed and are negative    Past Medical History:   Past Medical History:   Diagnosis Date    GERD (gastroesophageal reflux disease)     Osteoporosis     Parkinson disease ([facility] [address] )        Past Surgical History:   Past Surgical History:   Procedure Laterality Date    ARTERIOGRAM N/A 7/10/2019    Procedure: KYPHOPLASTY;  Surgeon: Asif Ruiz MD;  [facility];  Service: Interventional Radiology    IR KYPHOPLASTY/VERTEBROPLASTY  7/10/2019    TONSILLECTOMY         Family History:  Family history reviewed and non-contributory  Family History   Problem Relation Age of Onset    Dementia Mother     Heart disease Father     No Known Problems Daughter     No Known Problems Maternal Grandmother     No Known Problems Maternal Grandfather     No Known Problems Paternal Grandmother     Prostate cancer Paternal Grandfather     No Known Problems Daughter     No Known Problems Daughter     Alcohol abuse Neg Hx     Mental illness Neg Hx     Substance Abuse Neg Hx     Depression Neg Hx          Social History:  Social History     Socioeconomic History    Marital status: /Civil Union     Spouse name: None    Number of children: None    Years of education: None    Highest education level: None   Occupational History    None   Tobacco Use    Smoking status: Never Smoker    Smokeless tobacco: Never Used   Vaping Use    Vaping Use: Never used   Substance and Sexual Activity    Alcohol use: Not Currently     Comment: Rare    Drug use: Never    Sexual activity: None   Other Topics Concern    None   Social History Narrative    5 children     Social Determinants of Health     Financial Resource Strain: Not on file   Food Insecurity: Not on file   Transportation Needs: Not on file   Physical Activity: Not on file   Stress: Not on file   Social Connections: Not on file   Intimate Partner Violence: Not on file   Housing Stability: Not on file       Allergies: Allergies   Allergen Reactions    Sulfa Antibiotics      Other reaction(s): family history - anaphylaxis  Category: Allergy;     Doxycycline Rash     Category: Allergy;        Labs:  0   Lab Value Date/Time    HCT 42 7 07/23/2020 0726    HCT 40 7 01/15/2020 2053    HCT 44 7 10/05/2019 0946    HGB 14 0 07/23/2020 0726    HGB 13 7 01/15/2020 2053    HGB 14 4 10/05/2019 0946    INR 1 03 07/10/2019 0504    WBC 5 43 07/23/2020 0726    WBC 7 94 01/15/2020 2053    WBC 7 14 10/05/2019 0946       Meds:    Current Outpatient Medications:     Calcium Carbonate-Vit D-Min (CALCIUM 1200 PO), Take 1,200 mg by mouth daily, Disp: , Rfl:     carbidopa-levodopa-entacapone (STALEVO) 12 5- MG per tablet, Take 1 tablet by mouth 6 (six) times a day, Disp: 540 tablet, Rfl: 3    denosumab (Prolia) 60 mg/mL, , Disp: , Rfl:     famotidine (PEPCID) 20 mg tablet, TAKE 2 TABLETS (40 MG TOTAL) BY MOUTH DAILY AT BEDTIME, Disp: 180 tablet, Rfl: 1    gabapentin (Neurontin) 100 mg capsule, Take 1 capsule (100 mg total) by mouth 3 (three) times a day, Disp: 90 capsule, Rfl: 0    Gocovri 137 MG CP24, , Disp: , Rfl:     LORazepam (ATIVAN) 1 mg tablet, Take 1 tablet 2 hours prior to MRI, may repeat after 2 hours  , Disp: 2 tablet, Rfl: 0    rasagiline (AZILECT) 1 MG, Take 1 tablet (1 mg total) by mouth daily, Disp: 90 tablet, Rfl: 3    rOPINIRole (REQUIP XL) 8 MG 24 hr tablet, Take 1 tablet (8 mg total) by mouth daily, Disp: 90 tablet, Rfl: 6    amantadine (SYMMETREL) 100 mg capsule, Take 1tab in the AM and 1tab at noon (may further increase to 1tab tid), Disp: 270 capsule, Rfl: 3    carbidopa-levodopa (SINEMET CR)  mg per tablet, Take 1tab qhs, Disp: 90 tablet, Rfl: 3    erythromycin (ILOTYCIN) ophthalmic ointment, Administer 0 5 inches to both eyes daily at bedtime, Disp: 3 5 g, Rfl: 0    ibuprofen (MOTRIN) 800 mg tablet, Take 1 tablet (800 mg total) by mouth every 8 (eight) hours as needed for mild pain, Disp: 90 tablet, Rfl: 1    methylPREDNISolone 4 MG tablet therapy pack, Use as directed on package, Disp: 1 each, Rfl: 0      Physical Exam:     General Appearance:    Alert, cooperative, no distress, appears stated age   Head:    Normocephalic, without obvious abnormality, atraumatic   Eyes:    conjunctiva/corneas clear, both eyes        Nose:   Nares normal, septum midline, no drainage    Throat:   Lips normal; teeth and gums normal   Neck:    symmetrical, trachea midline, ;     thyroid:  no enlargement/   Back:     Symmetric, no curvature, ROM normal   Lungs:   No audible wheezing or labored breathing   Chest Wall:    No tenderness or deformity    Heart:    Regular rate and rhythm               Pulses:   2+ and symmetric all extremities   Skin:   Skin color, texture, turgor normal, no rashes or lesions   Neurologic:   normal strength, sensation and reflexes     throughout       Musculoskeletal: bilateral lower extremity  · Examination patient's right lower extremity no pain palpation of the right thigh region active hip flexion 90° no pain with resisted hip flexion adduction abduction active straight leg raise sensation intact distal pulses present  Examination patient's left lower extremity no pain palpation of the right thigh region active hip flexion 90° no pain with resisted hip flex ion adduction abduction active straight leg raise sensation intact distal pulses present negative Stinchfield test bilaterally    Radiology:   I personally reviewed the films  X-rays bilateral femur films reviewed x-rays reveal no significant changes in the lateral cortex bilateral femurs and no evidence of fracture at this time    _*_*_*_*_*_*_*_*_*_*_*_*_*_*_*_*_*_*_*_*_*_*_*_*_*_*_*_*_*_*_*_*_*_*_*_*_*_*_*_*_*    Assessment:  80 y  o female follow-up bilateral femoral stress fractures asymptomatic at this time    Plan:   · Weight bearing as tolerated  bilateral lower extremity  · Case discussed with Dr Jey Manuel  · Continue activities as tolerated  · Range of motion stretching strengthening exercises  · Follow up in 1 year's time repeat x-rays bilateral femurs  · Advised patient should she develop any pain in her thigh region she has a contact our office or present to the emergency department        Jeannie Don PA-C

## 2022-03-14 ENCOUNTER — ESTABLISHED COMPREHENSIVE EXAM (OUTPATIENT)
Dept: URBAN - METROPOLITAN AREA CLINIC 6 | Facility: CLINIC | Age: 72
End: 2022-03-14

## 2022-03-14 DIAGNOSIS — H25.13: ICD-10-CM

## 2022-03-14 DIAGNOSIS — H04.123: ICD-10-CM

## 2022-03-14 PROCEDURE — 92014 COMPRE OPH EXAM EST PT 1/>: CPT

## 2022-03-14 ASSESSMENT — VISUAL ACUITY
OS_CC: 20/100
OD_CC: 20/60

## 2022-04-02 ENCOUNTER — LAB (OUTPATIENT)
Dept: LAB | Age: 72
End: 2022-04-02
Payer: MEDICARE

## 2022-04-02 DIAGNOSIS — M84.352S STRESS FRACTURE OF LEFT FEMUR, SEQUELA: ICD-10-CM

## 2022-04-02 DIAGNOSIS — Z79.899 ENCOUNTER FOR LONG-TERM (CURRENT) USE OF MEDICATIONS: ICD-10-CM

## 2022-04-02 DIAGNOSIS — E78.49 OTHER HYPERLIPIDEMIA: Primary | ICD-10-CM

## 2022-04-02 DIAGNOSIS — G20 PARKINSON'S DISEASE (HCC): ICD-10-CM

## 2022-04-02 LAB
ALBUMIN SERPL BCP-MCNC: 4.2 G/DL (ref 3.5–5)
ALP SERPL-CCNC: 65 U/L (ref 46–116)
ALT SERPL W P-5'-P-CCNC: 12 U/L (ref 12–78)
ANION GAP SERPL CALCULATED.3IONS-SCNC: 5 MMOL/L (ref 4–13)
AST SERPL W P-5'-P-CCNC: 19 U/L (ref 5–45)
BASOPHILS # BLD AUTO: 0.07 THOUSANDS/ΜL (ref 0–0.1)
BASOPHILS NFR BLD AUTO: 1 % (ref 0–1)
BILIRUB SERPL-MCNC: 0.96 MG/DL (ref 0.2–1)
BUN SERPL-MCNC: 23 MG/DL (ref 5–25)
CALCIUM SERPL-MCNC: 9.1 MG/DL (ref 8.3–10.1)
CHLORIDE SERPL-SCNC: 109 MMOL/L (ref 100–108)
CHOLEST SERPL-MCNC: 173 MG/DL
CO2 SERPL-SCNC: 26 MMOL/L (ref 21–32)
CREAT SERPL-MCNC: 0.93 MG/DL (ref 0.6–1.3)
EOSINOPHIL # BLD AUTO: 0.31 THOUSAND/ΜL (ref 0–0.61)
EOSINOPHIL NFR BLD AUTO: 5 % (ref 0–6)
ERYTHROCYTE [DISTWIDTH] IN BLOOD BY AUTOMATED COUNT: 12 % (ref 11.6–15.1)
EST. AVERAGE GLUCOSE BLD GHB EST-MCNC: 111 MG/DL
GFR SERPL CREATININE-BSD FRML MDRD: 62 ML/MIN/1.73SQ M
GLUCOSE P FAST SERPL-MCNC: 100 MG/DL (ref 65–99)
HBA1C MFR BLD: 5.5 %
HCT VFR BLD AUTO: 41 % (ref 34.8–46.1)
HDLC SERPL-MCNC: 72 MG/DL
HGB BLD-MCNC: 13.4 G/DL (ref 11.5–15.4)
IMM GRANULOCYTES # BLD AUTO: 0.01 THOUSAND/UL (ref 0–0.2)
IMM GRANULOCYTES NFR BLD AUTO: 0 % (ref 0–2)
LDLC SERPL CALC-MCNC: 86 MG/DL (ref 0–100)
LYMPHOCYTES # BLD AUTO: 1.21 THOUSANDS/ΜL (ref 0.6–4.47)
LYMPHOCYTES NFR BLD AUTO: 21 % (ref 14–44)
MCH RBC QN AUTO: 30.9 PG (ref 26.8–34.3)
MCHC RBC AUTO-ENTMCNC: 32.7 G/DL (ref 31.4–37.4)
MCV RBC AUTO: 95 FL (ref 82–98)
MONOCYTES # BLD AUTO: 0.5 THOUSAND/ΜL (ref 0.17–1.22)
MONOCYTES NFR BLD AUTO: 9 % (ref 4–12)
NEUTROPHILS # BLD AUTO: 3.81 THOUSANDS/ΜL (ref 1.85–7.62)
NEUTS SEG NFR BLD AUTO: 64 % (ref 43–75)
NONHDLC SERPL-MCNC: 101 MG/DL
NRBC BLD AUTO-RTO: 0 /100 WBCS
PLATELET # BLD AUTO: 193 THOUSANDS/UL (ref 149–390)
PMV BLD AUTO: 10.9 FL (ref 8.9–12.7)
POTASSIUM SERPL-SCNC: 3.7 MMOL/L (ref 3.5–5.3)
PROT SERPL-MCNC: 7.1 G/DL (ref 6.4–8.2)
RBC # BLD AUTO: 4.33 MILLION/UL (ref 3.81–5.12)
SODIUM SERPL-SCNC: 140 MMOL/L (ref 136–145)
TRIGL SERPL-MCNC: 73 MG/DL
VIT B12 SERPL-MCNC: 159 PG/ML (ref 100–900)
WBC # BLD AUTO: 5.91 THOUSAND/UL (ref 4.31–10.16)

## 2022-04-02 PROCEDURE — 80053 COMPREHEN METABOLIC PANEL: CPT

## 2022-04-02 PROCEDURE — 36415 COLL VENOUS BLD VENIPUNCTURE: CPT | Performed by: INTERNAL MEDICINE

## 2022-04-02 PROCEDURE — 83036 HEMOGLOBIN GLYCOSYLATED A1C: CPT | Performed by: INTERNAL MEDICINE

## 2022-04-02 PROCEDURE — 82607 VITAMIN B-12: CPT

## 2022-04-02 PROCEDURE — 80061 LIPID PANEL: CPT

## 2022-04-02 PROCEDURE — 85025 COMPLETE CBC W/AUTO DIFF WBC: CPT

## 2022-04-18 ENCOUNTER — OFFICE VISIT (OUTPATIENT)
Dept: INTERNAL MEDICINE CLINIC | Facility: CLINIC | Age: 72
End: 2022-04-18
Payer: MEDICARE

## 2022-04-18 VITALS
SYSTOLIC BLOOD PRESSURE: 122 MMHG | TEMPERATURE: 96.6 F | WEIGHT: 131.4 LBS | BODY MASS INDEX: 23.28 KG/M2 | DIASTOLIC BLOOD PRESSURE: 78 MMHG | OXYGEN SATURATION: 96 % | HEIGHT: 63 IN | HEART RATE: 81 BPM

## 2022-04-18 DIAGNOSIS — M80.80XD LOCALIZED OSTEOPOROSIS WITH CURRENT PATHOLOGICAL FRACTURE WITH ROUTINE HEALING: ICD-10-CM

## 2022-04-18 DIAGNOSIS — E78.49 OTHER HYPERLIPIDEMIA: ICD-10-CM

## 2022-04-18 DIAGNOSIS — E53.8 VITAMIN B12 DEFICIENCY: Primary | ICD-10-CM

## 2022-04-18 DIAGNOSIS — Z71.9 HEALTH COUNSELING: ICD-10-CM

## 2022-04-18 DIAGNOSIS — E55.9 VITAMIN D DEFICIENCY: ICD-10-CM

## 2022-04-18 DIAGNOSIS — G20 PARKINSON'S DISEASE (HCC): ICD-10-CM

## 2022-04-18 DIAGNOSIS — M84.353S: ICD-10-CM

## 2022-04-18 PROBLEM — M54.2 NECK PAIN: Status: RESOLVED | Noted: 2022-02-15 | Resolved: 2022-04-18

## 2022-04-18 PROBLEM — F41.1 ANXIETY STATE: Status: RESOLVED | Noted: 2018-01-03 | Resolved: 2022-04-18

## 2022-04-18 PROBLEM — R93.89 ABNORMAL FINDING ON IMAGING: Status: RESOLVED | Noted: 2020-07-28 | Resolved: 2022-04-18

## 2022-04-18 PROCEDURE — 96372 THER/PROPH/DIAG INJ SC/IM: CPT

## 2022-04-18 PROCEDURE — 99214 OFFICE O/P EST MOD 30 MIN: CPT

## 2022-04-18 RX ORDER — PREDNISOLONE ACETATE 10 MG/ML
SUSPENSION/ DROPS OPHTHALMIC
COMMUNITY
Start: 2022-03-28

## 2022-04-18 RX ORDER — CYANOCOBALAMIN 1000 UG/ML
INJECTION INTRAMUSCULAR; SUBCUTANEOUS
Qty: 10 ML | Refills: 0 | Status: SHIPPED | OUTPATIENT
Start: 2022-04-18 | End: 2022-07-14

## 2022-04-18 RX ORDER — CYANOCOBALAMIN 1000 UG/ML
1000 INJECTION INTRAMUSCULAR; SUBCUTANEOUS
Status: SHIPPED | OUTPATIENT
Start: 2022-04-18

## 2022-04-18 RX ADMIN — CYANOCOBALAMIN 1000 MCG: 1000 INJECTION INTRAMUSCULAR; SUBCUTANEOUS at 11:29

## 2022-04-18 NOTE — PROGRESS NOTES
Assessment/Plan:    Parkinson's disease ([facility] )  Symptoms improved on Stalevo, Azilect and Gocovri  Per neurology  Cervical radiculopathy  Stable, taking gabapentin qHS  Limit NSAID use  Follow up with pain  Localized osteoporosis with current pathological fracture with routine healing  On Prolia  Per Rheum  Vitamin B12 deficiency  B12 injection today, continue q2 weeks x 4 doses then monthly x 6 doses  Start oral supplement also  Hyperlipidemia  Lipids improved, not on medication  Stress fracture of femur  Recent x-rays reviewed, saw Ortho  Fracture continues to heal     Esophagitis, reflux  Intermittent symptoms  Anxiety state  Resolved  Vitamin D deficiency  Taking D3 daily  Diagnoses and all orders for this visit:    Vitamin B12 deficiency  -     cyanocobalamin injection 1,000 mcg  -     cyanocobalamin (VITAMIN B-12) 1000 MCG tablet; Take 1 tablet (1,000 mcg total) by mouth daily  -     cyanocobalamin 1,000 mcg/mL; Inject 1 ml every 2 weeks x 4 doses then monthly x 6 doses  -     Vitamin B12; Future    Other hyperlipidemia  -     Comprehensive metabolic panel; Future  -     Lipid panel; Future    Localized osteoporosis with current pathological fracture with routine healing    Parkinson's disease ([place] [address] )    Stress fracture of femur, unspecified laterality, sequela    Vitamin D deficiency    Health counseling  Comments:  Mammogram scheduled  Other orders  -     prednisoLONE acetate (PRED FORTE) 1 % ophthalmic suspension; INSTILL 1 DROP INTO BOTH EYES FOUR TIMES A DAY USE 1-2 WEEKS ONLY 3-4 TIMES A DAY      Follow up in 6 months or as needed  Subjective:      Patient ID: Mercedes Duke is a 80 y o  female here for a follow up  She reports doing well     she continues to experience intermittent left-sided neck pain  She has been taking gabapentin 300 mg every night  She prefers to stop taking this, does not want to take additional doses during the day    She does have an appointment with pain management, did receive some injections which has helped with her pain  She had a bad case of dry eyes several weeks ago, not using eyedrops  She does have a follow-up appointment with her eye doctor  Her bone density is scheduled, received third dose of Prolia recently  She since stopping Sinemet and switching to Stalevo, she feels much better, feels it has less side effects  The following portions of the patient's history were reviewed and updated as appropriate: allergies, current medications, past medical history, past social history and problem list     Review of Systems   Constitutional: Negative for activity change, appetite change and fatigue  HENT: Negative for congestion, ear pain and postnasal drip  Eyes: Negative for visual disturbance  Respiratory: Negative for cough and shortness of breath  Cardiovascular: Negative for chest pain and leg swelling  Gastrointestinal: Negative for abdominal pain, constipation and diarrhea  Genitourinary: Negative for dysuria, frequency and urgency  Musculoskeletal: Positive for gait problem, neck pain and neck stiffness  Negative for arthralgias and myalgias  Skin: Negative for rash and wound  Neurological: Negative for dizziness, numbness and headaches  Psychiatric/Behavioral: Negative for confusion and dysphoric mood  The patient is not nervous/anxious  Objective:      /78   Pulse 81   Temp (!) 96 6 °F (35 9 °C)   Ht 5' 3" (1 6 m)   Wt 59 6 kg (131 lb 6 4 oz)   SpO2 96%   BMI 23 28 kg/m²          Physical Exam  Vitals and nursing note reviewed  Constitutional:       General: She is not in acute distress  Appearance: She is well-developed  HENT:      Head: Normocephalic and atraumatic  Eyes:      Pupils: Pupils are equal, round, and reactive to light  Cardiovascular:      Rate and Rhythm: Normal rate and regular rhythm  Heart sounds: Normal heart sounds     Pulmonary: Effort: Pulmonary effort is normal       Breath sounds: Normal breath sounds  No wheezing  Abdominal:      General: Bowel sounds are normal       Palpations: Abdomen is soft  Musculoskeletal:         General: No swelling  Right lower leg: No edema  Left lower leg: No edema  Skin:     General: Skin is warm  Findings: No rash  Neurological:      General: No focal deficit present  Mental Status: She is alert and oriented to person, place, and time  Motor: Tremor and abnormal muscle tone present  Psychiatric:         Mood and Affect: Mood and affect normal  Mood is not anxious or depressed  Behavior: Behavior normal            Labs & imaging results reviewed with patient

## 2022-04-18 NOTE — ASSESSMENT & PLAN NOTE
B12 injection today, continue q2 weeks x 4 doses then monthly x 6 doses  Start oral supplement also

## 2022-05-03 ENCOUNTER — ESTABLISHED (OUTPATIENT)
Dept: URBAN - METROPOLITAN AREA CLINIC 6 | Facility: CLINIC | Age: 72
End: 2022-05-03

## 2022-05-03 DIAGNOSIS — H04.123: ICD-10-CM

## 2022-05-03 DIAGNOSIS — H25.13: ICD-10-CM

## 2022-05-03 PROCEDURE — 92012 INTRM OPH EXAM EST PATIENT: CPT

## 2022-05-03 ASSESSMENT — TONOMETRY
OD_IOP_MMHG: 16
OS_IOP_MMHG: 15

## 2022-05-03 ASSESSMENT — VISUAL ACUITY
OD_CC: 20/70
OS_CC: 20/50+1

## 2022-05-05 DIAGNOSIS — E53.8 VITAMIN B12 DEFICIENCY: Primary | ICD-10-CM

## 2022-05-06 ENCOUNTER — HOSPITAL ENCOUNTER (OUTPATIENT)
Dept: RADIOLOGY | Age: 72
Discharge: HOME | End: 2022-05-06
Payer: MEDICARE

## 2022-05-06 VITALS — HEIGHT: 61 IN | WEIGHT: 131.4 LBS | BODY MASS INDEX: 24.81 KG/M2

## 2022-05-06 DIAGNOSIS — Z12.31 ENCOUNTER FOR SCREENING MAMMOGRAM FOR MALIGNANT NEOPLASM OF BREAST: ICD-10-CM

## 2022-05-06 DIAGNOSIS — M80.00XG AGE-RELATED OSTEOPOROSIS WITH CURRENT PATHOLOGICAL FRACTURE WITH DELAYED HEALING: ICD-10-CM

## 2022-05-06 DIAGNOSIS — Z12.31 ENCOUNTER FOR SCREENING MAMMOGRAM FOR BREAST CANCER: ICD-10-CM

## 2022-05-06 PROCEDURE — 77063 BREAST TOMOSYNTHESIS BI: CPT

## 2022-05-06 PROCEDURE — 77067 SCR MAMMO BI INCL CAD: CPT

## 2022-05-06 PROCEDURE — 77080 DXA BONE DENSITY AXIAL: CPT

## 2022-05-17 DIAGNOSIS — G20 PARKINSON'S DISEASE (HCC): ICD-10-CM

## 2022-05-17 RX ORDER — ROPINIROLE 8 MG/1
TABLET, FILM COATED, EXTENDED RELEASE ORAL
Qty: 90 TABLET | Refills: 6 | Status: SHIPPED | OUTPATIENT
Start: 2022-05-17 | End: 2022-07-11 | Stop reason: SDUPTHER

## 2022-05-23 ENCOUNTER — TELEPHONE (OUTPATIENT)
Dept: INTERNAL MEDICINE CLINIC | Facility: CLINIC | Age: 72
End: 2022-05-23

## 2022-05-23 DIAGNOSIS — R39.9 URINARY SYMPTOM OR SIGN: Primary | ICD-10-CM

## 2022-05-23 NOTE — TELEPHONE ENCOUNTER
Pt's daughter would like her tested for a UTI  She is seeing some cognitive decline and frequency with urination  No burning or pain with urination  Symptoms started about a week ago  Pt is also having a hard time giving herself B12 injections  She's unable to come in to the office due to transportation issues  Are there any other options such as traveling nurse?

## 2022-05-23 NOTE — TELEPHONE ENCOUNTER
UA ordered, can give sample at anytime  You can ask your daughter if she is willing to give you the B12 injections  No travel / home nurse available for that

## 2022-05-24 ENCOUNTER — APPOINTMENT (OUTPATIENT)
Dept: LAB | Facility: CLINIC | Age: 72
End: 2022-05-24
Payer: MEDICARE

## 2022-05-24 ENCOUNTER — CLINICAL SUPPORT (OUTPATIENT)
Dept: INTERNAL MEDICINE CLINIC | Facility: CLINIC | Age: 72
End: 2022-05-24
Payer: MEDICARE

## 2022-05-24 VITALS — TEMPERATURE: 97.7 F

## 2022-05-24 DIAGNOSIS — Z11.59 ENCOUNTER FOR SCREENING FOR OTHER VIRAL DISEASES: ICD-10-CM

## 2022-05-24 DIAGNOSIS — E55.9 VITAMIN D DEFICIENCY: Primary | ICD-10-CM

## 2022-05-24 LAB
BACTERIA UR QL AUTO: ABNORMAL /HPF
BILIRUB UR QL STRIP: NEGATIVE
CLARITY UR: CLEAR
COLOR UR: YELLOW
GLUCOSE UR STRIP-MCNC: NEGATIVE MG/DL
HGB UR QL STRIP.AUTO: NEGATIVE
KETONES UR STRIP-MCNC: NEGATIVE MG/DL
LEUKOCYTE ESTERASE UR QL STRIP: ABNORMAL
MUCOUS THREADS UR QL AUTO: ABNORMAL
NITRITE UR QL STRIP: NEGATIVE
NON-SQ EPI CELLS URNS QL MICRO: ABNORMAL /HPF
PH UR STRIP.AUTO: 6 [PH]
PROT UR STRIP-MCNC: ABNORMAL MG/DL
RBC #/AREA URNS AUTO: ABNORMAL /HPF
SARS-COV-2 AG UPPER RESP QL IA: NEGATIVE
SP GR UR STRIP.AUTO: 1.02 (ref 1–1.03)
UROBILINOGEN UR STRIP-ACNC: <2 MG/DL
VALID CONTROL: NORMAL
WBC #/AREA URNS AUTO: ABNORMAL /HPF

## 2022-05-24 PROCEDURE — 81001 URINALYSIS AUTO W/SCOPE: CPT | Performed by: INTERNAL MEDICINE

## 2022-05-24 PROCEDURE — 96372 THER/PROPH/DIAG INJ SC/IM: CPT

## 2022-05-24 PROCEDURE — 87811 SARS-COV-2 COVID19 W/OPTIC: CPT

## 2022-05-24 RX ORDER — CEPHALEXIN 500 MG/1
500 CAPSULE ORAL EVERY 12 HOURS SCHEDULED
Qty: 10 CAPSULE | Refills: 0 | Status: SHIPPED | OUTPATIENT
Start: 2022-05-24 | End: 2022-05-29

## 2022-05-24 RX ADMIN — CYANOCOBALAMIN 1000 MCG: 1000 INJECTION INTRAMUSCULAR; SUBCUTANEOUS at 14:13

## 2022-05-24 NOTE — TELEPHONE ENCOUNTER
Urine showed possible UTI  If you are having symptoms I can give you antibiotic for a few days  Sent to the pharmacy

## 2022-05-24 NOTE — PROGRESS NOTES
Patient originally here for B12 injection but has cold symptoms  Pt's daughter, Doni Cerrato, requesting he have a COVID test,  symptoms are dry cough with clear mucus and slight fatigue for 3-4 days, no fever or SOB  COVID home test negative 3 days ago as per daughter  Covid test neg in office, B12 given

## 2022-06-10 ENCOUNTER — TELEPHONE (OUTPATIENT)
Dept: NEUROLOGY | Facility: CLINIC | Age: 72
End: 2022-06-10

## 2022-06-10 NOTE — TELEPHONE ENCOUNTER
Left detailed message with pt's daughter Mauricio Nyhan of [facility] response and recommendations  Advised her to call back to let us know how they wish to proceed and to discuss further

## 2022-06-10 NOTE — TELEPHONE ENCOUNTER
Pt's daughter Mauricio Nyhan calling to report that pt is experiencing hallucinations and delusions and confusion  Happens throughout the day  Pt's daughter looking for some direction/support  States this is affecting pt's daily life  Asking if this could possibly be from new medication Gocovri she was started on  She says pt was checked to r/o UTI  was positive for UTI  Got abx, got better with hallucinations  But now hallucinations are back  Pt states it feels more psychological     Pt currently taking Govcovri 137 mg, ropinerole 8mg, stalevo, rasagiline 1 mg  Dub Carton - Please advise  Best  [phone] Urbano Patel), ok to leave detailed message

## 2022-06-11 ENCOUNTER — OFFICE VISIT (OUTPATIENT)
Dept: URGENT CARE | Age: 72
End: 2022-06-11
Payer: MEDICARE

## 2022-06-11 ENCOUNTER — NURSE TRIAGE (OUTPATIENT)
Dept: OTHER | Facility: OTHER | Age: 72
End: 2022-06-11

## 2022-06-11 VITALS
HEART RATE: 70 BPM | BODY MASS INDEX: 23.5 KG/M2 | TEMPERATURE: 96.2 F | WEIGHT: 124.5 LBS | SYSTOLIC BLOOD PRESSURE: 126 MMHG | HEIGHT: 61 IN | DIASTOLIC BLOOD PRESSURE: 88 MMHG

## 2022-06-11 DIAGNOSIS — R35.0 URINARY FREQUENCY: Primary | ICD-10-CM

## 2022-06-11 LAB
SL AMB  POCT GLUCOSE, UA: NEGATIVE
SL AMB LEUKOCYTE ESTERASE,UA: ABNORMAL
SL AMB POCT BILIRUBIN,UA: NEGATIVE
SL AMB POCT BLOOD,UA: NEGATIVE
SL AMB POCT CLARITY,UA: ABNORMAL
SL AMB POCT COLOR,UA: ABNORMAL
SL AMB POCT KETONES,UA: NEGATIVE
SL AMB POCT NITRITE,UA: NEGATIVE
SL AMB POCT PH,UA: 6
SL AMB POCT SPECIFIC GRAVITY,UA: 1.03
SL AMB POCT URINE PROTEIN: ABNORMAL
SL AMB POCT UROBILINOGEN: 0.2

## 2022-06-11 PROCEDURE — 81002 URINALYSIS NONAUTO W/O SCOPE: CPT

## 2022-06-11 PROCEDURE — 99213 OFFICE O/P EST LOW 20 MIN: CPT

## 2022-06-11 PROCEDURE — 87086 URINE CULTURE/COLONY COUNT: CPT

## 2022-06-11 PROCEDURE — G0463 HOSPITAL OUTPT CLINIC VISIT: HCPCS

## 2022-06-11 RX ORDER — AMOXICILLIN AND CLAVULANATE POTASSIUM 875; 125 MG/1; MG/1
1 TABLET, FILM COATED ORAL EVERY 12 HOURS SCHEDULED
Qty: 10 TABLET | Refills: 0 | Status: SHIPPED | OUTPATIENT
Start: 2022-06-11 | End: 2022-06-16

## 2022-06-11 NOTE — PROGRESS NOTES
[employer] [facility]        NAME: Yony Arenas is a 80 y o  female  : 1950    MRN: [id number]  DATE: 2022  TIME: 1:37 PM    Assessment and Plan   Urinary frequency [R35 0]  1  Urinary frequency  POCT urine dip   78-year-old female presents with daughter for chief complaint of increased urinary frequency and mild confusion  Review of previous notes shows hallucinations the prior episode, and daughter confirmed that this is consistent with current presentation  Urine dip in office skewed due to orange coloration of urine, however appears to not be positive for leukocytes or nitrates at this time  Will send for urine culture  Will treat empirically for  urinary tract infection, patient advised to follow-up with primary care provider soon as possible  Explained to daughter that hallucinations may also be a side effect of anti-parkinsonian agents such as Sinemet  Patient Instructions   Report to emergency department if:  -Chest pain/SOB  -Intractable fever > 100 4   -Unable to tolerate P O  fluids or manage saliva    Follow up with PCP in 3-5 days  Proceed to  ER if symptoms worsen  Chief Complaint     Chief Complaint   Patient presents with    Urinary Frequency     Has started Yesterday  Pt has finished last week course of antibiotic 500 mg Cephalexin x 5 days         History of Present Illness       Patient is a 78-year-old female who presents with daughter for evaluation of urinary frequency and confusion over the past day  Past medical history significant for Parkinson's disease, and patient was recently treated for a presumed urinary tract infection 3 weeks ago with Keflex  Daughter reports that symptoms at that time were similar, with urinary frequency and hallucinations, although urinary dip at the time was borderline positive for UTI  Patient's urine is orange at baseline due to her Sinemet    Daughter reports some relief over the past 2 weeks from initial symptoms, but return of symptoms yesterday  She denies fever, flank pain, chills, body aches, vaginal discharge or odor, concern for sexually transmitted infection  Urinary Frequency   Associated symptoms include frequency  Pertinent negatives include no chills, flank pain, nausea, urgency or vomiting  Review of Systems   Review of Systems   Constitutional: Negative for chills, fatigue and fever  HENT: Negative for congestion, nosebleeds, postnasal drip, rhinorrhea and sinus pain  Eyes: Negative  Respiratory: Negative for apnea, cough, choking, chest tightness, shortness of breath, wheezing and stridor  Cardiovascular: Negative for chest pain, palpitations and leg swelling  Gastrointestinal: Negative for diarrhea, nausea and vomiting  Endocrine: Negative  Genitourinary: Positive for frequency  Negative for dysuria, flank pain, pelvic pain, urgency, vaginal bleeding, vaginal discharge and vaginal pain  Musculoskeletal: Negative for myalgias, neck pain and neck stiffness  Skin: Negative  Negative for rash  Allergic/Immunologic: Negative  Negative for environmental allergies  Neurological: Negative for dizziness, light-headedness, numbness and headaches  Hematological: Negative for adenopathy  Psychiatric/Behavioral: Positive for confusion           Current Medications       Current Outpatient Medications:     Calcium Carbonate-Vit D-Min (CALCIUM 1200 PO), Take 1,200 mg by mouth daily, Disp: , Rfl:     carbidopa-levodopa-entacapone (STALEVO) 12 5- MG per tablet, Take 1 tablet by mouth 6 (six) times a day, Disp: 540 tablet, Rfl: 3    cyanocobalamin (VITAMIN B-12) 1000 MCG tablet, Take 1 tablet (1,000 mcg total) by mouth daily, Disp: 90 tablet, Rfl: 1    cyanocobalamin 1,000 mcg/mL, Inject 1 ml every 2 weeks x 4 doses then monthly x 6 doses, Disp: 10 mL, Rfl: 0    denosumab (Prolia) 60 mg/mL, , Disp: , Rfl:     famotidine (PEPCID) 20 mg tablet, TAKE 2 TABLETS (40 MG TOTAL) BY MOUTH DAILY AT BEDTIME, Disp: 180 tablet, Rfl: 1    gabapentin (Neurontin) 100 mg capsule, Take 1 capsule (100 mg total) by mouth 3 (three) times a day, Disp: 90 capsule, Rfl: 0    Gocovri 137 MG CP24, RX2: TAKE 2 CAPSULES (274MG) TOTAL BY MOUTH EVERY NIGHT AT BEDTIME, Disp: 60 capsule, Rfl: 3    prednisoLONE acetate (PRED FORTE) 1 % ophthalmic suspension, INSTILL 1 DROP INTO BOTH EYES FOUR TIMES A DAY USE 1-2 WEEKS ONLY 3-4 TIMES A DAY, Disp: , Rfl:     rasagiline (AZILECT) 1 MG, Take 1 tablet (1 mg total) by mouth daily, Disp: 90 tablet, Rfl: 3    rOPINIRole (REQUIP XL) 8 MG 24 hr tablet, TAKE 1 TABLET BY MOUTH EVERY DAY, Disp: 90 tablet, Rfl: 6    SYRINGE-NEEDLE, DISP, 3 ML 25G X 1" 3 ML MISC, Use see administration instructions every 2 weeks x 4 doses then monthly x 6 doses, Disp: 15 each, Rfl: 0    Current Facility-Administered Medications:     cyanocobalamin injection 1,000 mcg, 1,000 mcg, Intramuscular, Q30 Days, Chance Shea MD, 1,000 mcg at 05/24/22 1413    Current Allergies     Allergies as of 06/11/2022 - Reviewed 06/11/2022   Allergen Reaction Noted    Sulfa antibiotics  08/26/2017    Doxycycline Rash 08/26/2017            The following portions of the patient's history were reviewed and updated as appropriate: allergies, current medications, past family history, past medical history, past social history, past surgical history and problem list      Past Medical History:   Diagnosis Date    GERD (gastroesophageal reflux disease)     Osteoporosis     Parkinson disease ([facility] [address] )        Past Surgical History:   Procedure Laterality Date    ARTERIOGRAM N/A 7/10/2019    Procedure: KYPHOPLASTY;  Surgeon: Alma Delia Butt MD;  [facility];  Service: Interventional Radiology    IR KYPHOPLASTY/VERTEBROPLASTY  7/10/2019    TONSILLECTOMY         Family History   Problem Relation Age of Onset    Dementia Mother     Heart disease Father     No Known Problems Daughter     No Known Problems Maternal Grandmother     No Known Problems Maternal Grandfather     No Known Problems Paternal Grandmother     Prostate cancer Paternal Grandfather     No Known Problems Daughter     No Known Problems Daughter     Alcohol abuse Neg Hx     Mental illness Neg Hx     Substance Abuse Neg Hx     Depression Neg Hx          Medications have been verified  Objective   /88   Pulse 70   Temp (!) 96 2 °F (35 7 °C) (Temporal)   Ht 5' 1" (1 549 m)   Wt 56 5 kg (124 lb 8 oz)   BMI 23 52 kg/m²        Physical Exam     Physical Exam  Vitals and nursing note reviewed  Constitutional:       General: She is not in acute distress  Appearance: Normal appearance  She is not ill-appearing, toxic-appearing or diaphoretic  HENT:      Head: Normocephalic and atraumatic  Right Ear: Tympanic membrane normal       Left Ear: Tympanic membrane normal       Nose: Nose normal  No congestion or rhinorrhea  Mouth/Throat:      Mouth: Mucous membranes are moist    Eyes:      Extraocular Movements: Extraocular movements intact  Conjunctiva/sclera: Conjunctivae normal       Pupils: Pupils are equal, round, and reactive to light  Cardiovascular:      Rate and Rhythm: Normal rate and regular rhythm  Pulses: Normal pulses  Heart sounds: Normal heart sounds  No murmur heard  No friction rub  No gallop  Pulmonary:      Effort: Pulmonary effort is normal  No respiratory distress  Breath sounds: Normal breath sounds  No stridor  No wheezing, rhonchi or rales  Abdominal:      General: Abdomen is flat  Bowel sounds are normal       Palpations: Abdomen is soft  Tenderness: There is no abdominal tenderness  There is no right CVA tenderness, left CVA tenderness, guarding or rebound  Musculoskeletal:         General: Normal range of motion  Cervical back: Normal range of motion and neck supple  No rigidity or tenderness  Lymphadenopathy:      Cervical: No cervical adenopathy  Skin:     General: Skin is warm and dry  Capillary Refill: Capillary refill takes less than 2 seconds  Neurological:      General: No focal deficit present  Mental Status: She is alert and oriented to person, place, and time  GCS: GCS eye subscore is 4  GCS verbal subscore is 5  GCS motor subscore is 6  Cranial Nerves: No cranial nerve deficit  Sensory: Sensation is intact  Motor: Motor function is intact  Coordination: Coordination is intact  Gait: Gait is intact  Comments: Patient is able to respond to questions during the interview appropriately     Psychiatric:         Mood and Affect: Mood normal          Behavior: Behavior normal

## 2022-06-11 NOTE — TELEPHONE ENCOUNTER
Regarding: medication concern/question   ----- Message from [facility] DAPHNE JIMENEZ sent at 6/11/2022  5:53 PM EDT -----  "I am calling on behalf of my mom, I know the neuro treatment team is not in today but I wanted to speak with someone about my moms medication shes been having non motor symptoms of her Parkinsons disease and they left a message about stopping her one medication Gocovri, they wanted me to call and speak with someone prior to us stopping this medication and they mentioned something about starting a new medication and im not sure if we should wait to stop this medication until she sees someone?  But that wouldn't be until august "

## 2022-06-11 NOTE — TELEPHONE ENCOUNTER
Went over Manisha 's Note with Mauricio Nyhan and checked on Web MD   Recommanded  weaning off the Medication so they will start weaning her off it and call the office on Monday  Reason for Disposition   Caller has medicine question, adult has minor symptoms, caller declines triage, AND triager answers question    Answer Assessment - Initial Assessment Questions  1  NAME of MEDICATION: "What medicine are you calling about?"      Gocovri   2  QUESTION: "What is your question?" (e g , medication refill, side effect)      The Pa said we could stop it because she is still having hallucinations but can we just stop this or should we ween her off it  3  PRESCRIBING HCP: "Who prescribed it?" Reason: if prescribed by specialist, call should be referred to that group  Elsa JOHANSEN  4  SYMPTOMS: "Do you have any symptoms?"      Hallucinations  5  SEVERITY: If symptoms are present, ask "Are they mild, moderate or severe?"      Worse in the morning and evenings   6   PREGNANCY:  "Is there any chance that you are pregnant?" "When was your last menstrual period?"      N/A    Protocols used: [facility]

## 2022-06-12 LAB — BACTERIA UR CULT: NORMAL

## 2022-06-13 NOTE — TELEPHONE ENCOUNTER
Diaz Taylor - Pt called during after hours and spoke with Health call  See previous  Pt was given instructions to wean off of Gocovri  Called Sukhwinder Noe to clarify what instructions were given  Awaiting cb

## 2022-06-13 NOTE — TELEPHONE ENCOUNTER
Pradeep George RN     CB    5:56 PM  Note  Regarding: medication concern/question   ----- Message from [facility] sent at 6/11/2022  5:53 PM EDT -----  "I am calling on behalf of my mom, I know the neuro treatment team is not in today but I wanted to speak with someone about my moms medication shes been having non motor symptoms of her Parkinsons disease and they left a message about stopping her one medication Gocovri, they wanted me to call and speak with someone prior to us stopping this medication and they mentioned something about starting a new medication and im not sure if we should wait to stop this medication until she sees someone? But that wouldn't be until august "                   CB    5:59 PM  Pradeep George RN contacted José Rodriguez  (Emergency Contact)        Pradeep George RN         6:26 PM  Note  Went over Loni Tate 's Note with Naveed Robledo and checked on Web MD   Recommanded  weaning off the Medication so they will start weaning her off it and call the office on Monday  Reason for Disposition   Caller has medicine question, adult has minor symptoms, caller declines triage, AND triager answers question    Answer Assessment - Initial Assessment Questions  1  NAME of MEDICATION: "What medicine are you calling about?"      Gocovri   2  QUESTION: "What is your question?" (e g , medication refill, side effect)      The Pa said we could stop it because she is still having hallucinations but can we just stop this or should we ween her off it  3  PRESCRIBING HCP: "Who prescribed it?" Reason: if prescribed by specialist, call should be referred to that group  Rose JOHANSEN  4  SYMPTOMS: "Do you have any symptoms?"      Hallucinations  5  SEVERITY: If symptoms are present, ask "Are they mild, moderate or severe?"      Worse in the morning and evenings   6   PREGNANCY:  "Is there any chance that you are pregnant?" "When was your last menstrual period?"      N/A    Protocols used: MEDICATION QUESTION CALL-ADULT-AH

## 2022-06-13 NOTE — TELEPHONE ENCOUNTER
Patient's daughter Ricarda Hoffman (on consent form) returned call  Patient had UTI lab (6/11/22) which was inconclusive  She was started on abx for 5 days  She started amoxicillin on Saturday night for a 5 day course of therapy  Patient reduced Gocovri dose to one pill on Friday night  She stopped taking medication altogether after that  Patient reports fewer hallucinations as of Sunday AM and no halluciniations as of this morning  Daughter notes there was a similar episode end of May  Patient began w/hallucinations  Patient dx with UTI  Five (5) day course of abx  Hallucinations resolved  Of note-Several members of patient's family tested positive for Covid (5/3/122)  They were around patient  Patient remained asymptomatic and tested negative for Covid  Patient states she does notice a difference without Gocovri  She feels more tired/shaky  She does believe Gocovri was beneficial  She is willing to try it again (in case it was the UTI causing the hallucinations), if Acosta Kauffman is agreeable  Ricarda Hoffman - [phone] to leave detailed msg  Acosta Kauffman - please advise  Thanks!

## 2022-06-14 NOTE — TELEPHONE ENCOUNTER
Yes, I think it would be reasonable to try the Krystal Pancoast again especially if they felt it was helping  I would ideally like to give the patient another few days however to make sure the hallucinations are better after the UTI has been completely resolved  Perhaps give her the rest of this week and if she is still doing well in regards to the hallucinations over the weekend then they can restart the Gocovri on Monday  They can start with 1 tab at night for the first 2 weeks and then increase if tolerated  Thanks!

## 2022-06-20 NOTE — TELEPHONE ENCOUNTER
Pt LVM on nursing line to request cb to talk with someone about what to do moving forward  Spoke with pt and she states that she started back on Gocovri again last week on Wed or Thurs  Says that when she was off of Gocovri, her feet was "sticking" but now that she's back on Gocovri, her PD symptoms are much better, definitely helping  She states she took 1 cap for 2 days and went back to 2 capsules thereafter  Pt states that hallucinations have not come back so far  Pt will call if reoccurrence

## 2022-07-05 ENCOUNTER — TELEPHONE (OUTPATIENT)
Dept: INTERNAL MEDICINE CLINIC | Facility: CLINIC | Age: 72
End: 2022-07-05

## 2022-07-05 NOTE — TELEPHONE ENCOUNTER
Pt had an episode of confusion and cognitive decline  Went to Urgent Care on Sunday and diagnosed with UTI  Prescribed amoxicillin clavolanate  Daughter is concerned because this is the third time she's been put on antibiotics for UTI's  Pt seems to get better after the antibiotics for about two weeks and then symptoms start again  Wants to know where to go from here to figure out what might be causing the UTI's

## 2022-07-05 NOTE — TELEPHONE ENCOUNTER
Her final urine culture did not show an infection  Continue to monitor for confusion, it may be a side effect of one of her medications  Can also be from UTI however this time did not show a true infection

## 2022-07-06 ENCOUNTER — OFFICE VISIT (OUTPATIENT)
Dept: INTERNAL MEDICINE CLINIC | Facility: CLINIC | Age: 72
End: 2022-07-06
Payer: MEDICARE

## 2022-07-06 VITALS
DIASTOLIC BLOOD PRESSURE: 72 MMHG | WEIGHT: 124.4 LBS | HEART RATE: 89 BPM | OXYGEN SATURATION: 98 % | HEIGHT: 64 IN | TEMPERATURE: 96.4 F | BODY MASS INDEX: 21.24 KG/M2 | SYSTOLIC BLOOD PRESSURE: 130 MMHG

## 2022-07-06 DIAGNOSIS — R44.3 HALLUCINATIONS: ICD-10-CM

## 2022-07-06 DIAGNOSIS — N39.0 FREQUENT UTI: Primary | ICD-10-CM

## 2022-07-06 DIAGNOSIS — R39.9 URINARY SYMPTOM OR SIGN: ICD-10-CM

## 2022-07-06 DIAGNOSIS — G31.84 MILD COGNITIVE IMPAIRMENT: ICD-10-CM

## 2022-07-06 DIAGNOSIS — G20 PARKINSON'S DISEASE (HCC): ICD-10-CM

## 2022-07-06 PROCEDURE — 99214 OFFICE O/P EST MOD 30 MIN: CPT | Performed by: INTERNAL MEDICINE

## 2022-07-06 NOTE — ASSESSMENT & PLAN NOTE
More frequent visual and auditory hallucinations since the falls, worse with ? UTI  Instructed to check UA if with symptoms, will treat only if truly UTI  Explained symptoms may be worsening of PD  Follow up with neurology as scheduled to discussed medications

## 2022-07-06 NOTE — ASSESSMENT & PLAN NOTE
Daughters reports hallucinations improved after stopping Gocovri for a few days, with concomitant antibiotics for UTI  Recommend commode at home  Agrees to home physical therapy since refusing specific PD therapy  On Stalevo, Azilect also  Per neurology  Long discussion regarding progression of disease, probable need for nursing home care in the future   has dementia  Ordered commode
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
## 2022-07-07 DIAGNOSIS — G20 PARKINSON'S DISEASE (HCC): ICD-10-CM

## 2022-07-07 NOTE — TELEPHONE ENCOUNTER
*See 6/10/22 Encounter for further details*    Patient's daughter Dane Odom called  She reports patient has another UTI  Hallucinations returned on Saturday, 7/2/22  Patient went to [facility] on Sunday, 7/3/22  She was diagnosed with a UTI and prescribed amoxycillin-clavulanate for a 10 day course of therapy  Patient then saw PCP on 7/6/22  PCP is unsure that patient has been having UTIs (UA results from [facility] were unavailable at PCP appt)  She feels it is more likely that patient's Parkinson's is advancing  PCP provided referral to urogyneocologist and standing script for UA lab  Patient is currently taking Gocovri 137mg - 2 caps daily @[facility]    Patient has f/u visit on 8/9/22 with Candie Ackerman  [phone] to leave detailed msg  Candie Ackerman - sister/patient asking if any recommendations/advice prior to patient's f/u appt?

## 2022-07-07 NOTE — TELEPHONE ENCOUNTER
Hallucinations can be a part of the disease progression as well as a side effect to some of the PD medications  However we will always try and rule out an infection as the cause especially if hallucinations start out of the blue  IF the PCP and the Urogyn do not feel that this is related to an underlying infection then the next step would be to start reducing some of her medications  Could first try reducing her Ropinirole dose from 8mg to 6mg  This could also help with dyskinesia  If she would like to try this change we can place an order for Ropinirole ER 6mg  Thanks!

## 2022-07-08 ENCOUNTER — PATIENT OUTREACH (OUTPATIENT)
Dept: INTERNAL MEDICINE CLINIC | Facility: CLINIC | Age: 72
End: 2022-07-08

## 2022-07-08 LAB
DME PARACHUTE DELIVERY DATE ACTUAL: NORMAL
DME PARACHUTE DELIVERY DATE EXPECTED: NORMAL
DME PARACHUTE DELIVERY DATE REQUESTED: NORMAL
DME PARACHUTE ITEM DESCRIPTION: NORMAL
DME PARACHUTE ORDER STATUS: NORMAL
DME PARACHUTE SUPPLIER NAME: NORMAL
DME PARACHUTE SUPPLIER PHONE: NORMAL

## 2022-07-08 NOTE — PROGRESS NOTES
Referral received from PCP, pt reportedly having hallucinations and memory has worsened the past few months  SW called pt's daughter, reached voicemail and left message requesting call back

## 2022-07-08 NOTE — TELEPHONE ENCOUNTER
Called Charles Saldana and left detailed message of Fermin's message and recommendations  Advised her to call back to discuss how they wish to proceed

## 2022-07-11 ENCOUNTER — PATIENT OUTREACH (OUTPATIENT)
Dept: INTERNAL MEDICINE CLINIC | Facility: CLINIC | Age: 72
End: 2022-07-11

## 2022-07-11 NOTE — PROGRESS NOTES
ELENA called and spoke with pt's dtr Diane  The children are trying to find out how to get a program     We discussed pts level of independence  She is at fall risk and has balance issues, fine motor skill difficulties, and cognitive issues due to the Parkinson's  The dtrs cannot always be with patient  There are 3 other siblings aside from them, that can help care for pt, but they are looking for someone to be with pt to make sure she takes her medications and eats  ELENA explained that insurance only covers pts that have skilled needs, such as physical therapy or visiting nurse  [facility] mentioned the doctor is aware she needs PT and set it up for someone to come to the home  ELENA explained requirements for pts to be eligible for waiver services  [facility] said the family did call the [employer] waiver  and they are calling the family this Thursday   If pt is not eligible for waiver services, the next step would be considering paying privately for an aide  ELENA explained that ELENA will send local resources for where [facility] may find aides  [email or web address]  [email or web address]    ELENA asked [facility] if she has information on Parkinson's  Diane does   will send [facility] resources for caregivers, as well  ELENA stressed the importance of [facility] and her siblings taking care of themselves physically and emotionally so they can be there for pt  ELENA provided supportive counseling to [facility]  ELENA advised Diane to reach out with any additional questions or concerns  Diane appreciated the help  Email sent to [facility]  Note routed to PCP

## 2022-07-11 NOTE — TELEPHONE ENCOUNTER
Pt's dtr Bevtoft called and states that pt is agreeable to Ropinirole ER 6 mg Requesting script be sent to [facility] pharmacy on file  rx entered   Pls review and sign off    thanks

## 2022-07-12 RX ORDER — ROPINIROLE 6 MG/1
TABLET, FILM COATED, EXTENDED RELEASE ORAL
Qty: 90 TABLET | Refills: 6 | Status: SHIPPED | OUTPATIENT
Start: 2022-07-12 | End: 2022-08-25

## 2022-07-14 ENCOUNTER — TELEPHONE (OUTPATIENT)
Dept: INTERNAL MEDICINE CLINIC | Facility: CLINIC | Age: 72
End: 2022-07-14

## 2022-07-14 DIAGNOSIS — E53.8 VITAMIN B12 DEFICIENCY: ICD-10-CM

## 2022-07-14 DIAGNOSIS — G31.84 MILD COGNITIVE IMPAIRMENT: ICD-10-CM

## 2022-07-14 DIAGNOSIS — G20 PARKINSON'S DISEASE (HCC): Primary | ICD-10-CM

## 2022-07-14 RX ORDER — CYANOCOBALAMIN 1000 UG/ML
INJECTION, SOLUTION INTRAMUSCULAR; SUBCUTANEOUS
Qty: 10 ML | Refills: 0 | Status: SHIPPED | OUTPATIENT
Start: 2022-07-14 | End: 2022-08-25

## 2022-07-14 NOTE — TELEPHONE ENCOUNTER
Pt's daughter is requesting a doctor's referral for a talk therapist to help the pt deal with the emotional side of Parkinson's  Pt's daughter spoke with [facility] and they need a script with diagnosis and medical records to review for medical necessity to be faxed to [phone]

## 2022-07-15 ENCOUNTER — TELEPHONE (OUTPATIENT)
Dept: INTERNAL MEDICINE CLINIC | Facility: CLINIC | Age: 72
End: 2022-07-15

## 2022-07-15 DIAGNOSIS — G20 PARKINSON'S DISEASE (HCC): Primary | ICD-10-CM

## 2022-07-15 NOTE — TELEPHONE ENCOUNTER
Pt  would like a home health aide and a script for OT at home  Pt  aware Dr Kourtney Mitchell is out of the office until Monday

## 2022-07-21 ENCOUNTER — TELEPHONE (OUTPATIENT)
Dept: NEUROLOGY | Facility: CLINIC | Age: 72
End: 2022-07-21

## 2022-07-21 NOTE — TELEPHONE ENCOUNTER
Ordered paliative care, please fax with office notes  Ask daughter if she needs another order to send to [facility]  That was sent already that is why they are seeing her

## 2022-07-21 NOTE — TELEPHONE ENCOUNTER
Order and notes faxed     LM for daughter Mir Fix to call back in regards to rest of message about Varun Shalom

## 2022-07-21 NOTE — TELEPHONE ENCOUNTER
Called pt and LMOM of time change of her upcoming appt with Dr Tyra Gresham on 10/17/2022 in the [place] location  I informed the patient that the only change will be the appt time which was originally at the time of 1:30 PM but we are moving that appt to 2:00 PM instead  If any concerns please give us a call back

## 2022-07-21 NOTE — TELEPHONE ENCOUNTER
Requesting a doctor's referral for palliative care to be faxed to [facility] at [address] for [facility]'s  Please send last two office notes along with the script  Also asking if there are orders in for OT, PT, and SLP - [facility] has been coming out

## 2022-07-25 NOTE — TELEPHONE ENCOUNTER
Spoke with daughter - faxed psych referral and last office visit per daughter's request to [facility] @ [phone]

## 2022-08-01 ENCOUNTER — TELEPHONE (OUTPATIENT)
Dept: INTERNAL MEDICINE CLINIC | Facility: CLINIC | Age: 72
End: 2022-08-01

## 2022-08-01 NOTE — TELEPHONE ENCOUNTER
Reviewed available records  Agree with recommendations to have gallbladder removed  Her Parkinson's is not related to this  Her confusion may worsen since she is in the hospital, or during recovery

## 2022-08-01 NOTE — TELEPHONE ENCOUNTER
Her confusion may vary, it might be worse in the next few days/weeks because of her hospitalization  I cannot say if it will "go back" to where it was

## 2022-08-01 NOTE — TELEPHONE ENCOUNTER
Cathryn notified, will the worsened confusion become the patients normal or would it just worsen during hospital and recovery and then kind of go back to where it is currently? Also they were worried about mobility, again with being in hospital and recovery, if her mobility decreases will they just have to deal with that?     They are going to proceed with surgery just want to know what to expect

## 2022-08-01 NOTE — TELEPHONE ENCOUNTER
Pt  went to the ER and is admitted-has a gallstone which she is going to have removed and the Doc in the hospital recommends she  have her gallbladder removed  Daughter wants Joyce's opinion on this, if she thinks she should have it removed  Daughter wants to know if Parkinson's would play a role in this decision

## 2022-08-02 ENCOUNTER — TELEPHONE (OUTPATIENT)
Dept: NEUROLOGY | Facility: CLINIC | Age: 72
End: 2022-08-02

## 2022-08-02 NOTE — TELEPHONE ENCOUNTER
Patient with PD may have worsening of their symptoms with stress on the body such as undergoing surgery  If this is the case we could consider an increase of medication to help with the postop period  As for cognition we can see a change with this as well if there is an underlying dementia  I did not specify this in prior notes so I am not sure how she is baseline  Unfortunately there is no way of knowing how she will respond but I think the only option is to weight the risks versus benefits of having the proposed surgery  Thanks!

## 2022-08-02 NOTE — TELEPHONE ENCOUNTER
Pt's dtr, DREA Contreras on nursing line 8/1/22 stating that pt was admitted to hospital over the weekend with a dx of pancreatitis  States she is having stones removed currently and 8/2 or Wednesday they are encouraging them to have her gall bladder removed  Dtr states that she just wanted to check in to see if provider has input or advice on what this procedure would be like for someone with PD, if there are any additional concerns  She states that the surgeon mentioned pt will likely have setbacks with mobility  Daughter states she is concerned with pt also having setback with pt's cognitive capacity  Says she just wanted to check in and see if there are thoughts or input or advice  She states may have procedure 8/2 or 8/3   [phone]    London Dean - Please advise

## 2022-08-02 NOTE — TELEPHONE ENCOUNTER
Called daughter and advised her of Fermin's response and recommendations  Diane verbalizes understanding  She states that they have decided to go ahead with surgery  They are considering short term rehab to help with post op care  Pt's daughter asking in terms of medication change, how we coordinate after she is at [facility]  Advised her that we will communicate with [facility] with medication adjustments when she has been discharged  Advised Diane to call back when they have a plan of where pt will be staying  Diane agreeable and expresses gratitude for helpful information

## 2022-08-08 ENCOUNTER — TELEPHONE (OUTPATIENT)
Dept: OTHER | Facility: OTHER | Age: 72
End: 2022-08-08

## 2022-08-08 NOTE — TELEPHONE ENCOUNTER
Patients daughter called to request an order for a Home health aide for patient  Reporting she was advised to following up with PCP  Please contact daughter with any questions

## 2022-08-09 ENCOUNTER — OFFICE VISIT (OUTPATIENT)
Dept: NEUROLOGY | Facility: CLINIC | Age: 72
End: 2022-08-09
Payer: MEDICARE

## 2022-08-09 VITALS
SYSTOLIC BLOOD PRESSURE: 116 MMHG | WEIGHT: 119 LBS | DIASTOLIC BLOOD PRESSURE: 70 MMHG | BODY MASS INDEX: 20.43 KG/M2 | TEMPERATURE: 97.5 F

## 2022-08-09 DIAGNOSIS — G31.84 MILD COGNITIVE IMPAIRMENT: ICD-10-CM

## 2022-08-09 DIAGNOSIS — G20 PARKINSON'S DISEASE (HCC): Primary | ICD-10-CM

## 2022-08-09 PROCEDURE — 99215 OFFICE O/P EST HI 40 MIN: CPT | Performed by: PHYSICIAN ASSISTANT

## 2022-08-09 NOTE — ASSESSMENT & PLAN NOTE
Patient with Parkinson's disease complicated by dyskinesia, wearing off and more recent hallucinations  At her last visit she was started on a trial of Gocovri, hich per both the patient and her daughters, significantly helped dyskinesia, wearing off and mobility  It appears this medication was started at the end of February with an increase to 2 tabs at night in March  The daughter's call the office with concerns for confusion and hallucinations that began around June  This time she was also noted to have a UTI and the hallucinations improved after antibiotics were started  These continued however and there was a concern that perhaps they were related to the Gocovri  At this time she has not been on medication for the past few days following surgery for pancreatitis  She has had any recent hallucinations however she does continue to have some episodes of confusion per the daughter  Since being off the medication however she has noted worsening her Parkinson's symptoms along with more significant dyskinesia  Ultimately she would like to restart the Gocovri which I feel is reasonable  it remains unclear as to whether not her hallucinations and delusions were related to the medication or perhaps her underlying UTIs and pancreatitis  At this time given all those issues are under control I think it is reasonable to restart Gocovri to see if it is tolerated  She will start by taking 1 tab before bed for the 1st month  If with this change hallucinations or delusions return then she will stop the medication altogether  If however it is tolerated that she can increase to 2 tabs on the 2nd month  If she is unable to tolerate this medication she will call the office and we can consider adjustments of other medications at that time  Could certainly also consider medications specific for the hallucinations such as Seroquel or Nuplazid verses reducing her ropinirole dose further        There also remains some concern regarding her cognition  Once again it is unclear how much of this was related to her underlying issues versus progression of the disease  On exam today she scored 21/25 on memory testing  She does have some difficulty using electronics at home however otherwise she is still functioning rather independently  It does appear that in the past she was noted to have a low B12 and was subsequently started on B12 injections  PCP ordered a repeat B12 level however this is not been done yet  At this time we will continue to watch and monitor for any progression to suggest an underlying neuro degenerative process  She does still drive locally when she feels well  Daughter's not have any clear concerns for driving at this time  We did discuss the use of the fitness to drive evaluation if concern does arise down the line  Patient was encouraged to increase mind stimulating activities such as reading, crosswords, word searches, puzzles, Soduku, solitaire, coloring and other brain games  We also discussed the importance of staying physically active and eating a health diet such as the Mediterranean or MIND diet  She is currently getting both home PT and OT which is appropriate  Daughters also had some questions regarding DBS surgery  Once again this may be a good option for her given that she could still have control of her Parkinson's symptoms but she may be able to reduce her medications and in turn reduce her dyskinesias  In light of her more recent cognitive changes we would certainly have to monitor this for any evidence of an underlying dementia  We did discuss that prior to having DBS surgery she would need to undergo formal neuropsych testing which will help determine if she is indeed a candidate  She is not interested at this very moment so will hold off on further evaluation

## 2022-08-09 NOTE — PROGRESS NOTES
Patient ID: Usama Cottrell is a 80 y o  female  Assessment/Plan:    Parkinson's disease [facility]    Patient with Parkinson's disease complicated by dyskinesia, wearing off and more recent hallucinations  At her last visit she was started on a trial of Gocovri, hich per both the patient and her daughters, significantly helped dyskinesia, wearing off and mobility  It appears this medication was started at the end of February with an increase to 2 tabs at night in March  The daughter's call the office with concerns for confusion and hallucinations that began around June  This time she was also noted to have a UTI and the hallucinations improved after antibiotics were started  These continued however and there was a concern that perhaps they were related to the Gocovri  At this time she has not been on medication for the past few days following surgery for pancreatitis  She has had any recent hallucinations however she does continue to have some episodes of confusion per the daughter  Since being off the medication however she has noted worsening her Parkinson's symptoms along with more significant dyskinesia  Ultimately she would like to restart the Gocovri which I feel is reasonable  it remains unclear as to whether not her hallucinations and delusions were related to the medication or perhaps her underlying UTIs and pancreatitis  At this time given all those issues are under control I think it is reasonable to restart Gocovri to see if it is tolerated  She will start by taking 1 tab before bed for the 1st month  If with this change hallucinations or delusions return then she will stop the medication altogether  If however it is tolerated that she can increase to 2 tabs on the 2nd month  If she is unable to tolerate this medication she will call the office and we can consider adjustments of other medications at that time    Could certainly also consider medications specific for the hallucinations such as Seroquel or Nuplazid verses reducing her ropinirole dose further  There also remains some concern regarding her cognition  Once again it is unclear how much of this was related to her underlying issues versus progression of the disease  On exam today she scored 21/25 on memory testing  She does have some difficulty using electronics at home however otherwise she is still functioning rather independently  It does appear that in the past she was noted to have a low B12 and was subsequently started on B12 injections  PCP ordered a repeat B12 level however this is not been done yet  At this time we will continue to watch and monitor for any progression to suggest an underlying neuro degenerative process  She does still drive locally when she feels well  Daughter's not have any clear concerns for driving at this time  We did discuss the use of the fitness to drive evaluation if concern does arise down the line  Patient was encouraged to increase mind stimulating activities such as reading, crosswords, word searches, puzzles, Soduku, solitaire, coloring and other brain games  We also discussed the importance of staying physically active and eating a health diet such as the Mediterranean or MIND diet  She is currently getting both home PT and OT which is appropriate  Daughters also had some questions regarding DBS surgery  Once again this may be a good option for her given that she could still have control of her Parkinson's symptoms but she may be able to reduce her medications and in turn reduce her dyskinesias  In light of her more recent cognitive changes we would certainly have to monitor this for any evidence of an underlying dementia  We did discuss that prior to having DBS surgery she would need to undergo formal neuropsych testing which will help determine if she is indeed a candidate   She is not interested at this very moment so will hold off on further evaluation  Subjective: Srinath Wang is a 80year-old woman who presents in follow up for young onset, levodopa responsive Parkinson's disease  To review, symptom onset 1999 (47yo) with left shoulder stiffness, later complicated by motor fluctuations, early wearing off, unpredictable offs, delayed on, mild dyskinesias and freezing of gait (OFF)  At her last visit she continued to have bothersome dyskinesia despite Amantadine  She was switched to Gocovri  INTERVAL HISTORY:  She has noticed overall improvement of PD symptoms with Gocovri, she felt that the medication helped with the dyskinesia and sticking of her feet   Daughter called with concerns for hallucinations, she was found to have UTI at the same time and the hallucinations improved after treated with antibiotics  Hallucinations later returned and the patient was weaned off [address]  Hallucinations improved HOWEVER PD symptoms worsened  She restarted the Gocovri with improvement  Daughter called again 7/2022 with concerns for hallucinations and perhaps ongoing UTIs  Per the daughter each time she was given antibiotics for a UTI she had improvement of the hallucinations after a few days   At that time her Ropinirole dose was reduced to see if this may help with both hallucinations and dyskinesias  She was more recently found to have pancreatitis and underwent cholecystectomy  She went to rehab following the surgery but left AMA the same day (8/6/22)     She has been out of the Gocovri for the past few days since being in the hospital for pancreatitis   She is currently having dyskinesia, these were much better with the Gocovri   She can perform her ADLs on her own, she does feel more weak then she had in the past   Currently has PT and OT in the home   She feels that she is recovering well from her recent surgery   She has not been having any hallucinations since being in the hospital   She does still have episodes of confusion mainly with electronics   She does still manage her own medications, unclear how she is doing with this   She does struggle with some activities such as cooking  She sleeps well other than having trouble with the heat     Current medications and timing:   - Shanae Setting - currently off sine being in the hospital for pancreatitis   - stalevo 12 5/50/200 6 times per day Q2-3 hours   - azilect 1mg daily   - ropinirole XL 6mg daily - dose reduced due to hallucinations and dyskinesia  Prior medication trials:  selegiline   Sinemet IR  Ropinirole IR       I personally reviewed and updated the ROS  Total time spent today was 75 minutes  Greater than 50% of total time was spent with the patient and / or family counseling and / or coordinating plan of care  Objective:    Blood pressure 116/70, temperature 97 5 °F (36 4 °C), weight 54 kg (119 lb), not currently breastfeeding  Physical Exam  Constitutional:       General: She is awake  Appearance: Normal appearance  HENT:      Right Ear: Hearing normal       Left Ear: Hearing normal    Eyes:      General: Lids are normal       Extraocular Movements: Extraocular movements intact  Pupils: Pupils are equal, round, and reactive to light  Pulmonary:      Effort: Pulmonary effort is normal    Neurological:      Mental Status: She is alert  Deep Tendon Reflexes: Strength normal    Psychiatric:         Speech: Speech normal          Neurological Exam  Mental Status  Awake and alert  Oriented to person, place and time  Speech is normal  Able to repeat  Able to perform serial calculations  MoCA 21/25 - 8/9/22  Did not perform the written portion of testing   Cranial Nerves  CN III, IV, VI: Extraocular movements intact bilaterally  Normal lids and orbits bilaterally  Pupils equal round and reactive to light bilaterally  CN V:  Right: Facial sensation is normal   Left: Facial sensation is normal on the left    CN VIII:  Right: Hearing is normal   Left: Hearing is normal   CN XI: Shoulder shrug strength is normal     Motor   Strength is 5/5 throughout all four extremities  Sensory  Light touch is normal in upper and lower extremities  Coordination  Right: Finger-to-nose normal Left: Finger-to-nose normal   Generalized dyskinesias throughout the visit, slightly worse in the trunk and UEs, dampened a bit towards the end of the visit   Gait    Arose from the chair without issues   Overall good stride length, no freezing noted on exam today                                  Date of exam:  8/9/22 2/8/22           Speech  1  1   Facial Expression   2     Rigidity - Neck        Rigidity - Upper Extremity (Right)  0  0   Rigidity - Upper Extremity (Left)   0  0   Rigidity - Lower Extremity (Right)  0  0   Rigidity - Lower Extremity (Left)   0  0   Finger Taps (Right)   1  1   Finger Taps (Left)   1  0   Hand  (Right)  0  0   Hand  (Left)   0  0   Pronation/Supination (Right)  0 0   Pronation/Supination (Left)   0  1   Heel Taps (Right) 0  0   Heel Taps(Left) 0  0   Arising from Chair   1  0   Gait   2 1   Postural Stability         Posture 1  1   Global spontaneity of movement 1  1   Postural Tremor (Right) 0  0   Postural Tremor (Left) 0  0   Kinetic Tremor (Right)  0  0   Kinetic Tremor (Left)  0  0   Rest tremor  RUE 0  0   Rest tremor  LUE 0  0   Rest tremor  RLE 0  0   Reset tremor  LLE 0  0   Lip/Jaw Tremor        Motor Exam Total:            ROS:    Review of Systems   Constitutional: Negative  Negative for appetite change and fever  HENT: Negative  Negative for hearing loss, tinnitus, trouble swallowing and voice change  Eyes: Positive for visual disturbance  Negative for photophobia and pain  Respiratory: Negative  Negative for shortness of breath  Cardiovascular: Negative  Negative for palpitations  Gastrointestinal: Negative  Negative for nausea and vomiting  Endocrine: Negative  Negative for cold intolerance  Genitourinary: Negative for dysuria, frequency and urgency  Incontinence     Musculoskeletal: Positive for gait problem (Patient states no daughter states yes) and neck pain (Ongoing)  Negative for myalgias  Balance Issues  Involuntary body Movements     Skin: Negative  Negative for rash  Allergic/Immunologic: Negative  Neurological: Positive for tremors (Increased) and speech difficulty (At times as stated by daughter)  Negative for dizziness, seizures, syncope, facial asymmetry, weakness, light-headedness, numbness and headaches  Hematological: Negative  Does not bruise/bleed easily  Psychiatric/Behavioral: Positive for confusion and hallucinations  Negative for sleep disturbance  Memory Issues   All other systems reviewed and are negative

## 2022-08-09 NOTE — TELEPHONE ENCOUNTER
Please call daughter, clarify what services she has right now (PT,OT, speech)  They can hire home health aide for specific hours

## 2022-08-09 NOTE — TELEPHONE ENCOUNTER
You can call and set that up on your own, hire somebody and determine a schedule that will work for all of you

## 2022-08-09 NOTE — TELEPHONE ENCOUNTER
Jimy Huntley said she has OT bid and has PT set up in the home  Does she need a script for home health aide or do they just call and set that up on their own?

## 2022-08-12 DIAGNOSIS — G20 PARKINSON'S DISEASE (HCC): ICD-10-CM

## 2022-08-12 NOTE — TELEPHONE ENCOUNTER
[facility] requesting new script for Gocovri  States that pt had to go to hospital for emergency surgery  Upon returning home, she cannot locate her medication they recently sent her  They are going to reach out to the insurance company to get an override but they need new script   [phone] if further questions  Earnestine Ozuna - Rx entered  Please review and sign if in agreement

## 2022-08-13 DIAGNOSIS — G20 PARKINSON'S DISEASE (HCC): ICD-10-CM

## 2022-08-13 RX ORDER — AMANTADINE 137 MG/1
CAPSULE, COATED PELLETS ORAL
Qty: 60 CAPSULE | Refills: 0 | Status: CANCELLED | OUTPATIENT
Start: 2022-08-13

## 2022-08-15 NOTE — TELEPHONE ENCOUNTER
Dr Eliza Miranda - Pt needs this medication sent  She has been without it for 2 weeks now  Are you able to review and sign? Thank you!

## 2022-08-16 RX ORDER — AMANTADINE 137 MG/1
CAPSULE, COATED PELLETS ORAL
Qty: 60 CAPSULE | Refills: 3 | Status: SHIPPED | OUTPATIENT
Start: 2022-08-16 | End: 2022-08-17 | Stop reason: SDUPTHER

## 2022-08-17 DIAGNOSIS — G20 PARKINSON'S DISEASE (HCC): ICD-10-CM

## 2022-08-17 NOTE — TELEPHONE ENCOUNTER
Called pt's pharmacy [phone]  Confirmed that they  Received script of  Gocovri,spoke to Bret  Reported that script was sent to their specialty pharmacy and they will be calling pt  Informed her that pt has been out of this medication, stated she will put that on the notes  Called pt and made her aware,she verbalized understanding

## 2022-08-17 NOTE — TELEPHONE ENCOUNTER
Pt left vm stating that she is getting conflicting messages  Called pt and made her aware of below        Please sign off on script

## 2022-08-17 NOTE — TELEPHONE ENCOUNTER
Received vm from [facility] specialty pharm stating that they do not carry gocovri  States that it can be filled through [facility]  Script entered to be sent to [facility]    Please sign off

## 2022-08-18 ENCOUNTER — ESTABLISHED COMPREHENSIVE EXAM (OUTPATIENT)
Dept: URBAN - METROPOLITAN AREA CLINIC 6 | Facility: CLINIC | Age: 72
End: 2022-08-18

## 2022-08-18 DIAGNOSIS — H25.13: ICD-10-CM

## 2022-08-18 DIAGNOSIS — H04.123: ICD-10-CM

## 2022-08-18 PROCEDURE — 92014 COMPRE OPH EXAM EST PT 1/>: CPT

## 2022-08-18 RX ORDER — AMANTADINE 137 MG/1
CAPSULE, COATED PELLETS ORAL
Qty: 60 CAPSULE | Refills: 3 | Status: SHIPPED | OUTPATIENT
Start: 2022-08-18 | End: 2022-08-25

## 2022-08-18 ASSESSMENT — TONOMETRY
OS_IOP_MMHG: 17
OD_IOP_MMHG: 14

## 2022-08-18 ASSESSMENT — VISUAL ACUITY
OS_PH: 20/40
OS_GLARE: 20/400
OD_GLARE: 20/100
OD_PH: 20/40
OS_CC: 20/40-1
OD_CC: 20/40
OU_CC: J1

## 2022-08-19 NOTE — TELEPHONE ENCOUNTER
Received vm from [facility] asking for supervising physicians name and NPI      Can call with this info to [phone]  Or can fax to [phone]    Called [facility] and gave them dr Lora Cull

## 2022-08-20 ENCOUNTER — APPOINTMENT (EMERGENCY)
Dept: RADIOLOGY | Facility: HOSPITAL | Age: 72
DRG: 057 | End: 2022-08-20
Payer: MEDICARE

## 2022-08-20 ENCOUNTER — NURSE TRIAGE (OUTPATIENT)
Dept: OTHER | Facility: OTHER | Age: 72
End: 2022-08-20

## 2022-08-20 ENCOUNTER — HOSPITAL ENCOUNTER (INPATIENT)
Facility: HOSPITAL | Age: 72
LOS: 4 days | DRG: 057 | End: 2022-08-25
Attending: EMERGENCY MEDICINE | Admitting: INTERNAL MEDICINE
Payer: MEDICARE

## 2022-08-20 ENCOUNTER — APPOINTMENT (OUTPATIENT)
Dept: RADIOLOGY | Facility: HOSPITAL | Age: 72
DRG: 057 | End: 2022-08-20
Payer: MEDICARE

## 2022-08-20 DIAGNOSIS — J98.11 ATELECTASIS PULMONARY: ICD-10-CM

## 2022-08-20 DIAGNOSIS — G20 PARKINSON'S DISEASE (HCC): Primary | ICD-10-CM

## 2022-08-20 DIAGNOSIS — K59.00 CONSTIPATION: ICD-10-CM

## 2022-08-20 DIAGNOSIS — J18.9 PNEUMONIA: ICD-10-CM

## 2022-08-20 DIAGNOSIS — R53.1 GENERAL WEAKNESS: ICD-10-CM

## 2022-08-20 DIAGNOSIS — R44.3 HALLUCINATIONS: ICD-10-CM

## 2022-08-20 DIAGNOSIS — R45.851 SUICIDAL IDEATION: ICD-10-CM

## 2022-08-20 DIAGNOSIS — R91.1 INCIDENTAL PULMONARY NODULE: ICD-10-CM

## 2022-08-20 DIAGNOSIS — R26.2 AMBULATORY DYSFUNCTION: ICD-10-CM

## 2022-08-20 PROCEDURE — 99285 EMERGENCY DEPT VISIT HI MDM: CPT

## 2022-08-20 PROCEDURE — 99285 EMERGENCY DEPT VISIT HI MDM: CPT | Performed by: EMERGENCY MEDICINE

## 2022-08-20 PROCEDURE — 71045 X-RAY EXAM CHEST 1 VIEW: CPT

## 2022-08-20 NOTE — TELEPHONE ENCOUNTER
Regarding: sudden incontinence/ not moving/ frozen  ----- Message from Tommy Owens sent at 8/20/2022  9:24 AM EDT -----  "My mom had an episode last night where she urinated and pooped on herself   It's like she is completely frozen and she is unable to move at all "

## 2022-08-21 ENCOUNTER — APPOINTMENT (EMERGENCY)
Dept: RADIOLOGY | Facility: HOSPITAL | Age: 72
DRG: 057 | End: 2022-08-21
Payer: MEDICARE

## 2022-08-21 PROBLEM — R93.89 ABNORMAL CHEST X-RAY: Status: ACTIVE | Noted: 2022-08-21

## 2022-08-21 PROBLEM — R91.1 INCIDENTAL PULMONARY NODULE: Status: ACTIVE | Noted: 2022-08-21

## 2022-08-21 PROBLEM — Z96.0 URINARY CATHETER IN PLACE: Status: ACTIVE | Noted: 2022-08-21

## 2022-08-21 LAB
ALBUMIN SERPL BCP-MCNC: 2.8 G/DL (ref 3.5–5)
ALP SERPL-CCNC: 119 U/L (ref 46–116)
ALT SERPL W P-5'-P-CCNC: 12 U/L (ref 12–78)
ANION GAP SERPL CALCULATED.3IONS-SCNC: 5 MMOL/L (ref 4–13)
AST SERPL W P-5'-P-CCNC: 20 U/L (ref 5–45)
ATRIAL RATE: 90 BPM
BACTERIA UR QL AUTO: ABNORMAL /HPF
BASOPHILS # BLD AUTO: 0.06 THOUSANDS/ΜL (ref 0–0.1)
BASOPHILS NFR BLD AUTO: 1 % (ref 0–1)
BILIRUB SERPL-MCNC: 0.37 MG/DL (ref 0.2–1)
BILIRUB UR QL STRIP: NEGATIVE
BUN SERPL-MCNC: 21 MG/DL (ref 5–25)
CALCIUM ALBUM COR SERPL-MCNC: 9.7 MG/DL (ref 8.3–10.1)
CALCIUM SERPL-MCNC: 8.7 MG/DL (ref 8.3–10.1)
CARDIAC TROPONIN I PNL SERPL HS: 3 NG/L
CHLORIDE SERPL-SCNC: 109 MMOL/L (ref 96–108)
CK SERPL-CCNC: 79 U/L (ref 26–192)
CLARITY UR: CLEAR
CO2 SERPL-SCNC: 24 MMOL/L (ref 21–32)
COLOR UR: YELLOW
CREAT SERPL-MCNC: 0.65 MG/DL (ref 0.6–1.3)
EOSINOPHIL # BLD AUTO: 0.25 THOUSAND/ΜL (ref 0–0.61)
EOSINOPHIL NFR BLD AUTO: 2 % (ref 0–6)
ERYTHROCYTE [DISTWIDTH] IN BLOOD BY AUTOMATED COUNT: 12 % (ref 11.6–15.1)
FLUAV RNA RESP QL NAA+PROBE: NEGATIVE
FLUBV RNA RESP QL NAA+PROBE: NEGATIVE
GFR SERPL CREATININE-BSD FRML MDRD: 90 ML/MIN/1.73SQ M
GLUCOSE SERPL-MCNC: 117 MG/DL (ref 65–140)
GLUCOSE UR STRIP-MCNC: NEGATIVE MG/DL
HCT VFR BLD AUTO: 35.1 % (ref 34.8–46.1)
HGB BLD-MCNC: 11.6 G/DL (ref 11.5–15.4)
HGB UR QL STRIP.AUTO: ABNORMAL
IMM GRANULOCYTES # BLD AUTO: 0.03 THOUSAND/UL (ref 0–0.2)
IMM GRANULOCYTES NFR BLD AUTO: 0 % (ref 0–2)
KETONES UR STRIP-MCNC: NEGATIVE MG/DL
LEUKOCYTE ESTERASE UR QL STRIP: NEGATIVE
LYMPHOCYTES # BLD AUTO: 1.13 THOUSANDS/ΜL (ref 0.6–4.47)
LYMPHOCYTES NFR BLD AUTO: 11 % (ref 14–44)
MCH RBC QN AUTO: 30.9 PG (ref 26.8–34.3)
MCHC RBC AUTO-ENTMCNC: 33 G/DL (ref 31.4–37.4)
MCV RBC AUTO: 94 FL (ref 82–98)
MONOCYTES # BLD AUTO: 1.29 THOUSAND/ΜL (ref 0.17–1.22)
MONOCYTES NFR BLD AUTO: 12 % (ref 4–12)
NEUTROPHILS # BLD AUTO: 7.99 THOUSANDS/ΜL (ref 1.85–7.62)
NEUTS SEG NFR BLD AUTO: 74 % (ref 43–75)
NITRITE UR QL STRIP: NEGATIVE
NON-SQ EPI CELLS URNS QL MICRO: ABNORMAL /HPF
NRBC BLD AUTO-RTO: 0 /100 WBCS
P AXIS: 73 DEGREES
PH UR STRIP.AUTO: 7 [PH]
PLATELET # BLD AUTO: 317 THOUSANDS/UL (ref 149–390)
PMV BLD AUTO: 10.2 FL (ref 8.9–12.7)
POTASSIUM SERPL-SCNC: 3.5 MMOL/L (ref 3.5–5.3)
PR INTERVAL: 158 MS
PROCALCITONIN SERPL-MCNC: 0.09 NG/ML
PROT SERPL-MCNC: 6.8 G/DL (ref 6.4–8.4)
PROT UR STRIP-MCNC: ABNORMAL MG/DL
QRS AXIS: 70 DEGREES
QRSD INTERVAL: 78 MS
QT INTERVAL: 344 MS
QTC INTERVAL: 418 MS
RBC # BLD AUTO: 3.75 MILLION/UL (ref 3.81–5.12)
RBC #/AREA URNS AUTO: ABNORMAL /HPF
RSV RNA RESP QL NAA+PROBE: NEGATIVE
SARS-COV-2 RNA RESP QL NAA+PROBE: NEGATIVE
SODIUM SERPL-SCNC: 138 MMOL/L (ref 135–147)
SP GR UR STRIP.AUTO: >=1.05 (ref 1–1.03)
T WAVE AXIS: 47 DEGREES
UROBILINOGEN UR STRIP-ACNC: <2 MG/DL
VENTRICULAR RATE: 89 BPM
WBC # BLD AUTO: 10.75 THOUSAND/UL (ref 4.31–10.16)
WBC #/AREA URNS AUTO: ABNORMAL /HPF

## 2022-08-21 PROCEDURE — 99223 1ST HOSP IP/OBS HIGH 75: CPT | Performed by: INTERNAL MEDICINE

## 2022-08-21 PROCEDURE — 36415 COLL VENOUS BLD VENIPUNCTURE: CPT | Performed by: EMERGENCY MEDICINE

## 2022-08-21 PROCEDURE — 0241U HB NFCT DS VIR RESP RNA 4 TRGT: CPT | Performed by: INTERNAL MEDICINE

## 2022-08-21 PROCEDURE — 87040 BLOOD CULTURE FOR BACTERIA: CPT | Performed by: PHYSICIAN ASSISTANT

## 2022-08-21 PROCEDURE — 87449 NOS EACH ORGANISM AG IA: CPT

## 2022-08-21 PROCEDURE — 93005 ELECTROCARDIOGRAM TRACING: CPT

## 2022-08-21 PROCEDURE — 93010 ELECTROCARDIOGRAM REPORT: CPT | Performed by: INTERNAL MEDICINE

## 2022-08-21 PROCEDURE — 84145 PROCALCITONIN (PCT): CPT | Performed by: EMERGENCY MEDICINE

## 2022-08-21 PROCEDURE — 84484 ASSAY OF TROPONIN QUANT: CPT | Performed by: EMERGENCY MEDICINE

## 2022-08-21 PROCEDURE — G1004 CDSM NDSC: HCPCS

## 2022-08-21 PROCEDURE — 70450 CT HEAD/BRAIN W/O DYE: CPT

## 2022-08-21 PROCEDURE — RECHECK: Performed by: PHYSICIAN ASSISTANT

## 2022-08-21 PROCEDURE — 81001 URINALYSIS AUTO W/SCOPE: CPT | Performed by: EMERGENCY MEDICINE

## 2022-08-21 PROCEDURE — 87449 NOS EACH ORGANISM AG IA: CPT | Performed by: INTERNAL MEDICINE

## 2022-08-21 PROCEDURE — 72125 CT NECK SPINE W/O DYE: CPT

## 2022-08-21 PROCEDURE — 85025 COMPLETE CBC W/AUTO DIFF WBC: CPT | Performed by: EMERGENCY MEDICINE

## 2022-08-21 PROCEDURE — 96361 HYDRATE IV INFUSION ADD-ON: CPT

## 2022-08-21 PROCEDURE — 74177 CT ABD & PELVIS W/CONTRAST: CPT

## 2022-08-21 PROCEDURE — 96365 THER/PROPH/DIAG IV INF INIT: CPT

## 2022-08-21 PROCEDURE — 99222 1ST HOSP IP/OBS MODERATE 55: CPT | Performed by: PSYCHIATRY & NEUROLOGY

## 2022-08-21 PROCEDURE — 82550 ASSAY OF CK (CPK): CPT | Performed by: EMERGENCY MEDICINE

## 2022-08-21 PROCEDURE — 80053 COMPREHEN METABOLIC PANEL: CPT | Performed by: EMERGENCY MEDICINE

## 2022-08-21 RX ORDER — ROPINIROLE 6 MG/1
6 TABLET, FILM COATED, EXTENDED RELEASE ORAL DAILY
Status: DISCONTINUED | OUTPATIENT
Start: 2022-08-21 | End: 2022-08-21

## 2022-08-21 RX ORDER — VANCOMYCIN HYDROCHLORIDE 1 G/200ML
20 INJECTION, SOLUTION INTRAVENOUS ONCE
Status: COMPLETED | OUTPATIENT
Start: 2022-08-21 | End: 2022-08-21

## 2022-08-21 RX ORDER — RASAGILINE 1 MG/1
1 TABLET ORAL DAILY
Status: DISCONTINUED | OUTPATIENT
Start: 2022-08-21 | End: 2022-08-21

## 2022-08-21 RX ORDER — CARBIDOPA, LEVODOPA AND ENTACAPONE 12.5; 200; 5 MG/1; MG/1; MG/1
TABLET, FILM COATED ORAL
Status: DISCONTINUED | OUTPATIENT
Start: 2022-08-21 | End: 2022-08-25 | Stop reason: HOSPADM

## 2022-08-21 RX ORDER — QUETIAPINE FUMARATE 25 MG/1
25 TABLET, FILM COATED ORAL ONCE
Status: COMPLETED | OUTPATIENT
Start: 2022-08-21 | End: 2022-08-21

## 2022-08-21 RX ORDER — OXYCODONE HYDROCHLORIDE 5 MG/1
2.5 TABLET ORAL EVERY 4 HOURS PRN
Status: DISCONTINUED | OUTPATIENT
Start: 2022-08-21 | End: 2022-08-25 | Stop reason: HOSPADM

## 2022-08-21 RX ORDER — ENOXAPARIN SODIUM 100 MG/ML
40 INJECTION SUBCUTANEOUS DAILY
Status: DISCONTINUED | OUTPATIENT
Start: 2022-08-21 | End: 2022-08-25 | Stop reason: HOSPADM

## 2022-08-21 RX ORDER — LIDOCAINE 50 MG/G
1 PATCH TOPICAL DAILY
Status: DISCONTINUED | OUTPATIENT
Start: 2022-08-22 | End: 2022-08-25 | Stop reason: HOSPADM

## 2022-08-21 RX ORDER — ROPINIROLE 6 MG/1
6 TABLET, FILM COATED, EXTENDED RELEASE ORAL DAILY
Status: DISCONTINUED | OUTPATIENT
Start: 2022-08-21 | End: 2022-08-22

## 2022-08-21 RX ORDER — ROPINIROLE 1 MG/1
2 TABLET, FILM COATED ORAL
Status: DISCONTINUED | OUTPATIENT
Start: 2022-08-21 | End: 2022-08-21

## 2022-08-21 RX ORDER — CALCIUM CARBONATE 500(1250)
2 TABLET ORAL
Status: DISCONTINUED | OUTPATIENT
Start: 2022-08-21 | End: 2022-08-25 | Stop reason: HOSPADM

## 2022-08-21 RX ORDER — DOCUSATE SODIUM 100 MG/1
100 CAPSULE, LIQUID FILLED ORAL 2 TIMES DAILY PRN
Status: DISCONTINUED | OUTPATIENT
Start: 2022-08-21 | End: 2022-08-25 | Stop reason: HOSPADM

## 2022-08-21 RX ORDER — GUAIFENESIN 600 MG/1
600 TABLET, EXTENDED RELEASE ORAL 2 TIMES DAILY
Status: DISCONTINUED | OUTPATIENT
Start: 2022-08-21 | End: 2022-08-25 | Stop reason: HOSPADM

## 2022-08-21 RX ORDER — MAGNESIUM HYDROXIDE/ALUMINUM HYDROXICE/SIMETHICONE 120; 1200; 1200 MG/30ML; MG/30ML; MG/30ML
30 SUSPENSION ORAL EVERY 6 HOURS PRN
Status: DISCONTINUED | OUTPATIENT
Start: 2022-08-21 | End: 2022-08-25 | Stop reason: HOSPADM

## 2022-08-21 RX ORDER — FAMOTIDINE 20 MG/1
40 TABLET, FILM COATED ORAL
Status: DISCONTINUED | OUTPATIENT
Start: 2022-08-21 | End: 2022-08-25 | Stop reason: HOSPADM

## 2022-08-21 RX ORDER — ONDANSETRON 2 MG/ML
4 INJECTION INTRAMUSCULAR; INTRAVENOUS EVERY 6 HOURS PRN
Status: DISCONTINUED | OUTPATIENT
Start: 2022-08-21 | End: 2022-08-25 | Stop reason: HOSPADM

## 2022-08-21 RX ORDER — ENTACAPONE 200 MG/1
200 TABLET ORAL
Status: DISCONTINUED | OUTPATIENT
Start: 2022-08-21 | End: 2022-08-21

## 2022-08-21 RX ORDER — ACETAMINOPHEN 325 MG/1
650 TABLET ORAL EVERY 6 HOURS PRN
Status: DISCONTINUED | OUTPATIENT
Start: 2022-08-21 | End: 2022-08-25 | Stop reason: HOSPADM

## 2022-08-21 RX ORDER — AMANTADINE HYDROCHLORIDE 100 MG/1
100 CAPSULE, GELATIN COATED ORAL 2 TIMES DAILY
Status: DISCONTINUED | OUTPATIENT
Start: 2022-08-21 | End: 2022-08-21

## 2022-08-21 RX ORDER — ROPINIROLE 2 MG/1
2 TABLET, FILM COATED ORAL 3 TIMES DAILY
Status: DISCONTINUED | OUTPATIENT
Start: 2022-08-21 | End: 2022-08-21

## 2022-08-21 RX ORDER — RASAGILINE 1 MG/1
1 TABLET ORAL DAILY
Status: DISCONTINUED | OUTPATIENT
Start: 2022-08-21 | End: 2022-08-25 | Stop reason: HOSPADM

## 2022-08-21 RX ORDER — LORAZEPAM 2 MG/ML
0.5 INJECTION INTRAMUSCULAR EVERY 4 HOURS PRN
Status: DISCONTINUED | OUTPATIENT
Start: 2022-08-21 | End: 2022-08-23

## 2022-08-21 RX ORDER — SELEGILINE HYDROCHLORIDE 5 MG/1
5 TABLET ORAL
Status: DISCONTINUED | OUTPATIENT
Start: 2022-08-21 | End: 2022-08-21

## 2022-08-21 RX ADMIN — CARBIDOPA AND LEVODOPA 0.5 TABLET: 25; 100 TABLET ORAL at 10:25

## 2022-08-21 RX ADMIN — OXYCODONE HYDROCHLORIDE 2.5 MG: 5 TABLET ORAL at 20:43

## 2022-08-21 RX ADMIN — ROPINIROLE HYDROCHLORIDE 2 MG: 1 TABLET, FILM COATED ORAL at 10:24

## 2022-08-21 RX ADMIN — CARBIDOPA, LEVODOPA, AND ENTACAPONE: 12.5; 50; 2 TABLET, FILM COATED ORAL at 23:13

## 2022-08-21 RX ADMIN — SELEGILINE HYDROCHLORIDE 5 MG: 5 TABLET ORAL at 10:43

## 2022-08-21 RX ADMIN — CYANOCOBALAMIN TAB 500 MCG 1000 MCG: 500 TAB at 10:25

## 2022-08-21 RX ADMIN — FAMOTIDINE 40 MG: 20 TABLET ORAL at 23:12

## 2022-08-21 RX ADMIN — CARBIDOPA, LEVODOPA, AND ENTACAPONE: 12.5; 50; 2 TABLET, FILM COATED ORAL at 20:38

## 2022-08-21 RX ADMIN — ENTACAPONE 200 MG: 200 TABLET ORAL at 10:43

## 2022-08-21 RX ADMIN — GUAIFENESIN 600 MG: 600 TABLET, EXTENDED RELEASE ORAL at 10:25

## 2022-08-21 RX ADMIN — CALCIUM 2 TABLET: 500 TABLET ORAL at 10:25

## 2022-08-21 RX ADMIN — VANCOMYCIN HYDROCHLORIDE 1000 MG: 1 INJECTION, SOLUTION INTRAVENOUS at 03:42

## 2022-08-21 RX ADMIN — CARBIDOPA, LEVODOPA, AND ENTACAPONE: 12.5; 50; 2 TABLET, FILM COATED ORAL at 17:00

## 2022-08-21 RX ADMIN — RASAGILINE 1 MG: 1 TABLET ORAL at 17:00

## 2022-08-21 RX ADMIN — ACETAMINOPHEN 650 MG: 325 TABLET ORAL at 10:43

## 2022-08-21 RX ADMIN — QUETIAPINE FUMARATE 25 MG: 25 TABLET ORAL at 20:43

## 2022-08-21 RX ADMIN — ENOXAPARIN SODIUM 40 MG: 40 INJECTION SUBCUTANEOUS at 10:24

## 2022-08-21 RX ADMIN — CEFTRIAXONE 1000 MG: 10 INJECTION, POWDER, FOR SOLUTION INTRAVENOUS at 03:13

## 2022-08-21 RX ADMIN — SODIUM CHLORIDE 500 ML: 0.9 INJECTION, SOLUTION INTRAVENOUS at 00:18

## 2022-08-21 RX ADMIN — LORAZEPAM 0.5 MG: 2 INJECTION INTRAMUSCULAR; INTRAVENOUS at 04:01

## 2022-08-21 RX ADMIN — ROPINIROLE 6 MG: 6 TABLET, FILM COATED, EXTENDED RELEASE ORAL at 17:00

## 2022-08-21 RX ADMIN — ACETAMINOPHEN 650 MG: 325 TABLET ORAL at 04:02

## 2022-08-21 RX ADMIN — IOHEXOL 65 ML: 350 INJECTION, SOLUTION INTRAVENOUS at 00:58

## 2022-08-21 RX ADMIN — ACETAMINOPHEN 650 MG: 325 TABLET ORAL at 19:04

## 2022-08-21 NOTE — TELEPHONE ENCOUNTER
Patient completely frozen, unable to walk without support, or roll over in bed/get up, onset 8/19 at night  Daughter reports some disorientation, and falling several times tonight, and today  Also reports slurred speech  No additional symptom  Daughter reports patient not taking following medications:   rasagiline (AZILECT) 1 MG: Last two days r/t forgetfulness    rOPINIRole (REQUIP XL) 6 MG: Last two days r/t forgetfulness   Amantadine HCl ER (Gocovri) 137 MG: Since 8/1/22 due to hospital admission and mail order error  On call provider contacted and informed of patients concerns and status  Provider recommends proceeding to ED for evaluation preferably via EMS to expedite evaluation  Patient daughter informed of providers recommendation, along with care advice  Verbalized understanding and agreeable with disposition  No further questions

## 2022-08-21 NOTE — ASSESSMENT & PLAN NOTE
· Concern for pneumonia noted on chest x-ray  · Patient is currently asymptomatic at this time  · She has mild elevation white count, however procalcitonin is negative  · Patient is afebrile  · Initially given ceftriaxone, and vancomycin in the emergency department  · Will monitor off antibiotics for now    · Discontinue pulmonary consult unless patient clinically deteriorates

## 2022-08-21 NOTE — PROGRESS NOTES
[address] [facility]  Progress Note - [place]rett Sun 1950, 80 y o  female MRN: [id number]  Unit/Bed#: CW2 210-02 Encounter: [id number]  Primary Care Provider: Lizy Ng MD   Date and time admitted to hospital: 8/20/2022 11:03 PM    * Ambulatory dysfunction  Assessment & Plan  · Patient presents for a fall at home  Noted history of parkinsons  Patient lives alone and was found by daughter on the floor  · Had mobility issues previously related to parkinson's improved w/ amantadine  · This was discontinued due to causing hallucinations   · Resume amantadine   · Neurology consult  · Physical and occupational therapy with case management  Parkinson's disease [facility])  Assessment & Plan  · Patient placed on amantadine which was put on hold by patient recently due to complaints of hallucinations  She is supposed to receive additional amantadine tablets by tomorrow however comes to the emergency room with fall   · Would resume amantadine 137 mg 2 capsules daily  · Neurology consult  · PT/OT consult   · Continue Stalevo 12 5/50/201 tablet 6 times daily  Abnormal chest x-ray  Assessment & Plan  · Concern for pneumonia noted on chest x-ray  · Patient is currently asymptomatic at this time  · She has mild elevation white count, however procalcitonin is negative  · Patient is afebrile  · Initially given ceftriaxone, and vancomycin in the emergency department  · Will monitor off antibiotics for now  · Discontinue pulmonary consult unless patient clinically deteriorates    Hallucinations  Assessment & Plan  · Per chart review appears to be 2/2 amantadine use   · Pt self discontinued amantadine due to this   · Has been resumed while here     Urinary catheter in place  Assessment & Plan  · Placed in the emergency department for on reason    · Will try voiding trial now, and discontinue Chen catheter    Incidental pulmonary nodule  Assessment & Plan  · Noted on imaging w/ 6 mm ground glass nodule in the right middle lobe   · Recommending follow up in 6-12 months     Esophagitis, reflux  Assessment & Plan  · On Mylanta as needed  · Continue Pepcid 40 mg daily  Vitamin B12 deficiency  Assessment & Plan  · Continue vitamin B12 supplementation  Vitamin D deficiency  Assessment & Plan  · Continue cholecalciferol and calcium supplementation  VTE Pharmacologic Prophylaxis: VTE Score: 2 Low Risk (Score 0-2) - Encourage Ambulation  Patient Centered Rounds: I performed bedside rounds with nursing staff today  Discussions with Specialists or Other Care Team Provider: CM, pulm (to discontinue consult)    Education and Discussions with Family / Patient: Updated  (daughter) via phone  Time Spent for Care: 30 minutes  More than 50% of total time spent on counseling and coordination of care as described above  Current Length of Stay: 0 day(s)  Current Patient Status: Inpatient   Certification Statement: The patient will continue to require additional inpatient hospital stay due to Pending evaluation from Neurology, and PT/OT  Discharge Plan: Anticipate discharge in 24-48 hrs to Pending PT evaluation    Code Status: Level 1 - Full Code    Subjective:   Patient states that she is feeling well today  She states that she had a mixup with her medications and was unable to take them for a week as she gets the mail to her  She states that she thinks this is the reason for her symptoms currently  Per discussion with daughter, patient is usually relatively ambulatory, and able to move by herself when she is taking her medications  Objective:     Vitals:   Temp (24hrs), Av °F (37 2 °C), Min:99 °F (37 2 °C), Max:99 °F (37 2 °C)    Temp:  [99 °F (37 2 °C)] 99 °F (37 2 °C)  HR:  [76-99] 82  Resp:  [18] 18  BP: (114-130)/(64-73) 114/68  SpO2:  [95 %-98 %] 96 %  There is no height or weight on file to calculate BMI       Input and Output Summary (last 24 hours): Intake/Output Summary (Last 24 hours) at 8/21/2022 1023  Last data filed at 8/21/2022 0331  Gross per 24 hour   Intake --   Output 40 ml   Net -40 ml       Physical Exam:   Physical Exam  Constitutional:       General: She is not in acute distress  HENT:      Head: Normocephalic and atraumatic  Mouth/Throat:      Mouth: Mucous membranes are moist       Pharynx: Oropharynx is clear  Eyes:      General:         Right eye: No discharge  Left eye: No discharge  Cardiovascular:      Rate and Rhythm: Normal rate and regular rhythm  Pulses: Normal pulses  Heart sounds: Normal heart sounds  Pulmonary:      Effort: Pulmonary effort is normal       Breath sounds: Normal breath sounds  Abdominal:      General: Abdomen is flat  Palpations: Abdomen is soft  Genitourinary:     Comments: Chen catheter in place  Musculoskeletal:      Cervical back: No muscular tenderness  Right lower leg: No edema  Left lower leg: No edema  Skin:     General: Skin is warm and dry  Coloration: Skin is not jaundiced  Neurological:      General: No focal deficit present  Mental Status: She is alert and oriented to person, place, and time     Psychiatric:         Mood and Affect: Mood normal           Additional Data:     Labs:  Results from last 7 days   Lab Units 08/21/22  0018   WBC Thousand/uL 10 75*   HEMOGLOBIN g/dL 11 6   HEMATOCRIT % 35 1   PLATELETS Thousands/uL 317   NEUTROS PCT % 74   LYMPHS PCT % 11*   MONOS PCT % 12   EOS PCT % 2     Results from last 7 days   Lab Units 08/21/22  0018   SODIUM mmol/L 138   POTASSIUM mmol/L 3 5   CHLORIDE mmol/L 109*   CO2 mmol/L 24   BUN mg/dL 21   CREATININE mg/dL 0 65   ANION GAP mmol/L 5   CALCIUM mg/dL 8 7   ALBUMIN g/dL 2 8*   TOTAL BILIRUBIN mg/dL 0 37   ALK PHOS U/L 119*   ALT U/L 12   AST U/L 20   GLUCOSE RANDOM mg/dL 117                 Results from last 7 days   Lab Units 08/21/22  0018   PROCALCITONIN ng/ml 0 09 Lines/Drains:  Invasive Devices  Report    Peripheral Intravenous Line  Duration           Peripheral IV 08/21/22 Left Wrist <1 day          Drain  Duration           Urethral Catheter Latex 16 Fr  <1 day              Urinary Catheter:  Goal for removal: No longer needed  Will place order to discontinue               Imaging: No pertinent imaging reviewed  Recent Cultures (last 7 days):         Last 24 Hours Medication List:   Current Facility-Administered Medications   Medication Dose Route Frequency Provider Last Rate    acetaminophen  650 mg Oral Q6H PRN Kunal Calixto MD      aluminum-magnesium hydroxide-simethicone  30 mL Oral Q6H PRN Kunal Calixto MD      calcium carbonate  2 tablet Oral Daily With Breakfast Kunal Calixto MD      entacapone  200 mg Oral 6x Daily Kunal Calixto MD      And    carbidopa-levodopa  0 5 tablet Oral 6x Daily Kunal Calixto MD      cyanocobalamin  1,000 mcg Oral Daily Kunal Calixto MD      docusate sodium  100 mg Oral BID PRN Kunal Calixto MD      enoxaparin  40 mg Subcutaneous Daily Kunal Calixto MD      famotidine  40 mg Oral HS Kunal Calixto MD      guaiFENesin  600 mg Oral BID Kunal Calixto MD      LORazepam  0 5 mg Intravenous Q4H PRN Kunal Calixto MD      ondansetron  4 mg Intravenous Q6H PRN Kunal Calixto MD      rOPINIRole  2 mg Oral TID With Meals Ana Morales PA-C      selegiline  5 mg Oral Daily With Breakfast Kunal Calixto MD          Today, Patient Was Seen By: Sami Reyna PA-C    **Please Note: This note may have been constructed using a voice recognition system  **

## 2022-08-21 NOTE — ED PROVIDER NOTES
History  Chief Complaint   Patient presents with    Difficulty Walking     Pt brought in via EMS c/o increased difficulty walking d/t her parkinson's  Pt expresses her goal in getting scene today is to get her medications evaluated and states "I want to get my muscles to work better with my brain"     23-year-old female with a past medical history of Parkinson's, hyperlipidemia, and GERD presents with difficulty walking  Patient had a cholecystectomy 1 week ago  She reports feeling weak since the surgery  She has no symptoms such as fever, chills, nausea, vomiting, or abdominal pain  She stopped her amantadine before this surgery because she was afraid it was giving her hallucinations  She did not refill the medication, but then she started getting more rigid over the past week  She wanted to restart the amantadine and it is supposed to arrive at her house tomorrow  She now feels like she is having significant difficulty walking secondary to rigidity and generalized weakness  She reports that this became a serious issue yesterday  According to chart review, this morning, patient's daughter found her on the floor of her house  Patient does not remember how she got on the ground or how long she was there  Patient was apparently covered in urine and stool, but patient does not remember this  Patient reports no bowel or bladder incontinence  She does notice some pain in her left shoulder, which she states always bothers her, but she was lying on her left shoulder this morning, so it is worse now  She also notes some back pain since laying on the ground  No saddle anesthesia  Prior to Admission Medications   Prescriptions Last Dose Informant Patient Reported? Taking? Amantadine HCl ER (Gocovri) 137 MG CP24   No No   Sig: Take 2 capsules (274 mg total) by mouth every night at bedtime     Calcium Carbonate-Vit D-Min (CALCIUM 1200 PO)  Self Yes No   Sig: Take 1,200 mg by mouth daily SYRINGE-NEEDLE, DISP, 3 ML 25G X 1" 3 ML MISC   No No   Sig: Use see administration instructions every 2 weeks x 4 doses then monthly x 6 doses   carbidopa-levodopa-entacapone (STALEVO) 12 5- MG per tablet   No No   Sig: Take 1 tablet by mouth 6 (six) times a day   cyanocobalamin (VITAMIN B-12) 1000 MCG tablet   No No   Sig: Take 1 tablet (1,000 mcg total) by mouth daily   cyanocobalamin 1,000 mcg/mL   No No   Sig: INJECT 1 ML EVERY 2 WEEKS X 4 DOSES THEN MONTHLY X 6 DOSES   denosumab (Prolia) 60 mg/mL   Yes No   famotidine (PEPCID) 20 mg tablet   No No   Sig: TAKE 2 TABLETS (40 MG TOTAL) BY MOUTH DAILY AT BEDTIME   gabapentin (Neurontin) 100 mg capsule   No No   Sig: Take 1 capsule (100 mg total) by mouth 3 (three) times a day   Patient not taking: Reported on 8/9/2022   mupirocin (BACTROBAN) 2 % ointment   Yes No   Sig: APPLY TWICE A DAY TO OPEN SORES UNTIL HEALED   Patient not taking: Reported on 8/9/2022   prednisoLONE acetate (PRED FORTE) 1 % ophthalmic suspension   Yes No   Sig: INSTILL 1 DROP INTO BOTH EYES FOUR TIMES A DAY USE 1-2 WEEKS ONLY 3-4 TIMES A DAY   rOPINIRole (REQUIP XL) 6 MG TB24   No No   Sig: Take 1tab qhs   rasagiline (AZILECT) 1 MG   No No   Sig: Take 1 tablet (1 mg total) by mouth daily      Facility-Administered Medications Last Administration Doses Remaining   cyanocobalamin injection 1,000 mcg 5/24/2022  2:13 PM           Past Medical History:   Diagnosis Date    GERD (gastroesophageal reflux disease)     Osteoporosis     Parkinson disease ([address] )        Past Surgical History:   Procedure Laterality Date    ARTERIOGRAM N/A 7/10/2019    Procedure: KYPHOPLASTY;  Surgeon: Babs Can MD;  [facility];  Service: Interventional Radiology    IR KYPHOPLASTY/VERTEBROPLASTY  7/10/2019    TONSILLECTOMY         Family History   Problem Relation Age of Onset    Dementia Mother     Heart disease Father     No Known Problems Daughter     No Known Problems Maternal Grandmother     No Known Problems Maternal Grandfather     No Known Problems Paternal Grandmother     Prostate cancer Paternal Grandfather     No Known Problems Daughter     No Known Problems Daughter     Alcohol abuse Neg Hx     Mental illness Neg Hx     Substance Abuse Neg Hx     Depression Neg Hx      I have reviewed and agree with the history as documented  E-Cigarette/Vaping    E-Cigarette Use Never User      E-Cigarette/Vaping Substances    Nicotine No     THC No     CBD No     Flavoring No     Other No     Unknown No      Social History     Tobacco Use    Smoking status: Never Smoker    Smokeless tobacco: Never Used   Vaping Use    Vaping Use: Never used   Substance Use Topics    Alcohol use: Not Currently     Comment: Rare    Drug use: Never        Review of Systems   Constitutional: Positive for appetite change  Negative for chills and fever  HENT: Negative for congestion, rhinorrhea and sore throat  Eyes: Negative for pain and redness  Respiratory: Negative for cough, chest tightness, shortness of breath and wheezing  Cardiovascular: Negative for chest pain and palpitations  Gastrointestinal: Negative for abdominal pain, diarrhea, nausea and vomiting  Endocrine: Negative  Genitourinary: Negative for difficulty urinating and hematuria  Musculoskeletal: Positive for back pain and gait problem  Negative for myalgias  Skin: Negative for pallor and rash  Allergic/Immunologic: Negative  Neurological: Positive for weakness  Negative for dizziness, light-headedness and headaches  Hematological: Negative          Physical Exam  ED Triage Vitals [08/20/22 2307]   Temperature Pulse Respirations Blood Pressure SpO2   99 °F (37 2 °C) 99 18 130/73 95 %      Temp Source Heart Rate Source Patient Position - Orthostatic VS BP Location FiO2 (%)   Oral Monitor Sitting Right arm --      Pain Score       No Pain             Orthostatic Vital Signs  Vitals:    08/20/22 2307 08/21/22 0145   BP: 130/73 118/64   Pulse: 99 89   Patient Position - Orthostatic VS: Sitting Sitting       Physical Exam  Vitals and nursing note reviewed  Constitutional:       General: She is not in acute distress  Appearance: Normal appearance  She is not ill-appearing  HENT:      Head: Normocephalic and atraumatic  Eyes:      Conjunctiva/sclera: Conjunctivae normal    Cardiovascular:      Rate and Rhythm: Normal rate and regular rhythm  Heart sounds: No murmur heard  Pulmonary:      Effort: Pulmonary effort is normal  No respiratory distress  Breath sounds: Normal breath sounds  No wheezing, rhonchi or rales  Abdominal:      General: Abdomen is flat  There is no distension  Palpations: Abdomen is soft  Tenderness: There is no abdominal tenderness  There is no guarding or rebound  Musculoskeletal:         General: Normal range of motion  Cervical back: Normal range of motion and neck supple  Right lower leg: No edema  Left lower leg: No edema  Comments: Patient has no midline tenderness of the lumbar or thoracic spine  She has paraspinal muscular tenderness in the lumbar region  Skin:     General: Skin is warm and dry  Neurological:      General: No focal deficit present  Mental Status: She is alert and oriented to person, place, and time  Motor: No weakness        Comments: Not able to get up to ambulate         ED Medications  Medications   vancomycin (VANCOCIN) IVPB (premix in dextrose) 1,000 mg 200 mL (1,000 mg Intravenous New Bag 8/21/22 0342)   amantadine (SYMMETREL) capsule 100 mg (has no administration in time range)   calcium carbonate (OYSTER SHELL,OSCAL) 500 mg tablet 2 tablet (has no administration in time range)   carbidopa-levodopa (SINEMET)  mg per tablet 0 5 tablet (has no administration in time range)   cyanocobalamin (VITAMIN B-12) tablet 1,000 mcg (has no administration in time range)   famotidine (PEPCID) tablet 40 mg (has no administration in time range)   selegiline (ELDEPRYL) tablet 5 mg (has no administration in time range)   rOPINIRole (REQUIP) tablet 2 mg (has no administration in time range)   acetaminophen (TYLENOL) tablet 650 mg (has no administration in time range)   docusate sodium (COLACE) capsule 100 mg (has no administration in time range)   guaiFENesin (MUCINEX) 12 hr tablet 600 mg (has no administration in time range)   ondansetron (ZOFRAN) injection 4 mg (has no administration in time range)   aluminum-magnesium hydroxide-simethicone (MYLANTA) oral suspension 30 mL (has no administration in time range)   enoxaparin (LOVENOX) subcutaneous injection 40 mg (has no administration in time range)   LORazepam (ATIVAN) injection 0 5 mg (has no administration in time range)   sodium chloride 0 9 % bolus 500 mL (0 mL Intravenous Stopped 8/21/22 0145)   iohexol (OMNIPAQUE) 350 MG/ML injection (SINGLE-DOSE) 65 mL (65 mL Intravenous Given 8/21/22 0058)   ceftriaxone (ROCEPHIN) 1 g/50 mL in dextrose IVPB (0 mg Intravenous Stopped 8/21/22 0338)       Diagnostic Studies  Results Reviewed     Procedure Component Value Units Date/Time    Urine Microscopic [[id number]]  (Abnormal) Collected: 08/21/22 0309    Lab Status: Final result Specimen: Urine, Other Updated: 08/21/22 0346     RBC, UA 20-30 /hpf      WBC, UA None Seen /hpf      Epithelial Cells None Seen /hpf      Bacteria, UA None Seen /hpf     Sputum culture and Gram stain [[id number]]     Lab Status: No result Specimen: Sputum     Legionella antigen, Urine [[id number]]     Lab Status: No result Specimen: Urine     Strep Pneumoniae, Urine [[id number]]     Lab Status: No result Specimen: Urine     UA (URINE) with reflex to Scope [[id number]]     Lab Status: No result Specimen: Urine     UA w Reflex to Microscopic w Reflex to Culture [[id number]]  (Abnormal) Collected: 08/21/22 0309    Lab Status: Final result Specimen: Urine, Other Updated: 08/21/22 0326     Color, UA Yellow Clarity, UA Clear     Specific Gravity, UA >=1 050     pH, UA 7 0     Leukocytes, UA Negative     Nitrite, UA Negative     Protein, UA Trace mg/dl      Glucose, UA Negative mg/dl      Ketones, UA Negative mg/dl      Urobilinogen, UA <2 0 mg/dl      Bilirubin, UA Negative     Occult Blood, UA Large    Procalcitonin [[id number]]  (Normal) Collected: 08/21/22 0018    Lab Status: Final result Specimen: Blood from Arm, Left Updated: 08/21/22 0311     Procalcitonin 0 09 ng/ml     HS Troponin 0hr (reflex protocol) [[id number]]  (Normal) Collected: 08/21/22 0018    Lab Status: Final result Specimen: Blood from Arm, Left Updated: 08/21/22 0055     hs TnI 0hr 3 ng/L     CK Total with Reflex CKMB [[id number]]  (Normal) Collected: 08/21/22 0018    Lab Status: Final result Specimen: Blood from Arm, Left Updated: 08/21/22 0047     Total CK 79 U/L     Comprehensive metabolic panel [[id number]]  (Abnormal) Collected: 08/21/22 0018    Lab Status: Final result Specimen: Blood from Arm, Left Updated: 08/21/22 0047     Sodium 138 mmol/L      Potassium 3 5 mmol/L      Chloride 109 mmol/L      CO2 24 mmol/L      ANION GAP 5 mmol/L      BUN 21 mg/dL      Creatinine 0 65 mg/dL      Glucose 117 mg/dL      Calcium 8 7 mg/dL      Corrected Calcium 9 7 mg/dL      AST 20 U/L      ALT 12 U/L      Alkaline Phosphatase 119 U/L      Total Protein 6 8 g/dL      Albumin 2 8 g/dL      Total Bilirubin 0 37 mg/dL      eGFR 90 ml/min/1 73sq m     Narrative:      [facility] guidelines for Chronic Kidney Disease (CKD):     Stage 1 with normal or high GFR (GFR > 90 mL/min/1 73 square meters)    Stage 2 Mild CKD (GFR = 60-89 mL/min/1 73 square meters)    Stage 3A Moderate CKD (GFR = 45-59 mL/min/1 73 square meters)    Stage 3B Moderate CKD (GFR = 30-44 mL/min/1 73 square meters)    Stage 4 Severe CKD (GFR = 15-29 mL/min/1 73 square meters)    Stage 5 End Stage CKD (GFR <15 mL/min/1 73 square meters)  Note: GFR calculation is accurate only with a steady state creatinine    CBC and differential [[id number]]  (Abnormal) Collected: 08/21/22 0018    Lab Status: Final result Specimen: Blood from Arm, Left Updated: 08/21/22 0042     WBC 10 75 Thousand/uL      RBC 3 75 Million/uL      Hemoglobin 11 6 g/dL      Hematocrit 35 1 %      MCV 94 fL      MCH 30 9 pg      MCHC 33 0 g/dL      RDW 12 0 %      MPV 10 2 fL      Platelets 920 Thousands/uL      nRBC 0 /100 WBCs      Neutrophils Relative 74 %      Immat GRANS % 0 %      Lymphocytes Relative 11 %      Monocytes Relative 12 %      Eosinophils Relative 2 %      Basophils Relative 1 %      Neutrophils Absolute 7 99 Thousands/µL      Immature Grans Absolute 0 03 Thousand/uL      Lymphocytes Absolute 1 13 Thousands/µL      Monocytes Absolute 1 29 Thousand/µL      Eosinophils Absolute 0 25 Thousand/µL      Basophils Absolute 0 06 Thousands/µL                  CT head without contrast   Final Result by Alexandria Parsons MD (08/21 2850)      No acute intracranial abnormality  Workstation performed: [id number]         CT spine cervical without contrast   Final Result by Alexandria Parsons MD (08/21 1144)      No cervical spine fracture or traumatic malalignment  Workstation performed: [id number]         CT abdomen pelvis with contrast   Final Result by Alexandria Parsons MD (08/21 4950)         No acute pathology visualized on CT of the abdomen and pelvis with IV without oral contrast       6 mm groundglass nodule in the right middle lobe  Based on current Fleischner Society 2017 Guidelines on incidental pulmonary nodule, followup noncontrast CT is recommended at 6-12 months from the initial examination to confirm persistence; if stable at    that time, additional followup CT is recommended for every 2 years until 5 years of stability is demonstrated  Lingular linear atelectasis  The study was marked in [facility] for immediate notification           Workstation performed: [id number] XR chest 1 view portable   ED Interpretation by Jeanette Ozuna DO (08/21 0229)   EDI infiltrate  Possible RUL infiltrate as well      CT recon only lumbar spine    (Results Pending)         Procedures  Procedures      ED Course  ED Course as of 08/21/22 0347   Sun Aug 21, 2022   0026 ECG 12 lead  The heart rate is 89, which is normal  The rhythm is regular  The axis is normal  The P waves are normal and the WV interval is normal  The QRS height is normal and width is normal  The ST segments are not elevated or depressed  The T waves are normal  The QT segment is normal  This ecg shows sinus rhythm  SBIRT 22yo+    Flowsheet Row Most Recent Value   SBIRT (23 yo +)    In order to provide better care to our patients, we are screening all of our patients for alcohol and drug use  Would it be okay to ask you these screening questions? No Filed at: 08/20/2022 2312                MDM  Number of Diagnoses or Management Options  Ambulatory dysfunction: established and worsening  General weakness: established and worsening  Pneumonia: new and requires workup  Diagnosis management comments: 67-year-old female with a past medical history of Parkinson's presents with generalized weakness and ambulatory dysfunction  This could be related to the fact that patient has been off her amantadine for a week and feels more rigid  She also has generalized weakness  Will check labs to rule out other causes of weakness  Will check a CK because patient was on the ground for an unknown amount of time  Will check CT head and C-spine to rule out injury  Will check a CT abdomen pelvis with lumbar recon due to pain         Amount and/or Complexity of Data Reviewed  Clinical lab tests: ordered and reviewed  Tests in the radiology section of CPT®: ordered and reviewed  Review and summarize past medical records: yes  Discuss the patient with other providers: yes    Risk of Complications, Morbidity, and/or Mortality  Presenting problems: moderate  Diagnostic procedures: moderate  Management options: moderate    Patient Progress  Patient progress: improved    Antibiotics were started for possible pneumonia  Patient was admitted to medicine for further workup and management  Disposition  Final diagnoses:   Ambulatory dysfunction   Pneumonia   General weakness     Time reflects when diagnosis was documented in both MDM as applicable and the Disposition within this note     Time User Action Codes Description Comment    8/21/2022  3:29 AM Kelsey Runnells S Add [G20] Parkinson's disease ([address] )     8/21/2022  3:29 AM Kelsey Runnells S Add [R91 1] Incidental pulmonary nodule     8/21/2022  3:32 AM Carollee Henry Add [A77 29] Atelectasis pulmonary     8/21/2022  3:45 AM Brenda Ralphs Add [R26 2] Ambulatory dysfunction     8/21/2022  3:45 AM Brenda Ralphs Add [J18 9] Pneumonia     8/21/2022  3:45 AM Brenda Ralphs Add [R53 1] General weakness       ED Disposition     ED Disposition   Admit    Condition   Fair    Date/Time   Sun Aug 21, 2022  3:30 AM    Comment   Case was discussed with Dr Shamir Gaytan and the patient's admission status was agreed to be Admission Status: inpatient status to the service of Dr Shamir Gaytan   Follow-up Information    None         Patient's Medications   Discharge Prescriptions    No medications on file     No discharge procedures on file  PDMP Review       Value Time User    PDMP Reviewed  Yes 2/14/2022 11:48 AM Bartolome Obregon MD           ED Provider  Attending physically available and evaluated Charmaine Castaneda  MARTINA managed the patient along with the ED Attending      Electronically Signed by         Helen Woods DO  08/21/22 4725

## 2022-08-21 NOTE — ASSESSMENT & PLAN NOTE
· Patient placed on amantadine which was put on hold by patient recently due to complaints of hallucinations  She is supposed to receive additional amantadine tablets by tomorrow however comes to the emergency room with fall   · Would resume amantadine 137 mg 2 capsules daily  · Neurology consult  · PT/OT consult   · Continue Stalevo 12 5/50/201 tablet 6 times daily

## 2022-08-21 NOTE — H&P
Enrico 44 1950, 80 y o  female MRN: [id number]  Unit/Bed#: ED 28 Encounter: [id number]  Primary Care Provider: Lisa Zambrano MD   Date and time admitted to hospital: 8/20/2022 11:03 PM    * Ambulatory dysfunction  Assessment & Plan  Most likely secondary to exacerbation of Parkinson symptoms  Neurology consult  Would resume amantadine  Physical and occupational therapy with case management  Incidental pulmonary nodule  Assessment & Plan  This needs outpatient follow-up  Would defer to the time staff to discussed with patient upon discharge  Hallucinations  Assessment & Plan  Patient states that her hallucinations are brought about by her use of amantadine hence she discontinued it by herself  Currently not on amantadine; but will resume this admission and continue observation  Vitamin B12 deficiency  Assessment & Plan  Continue vitamin B12 supplementation  Parkinson's disease [facility])  Assessment & Plan  Patient placed on amantadine which was put on hold by patient recently due to complaints of hallucinations  She is supposed receive additional amantadine tablets by tomorrow however comes to the emergency room with exacerbation of symptoms and further ambulatory dysfunction  Would resume amantadine 137 mg 2 capsules daily  Neurology consult  Occupational physical therapy consult with case management  Continue Stalevo 12 5/50/201 tablet 6 times daily  Vitamin D deficiency  Assessment & Plan  Continue cholecalciferol and calcium supplementation  Esophagitis, reflux  Assessment & Plan  On Mylanta as needed  Continue Pepcid 40 mg daily            VTE Prophylaxis: Enoxaparin (Lovenox)  / sequential compression device   Code Status: Level 1 - Full Code as discussed with patient  POLST: There is no POLST form on file for this patient (pre-hospital)    Anticipated Length of Stay:  Patient will be admitted on an Inpatient basis with an anticipated length of stay of  greater than 2 midnights  Justification for Hospital Stay: Please see detailed plans noted above  Further evaluation of movement disorder; neurology consult for management of Parkinson's disease  Finding of atelectasis on CT scan with concern for possible pneumonia  Chief Complaint:     Increasing ambulatory dysfunction; shuffling gait flat affect  History of Present Illness:  Aamir Pollack is a 80 y o  female who has past medical history significant for Parkinson disease currently on levodopa carbidopa entacapone, amantadine 137 mg 2 capsules daily in addition to Prolia, rasagiline and Requip  In addition, she has a history of vitamin-D deficiency and B12  Patient had cholecystectomy approximately a week ago and prior to that she discontinued the use of amantadine as she has complaints of hallucinations with its use  According to her, she sees people and things  All visual hallucinations no auditory  The patient therefore did not refill her medication however she did apply for the refill and was due to get that by tomorrow  Meanwhile, she lives alone although family members live close by  Patient is complaining of rigidity and difficulty with ambulation  Likewise, her affect as of the moment is somewhat flat which according to her symptoms, signify worsening Parkinson's  According to patient, her daughter found her on the floor but she cannot recall having fallen  Therefore, she presents as a safety issue  She denies any fever  She may have occasional cough but nonproductive  She denies chills  No dysuria  Patient states that she can not swallow and eat food however has been having difficulty due to her ability to move around the house  Here in the emergency room, she had a head CT scan along with cervicals spines and abdomen and pelvis done  Her cervical spines show mild degenerative disease but no cervical stenosis    An incidental pulmonary nodule was found  It is also notable for presence of atelectasis  Her current saturation on room air is 94% but when upright improves to 98%  Patient states that she has some problems taking a very deep breath  However, she denies having chest pains  Incidentally, emergency room physicians thought that she may have pneumonia hence was started on antibiotics including Rocephin with vancomycin  Noted procalcitonin is normal   She does have mild elevation of white count from previous  No fever  Unfortunately, no blood cultures were taken prior to administration of antibiotics  The patient is placed in hospital for further evaluation of movement disorder and Parkinson disease  Neurology consult  Will also resume amantadine  However, will also include respiratory support and protocol with consult to Pulmonary and Infectious Disease regarding appropriateness of further antibiotic therapy for presumptive pneumonia  Incidentally, CT scan makes note of incidental pulmonary nodule which needs outpatient follow-up      Review of Systems:    Constitutional:  Denies fever or chills   Eyes:  Denies change in visual acuity   HENT:  Denies nasal congestion or sore throat   Respiratory:  Incidental cough without shortness of breath   Cardiovascular:  Denies chest pain or edema   GI:  Denies abdominal pain, nausea, vomiting, bloody stools or diarrhea   :  Denies dysuria   Musculoskeletal:  Denies back pain or joint pain   Integument:  Denies rash   Neurologic:  Denies headache, focal weakness or sensory changes   Endocrine:  Denies polyuria or polydipsia   Lymphatic:  Denies swollen glands   Psychiatric:  Denies depression or anxiety     Past Medical and Surgical History:   Past Medical History:   Diagnosis Date    GERD (gastroesophageal reflux disease)     Osteoporosis     Parkinson disease ([facility] [address] )      Past Surgical History:   Procedure Laterality Date    ARTERIOGRAM N/A 7/10/2019    Procedure: KYPHOPLASTY;  Surgeon: Laney Aldridge MD;  Location: [facility];  Service: Interventional Radiology    IR KYPHOPLASTY/VERTEBROPLASTY  7/10/2019    TONSILLECTOMY         Meds/Allergies:  (Not in a hospital admission)      Allergies: Allergies   Allergen Reactions    Sulfa Antibiotics      Other reaction(s): family history - anaphylaxis  Category: Allergy;     Doxycycline Rash     Category: Allergy; History:  Marital Status: /Civil Union   Occupation:  She is currently retired lives alone  Patient Pre-hospital Living Situation:  Alone at home  However has family members living nearby  Patient Pre-hospital Level of Mobility:  Ambulatory but having difficulty lately  Patient Pre-hospital Diet Restrictions:  Regular  Substance Use History:   Social History     Substance and Sexual Activity   Alcohol Use Not Currently    Comment: Rare     Social History     Tobacco Use   Smoking Status Never Smoker   Smokeless Tobacco Never Used     Social History     Substance and Sexual Activity   Drug Use Never       Family History:  Family History   Problem Relation Age of Onset    Dementia Mother     Heart disease Father     No Known Problems Daughter     No Known Problems Maternal Grandmother     No Known Problems Maternal Grandfather     No Known Problems Paternal Grandmother     Prostate cancer Paternal Grandfather     No Known Problems Daughter     No Known Problems Daughter     Alcohol abuse Neg Hx     Mental illness Neg Hx     Substance Abuse Neg Hx     Depression Neg Hx        Physical Exam:     Vitals:   Blood Pressure: 118/64 (08/21/22 0145)  Pulse: 89 (08/21/22 0145)  Temperature: 99 °F (37 2 °C) (08/20/22 2307)  Temp Source: Oral (08/20/22 2307)  Respirations: 18 (08/21/22 0145)  SpO2: 95 % (08/21/22 0145)    Constitutional:  Well developed, thin, no acute distress, non-toxic appearance ; can converse however slow  Flat affect    No emotional facial movement seen  Eyes:  PERRL, conjunctiva normal   HENT:  Atraumatic, external ears normal, nose normal, oropharynx dry, no pharyngeal exudates  Neck- normal range of motion, no tenderness, supple   Respiratory:  No respiratory distress, normal breath sounds, no rales, no wheezing   Cardiovascular:  Normal rate, normal rhythm, no murmurs, no gallops, no rubs   GI:  Soft, nondistended, normal bowel sounds, nontender, no organomegaly, no mass, no rebound, no guarding   :  No costovertebral angle tenderness   Musculoskeletal:  No edema, no tenderness, no deformities  Back- no tenderness  Integument:  Well hydrated, no rash   Lymphatic:  No lymphadenopathy noted   Neurologic:  Alert and awake communicative but slow to respond, no focal facial signs or droop, tongue is midline  Cranial nerves are generally intact  Can also move all 4 extremities without any preferential movement  All extremities are 4/5  Psychiatric:  Speech and behavior flat with out facial expression      Lab Results: I have personally reviewed pertinent reports  Results from last 7 days   Lab Units 08/21/22  0018   WBC Thousand/uL 10 75*   HEMOGLOBIN g/dL 11 6   HEMATOCRIT % 35 1   PLATELETS Thousands/uL 317   NEUTROS PCT % 74   LYMPHS PCT % 11*   MONOS PCT % 12   EOS PCT % 2     Results from last 7 days   Lab Units 08/21/22  0018   POTASSIUM mmol/L 3 5   CHLORIDE mmol/L 109*   CO2 mmol/L 24   BUN mg/dL 21   CREATININE mg/dL 0 65   CALCIUM mg/dL 8 7   ALK PHOS U/L 119*   ALT U/L 12   AST U/L 20               Imaging: I have personally reviewed pertinent reports  CT head without contrast    Result Date: 8/21/2022  Narrative: CT BRAIN - WITHOUT CONTRAST INDICATION:   found down  COMPARISON:  None  TECHNIQUE:  CT examination of the brain was performed  In addition to axial images, sagittal and coronal 2D reformatted images were created and submitted for interpretation  Radiation dose length product (DLP) for this visit:  870 mGy-cm     This examination, like all CT scans performed in the [facility], was performed utilizing techniques to minimize radiation dose exposure, including the use of iterative reconstruction and automated exposure control  IMAGE QUALITY:  Diagnostic  FINDINGS: PARENCHYMA: Decreased attenuation is noted in periventricular and subcortical white matter demonstrating an appearance that is statistically most likely to represent mild microangiopathic change  No CT signs of acute infarction  No intracranial mass, mass effect or midline shift  No acute parenchymal hemorrhage  VENTRICLES AND EXTRA-AXIAL SPACES:  Normal for the patient's age  VISUALIZED ORBITS AND PARANASAL SINUSES:  Unremarkable  CALVARIUM AND EXTRACRANIAL SOFT TISSUES:  Normal      Impression: No acute intracranial abnormality  Workstation performed: [id number]     CT spine cervical without contrast    Result Date: 8/21/2022  Narrative: CT CERVICAL SPINE - WITHOUT CONTRAST INDICATION:   found down  COMPARISON:  None  TECHNIQUE:  CT examination of the cervical spine was performed without intravenous contrast   Contiguous axial images were obtained  Sagittal and coronal reconstructions were performed  Radiation dose length product (DLP) for this visit:  270 mGy-cm   This examination, like all CT scans performed in the [facility], was performed utilizing techniques to minimize radiation dose exposure, including the use of iterative reconstruction and automated exposure control  IMAGE QUALITY:  Diagnostic  FINDINGS: ALIGNMENT:  Normal alignment of the cervical spine  No subluxation  VERTEBRAL BODIES:  No fracture  DEGENERATIVE CHANGES:  Mild multilevel cervical degenerative changes are noted without critical central canal stenosis  PREVERTEBRAL AND PARASPINAL SOFT TISSUES:  Unremarkable  THORACIC INLET:  Normal      Impression: No cervical spine fracture or traumatic malalignment    Workstation performed: [id number]     CT abdomen pelvis with contrast    Result Date: 8/21/2022  Narrative: CT ABDOMEN AND PELVIS WITH IV CONTRAST INDICATION:   found down, back pain  COMPARISON:  None  TECHNIQUE:  CT examination of the abdomen and pelvis was performed  Axial, sagittal, and coronal 2D reformatted images were created from the source data and submitted for interpretation  Radiation dose length product (DLP) for this visit:  314 mGy-cm   This examination, like all CT scans performed in the [facility], was performed utilizing techniques to minimize radiation dose exposure, including the use of iterative reconstruction and automated exposure control  IV Contrast:  65 mL of iohexol (OMNIPAQUE) Enteric Contrast:  Enteric contrast was not administered  FINDINGS: ABDOMEN LOWER CHEST: 6 mm groundglass nodule in the right middle lobe  Based on current Fleischner Society 2017 Guidelines on incidental pulmonary nodule, followup noncontrast CT is recommended at 6-12 months from the initial examination to confirm persistence; if stable at that time, additional followup CT is recommended for every 2 years until 5 years of stability is demonstrated  Lingular linear atelectasis  LIVER/BILIARY TREE:  Unremarkable  GALLBLADDER:  No calcified gallstones  No pericholecystic inflammatory change  SPLEEN:  Unremarkable  PANCREAS:  Unremarkable  ADRENAL GLANDS:  Unremarkable  KIDNEYS/URETERS:  Unremarkable  No hydronephrosis  STOMACH AND BOWEL:  Unremarkable  APPENDIX:  A normal appendix was visualized  ABDOMINOPELVIC CAVITY:  No ascites  No pneumoperitoneum  No lymphadenopathy  VESSELS:  Unremarkable for patient's age  PELVIS REPRODUCTIVE ORGANS:  Unremarkable for patient's age  URINARY BLADDER:  Unremarkable  ABDOMINAL WALL/INGUINAL REGIONS:  Unremarkable  OSSEOUS STRUCTURES:  No acute fracture or destructive osseous lesion  Post T12 and L1 kyphoplasty is       Impression: No acute pathology visualized on CT of the abdomen and pelvis with IV without oral contrast  6 mm groundglass nodule in the right middle lobe  Based on current Fleischner Society 2017 Guidelines on incidental pulmonary nodule, followup noncontrast CT is recommended at 6-12 months from the initial examination to confirm persistence; if stable at that time, additional followup CT is recommended for every 2 years until 5 years of stability is demonstrated  Lingular linear atelectasis  The study was marked in [facility] for immediate notification  Workstation performed: [id number]         ** Please Note: Dragon 360 Dictation voice to text software was used in the creation of this document   **

## 2022-08-21 NOTE — ASSESSMENT & PLAN NOTE
Patient states that her hallucinations are brought about by her use of amantadine hence she discontinued it by herself  Currently not on amantadine; but will resume this admission and continue observation

## 2022-08-21 NOTE — ASSESSMENT & PLAN NOTE
· Per chart review appears to be 2/2 amantadine use   · Pt self discontinued amantadine due to this   · Has been resumed while here

## 2022-08-21 NOTE — ASSESSMENT & PLAN NOTE
Most likely secondary to exacerbation of Parkinson symptoms  Neurology consult  Would resume amantadine  Physical and occupational therapy with case management

## 2022-08-21 NOTE — TELEPHONE ENCOUNTER
Reason for Disposition   [1] Weakness of the face, arm / hand, or leg / foot on one side of the body AND [2] gradual onset (e g , days to weeks) AND [3] present now   [1] Loss of speech or garbled speech AND [2] gradual onset (e g , days to weeks) AND [3] present now    Answer Assessment - Initial Assessment Questions  1  SYMPTOM: "What is the main symptom you are concerned about?" (e g , weakness, numbness)      completely frozen, unable to walk without support, or roll over in bed/get up  2  ONSET: "When did this start?" (minutes, hours, days; while sleeping)      onset 8/19 at night  3  LAST NORMAL: "When was the last time you were normal (no symptoms)?"    8/18  4  PATTERN "Does this come and go, or has it been constant since it started?"  "Is it present now?"      Constant   5  CARDIAC SYMPTOMS: "Have you had any of the following symptoms: chest pain, difficulty breathing, palpitations?"    Denies   6  NEUROLOGIC SYMPTOMS: "Have you had any of the following symptoms: headache, dizziness, vision loss, double vision, changes in speech, unsteady on your feet?"      Daughter reports some disorientation, and falling several times tonight, and today  Also reports slurred speech     7  OTHER SYMPTOMS: "Do you have any other symptoms?"      Denies    Protocols used: NEUROLOGIC DEFICIT-ADULT-

## 2022-08-21 NOTE — ASSESSMENT & PLAN NOTE
This needs outpatient follow-up  Would defer to the time staff to discussed with patient upon discharge

## 2022-08-21 NOTE — ASSESSMENT & PLAN NOTE
· Noted on imaging w/ 6 mm ground glass nodule in the right middle lobe   · Recommending follow up in 6-12 months

## 2022-08-21 NOTE — CONSULTS
Consultation - Neurology   Dot Ezra 80 y o  female MRN: [id number]  Unit/Bed#: CW2 210-02 Encounter: [id number]      Assessment/Plan     Parkinson's disease [facility])  Assessment & Plan  78-year-old female with Parkinson's disease, GERD, osteoporosis, recent gallstone pancreatitis, presents with significant freezing after missing several days of some of her Parkinson's medications (Azilect and ropinirole)  Home medications include:  · Stalevo 50 mg/12 5 mg/200 mg 6 times daily approximately 2-3 hours between doses  · Azilect 1 mg daily  · Ropinirole XL 6 mg daily  · Gocovri (amandatine extended release) 137 mg - not taken since 8/1/22    Suspect medication mixup likely significant contributor to patient's setback with regard to her movement  Would also screen for infectious/metabolic derangements  Plan:  -patient's daughters will bring in her home medications as not all of them are on formulary here  Will in the meantime substitute Stalevo with entacapone/carbidopa-levodopa 6 times daily, rasagiline with selegiline 5 mg daily, and ropinirole 6 mg XR with ropinirole immediate release 2 mg t i d  with meals  -will hold off on starting amantadine until patient is stabilized on above medications  Can at that time consider restarting Gocovri 137mg 1 tab nightly as recommended by her outpatient Neurology provider and observing for increased hallucinosis  -PT/OT  -correction of infectious/metabolic derangements as per primary service  -regulation of sleep/wake cycle  Would avoid overuse of Ativan  Patient does take this at home p r n  for sleep and reportedly tolerates it well  Can consider alternative low-dose medications such as Zyprexa for behavioral control should she have paradoxical agitation on the Ativan  Recommendations for outpatient neurological follow up have yet to be determined  Patient has appointment with Dr Karlynn Kawasaki on 10/17/2022  She should keep this appointment      History of Present Illness     Reason for Consult / Principal Problem:  Freezing, Parkinson's medication adjustment  Hx and PE limited by:  N/A  HPI: Clement Calderon is a 80 y o   female with Parkinson's disease, GERD, osteoporosis, recent gallstone pancreatitis, presents with significant freezing after missing several doses of her Parkinson's medications  Per discussion with patient and her daughter at bedside, it appears patient missed taking her rasagiline and ropinirole for several days  On the evening of 08/19/2022, she was discovered by 1 of her daughters downstairs, apparently unable to move, incontinent of urine and stool  She appeared quite frozen  The day before this, she appeared hyperactive and very restless  Her daughter notes that patient has had frequency of urination over the past several days and she has not slept well in at least 2 days  Her daughters made sure she restarted all of her medications but patient's movement did not appear to improve, which prompted her presentation to the emergency department  She has not taken her Gocovri (extended release amantadine) since 8/1/22 as she thought it had been contributing to her visual hallucinosis  However, it was unclear whether the hallucinosis was actually caused by UTI as she was later found to have  She was then admitted to [facility] on 08/05/2022 with gallstone pancreatitis  The plan as far as the [place] Setting was to restart the medication at a lower dose of (1 tablet nightly) and observe, but patient has not yet received her new Gocovri prescription in the mail and thus she has not restarted it  Patient has been symptomatic from her Parkinson's disease since 1999, but patient's daughter at bedside notes a significant physical decline in the last year so, with possible cognitive decline as well  CT head on presentation demonstrated no acute changes    Patient given single dose of vancomycin and ceftriaxone in the ED for unclear reasons  Does have mildly elevated WBC and temp 99° but no clear source of infection  On exam, patient has diffuse rigidity but no rest tremor  She appears quite sleepy as she was given Ativan just prior to presentation to the ED and also at 0400 due to agitation  Per her daughter at bedside, patient typically tolerates low-dose Ativan which is used for sleep quite well  Inpatient consult to Neurology  Consult performed by: Mary Patterson PA-C  Consult ordered by: Pedro Hayes MD          Review of Systems   Constitutional: Negative  HENT: Negative  Eyes: Negative  Respiratory: Negative  Cardiovascular: Negative  Gastrointestinal: Negative  Endocrine: Negative  Genitourinary: Positive for frequency  Musculoskeletal: Positive for arthralgias (Left shoulder pain (chronic))  Skin: Negative for rash  Allergic/Immunologic: Negative  Neurological: Positive for weakness (generalized)  As above  Hematological: Negative  Psychiatric/Behavioral: Positive for agitation, hallucinations and sleep disturbance         Historical Information   Past Medical History:   Diagnosis Date    GERD (gastroesophageal reflux disease)     Osteoporosis     Parkinson disease ([address] )      Past Surgical History:   Procedure Laterality Date    ARTERIOGRAM N/A 7/10/2019    Procedure: KYPHOPLASTY;  Surgeon: Sweetie Antony MD;  Location: [facility];  Service: Interventional Radiology    IR KYPHOPLASTY/VERTEBROPLASTY  7/10/2019    TONSILLECTOMY       Social History   Social History     Substance and Sexual Activity   Alcohol Use Not Currently    Comment: Rare     Social History     Substance and Sexual Activity   Drug Use Never     E-Cigarette/Vaping    E-Cigarette Use Never User      E-Cigarette/Vaping Substances    Nicotine No     THC No     CBD No     Flavoring No     Other No     Unknown No      Social History     Tobacco Use   Smoking Status Never Smoker   Smokeless Tobacco Never Used     Family History:   Family History   Problem Relation Age of Onset    Dementia Mother     Heart disease Father     No Known Problems Daughter     No Known Problems Maternal Grandmother     No Known Problems Maternal Grandfather     No Known Problems Paternal Grandmother     Prostate cancer Paternal Grandfather     No Known Problems Daughter     No Known Problems Daughter     Alcohol abuse Neg Hx     Mental illness Neg Hx     Substance Abuse Neg Hx     Depression Neg Hx        Review of previous medical records was completed  Meds/Allergies   PTA meds:   Prior to Admission Medications   Prescriptions Last Dose Informant Patient Reported? Taking? Amantadine HCl ER (Gocovri) 137 MG CP24   No No   Sig: Take 2 capsules (274 mg total) by mouth every night at bedtime     Calcium Carbonate-Vit D-Min (CALCIUM 1200 PO)  Self Yes No   Sig: Take 1,200 mg by mouth daily   SYRINGE-NEEDLE, DISP, 3 ML 25G X 1" 3 ML MISC   No No   Sig: Use see administration instructions every 2 weeks x 4 doses then monthly x 6 doses   carbidopa-levodopa-entacapone (STALEVO) 12 5- MG per tablet   No No   Sig: Take 1 tablet by mouth 6 (six) times a day   cyanocobalamin (VITAMIN B-12) 1000 MCG tablet   No No   Sig: Take 1 tablet (1,000 mcg total) by mouth daily   cyanocobalamin 1,000 mcg/mL   No No   Sig: INJECT 1 ML EVERY 2 WEEKS X 4 DOSES THEN MONTHLY X 6 DOSES   denosumab (Prolia) 60 mg/mL   Yes No   famotidine (PEPCID) 20 mg tablet   No No   Sig: TAKE 2 TABLETS (40 MG TOTAL) BY MOUTH DAILY AT BEDTIME   gabapentin (Neurontin) 100 mg capsule   No No   Sig: Take 1 capsule (100 mg total) by mouth 3 (three) times a day   Patient not taking: Reported on 8/9/2022   mupirocin (BACTROBAN) 2 % ointment   Yes No   Sig: APPLY TWICE A DAY TO OPEN SORES UNTIL HEALED   Patient not taking: Reported on 8/9/2022   prednisoLONE acetate (PRED FORTE) 1 % ophthalmic suspension   Yes No   Sig: INSTILL 1 DROP INTO BOTH EYES FOUR TIMES A DAY USE 1-2 WEEKS ONLY 3-4 TIMES A DAY   rOPINIRole (REQUIP XL) 6 MG TB24   No No   Sig: Take 1tab qhs   rasagiline (AZILECT) 1 MG   No No   Sig: Take 1 tablet (1 mg total) by mouth daily      Facility-Administered Medications Last Administration Doses Remaining   cyanocobalamin injection 1,000 mcg 5/24/2022  2:13 PM           Allergies   Allergen Reactions    Sulfa Antibiotics      Other reaction(s): family history - anaphylaxis  Category: Allergy;     Doxycycline Rash     Category: Allergy;        Objective   Vitals:Blood pressure 114/68, pulse 82, temperature 99 °F (37 2 °C), temperature source Oral, resp  rate 18, SpO2 96 %, not currently breastfeeding  ,There is no height or weight on file to calculate BMI  Intake/Output Summary (Last 24 hours) at 8/21/2022 1118  Last data filed at 8/21/2022 0331  Gross per 24 hour   Intake --   Output 40 ml   Net -40 ml       Invasive Devices: Invasive Devices  Report    Peripheral Intravenous Line  Duration           Peripheral IV 08/21/22 Left Wrist <1 day          Drain  Duration           Urethral Catheter Latex 16 Fr  <1 day                Physical Exam   General:  Patient is well-developed, well-nourished, and in no acute distress  HEENT:  Head normocephalic  Eyes anicteric  Cardiovascular:  With regular rhythm  Lungs:  Normal effort  Nonlabored breathing  Extremities:  With no significant edema  Skin: No rashes  Neurologic Exam examined prior to first dose Parkinson's meds  Neurologic:  Patient is sleepy but alerts to voice, pleasantly interactive, and appropriately conversational   No obvious symbolic language difficulty or dysarthria (does speak quickly with hypophonia), and the patient is fully oriented  Gait deferred for safety  Cranial Nerves:   II: Visual fields full to confrontation  Pupils equal, round, reactive to light with normal accomodation  Cannot visualize optic fundi    III,IV,VI: extraocular movements intact with no nystagmus  V: Sensation in the V1 through V3 distributions intact to light touch bilaterally  VII: Face is symmetric with no weakness noted  XII: Tongue midline with no atrophy or fasciculations with appropriate movement  Coordination:  Accurate with finger-to-nose maneuvers bilaterally  Diffusely rigid with cogwheeling at the wrists but no rest tremor  Diminished facial and general animation  Motor testing with no upper or lower extremity drift  Full strength to confrontation throughout  Sensory testing grossly intact to light touch throughout  Reflexes 2 bilateral upper extremities and lower extremities  Toe responses are downgoing bilaterally  Lab Results:   CBC:   Results from last 7 days   Lab Units 08/21/22  0018   WBC Thousand/uL 10 75*   RBC Million/uL 3 75*   HEMOGLOBIN g/dL 11 6   HEMATOCRIT % 35 1   MCV fL 94   PLATELETS Thousands/uL 317   , BMP/CMP:   Results from last 7 days   Lab Units 08/21/22  0018   SODIUM mmol/L 138   POTASSIUM mmol/L 3 5   CHLORIDE mmol/L 109*   CO2 mmol/L 24   BUN mg/dL 21   CREATININE mg/dL 0 65   CALCIUM mg/dL 8 7   AST U/L 20   ALT U/L 12   ALK PHOS U/L 119*   EGFR ml/min/1 73sq m 90     Imaging Studies: I have personally reviewed pertinent reports  and I have personally reviewed pertinent films in PACS [facility]  EKG, Pathology, and Other Studies: I have personally reviewed pertinent reports      VTE Prophylaxis: Sequential compression device Edwardo Florian)     Code Status: Level 1 - Full Code  Advance Directive and Living Will:      Power of :    POLST:

## 2022-08-21 NOTE — ED ATTENDING ATTESTATION
8/20/2022  I saw and evaluated the patient  I have discussed the patient with the resident physician and agree with the resident's findings, assessment and plan as documented in the resident physician's note, unless otherwise documented below  All available laboratory and imaging studies were reviewed by myself  I was present for key portions of any procedure(s) performed by the resident and I was immediately available to provide assistance  I agree with the current assessment done in the Emergency Department  I have conducted an independent evaluation of this patient  Emergency Department Note- Nina Hayward 80 y o  female MRN: [id number]    Unit/Bed#: ED 29 Encounter: [id number]    Chief Complaint   Patient presents with    Difficulty Walking     Pt brought in via EMS c/o increased difficulty walking d/t her parkinson's  Pt expresses her goal in getting scene today is to get her medications evaluated and states "I want to get my muscles to work better with my brain"       Nina Hayward is a 80 y o  female with past medical history of Parkinson's disease presenting with difficulty walking  Patient is on extended-release amantadine at home but has not refilled her medication because there was a concern that it was causing her hallucinations  She has not had this medication since early August   Patient was admitted at [facility] on 08/05/2022 for gallstone pancreatitis and had a cholecystectomy at that time  Over the past week, patient has had significant difficulty walking due to rigidity and generalized weakness, especially worse since yesterday  This morning, patient's daughter found patient on the floor  Patient has no recollection of this: does not recall the fall  Reports left shoulder pain as well as some back pain  No focal weakness or numbness, reports generalized symptoms  No fevers or chills  No chest pain  No shortness of breath  No nausea, vomiting, or diarrhea   Reports healing well after the gall bladder surgery  REVIEW OF SYSTEMS    Constitutional: denies fevers, as above otherwise  Visual/Eyes: no changes in vision  HENT: no rhinorrhea, no sore throat  Cardiac: no chest pain  Respiratory: no shortness of breath, no cough  GI: no abdominal pain, nausea, vomiting, or diarrhea  : no burning with urination  Heme/Onc: no easy bruising  Endocrine: no diabetes  Neuro: as above    Ten systems reviewed and negative unless otherwise noted in HPI and above    PAST MEDICAL HISTORY  Past Medical History:   Diagnosis Date    GERD (gastroesophageal reflux disease)     Osteoporosis     Parkinson disease ([address] )        SURGICAL HISTORY  Past Surgical History:   Procedure Laterality Date    ARTERIOGRAM N/A 7/10/2019    Procedure: KYPHOPLASTY;  Surgeon: Gordon Stearns MD;  Location: [facility];  Service: Interventional Radiology    IR KYPHOPLASTY/VERTEBROPLASTY  7/10/2019    TONSILLECTOMY         FAMILY HISTORY  Family History   Problem Relation Age of Onset    Dementia Mother     Heart disease Father     No Known Problems Daughter     No Known Problems Maternal Grandmother     No Known Problems Maternal Grandfather     No Known Problems Paternal Grandmother     Prostate cancer Paternal Grandfather     No Known Problems Daughter     No Known Problems Daughter     Alcohol abuse Neg Hx     Mental illness Neg Hx     Substance Abuse Neg Hx     Depression Neg Hx         CURRENT MEDICATIONS  Current Facility-Administered Medications on File Prior to Encounter   Medication    cyanocobalamin injection 1,000 mcg     Current Outpatient Medications on File Prior to Encounter   Medication Sig    Amantadine HCl ER (Gocovri) 137 MG CP24 Take 2 capsules (274 mg total) by mouth every night at bedtime      Calcium Carbonate-Vit D-Min (CALCIUM 1200 PO) Take 1,200 mg by mouth daily    carbidopa-levodopa-entacapone (STALEVO) 12 5- MG per tablet Take 1 tablet by mouth 6 (six) times a day    cyanocobalamin (VITAMIN B-12) 1000 MCG tablet Take 1 tablet (1,000 mcg total) by mouth daily    cyanocobalamin 1,000 mcg/mL INJECT 1 ML EVERY 2 WEEKS X 4 DOSES THEN MONTHLY X 6 DOSES    denosumab (Prolia) 60 mg/mL     famotidine (PEPCID) 20 mg tablet TAKE 2 TABLETS (40 MG TOTAL) BY MOUTH DAILY AT BEDTIME    gabapentin (Neurontin) 100 mg capsule Take 1 capsule (100 mg total) by mouth 3 (three) times a day (Patient not taking: Reported on 8/9/2022)    mupirocin (BACTROBAN) 2 % ointment APPLY TWICE A DAY TO OPEN SORES UNTIL HEALED (Patient not taking: Reported on 8/9/2022)    prednisoLONE acetate (PRED FORTE) 1 % ophthalmic suspension INSTILL 1 DROP INTO BOTH EYES FOUR TIMES A DAY USE 1-2 WEEKS ONLY 3-4 TIMES A DAY    rasagiline (AZILECT) 1 MG Take 1 tablet (1 mg total) by mouth daily    rOPINIRole (REQUIP XL) 6 MG TB24 Take 1tab qhs    SYRINGE-NEEDLE, DISP, 3 ML 25G X 1" 3 ML MISC Use see administration instructions every 2 weeks x 4 doses then monthly x 6 doses       ALLERGIES  Allergies   Allergen Reactions    Sulfa Antibiotics      Other reaction(s): family history - anaphylaxis  Category: Allergy;     Doxycycline Rash     Category:  Allergy;        SOCIAL HISTORY  Social History     Socioeconomic History    Marital status: /Civil Union     Spouse name: None    Number of children: None    Years of education: None    Highest education level: None   Occupational History    None   Tobacco Use    Smoking status: Never Smoker    Smokeless tobacco: Never Used   Vaping Use    Vaping Use: Never used   Substance and Sexual Activity    Alcohol use: Not Currently     Comment: Rare    Drug use: Never    Sexual activity: None   Other Topics Concern    None   Social History Narrative    5 children     Social Determinants of Health     Financial Resource Strain: Not on file   Food Insecurity: Not on file   Transportation Needs: Not on file   Physical Activity: Not on file   Stress: Not on file Social Connections: Not on file   Intimate Partner Violence: Not on file   Housing Stability: Not on file       PHYSICAL EXAM  /73 (BP Location: Right arm)   Pulse 99   Temp 99 °F (37 2 °C) (Oral)   Resp 18   SpO2 95%   Vital signs and nursing notes reviewed    CONSTITUTIONAL: female appearing stated age resting in bed, in no acute distress  HEENT: atraumatic, normocephalic  Sclera anicteric, conjunctiva are not injected  Moist oral mucosa  CARDIOVASCULAR/CHEST: RRR, no M/R/G  2+ radial pulses  PULMONARY: Breathing comfortably on RA  Breath sounds are equal and clear to auscultation  ABDOMEN: non-distended  BS present, normoactive  Non-tender  MSK: moves all extremities, no deformities, no peripheral edema, no calf asymmetry  NEURO: Awake, alert, and oriented x 3  Face symmetric  Speech is somewhat hard to understand  Moves all extremities spontaneously but slowly with significant delay  Cogwheel rigidity is present in all extremities  SKIN: Warm, appears well-perfused  MENTAL STATUS: Normal affect        DIAGNOSTIC STUDIES  Results Reviewed     Procedure Component Value Units Date/Time    COVID/FLU/RSV [[id number]]  (Normal) Collected: 08/21/22 0435    Lab Status: Final result Specimen: Nares from Nose Updated: 08/21/22 0524     SARS-CoV-2 Negative     INFLUENZA A PCR Negative     INFLUENZA B PCR Negative     RSV PCR Negative    Narrative:      FOR PEDIATRIC PATIENTS - copy/paste COVID Guidelines URL to browser: [email or web address]    SARS-CoV-2 assay is a Nucleic Acid Amplification assay intended for the  qualitative detection of nucleic acid from SARS-CoV-2 in nasopharyngeal  swabs  Results are for the presumptive identification of SARS-CoV-2 RNA  Positive results are indicative of infection with SARS-CoV-2, the virus  causing COVID-19, but do not rule out bacterial infection or co-infection  with other viruses   Laboratories within the [facility] and its  territories are required to report all positive results to the appropriate  public health authorities  Negative results do not preclude SARS-CoV-2  infection and should not be used as the sole basis for treatment or other  patient management decisions  Negative results must be combined with  clinical observations, patient history, and epidemiological information  This test has not been FDA cleared or approved  This test has been authorized by FDA under an Emergency Use Authorization  (EUA)  This test is only authorized for the duration of time the  declaration that circumstances exist justifying the authorization of the  emergency use of an in vitro diagnostic tests for detection of SARS-CoV-2  virus and/or diagnosis of COVID-19 infection under section 564(b)(1) of  the Act, 21 U  S C  286HCC-1(M)(0), unless the authorization is terminated  or revoked sooner  The test has been validated but independent review by FDA  and CLIA is pending  Test performed using [employer] GeneXpert: This RT-PCR assay targets N2,  a region unique to SARS-CoV-2   A conserved region in the E-gene was chosen  for pan-Sarbecovirus detection which includes SARS-CoV-2     UA (URINE) with reflex to Scope [[id number]]     Lab Status: No result Specimen: Urine     MRSA culture [[id number]]     Lab Status: No result Specimen: Nares     Urine Microscopic [[id number]]  (Abnormal) Collected: 08/21/22 0309    Lab Status: Final result Specimen: Urine, Other Updated: 08/21/22 0346     RBC, UA 20-30 /hpf      WBC, UA None Seen /hpf      Epithelial Cells None Seen /hpf      Bacteria, UA None Seen /hpf     Sputum culture and Gram stain [[id number]]     Lab Status: No result Specimen: Sputum     UA w Reflex to Microscopic w Reflex to Culture [[id number]]  (Abnormal) Collected: 08/21/22 0309    Lab Status: Final result Specimen: Urine, Other Updated: 08/21/22 0326     Color, UA Yellow     Clarity, UA Clear     Specific Gravity, UA >=1 050 pH, UA 7 0     Leukocytes, UA Negative     Nitrite, UA Negative     Protein, UA Trace mg/dl      Glucose, UA Negative mg/dl      Ketones, UA Negative mg/dl      Urobilinogen, UA <2 0 mg/dl      Bilirubin, UA Negative     Occult Blood, UA Large    Procalcitonin [[id number]]  (Normal) Collected: 08/21/22 0018    Lab Status: Final result Specimen: Blood from Arm, Left Updated: 08/21/22 0311     Procalcitonin 0 09 ng/ml     HS Troponin 0hr (reflex protocol) [[id number]]  (Normal) Collected: 08/21/22 0018    Lab Status: Final result Specimen: Blood from Arm, Left Updated: 08/21/22 0055     hs TnI 0hr 3 ng/L     CK Total with Reflex CKMB [[id number]]  (Normal) Collected: 08/21/22 0018    Lab Status: Final result Specimen: Blood from Arm, Left Updated: 08/21/22 0047     Total CK 79 U/L     Comprehensive metabolic panel [[id number]]  (Abnormal) Collected: 08/21/22 0018    Lab Status: Final result Specimen: Blood from Arm, Left Updated: 08/21/22 0047     Sodium 138 mmol/L      Potassium 3 5 mmol/L      Chloride 109 mmol/L      CO2 24 mmol/L      ANION GAP 5 mmol/L      BUN 21 mg/dL      Creatinine 0 65 mg/dL      Glucose 117 mg/dL      Calcium 8 7 mg/dL      Corrected Calcium 9 7 mg/dL      AST 20 U/L      ALT 12 U/L      Alkaline Phosphatase 119 U/L      Total Protein 6 8 g/dL      Albumin 2 8 g/dL      Total Bilirubin 0 37 mg/dL      eGFR 90 ml/min/1 73sq m     Narrative:      José guidelines for Chronic Kidney Disease (CKD):     Stage 1 with normal or high GFR (GFR > 90 mL/min/1 73 square meters)    Stage 2 Mild CKD (GFR = 60-89 mL/min/1 73 square meters)    Stage 3A Moderate CKD (GFR = 45-59 mL/min/1 73 square meters)    Stage 3B Moderate CKD (GFR = 30-44 mL/min/1 73 square meters)    Stage 4 Severe CKD (GFR = 15-29 mL/min/1 73 square meters)    Stage 5 End Stage CKD (GFR <15 mL/min/1 73 square meters)  Note: GFR calculation is accurate only with a steady state creatinine    CBC and differential [[id number]]  (Abnormal) Collected: 08/21/22 0018    Lab Status: Final result Specimen: Blood from Arm, Left Updated: 08/21/22 0042     WBC 10 75 Thousand/uL      RBC 3 75 Million/uL      Hemoglobin 11 6 g/dL      Hematocrit 35 1 %      MCV 94 fL      MCH 30 9 pg      MCHC 33 0 g/dL      RDW 12 0 %      MPV 10 2 fL      Platelets 349 Thousands/uL      nRBC 0 /100 WBCs      Neutrophils Relative 74 %      Immat GRANS % 0 %      Lymphocytes Relative 11 %      Monocytes Relative 12 %      Eosinophils Relative 2 %      Basophils Relative 1 %      Neutrophils Absolute 7 99 Thousands/µL      Immature Grans Absolute 0 03 Thousand/uL      Lymphocytes Absolute 1 13 Thousands/µL      Monocytes Absolute 1 29 Thousand/µL      Eosinophils Absolute 0 25 Thousand/µL      Basophils Absolute 0 06 Thousands/µL           CT head wo contrast   Final Result      No acute intracranial abnormality  Workstation performed: [id number]         CT head without contrast   Final Result      No acute intracranial abnormality  Workstation performed: [id number]         CT spine cervical without contrast   Final Result      No cervical spine fracture or traumatic malalignment  Workstation performed: [id number]         CT abdomen pelvis with contrast   Final Result         No acute pathology visualized on CT of the abdomen and pelvis with IV without oral contrast       6 mm groundglass nodule in the right middle lobe  Based on current Fleischner Society 2017 Guidelines on incidental pulmonary nodule, followup noncontrast CT is recommended at 6-12 months from the initial examination to confirm persistence; if stable at    that time, additional followup CT is recommended for every 2 years until 5 years of stability is demonstrated  Lingular linear atelectasis  The study was marked in [facility] for immediate notification           Workstation performed: [id number]         CT recon only lumbar spine   Final Result         1  Prior vertebroplasty of T12-L1 redemonstrated  2   L3-L5 compression deformities with mild bony retropulsion of L4, age determinant appearance, although subtle sclerotic margins may favor chronic deformities  Correlation for focal tenderness recommended  3   Given the multilevel compression deformities osteoporosis should be excluded  Consider follow-up DEXA scan if not previously performed  Workstation performed: [id number]         XR chest 1 view portable   ED Interpretation   EDI infiltrate  Possible RUL infiltrate as well      Final Result      Biapical pulmonary infiltrates, more prominent on the left, in keeping with pneumonia  Workstation performed: [id number]             PROCEDURES  ECG 12 Lead Documentation Only    Date/Time: 8/21/2022 12:25 AM  Performed by: Vickey Ormond, MD  Authorized by: Vickey Ormond, MD     Comments:      Normal sinus rhythm, ventricular rate 89, MO interval 158, QRS 70, , normal axis, no ST/T-wave changes to suggest ischemia, no STEMI  No significant change from prior EKG dated 01/15/2020  ED COURSE  Medications   sodium chloride 0 9 % bolus 500 mL (0 mL Intravenous Stopped 8/21/22 0145)   iohexol (OMNIPAQUE) 350 MG/ML injection (SINGLE-DOSE) 65 mL (65 mL Intravenous Given 8/21/22 0058)   vancomycin (VANCOCIN) IVPB (premix in dextrose) 1,000 mg 200 mL (0 mg/kg × 54 kg Intravenous Stopped 8/21/22 7005)   ceftriaxone (ROCEPHIN) 1 g/50 mL in dextrose IVPB (0 mg Intravenous Stopped 8/21/22 9610)     81-year-old female with history of Parkinson's disease as well as with a recent abdominal surgery presenting with generalized weakness, as well as worsened rigidity in setting of Parkinson's disease and lack of 1 of the Parkinson's medications  Vital signs reviewed, afebrile and within normal limits    Differential diagnosis includes underlying infection, electrolyte abnormality, medication side effect versus lack of medication, versus another etiology of symptoms  Given that patient has no recollection of how she ended up on the floor, we are also working patient up for possible syncope as well as for any traumatic injuries  EKG is without ischemic or pro arrhythmogenic changes  Labs reveal intact renal function, normal high sensitivity troponin, CK not consistent with rhabdomyolysis, CBCs with mild leukocytosis but no anemia or thrombocytopenia  UA is pending  Chest x-ray to my review is with possible infiltrate  CT C-spine reviewed, there are what appears to be infiltrates in the apices  As such, we are treating patient with ceftriaxone and vancomycin for possible hospital-acquired pneumonia  Rest of CT imaging is as above  Patient admitted to Internal Medicine for further evaluation and care      CLINICAL IMPRESSION  Final diagnoses:   Ambulatory dysfunction   Pneumonia   General weakness

## 2022-08-21 NOTE — ASSESSMENT & PLAN NOTE
· Patient presents for a fall at home  Noted history of parkinsons  Patient lives alone and was found by daughter on the floor  · Had mobility issues previously related to parkinson's improved w/ amantadine  · This was discontinued due to causing hallucinations   · Resume amantadine   · Neurology consult  · Physical and occupational therapy with case management

## 2022-08-21 NOTE — ASSESSMENT & PLAN NOTE
20-year-old female with Parkinson's disease, GERD, osteoporosis, and recent gallstone pancreatitis who presented on 8/20/2022 with significant freezing after missing several days of some of her Parkinson's medications (Azilect and ropinirole)  CT head unremarkable  Home medications include:  · Stalevo 50 mg/12 5 mg/200 mg 6 times daily approximately 2-3 hours between doses  · Azilect 1 mg daily  · Ropinirole XL 6 mg daily  · Gocovri (amandatine extended release) 137 mg - not taken since 8/1/22    Suspect medication mixup likely significant contributor to patient's setback with regard to her movement  Would also screen for infectious/metabolic derangements  On exam today (8/22), patient has significant dyskinesias/hyperkinetic movements likely due excess dopamine from restarting all of her home medications other than Gocovri  Plan:  - Patient's daughter brought in her home medications as not all of them are on formulary here  - Due to dyskinesias, discussed with patient's outpatient Neurologist who recommended restarting Gocovri 137 mg 1 tab nightly and stopping the ropinirole (which was dc'd on 8/22)  Continue Stalevo 6x daily and Azilect 1 mg daily at this time  - Observe for increased hallucinations since restarting Gocovri  - PT/OT  - Delirium precautions  - Regulation of sleep/wake cycle  Would avoid overuse of Ativan  Patient does take this at home p r n  for sleep and reportedly tolerates it well  Can consider alternative low-dose medications such as Zyprexa for behavioral control should she have paradoxical agitation on the Ativan  - Monitor neuro exam; notify with any changes  - Medical management and supportive care per primary team  Correction of any metabolic or infectious disturbances

## 2022-08-21 NOTE — RESPIRATORY THERAPY NOTE
RT Protocol Note  Usama Cottrell 80 y o  female MRN: [id number]  Unit/Bed#: ED 28 Encounter: [id number]    Assessment    Principal Problem:    Ambulatory dysfunction  Active Problems:    Esophagitis, reflux    Vitamin D deficiency    Parkinson's disease ([facility] )    Vitamin B12 deficiency    Hallucinations    Incidental pulmonary nodule      Home Pulmonary Medications: (none)  Home Devices/Therapy:  (none)    Past Medical History:   Diagnosis Date    GERD (gastroesophageal reflux disease)     Osteoporosis     Parkinson disease ([facility] )      Social History     Socioeconomic History    Marital status: /Civil Union     Spouse name: None    Number of children: None    Years of education: None    Highest education level: None   Occupational History    None   Tobacco Use    Smoking status: Never Smoker    Smokeless tobacco: Never Used   Vaping Use    Vaping Use: Never used   Substance and Sexual Activity    Alcohol use: Not Currently     Comment: Rare    Drug use: Never    Sexual activity: None   Other Topics Concern    None   Social History Narrative    5 children     Social Determinants of Health     Financial Resource Strain: Not on file   Food Insecurity: Not on file   Transportation Needs: Not on file   Physical Activity: Not on file   Stress: Not on file   Social Connections: Not on file   Intimate Partner Violence: Not on file   Housing Stability: Not on file       Subjective         Objective    Physical Exam:   Assessment Type: Assess only  General Appearance: Sleeping  Respiratory Pattern: Normal  Chest Assessment: Chest expansion symmetrical  Bilateral Breath Sounds: Diminished  Cough: None  O2 Device: RA    Vitals:  Blood pressure 122/66, pulse 76, temperature 99 °F (37 2 °C), temperature source Oral, resp  rate 18, SpO2 98 %, not currently breastfeeding  Imaging and other studies: I have personally reviewed pertinent reports        O2 Device: RA     Plan    Respiratory Plan: Discontinue Protocol Resp Comments: Pt admitted with ambulatory dysfunction, hx of Parkinson's Disease, ?pneumonia on CXR  Pt is in no distress at this time, on RA with sats in the high 90s, no cough noted  Pt has no pulmonary hx or pulmonary meds at home, IS already at the bedside, will D/C resp protocol at this time

## 2022-08-21 NOTE — ASSESSMENT & PLAN NOTE
Patient placed on amantadine which was put on hold by patient recently due to complaints of hallucinations  She is supposed receive additional amantadine tablets by tomorrow however comes to the emergency room with exacerbation of symptoms and further ambulatory dysfunction  Would resume amantadine 137 mg 2 capsules daily  Neurology consult  Occupational physical therapy consult with case management  Continue Stalevo 12 5/50/201 tablet 6 times daily

## 2022-08-21 NOTE — ASSESSMENT & PLAN NOTE
· Placed in the emergency department for on reason    · Will try voiding trial now, and discontinue Chen catheter

## 2022-08-21 NOTE — TELEPHONE ENCOUNTER
Regarding: parkinsons concern/ frozen/cannot walk/function   ----- Message from [facility] ([place], [place]) TONY sent at 8/20/2022  8:16 PM EDT -----  "Im calling about my mom, she is here with me  im wondering if we can speak with neurology or speak to a nurse, she can barely walk, is frozen I think she had messed up her meds within the last couple of days, shes been waiting for them to come in the mail and they have not arrived  She was stuck in her own urine and feces and couldn't get out until someone could help her  We dont have the capacity for the level of care that she needs and im wondering what we can do about her medications to get her back on track   She has parkinsons and im really worried about her, she hasnt really improved "

## 2022-08-22 PROBLEM — Z96.0 URINARY CATHETER IN PLACE: Status: RESOLVED | Noted: 2022-08-21 | Resolved: 2022-08-22

## 2022-08-22 LAB
ALBUMIN SERPL BCP-MCNC: 2.2 G/DL (ref 3.5–5)
ALP SERPL-CCNC: 110 U/L (ref 46–116)
ALT SERPL W P-5'-P-CCNC: 8 U/L (ref 12–78)
ANION GAP SERPL CALCULATED.3IONS-SCNC: 5 MMOL/L (ref 4–13)
AST SERPL W P-5'-P-CCNC: 18 U/L (ref 5–45)
BASOPHILS # BLD AUTO: 0.06 THOUSANDS/ΜL (ref 0–0.1)
BASOPHILS NFR BLD AUTO: 1 % (ref 0–1)
BILIRUB SERPL-MCNC: 0.34 MG/DL (ref 0.2–1)
BUN SERPL-MCNC: 11 MG/DL (ref 5–25)
CALCIUM ALBUM COR SERPL-MCNC: 9.4 MG/DL (ref 8.3–10.1)
CALCIUM SERPL-MCNC: 8 MG/DL (ref 8.3–10.1)
CHLORIDE SERPL-SCNC: 109 MMOL/L (ref 96–108)
CO2 SERPL-SCNC: 25 MMOL/L (ref 21–32)
CREAT SERPL-MCNC: 0.53 MG/DL (ref 0.6–1.3)
EOSINOPHIL # BLD AUTO: 0.55 THOUSAND/ΜL (ref 0–0.61)
EOSINOPHIL NFR BLD AUTO: 6 % (ref 0–6)
ERYTHROCYTE [DISTWIDTH] IN BLOOD BY AUTOMATED COUNT: 11.9 % (ref 11.6–15.1)
GFR SERPL CREATININE-BSD FRML MDRD: 96 ML/MIN/1.73SQ M
GLUCOSE SERPL-MCNC: 103 MG/DL (ref 65–140)
HCT VFR BLD AUTO: 36 % (ref 34.8–46.1)
HGB BLD-MCNC: 11.8 G/DL (ref 11.5–15.4)
IMM GRANULOCYTES # BLD AUTO: 0.03 THOUSAND/UL (ref 0–0.2)
IMM GRANULOCYTES NFR BLD AUTO: 0 % (ref 0–2)
L PNEUMO1 AG UR QL IA.RAPID: NEGATIVE
LYMPHOCYTES # BLD AUTO: 1.23 THOUSANDS/ΜL (ref 0.6–4.47)
LYMPHOCYTES NFR BLD AUTO: 13 % (ref 14–44)
MAGNESIUM SERPL-MCNC: 2.2 MG/DL (ref 1.6–2.6)
MCH RBC QN AUTO: 31.1 PG (ref 26.8–34.3)
MCHC RBC AUTO-ENTMCNC: 32.8 G/DL (ref 31.4–37.4)
MCV RBC AUTO: 95 FL (ref 82–98)
MONOCYTES # BLD AUTO: 1.2 THOUSAND/ΜL (ref 0.17–1.22)
MONOCYTES NFR BLD AUTO: 13 % (ref 4–12)
NEUTROPHILS # BLD AUTO: 6.17 THOUSANDS/ΜL (ref 1.85–7.62)
NEUTS SEG NFR BLD AUTO: 67 % (ref 43–75)
NRBC BLD AUTO-RTO: 0 /100 WBCS
PHOSPHATE SERPL-MCNC: 2.9 MG/DL (ref 2.3–4.1)
PLATELET # BLD AUTO: 312 THOUSANDS/UL (ref 149–390)
PMV BLD AUTO: 10.2 FL (ref 8.9–12.7)
POTASSIUM SERPL-SCNC: 3.3 MMOL/L (ref 3.5–5.3)
PROCALCITONIN SERPL-MCNC: 0.1 NG/ML
PROT SERPL-MCNC: 6.1 G/DL (ref 6.4–8.4)
RBC # BLD AUTO: 3.8 MILLION/UL (ref 3.81–5.12)
S PNEUM AG UR QL: NEGATIVE
SODIUM SERPL-SCNC: 139 MMOL/L (ref 135–147)
TSH SERPL DL<=0.05 MIU/L-ACNC: 1.46 UIU/ML (ref 0.45–4.5)
WBC # BLD AUTO: 9.24 THOUSAND/UL (ref 4.31–10.16)

## 2022-08-22 PROCEDURE — 80053 COMPREHEN METABOLIC PANEL: CPT | Performed by: INTERNAL MEDICINE

## 2022-08-22 PROCEDURE — 99222 1ST HOSP IP/OBS MODERATE 55: CPT | Performed by: PSYCHIATRY & NEUROLOGY

## 2022-08-22 PROCEDURE — 84100 ASSAY OF PHOSPHORUS: CPT | Performed by: INTERNAL MEDICINE

## 2022-08-22 PROCEDURE — 84145 PROCALCITONIN (PCT): CPT | Performed by: INTERNAL MEDICINE

## 2022-08-22 PROCEDURE — 99232 SBSQ HOSP IP/OBS MODERATE 35: CPT | Performed by: PSYCHIATRY & NEUROLOGY

## 2022-08-22 PROCEDURE — 85025 COMPLETE CBC W/AUTO DIFF WBC: CPT | Performed by: INTERNAL MEDICINE

## 2022-08-22 PROCEDURE — 97163 PT EVAL HIGH COMPLEX 45 MIN: CPT

## 2022-08-22 PROCEDURE — 83735 ASSAY OF MAGNESIUM: CPT | Performed by: INTERNAL MEDICINE

## 2022-08-22 PROCEDURE — 99232 SBSQ HOSP IP/OBS MODERATE 35: CPT | Performed by: PHYSICIAN ASSISTANT

## 2022-08-22 PROCEDURE — 84443 ASSAY THYROID STIM HORMONE: CPT

## 2022-08-22 PROCEDURE — 97167 OT EVAL HIGH COMPLEX 60 MIN: CPT

## 2022-08-22 RX ORDER — POTASSIUM CHLORIDE 20 MEQ/1
40 TABLET, EXTENDED RELEASE ORAL ONCE
Status: COMPLETED | OUTPATIENT
Start: 2022-08-22 | End: 2022-08-22

## 2022-08-22 RX ORDER — DIAZEPAM 5 MG/ML
INJECTION, SOLUTION INTRAMUSCULAR; INTRAVENOUS
Status: DISCONTINUED
Start: 2022-08-22 | End: 2022-08-22 | Stop reason: WASHOUT

## 2022-08-22 RX ADMIN — FAMOTIDINE 40 MG: 20 TABLET ORAL at 22:54

## 2022-08-22 RX ADMIN — ENOXAPARIN SODIUM 40 MG: 40 INJECTION SUBCUTANEOUS at 08:42

## 2022-08-22 RX ADMIN — LIDOCAINE 5% 1 PATCH: 700 PATCH TOPICAL at 08:42

## 2022-08-22 RX ADMIN — CARBIDOPA, LEVODOPA, AND ENTACAPONE: 12.5; 50; 2 TABLET, FILM COATED ORAL at 16:05

## 2022-08-22 RX ADMIN — CARBIDOPA, LEVODOPA, AND ENTACAPONE: 12.5; 50; 2 TABLET, FILM COATED ORAL at 06:36

## 2022-08-22 RX ADMIN — POTASSIUM CHLORIDE 40 MEQ: 1500 TABLET, EXTENDED RELEASE ORAL at 08:42

## 2022-08-22 RX ADMIN — CARBIDOPA, LEVODOPA, AND ENTACAPONE: 12.5; 50; 2 TABLET, FILM COATED ORAL at 22:55

## 2022-08-22 RX ADMIN — LORAZEPAM 0.5 MG: 2 INJECTION INTRAMUSCULAR; INTRAVENOUS at 20:13

## 2022-08-22 RX ADMIN — CARBIDOPA, LEVODOPA, AND ENTACAPONE: 12.5; 50; 2 TABLET, FILM COATED ORAL at 11:30

## 2022-08-22 RX ADMIN — ACETAMINOPHEN 650 MG: 325 TABLET ORAL at 18:49

## 2022-08-22 RX ADMIN — RASAGILINE 1 MG: 1 TABLET ORAL at 08:43

## 2022-08-22 RX ADMIN — CARBIDOPA, LEVODOPA, AND ENTACAPONE: 12.5; 50; 2 TABLET, FILM COATED ORAL at 20:17

## 2022-08-22 RX ADMIN — ACETAMINOPHEN 650 MG: 325 TABLET ORAL at 11:43

## 2022-08-22 RX ADMIN — CYANOCOBALAMIN TAB 500 MCG 1000 MCG: 500 TAB at 08:42

## 2022-08-22 RX ADMIN — CARBIDOPA, LEVODOPA, AND ENTACAPONE: 12.5; 50; 2 TABLET, FILM COATED ORAL at 14:18

## 2022-08-22 RX ADMIN — LORAZEPAM 0.5 MG: 2 INJECTION INTRAMUSCULAR; INTRAVENOUS at 03:29

## 2022-08-22 RX ADMIN — ROPINIROLE 6 MG: 6 TABLET, FILM COATED, EXTENDED RELEASE ORAL at 08:44

## 2022-08-22 RX ADMIN — OXYCODONE HYDROCHLORIDE 2.5 MG: 5 TABLET ORAL at 08:47

## 2022-08-22 RX ADMIN — CALCIUM 2 TABLET: 500 TABLET ORAL at 08:42

## 2022-08-22 RX ADMIN — GUAIFENESIN 600 MG: 600 TABLET, EXTENDED RELEASE ORAL at 08:42

## 2022-08-22 RX ADMIN — OXYCODONE HYDROCHLORIDE 2.5 MG: 5 TABLET ORAL at 03:40

## 2022-08-22 NOTE — PLAN OF CARE
Problem: PHYSICAL THERAPY ADULT  Goal: Performs mobility at highest level of function for planned discharge setting  See evaluation for individualized goals  Description: Treatment/Interventions: Functional transfer training, LE strengthening/ROM, Elevations, Therapeutic exercise, Endurance training, Equipment eval/education, Bed mobility, Gait training, Spoke to nursing, Spoke to case management, OT          See flowsheet documentation for full assessment, interventions and recommendations  Note: Prognosis: Fair  Problem List: Decreased strength, Decreased endurance, Decreased coordination, Decreased mobility, Impaired balance, Decreased cognition, Decreased safety awareness, Pain  Assessment: Pt is a 80 y o  female seen for PT evaluation s/p admit to [facility] on 8/20/2022  Pt was admitted with a primary dx of: ambulatory dysfunction after being found down at home  PT now consulted for assessment of mobility and d/c needs  Pt with Up with assistance, Up and OOB as tolerated , PT evaluation orders  Pts current comorbidities effecting treatment include: GERD, osteoporosis, and parkinson's disease  Personal factors impacting pt include: having stairs to navigate at home  Pts current clinical presentation is Unstable/ Unpredictable (high complexity) due to Ongoing medical management for primary dx, Decreased activity tolerance compared to baseline, Fall risk, Increased assistance needed from caregiver at current time, Ongoing telemetry monitoring, Cog status, Trending lab values  Pt is a poor historian, home setup and PLOF obtained from EMR  Pt lives with her  in a [facility] with 2 SHERRY  Pt reports being I with ADLs (per EMR pt requires A for ADLs, will need to clarify with CM) and using RW occasionally  Upon evaluation, pt currently is requiring modA for bed mobility; minAx2 for transfers; minAx2 for ambulation 2 lateral steps at EOB ft w/ HHA   Pt presents at PT eval functioning below baseline and currently w/ overall mobility deficits 2* to: BLE weakness, impaired balance, impaired coordination, gait deviations, pain, decreased activity tolerance compared to baseline, decreased functional mobility tolerance compared to baseline, decreased safety awareness, fall risk, decreased cognition  Pt currently at a fall risk 2* to impairments listed above  Pt will continue to benefit from skilled acute PT interventions to address stated impairments; to maximize functional mobility; for ongoing pt/ family training; and DME needs  At conclusion of PT session pt returned BTB, bed alarm engaged and RN notified of session findings/recommendations with phone and call bell within reach  Pt denies any further questions at this time  The patient's AM-PAC Basic Mobility Inpatient Short Form Raw Score is 16  A Raw score of less than or equal to 16 suggests the patient may benefit from discharge to post-acute rehabilitation services  Please also refer to the recommendation of the Physical Therapist for safe discharge planning  Recommend rehab upon hospital D/C  PT Discharge Recommendation: Post acute rehabilitation services    See flowsheet documentation for full assessment

## 2022-08-22 NOTE — QUICK NOTE
Alerted by nursing staff the patient expressed suicidal ideations occupational therapy team this morning  Patient was placed on one-to-one for continue observation as well as psychiatry consultation was placed  She is continuing to complain of significant nerve like pain in her left shoulder  In the past, gabapentin has helped with this pain  Patient has Tylenol, aqua K, and lidocaine patches ordered for pain as well as low dose of oxycodone  I discussed the case with Neurology who recommended avoiding anything that may potentially contribute to worsening encephalopathy including further opioids  Gabapentin can be discussed in the outpatient setting as this would not help with acute pain and can potentially contribute to worsening encephalopathy  Ideations may be related to the hyper dopaminergic state the patient is currently experiencing  Her case was discussed with her outpatient neurologist who recommended restarting Gocovri and stopping ropinirole  Continue Stalevo 6 times daily as well as Azilect 1 mg daily      William Dykes PA-C

## 2022-08-22 NOTE — PLAN OF CARE
Problem: OCCUPATIONAL THERAPY ADULT  Goal: Performs self-care activities at highest level of function for planned discharge setting  See evaluation for individualized goals  Description: Treatment Interventions: ADL retraining, Functional transfer training, Visual perceptual retraining, UE strengthening/ROM, Endurance training, Cognitive reorientation, Patient/family training, Equipment evaluation/education, Compensatory technique education, Fine motor coordination activities, Continued evaluation, Energy conservation, Activityengagement          See flowsheet documentation for full assessment, interventions and recommendations  Note: Limitation: Decreased ADL status, Decreased Safe judgement during ADL, Decreased cognition, Decreased endurance, Visual deficit, Decreased self-care trans, Decreased fine motor control, Decreased high-level ADLs  Prognosis: Fair  Assessment: Pt is a 81 y/o female seen for OT eval s/p adm to B after being found on the floor by her dght  Pt is dx'd w/ ambulatory dysfunction  Pt  has a past medical history of GERD (gastroesophageal reflux disease), Osteoporosis, and Parkinson disease ([facility] [address] )  Pt with active OT orders and up with assistance  orders  Pt lives with her spouse in [address] [place], 2 [place]ERRY, FFOS up to bed/bath; has 3 dghts, 1 next door  Per EMR, pt has assist w/ ADLS and IADLS, pt reports she drives (but unclear if this is accurate) & required use of DME PTA including RW  Pt is a poor historian; will confirm w/ CM home setup  Pt is currently demonstrating the following occupational deficits: Mod A UB ADLS, Max A LB ADLS, Mod A bed mobility, Min A x2 transfers and functional mobility w/ HHA   These deficits that are impacting pt's baseline areas of occupation are a result of the following impairments: pain, endurance, activity tolerance, functional mobility, forward functional reach, balance, trunk control, functional standing tolerance, unsupportive home environment, decreased I w/ ADLS/IADLS, strength, ROM, visual deficits, cognitive impairments, decreased safety awareness, decreased insight into deficits, impaired fine motor skills and coordination deficits  The following Occupational Performance Areas to address include: eating, grooming, bathing/shower, toilet hygiene, dressing, medication management, socialization, health maintenance, functional mobility, community mobility, clothing management and household maintenance  Recommend inpatient rehab  upon D/C   Pt to continue to benefit from acute immediate OT services to address the following goals 3-5x/week to  w/in 10-14 days:  Recommendation: (S) Psych Consult  OT Discharge Recommendation: Post acute rehabilitation services     Nba Gaona MS, OTR/L

## 2022-08-22 NOTE — PHYSICAL THERAPY NOTE
Physical Therapy Evaluation    Patient's Name: Meghan Torres    Admitting Diagnosis  Parkinson's disease ([facility] [address] ) Monna Pellet  Pneumonia [J18 9]  Atelectasis pulmonary [J98 11]  General weakness [R53 1]  Incidental pulmonary nodule [R91 1]  Leg weakness [R29 898]  Ambulatory dysfunction [R26 2]    Problem List  Patient Active Problem List   Diagnosis    Esophagitis, reflux    Hyperlipidemia    Mild cognitive impairment    Localized osteoporosis with current pathological fracture with routine healing    Vitamin D deficiency    Torticollis    REM sleep behavior disorder    Parkinson's disease ([facility] [address] )    Abnormal thyroid function test    Back pain    Ambulatory dysfunction    Compression fracture of T12 first lumbar vertebra (HCC)    Hearing loss, bilateral    Stress fracture of femur    Cervical radiculopathy    Cervical spondylosis    Vitamin B12 deficiency    Hallucinations    Incidental pulmonary nodule    Abnormal chest x-ray       Past Medical History  Past Medical History:   Diagnosis Date    GERD (gastroesophageal reflux disease)     Osteoporosis     [facility]        Past Surgical History  Past Surgical History:   Procedure Laterality Date    ARTERIOGRAM N/A 7/10/2019    Procedure: KYPHOPLASTY;  Surgeon: Sweetie Antony MD;  Location: [facility];  Service: Interventional Radiology    IR KYPHOPLASTY/VERTEBROPLASTY  7/10/2019    TONSILLECTOMY          08/22/22 1102   PT Last Visit   PT Visit Date 08/22/22   Note Type   Note type Evaluation   Pain Assessment   Pain Assessment Tool 0-10   Pain Score 10 - Worst Possible Pain   Pain Location/Orientation Location: Back   Hospital Pain Intervention(s) Repositioned; Ambulation/increased activity   Restrictions/Precautions   Weight Bearing Precautions Per Order No   Other Precautions Impulsive;Cognitive; Bed Alarm; Fall Risk;Pain   Home Living   Type of [address] [facility] Two level;Bed/bath upstairs;Stairs to enter with rails  (2 SHERRY)   Home Equipment Katharine Socks Additional Comments Pt is a poor historian, very restless (kicking legs over bedrail upon arrival), unable to answer home setup questions  Per EMR, pt resides in a [facility] with 2 SHERRY  Pt reports occasional use of RW  Prior Function   Level of [place] Needs assistance with ADLs and functional mobility; Needs assistance with IADLs   Lives With Spouse   Receives Help From Family   ADL Assistance Needs assistance   IADLs Needs assistance   Comments pt reports being I with ADLs, however per EMR pt was receiving A for ADLs  Will need to clarify with CM  Cognition   Overall Cognitive Status Impaired   Attention Difficulty attending to directions   Orientation Level Oriented to person;Oriented to place; Disoriented to time;Disoriented to situation   Memory Decreased recall of precautions;Decreased recall of recent events   Following Commands Follows one step commands with increased time or repetition   Comments (S)  Pt very restless throughout evaluation, flailing BUEs and BLEs, complaining of back pain  Of note, when therapy asked pt how they could make her more comfortable, pt stated "just kill me    it's not like I haven't already thought about it " RN aware  RLE Assessment   RLE Assessment X  (unable to formally assessed 2* fliailing of LEs, however ~3+/5 observed with STS transfer)   LLE Assessment   LLE Assessment X  (unable to formally assessed 2* fliailing of LEs, however ~3+/5 observed with STS transfer)   Bed Mobility   Supine to Sit 3  Moderate assistance   Additional items Assist x 1;HOB elevated; Impulsive;Verbal cues;LE management   Sit to Supine 3  Moderate assistance   Additional items Assist x 1; Impulsive;Verbal cues;LE management   Additional Comments pt requiring Allie at EOB for trunk control, had increased difficulty placing both feet on the floor despite maximal VCs   Transfers   Sit to Stand 4  Minimal assistance   Additional items Assist x 2;Impulsive;Verbal cues   Stand to Sit 4  Minimal assistance   Additional items Assist x 2;Impulsive;Verbal cues   Additional Comments Transfer with HHA  Assist of 2nd person for safety 2* fidgetting and flailing of limbs  Pt requiring max cues to keep both feet on the ground  Ambulation/Elevation   Gait pattern Excessively slow; Short stride; Ataxia; Decreased foot clearance   Gait Assistance 4  Minimal assist   Additional items Assist x 2;Verbal cues   Assistive Device Other (Comment)  (HHA)   Distance 2 lateral steps up toward [facility]   Ambulation/Elevation Additional Comments limited by pain, assist of 2nd for safety   Balance   Static Sitting Poor +   Dynamic Sitting Poor +   Static Standing Poor   Dynamic Standing Poor   Ambulatory Poor   Activity Tolerance   Activity Tolerance Patient limited by pain; Other (Comment)  (cog)   Medical Staff Made Aware Seen with OT 2* pt's medical complexity and multiple comorbidities  PT focus on functional transfers, gait, and LE strength   Nurse Made Aware ok to see per RN   Assessment   Prognosis Fair   Problem List Decreased strength;Decreased endurance;Decreased coordination;Decreased mobility; Impaired balance;Decreased cognition;Decreased safety awareness;Pain   Assessment Pt is a 80 y o  female seen for PT evaluation s/p admit to [facility] on 8/20/2022  Pt was admitted with a primary dx of: ambulatory dysfunction after being found down at home  PT now consulted for assessment of mobility and d/c needs  Pt with Up with assistance, Up and OOB as tolerated , PT evaluation orders  Pts current comorbidities effecting treatment include: GERD, osteoporosis, and parkinson's disease  Personal factors impacting pt include: having stairs to navigate at home   Pts current clinical presentation is Unstable/ Unpredictable (high complexity) due to Ongoing medical management for primary dx, Decreased activity tolerance compared to baseline, Fall risk, Increased assistance needed from caregiver at current time, Ongoing telemetry monitoring, Cog status, Trending lab values  Pt is a poor historian, home setup and PLOF obtained from EMR  Pt lives with her  in a [facility] with 2 SHERRY  Pt reports being I with ADLs (per EMR pt requires A for ADLs, will need to clarify with CM) and using RW occasionally  Upon evaluation, pt currently is requiring modA for bed mobility; minAx2 for transfers; minAx2 for ambulation 2 lateral steps at EOB ft w/ HHA  Pt presents at PT eval functioning below baseline and currently w/ overall mobility deficits 2* to: BLE weakness, impaired balance, impaired coordination, gait deviations, pain, decreased activity tolerance compared to baseline, decreased functional mobility tolerance compared to baseline, decreased safety awareness, fall risk, decreased cognition  Pt currently at a fall risk 2* to impairments listed above  Pt will continue to benefit from skilled acute PT interventions to address stated impairments; to maximize functional mobility; for ongoing pt/ family training; and DME needs  At conclusion of PT session pt returned BTB, bed alarm engaged and RN notified of session findings/recommendations with phone and call bell within reach  Pt denies any further questions at this time  The patient's AM-PAC Basic Mobility Inpatient Short Form Raw Score is 16  A Raw score of less than or equal to 16 suggests the patient may benefit from discharge to post-acute rehabilitation services  Please also refer to the recommendation of the Physical Therapist for safe discharge planning  Recommend rehab upon hospital D/C  Goals   Patient Goals to have less pain   STG Expiration Date 09/05/22   Short Term Goal #1 In 10-14 days pt will be able to: 1  Demonstrate ability to perform all aspects of bed mobility at mod (I) level to increase functional independence  2  Perform functional transfers at mod (I) level to facilitate safe return to previous living environment    3   Ambulate 300 ft with LRAD at mod (I) level with stable vitals to improve safety with household distances and reduce fall risk  4  Climb 12 steps with HR at mod (I) level to simulate home environment  5  Improve LE strength grades by 1 to increase ease of functional mobility with transfers and gait  6  Pt will demonstrate improved balance by one grade order to decrease risk of falls  PT Treatment Day 0   Plan   Treatment/Interventions Functional transfer training;LE strengthening/ROM; Elevations; Therapeutic exercise; Endurance training;Equipment eval/education; Bed mobility;Gait training;Spoke to nursing;Spoke to case management;OT   PT Frequency 3-5x/wk   Recommendation   PT Discharge Recommendation Post acute rehabilitation services   AM-PAC Basic Mobility Inpatient   Turning in Bed Without Bedrails 3   Lying on Back to Sitting on Edge of Flat Bed 2   Moving Bed to Chair 3   Standing Up From Chair 3   Walk in Room 3   Climb 3-5 Stairs 2   Basic Mobility Inpatient Raw Score 16   Basic Mobility Standardized Score 38 32   Highest Level Of Mobility   -HLM Goal 5: Stand one or more mins   -HLM Achieved 5: Stand (1 or more minutes)   Modified Hodgeman Scale   Modified Hodgeman Scale 4   End of Consult   Patient Position at End of Consult Supine;Bed/Chair alarm activated; All needs within reach       Kathlyn Leventhal, PT, DPT

## 2022-08-22 NOTE — ASSESSMENT & PLAN NOTE
Concern for pneumonia noted on chest x-ray  · Patient is currently asymptomatic at this time  · She has mild elevation white count, however procalcitonin is negative  · Patient is afebrile  · Initially given ceftriaxone, and vancomycin in the emergency department  · Will monitor off antibiotics for now    · Pulmonary consult discontinued, will re-consult if patient clinically deteriorates

## 2022-08-22 NOTE — OCCUPATIONAL THERAPY NOTE
Occupational Therapy Evaluation     Patient Name: Charmaine Castaneda  LGWTZ'Y Date: 8/22/2022  Problem List  Principal Problem:    Ambulatory dysfunction  Active Problems:    Esophagitis, reflux    Vitamin D deficiency    Parkinson's disease ([facility] [address] )    Vitamin B12 deficiency    Hallucinations    Incidental pulmonary nodule    Abnormal chest x-ray    Past Medical History  Past Medical History:   Diagnosis Date    GERD (gastroesophageal reflux disease)     Osteoporosis     Parkinson disease ([facility] [address] )      Past Surgical History  Past Surgical History:   Procedure Laterality Date    ARTERIOGRAM N/A 7/10/2019    Procedure: KYPHOPLASTY;  Surgeon: Cora Garrett MD;  Location: [facility];  Service: Interventional Radiology    IR KYPHOPLASTY/VERTEBROPLASTY  7/10/2019    TONSILLECTOMY               08/22/22 1101   OT Last Visit   OT Visit Date 08/22/22   Note Type   Note type Evaluation   Restrictions/Precautions   Weight Bearing Precautions Per Order No   Other Precautions Impulsive;Cognitive; Bed Alarm;Telemetry; Fall Risk;Pain   Pain Assessment   Pain Assessment Tool 0-10   Pain Score 10 - Worst Possible Pain   Pain Location/Orientation Location: Back   Pain Onset/Description Onset: Ongoing; Descriptor: Aching;Descriptor: Discomfort   Patient's Stated Pain Goal No pain   Home Living   Type of [address] [place] [address] Two level;Bed/bath upstairs  (2 SHERRY)   Home Equipment Walker   Additional Comments Pt is a poor historian; extremely restless and unable to answer questions regarding home setup; hx obtained from EMR  Pt resides in 2 , 2 SHERRY w/ FFOS up to bed/bath  Lives w/ spouse per CM note  Has 3 dghts, 1 who lives next door   Prior Function   Level of [place] Needs assistance with IADLs; Needs assistance with ADLs and functional mobility   Lives With Spouse   Receives Help From Family   ADL Assistance Needs assistance   IADLs Needs assistance   Comments pt is a poor historian; hx obtained from chart review   Pt receives assist for ADLS, use of RW for functional mobility  (Pt reports being I PTA w/ ADLS and driving; unclear accuracy of this information at this time)  Lifestyle   Autonomy Assist ADLS/IADLS, Mod I w/ transfers and functional mobility w/ RW   Reciprocal Relationships Pt lives w/ spouse; has supportive [facility] nearby   Service to Others Unable to report; will continue to assess   Intrinsic Gratification Unable to report; will continue to assess   Psychosocial   Psychosocial (WDL) X   Patient Behaviors/Mood Anxious; Labile; Wandering; Other (Comment)  (restless; impulsive; spastic mvmts)   Needs Expressed Emotional   Psychosocial Additional Assessments Yes   Ability to Express Feelings Able to express   Ability to Express Needs Able to express   Ability to Express Thoughts Able to express   Ability to Understand Others Sometimes understands   ADL   Eating Assistance 4  Minimal Assistance   Eating Deficit Setup; Impulsive;Verbal cueing;Supervision/safety; Increased time to complete; Beverage management   Grooming Assistance 4  Minimal Assistance   UB Bathing Assistance 3  Moderate Assistance   LB Bathing Assistance 2  Maximal Assistance   UB Dressing Assistance 3  Moderate Assistance   LB Dressing Assistance 2  Maximal [address]  3  Moderate Assistance   Functional Assistance 4  Minimal Assistance   Functional Deficit Steadying;Verbal cueing;Supervision/safety; Impulsive; Increased time to complete  (Ax2)   Additional Comments (S)  Recommend Ax2 for all functional tasks 2/2 impulsivity and restlessness and difficulty redirecting to task safely   Bed Mobility   Supine to Sit 3  Moderate assistance   Additional items Assist x 1;HOB elevated; Increased time required;LE management;Verbal cues; Impulsive   Sit to Supine 3  Moderate assistance   Additional items Assist x 1;HOB elevated; Increased time required; Impulsive;LE management;Verbal cues   Additional Comments pt found supine in bed, restless w/ B/L legs over L sided bed rail  Pt required Mod A for safety from supine<>sit  pt sat EOB w/ Min A for sitting balance/trunk control; SBA 2nd for safety awareness  Pt continues to be restless; spastic and fidgety throughout session; unable to be calmed despite cueing for relaxation techniques   Transfers   Sit to Stand 4  Minimal assistance   Additional items Assist x 2; Increased time required; Impulsive;Verbal cues   Stand to Sit 4  Minimal assistance   Additional items Assist x 2; Increased time required; Impulsive;Verbal cues   Additional Comments Ax2 for safety; HHA used   Functional Mobility   Functional Mobility 4  Minimal assistance   Additional Comments Pt took lateral side step @ EOB w/ Min A x2; HHA used; VC/TC for safety/proper technique   Additional items Hand hold assistance   Balance   Static Sitting Poor +   Dynamic Sitting Poor +   Static Standing Poor   Dynamic Standing Poor   Ambulatory Poor   Activity Tolerance   Activity Tolerance Patient limited by fatigue;Patient limited by pain   Medical Staff Made Aware PT   Nurse Made Aware yes, Epi   RUE Assessment   RUE Assessment   (AROM observed; not following MMT commands; continues w/ restlessness of B/L UE's and flails B/L UE's continuously throughout session )   LUE Assessment   LUE Assessment X   Hand Function   Gross Motor Coordination Impaired   Fine Motor Coordination Impaired   Sensation   Light Touch No apparent deficits   Vision - Complex Assessment   Ocular Range of Motion [facility]   Additional Comments pt reports no visual deficits however has difficulty keeping eyes open while supine in bed  Not following visual commands at this time; pt opened eyes for longer period when seated EOB   Cognition   Overall Cognitive Status Impaired   Arousal/Participation Arousable; Cooperative   Attention Difficulty attending to directions   Orientation Level Oriented to person;Oriented to place; Disoriented to situation;Disoriented to time   Memory Decreased recall of precautions Following Commands Follows one step commands with increased time or repetition   Comments (S)  Pt found supine in bed restless, fidgety and flailing B/L UE/LEs  Pt c/o of severe back pain; making continuous writhing mvmts and very difficult to re-direct to task  Has decreased safety awareness  When asked how therapy can help make pt more comfortable pt stated, "Just Jagdish Oswaldberg me"; then stated "It's not like I haven't already thought about it " RN made aware of statement; recommend follow up w/ psych evaluation  Assessment   Limitation Decreased ADL status; Decreased Safe judgement during ADL;Decreased cognition;Decreased endurance;Visual deficit; Decreased self-care trans;Decreased fine motor control;Decreased high-level ADLs   Prognosis Fair   Assessment Pt is a 79 y/o female seen for OT eval s/p adm to B after being found on the floor by her dght  Pt is dx'd w/ ambulatory dysfunction  Pt  has a past medical history of GERD (gastroesophageal reflux disease), Osteoporosis, and Parkinson disease ([facility] [address] )  Pt with active OT orders and up with assistance  orders  Pt lives with her spouse in 2 SH, 2 SHERRY, FFOS up to bed/bath; has 3 dghts, 1 next door  Per EMR, pt has assist w/ ADLS and IADLS, pt reports she drives (but unclear if this is accurate) & required use of DME PTA including RW  Pt is a poor historian; will confirm w/ CM home setup  Pt is currently demonstrating the following occupational deficits: Mod A UB ADLS, Max A LB ADLS, Mod A bed mobility, Min A x2 transfers and functional mobility w/ HHA   These deficits that are impacting pt's baseline areas of occupation are a result of the following impairments: pain, endurance, activity tolerance, functional mobility, forward functional reach, balance, trunk control, functional standing tolerance, unsupportive home environment, decreased I w/ ADLS/IADLS, strength, ROM, visual deficits, cognitive impairments, decreased safety awareness, decreased insight into deficits, impaired fine motor skills and coordination deficits  The following Occupational Performance Areas to address include: eating, grooming, bathing/shower, toilet hygiene, dressing, medication management, socialization, health maintenance, functional mobility, community mobility, clothing management and household maintenance  Recommend inpatient rehab  upon D/C  Pt to continue to benefit from acute immediate OT services to address the following goals 3-5x/week to  w/in 10-14 days:   Goals   Patient Goals to have less pain   LTG Time Frame 10-14   Long Term Goal #1 see below listed goals   Plan   Treatment Interventions ADL retraining;Functional transfer training;Visual perceptual retraining;UE strengthening/ROM; Endurance training;Cognitive reorientation;Patient/family training;Equipment evaluation/education; Compensatory technique education; Fine motor coordination activities;Continued evaluation; Energy conservation; Activityengagement   Goal Expiration Date 22   OT Frequency 3-5x/wk   Recommendation   Recommendation (S)  Psych Consult   OT Discharge Recommendation Post acute rehabilitation services   Additional Comments  The patient's raw score on the AM-PAC Daily Activity inpatient short form is 14, standardized score is 33 39, less than 39 4  Patients at this level are likely to benefit from discharge to post-acute rehabilitation services  Please refer to the recommendation of the Occupational Therapist for safe discharge planning  Additional Comments 2 Pt seen as a co-evaluation due to the patient's co-morbidities, clinically unstable presentation, and present impairments which are a regression from the patient's baseline     AM-PAC Daily Activity Inpatient   Lower Body Dressing 2   Bathing 2   Toileting 2   Upper Body Dressing 2   Grooming 3   Eating 3   Daily Activity Raw Score 14   Daily Activity Standardized Score (Calc for Raw Score >=11) 33 39   AM-[facility] Applied Cognition Inpatient   Following a Speech/Presentation 2   Understanding Ordinary Conversation 2   Taking Medications 1   Remembering Where Things Are Placed or Put Away 1   Remembering List of 4-5 Errands 1   Taking Care of Complicated Tasks 1   Applied Cognition Raw Score 8   Applied Cognition Standardized Score 19 32        GOALS    1) Pt will complete rolling left/right in bed with S assist, as prerequisite for further engagement in ADLS   2) Pt will complete supine to sit transfer with S using B/L UEs to initiate bed mobility   3) Pt will tolerate sitting at EOB 20 minutes with S assist and stable vital signs, as prerequisite for further engagement in ADLS   4) Pt will complete grooming task with S assist and increased time to increase independence in functional tasks  5) Pt will increase B/L UE ROM 1/2 MMT to increase functional UB use during ADLS   6) Pt will complete UB ADLS with S and use of AD/DME as needed to increase independence in functional tasks  7) Pt will complete LB ADLS with Min A and use of AD/DME as needed to increase independence in functional tasks  8) Pt will complete sit to stand transfer / sit pivot transfer / stand pivot transfer with S assist on/off all ADL surfaces   9) Pt will follow 100% simple one step verbal commands and be A/Ox4 consistently t/o use of external environmental cues to increase awareness for functional tasks  10) Pt will demonstrate 100% carryover of E C  techniques t/o fx'l I/ADL/ leisure tasks w/o cues s/p skilled education  11) Pt will improve functional mobility to S w/ use of AD/DME as needed to increase I w/ functional tasks  12) Pt will be attentive 100% of the time during ongoing formal cognitive assessment to assist w/ safe D/C planning and increase safety for functional activities    Raul Mary MS, OTR/L

## 2022-08-22 NOTE — ASSESSMENT & PLAN NOTE
· Per chart review appears to be 2/2 amantadine use   · Pt self discontinued amantadine due to this   · Continue to hold per Neurology recommendations

## 2022-08-22 NOTE — PROGRESS NOTES
Progress Note - Neurology   Ruslan Pa 80 y o  female [id number]  Unit/Bed#: CW2 210/CW2 210-02    Assessment/Plan:    Parkinson's disease [facility])  Assessment & Plan  23-year-old female with Parkinson's disease, GERD, osteoporosis, and recent gallstone pancreatitis who presented on 8/20/2022 with significant freezing after missing several days of some of her Parkinson's medications (Azilect and ropinirole)  CT head unremarkable  Home medications include:  · Stalevo 50 mg/12 5 mg/200 mg 6 times daily approximately 2-3 hours between doses  · Azilect 1 mg daily  · Ropinirole XL 6 mg daily  · Gocovri (amandatine extended release) 137 mg - not taken since 8/1/22    Suspect medication mixup likely significant contributor to patient's setback with regard to her movement  Would also screen for infectious/metabolic derangements  On exam today (8/22), patient has significant dyskinesias/hyperkinetic movements likely due excess dopamine from restarting all of her home medications other than Gocovri  Plan:  - Patient's daughter brought in her home medications as not all of them are on formulary here  - Due to dyskinesias, spoke to her outpatient Neurologist who recommended restarting Gocovri 137 mg 1 tab nightly and stopping the ropinirole  Will continue Stalevo 6x daily and Azilect 1 mg daily at this time  - Observe for increased hallucinations since restarting Gocovri  - PT/OT  - Correction of infectious/metabolic derangements as per primary service  - Regulation of sleep/wake cycle  Would avoid overuse of Ativan  Patient does take this at home p r n  for sleep and reportedly tolerates it well  Can consider alternative low-dose medications such as Zyprexa for behavioral control should she have paradoxical agitation on the Ativan  Patient has an appointment with Dr Ryann Potts on 10/17/2022  Patient should keep this appointment  Subjective:   Patient seen and examined at bedside    Patient has significant dyskinesias and hyperkinetic movements  Patient states that she is having severe left scapular pain, but this has been present for a long time" and she is working with PT outpatient  Denies any headache, weakness, numbness, tingling, visual disturbances  Patient has difficulty explaining why she is not taking her Parkinson's medications          Past Medical History:   Diagnosis Date    GERD (gastroesophageal reflux disease)     Osteoporosis     Parkinson disease ([address] )      Past Surgical History:   Procedure Laterality Date    ARTERIOGRAM N/A 7/10/2019    Procedure: KYPHOPLASTY;  Surgeon: Maritza Carrington MD;  Location:  MAIN OR;  Service: Interventional Radiology    IR KYPHOPLASTY/VERTEBROPLASTY  7/10/2019    TONSILLECTOMY       Family History   Problem Relation Age of Onset    Dementia Mother     Heart disease Father     No Known Problems Daughter     No Known Problems Maternal Grandmother     No Known Problems Maternal Grandfather     No Known Problems Paternal Grandmother     Prostate cancer Paternal Grandfather     No Known Problems Daughter     No Known Problems Daughter     Alcohol abuse Neg Hx     Mental illness Neg Hx     Substance Abuse Neg Hx     Depression Neg Hx      Social History     Socioeconomic History    Marital status: /Civil Union     Spouse name: None    Number of children: None    Years of education: None    Highest education level: None   Occupational History    None   Tobacco Use    Smoking status: Never Smoker    Smokeless tobacco: Never Used   Vaping Use    Vaping Use: Never used   Substance and Sexual Activity    Alcohol use: Not Currently     Comment: Rare    Drug use: Never    Sexual activity: None   Other Topics Concern    None   Social History Narrative    5 children     Social Determinants of Health     Financial Resource Strain: Not on file   Food Insecurity: Not on file   Transportation Needs: Not on file   Physical Activity: Not on file   Stress: Not on file   Social Connections: Not on file   Intimate Partner Violence: Not on file   Housing Stability: Not on file     E-Cigarette/Vaping    E-Cigarette Use Never User      E-Cigarette/Vaping Substances    Nicotine No     THC No     CBD No     Flavoring No     Other No     Unknown No          Medications: All current active meds have been reviewed and current meds:  Scheduled Meds:  Current Facility-Administered Medications   Medication Dose Route Frequency Provider Last Rate    acetaminophen  650 mg Oral Q6H PRN Nandini Lee MD      aluminum-magnesium hydroxide-simethicone  30 mL Oral Q6H PRN Nandini Lee MD      calcium carbonate  2 tablet Oral Daily With Breakfast Nandini Lee MD      carbidopa-levodopa-entacapone   Oral 6x Daily Army Babak PA-C      cyanocobalamin  1,000 mcg Oral Daily Nandini Lee MD      docusate sodium  100 mg Oral BID PRN Nandini Lee MD      enoxaparin  40 mg Subcutaneous Daily Nandini Lee MD      famotidine  40 mg Oral HS Nandini Lee MD      guaiFENesin  600 mg Oral BID Nandini Lee MD      lidocaine  1 patch Topical Daily Richy Butterfield PA-C      LORazepam  0 5 mg Intravenous Q4H PRN Nandini Lee MD      ondansetron  4 mg Intravenous Q6H PRN Nandini Lee MD      oxyCODONE  2 5 mg Oral Q4H PRN Richy Butterfield PA-C      potassium chloride  40 mEq Oral Once Brendan Bronson PA-C      rasagiline  1 mg Oral Daily Hoa Rawls PA-C      rOPINIRole  6 mg Oral Daily Hoa Rawls PA-C       Continuous Infusions:   PRN Meds:   acetaminophen    aluminum-magnesium hydroxide-simethicone    docusate sodium    LORazepam    ondansetron    oxyCODONE       ROS:   Review of Systems   Musculoskeletal: Positive for back pain  All other systems reviewed and are negative              Vitals:   BP 94/59   Pulse 83   Temp 97 8 °F (36 6 °C) (Oral)   Resp 16   Wt 54 4 kg (120 lb)   SpO2 94%   BMI 20 60 kg/m²     Physical Exam:   Physical Exam  Vitals and nursing note reviewed  Constitutional:       Comments: Patient lying in bed with hyperkinetic movements in all extremities   HENT:      Head: Normocephalic and atraumatic  Mouth/Throat:      Mouth: Mucous membranes are moist       Pharynx: Oropharynx is clear  Eyes:      Extraocular Movements: Extraocular movements intact  Conjunctiva/sclera: Conjunctivae normal       Pupils: Pupils are equal, round, and reactive to light  Pulmonary:      Effort: Pulmonary effort is normal    Musculoskeletal:         General: Normal range of motion  Comments: Jamar Chun out in pain when entire cervical/thoracic/lumbar spine is palpated   Skin:     General: Skin is warm and dry  Neurological:      Mental Status: She is alert and oriented to person, place, and time  Comments: See full neuro exam below       Neurologic Exam     Mental Status   Oriented to person, place, and time  Patient awake and alert  Oriented to person, place, and month  Incorrectly stated that the year is 2020 on that it is Sunday  No dysarthria or aphasia  Able to follow simple midline and appendicular commands  Cranial Nerves     CN III, IV, VI   Pupils are equal, round, and reactive to light  Pupils 3 mm, round, reactive to light bilaterally  EOMs intact without nystagmus  Blink to threat intact bilaterally  Facial sensation to light touch intact throughout  Facial expressions full and symmetric  Tongue midline  Motor Exam   Muscle bulk: normal  Overall muscle tone: normal  No rigidity noted in any extremity  5/5 strength in bilateral upper and lower extremities    Hyperkinetic movements/dyskinesias in all extremities     Sensory Exam   Sensation to light touch intact throughout     Gait, Coordination, and Reflexes     Gait  Gait: (Deferred for patient's safety)  No ataxia with finger to nose bilaterally  Dyskinesias/hyperkinetic movements in all extremities  No ankle clonus bilaterally           Labs: I have personally reviewed pertinent reports  Recent Results (from the past 24 hour(s))   Blood culture    Collection Time: 08/21/22  1:20 PM    Specimen: Arm, Right; Blood   Result Value Ref Range    Blood Culture Received in Microbiology Lab  Culture in Progress  Blood culture    Collection Time: 08/21/22  1:20 PM    Specimen: Arm, Left; Blood   Result Value Ref Range    Blood Culture Received in Microbiology Lab  Culture in Progress      Procalcitonin, Next Day AM Collection    Collection Time: 08/22/22  4:29 AM   Result Value Ref Range    Procalcitonin 0 10 <=0 25 ng/ml   Comprehensive metabolic panel    Collection Time: 08/22/22  4:29 AM   Result Value Ref Range    Sodium 139 135 - 147 mmol/L    Potassium 3 3 (L) 3 5 - 5 3 mmol/L    Chloride 109 (H) 96 - 108 mmol/L    CO2 25 21 - 32 mmol/L    ANION GAP 5 4 - 13 mmol/L    BUN 11 5 - 25 mg/dL    Creatinine 0 53 (L) 0 60 - 1 30 mg/dL    Glucose 103 65 - 140 mg/dL    Calcium 8 0 (L) 8 3 - 10 1 mg/dL    Corrected Calcium 9 4 8 3 - 10 1 mg/dL    AST 18 5 - 45 U/L    ALT 8 (L) 12 - 78 U/L    Alkaline Phosphatase 110 46 - 116 U/L    Total Protein 6 1 (L) 6 4 - 8 4 g/dL    Albumin 2 2 (L) 3 5 - 5 0 g/dL    Total Bilirubin 0 34 0 20 - 1 00 mg/dL    eGFR 96 ml/min/1 73sq m   Magnesium    Collection Time: 08/22/22  4:29 AM   Result Value Ref Range    Magnesium 2 2 1 6 - 2 6 mg/dL   Phosphorus    Collection Time: 08/22/22  4:29 AM   Result Value Ref Range    Phosphorus 2 9 2 3 - 4 1 mg/dL   CBC and differential    Collection Time: 08/22/22  4:29 AM   Result Value Ref Range    WBC 9 24 4 31 - 10 16 Thousand/uL    RBC 3 80 (L) 3 81 - 5 12 Million/uL    Hemoglobin 11 8 11 5 - 15 4 g/dL    Hematocrit 36 0 34 8 - 46 1 %    MCV 95 82 - 98 fL    MCH 31 1 26 8 - 34 3 pg    MCHC 32 8 31 4 - 37 4 g/dL    RDW 11 9 11 6 - 15 1 %    MPV 10 2 8 9 - 12 7 fL    Platelets 102 149 - 390 Thousands/uL    nRBC 0 /100 WBCs    Neutrophils Relative 67 43 - 75 %    Immat GRANS % 0 0 - 2 %    Lymphocytes Relative 13 (L) 14 - 44 %    Monocytes Relative 13 (H) 4 - 12 %    Eosinophils Relative 6 0 - 6 %    Basophils Relative 1 0 - 1 %    Neutrophils Absolute 6 17 1 85 - 7 62 Thousands/µL    Immature Grans Absolute 0 03 0 00 - 0 20 Thousand/uL    Lymphocytes Absolute 1 23 0 60 - 4 47 Thousands/µL    Monocytes Absolute 1 20 0 17 - 1 22 Thousand/µL    Eosinophils Absolute 0 55 0 00 - 0 61 Thousand/µL    Basophils Absolute 0 06 0 00 - 0 10 Thousands/µL   TSH, 3rd generation    Collection Time: 08/22/22  4:29 AM   Result Value Ref Range    TSH 3RD GENERATON 1 460 0 450 - 4 500 uIU/mL       Imaging: I have personally reviewed pertinent imaging in PACS, including CT head,  and I have personally reviewed PACS reports  EKG, Pathology, and Other Studies: I have personally reviewed pertinent reports  VTE Prophylaxis: Sequential compression device (Venodyne)  and Enoxaparin (Lovenox)      Counseling / Coordination of Care  Total time spent today 25 minutes  Greater than 50% of total time was spent with the patient and/or family counseling and/or coordination of care  A description of the counseling/coordination of care:  Patient was seen and evaluated  Discussed with attending  Chart reviewed thoroughly including laboratory and imaging studies    Plan of care discussed with patient and primary team

## 2022-08-22 NOTE — ASSESSMENT & PLAN NOTE
Patient placed on amantadine which was put on hold by patient recently due to complaints of hallucinations  She is supposed to receive additional amantadine tablets by tomorrow however comes to the emergency room with fall   · Hold further amantadine at this time per Neurology recommendations   · Neurology will likely decrease ropinirole as well, continue other home medications as prescribed  · PT/OT consult   · Continue Stalevo 12 5/50/201 tablet 6 times daily

## 2022-08-22 NOTE — ASSESSMENT & PLAN NOTE
Patient presents for a fall at home  Noted history of parkinsons  Patient lives alone and was found by daughter on the floor  · Had mobility issues previously related to parkinson's improved w/ amantadine  · This was discontinued due to causing hallucinations    · Neurology consulted  · Suspecting that medication mixup likely a significant contributor to her set back in regard to her movement  · Patient may restart home medications with the exception of Gocovri (amantadine extended release)  · Suggesting decreasing ropinirole given uncontrolled movements likely caused by increased dopamine  · Physical and occupational therapy with case management    · Awaiting formal recommendations

## 2022-08-22 NOTE — TELEPHONE ENCOUNTER
Pt's daughter Camila Cool  left  ,reported that her Mom is currently in the hospital and out of her Franci Horton she is trying to find out how to get it  [facility]ed pharmacy at  [facility]- spoke to [facility] that Lien Haynes is scheduled to be delivered tomorrow  Called pt's daughter again and made her aware,she said that pharmacy  already made her aware  Daughter said she called the hospital and spoke to her mom's nurse to let her know that Lien Haynes is coming tommorrow,and nurse told her that the plan was for her to start  Amantadine and to stop Ropinorole    Daughter was concerned  and wanted to check with you if that is ok,or to just keep her on the same medication and wait for Gocovri that is coming,she was not sure if you can talk to her doctor while in the hospital   [phone] to leave detailed message    Please advise

## 2022-08-22 NOTE — CASE MANAGEMENT
Case Management Discharge Planning Note    Patient name Everett Sun  Location CW2 210/CW2 210-02 MRN [id number]  : 1950 Date 2022       Current Admission Date: 2022  Current Admission Diagnosis:Ambulatory dysfunction   Patient Active Problem List    Diagnosis Date Noted    Incidental pulmonary nodule 2022    Abnormal chest x-ray 2022    Hallucinations 2022    Vitamin B12 deficiency 2022    Cervical spondylosis 02/15/2022    Cervical radiculopathy 2021    Stress fracture of femur 2020    Hearing loss, bilateral 2020    Compression fracture of T12 first lumbar vertebra ([facility] [address] ) 2019    Back pain 2019    Ambulatory dysfunction 2019    Abnormal thyroid function test 2019    Hyperlipidemia 2017    Vitamin D deficiency 2017    Mild cognitive impairment 2014    REM sleep behavior disorder 2014    Esophagitis, reflux 2012    Localized osteoporosis with current pathological fracture with routine healing 2012    Parkinson's disease ([facility] [address] ) 2012    Torticollis 10/21/2009      LOS (days): 1  Geometric Mean LOS (GMLOS) (days): 3 90  Days to GMLOS:2 4     OBJECTIVE:  Risk of Unplanned Readmission Score: 13 31         Current admission status: Inpatient   Preferred Pharmacy:   [place] [address]  Phone: [phone] Fax: [address], [address] Darrion Castro  [address] [place] [address]  Phone: [phone] Fax: [phone]    [facility] [address], [address]  [address]  Phone: [phone] Fax: [phone]    Primary Care Provider: Lizy Ng MD    Primary Insurance: MEDICARE  Secondary Insurance: BLUE CROSS    DISCHARGE DETAILS:    Discharge planning discussed with[de-identified] Daughter Neida Ritchie via phone ([phone])  Freedom of Choice: Yes  Comments - Freedom of Choice: Discussed STR recommendation and facility choices     Other Referral/Resources/Interventions Provided:  Interventions: Short Term Rehab  Referral Comments: CM advised by provider that pt is recommended for STR placement upon medical stability; CM spoke with daughter Neida Ritchie via phone to discuss same  Daughter is in agreement with placement but would like to speak with her sisters to discuss facility choices first  Daughter states her sister is also a  and was employed with [employer]; CM advised daughter that optioning is not necessary if acute rehab placement is decided  Daughter confirmed understanding of same  Pt does have a dx of Parkinson's Disease, which may require optioning if pt admits to a SNF (pt was diagnosed 22 years ago)  CM will follow up with daughters tomorrow AM to discuss choices  Treatment Team Recommendation: Short Term Rehab  Discharge Destination Plan[de-identified] Short Term Rehab      Additional Comments: Pt is covid vaccinated x3

## 2022-08-22 NOTE — CONSULTS
Consultation - [facility] 80 y o  female MRN: [id number]  Unit/Bed#: CW2 210-02 Encounter: [id number]      Chief Complaint:  I am in pain    History of Present Illness   Physician Requesting Consult: Eusebio Driscoll MD  Reason for Consult / Principal Problem:  Suicidal ideation    Hosea Moody is a 80 y o  female with longstanding history of Parkinson disease, vitamin B12 deficiency,  incidental pulmonary nodule, and ambulatory dysfunction, vitamin-D deficiency and esophagitis reflux presents with increasing ambulatory dysfunction shuffling gait and flat affect  According to the admission do patient has been in level 2 because he had open amantadine 137 mg 2 capsules daily in addition to Prolia, rasagline and Requip  She had been complaining of visual hallucinations denies any auditory hallucinations  She had been evaluated for suicidal ideation  She states that she had discussed Euthanasia with her family but this is something that if she ever do it need to be with her family  She denies any intention to hurt herself, she denies any plan or intent  She does states that her quality of life is decreasing and she had became worse after she was admitted to the hospital   She states that she is in lot of pain  She also states that she was able to sleep last night because she got some type of medication that help her     She does have good appetite  She states that she never saw a psychiatrist   She denies any auditory hallucination  At this time patient denies any suicidal homicidal thoughts plans or intent           Psychiatric Review Of Systems:  sleep: yes  appetite changes: no  weight changes: no  energy/anergy: yes  interest/pleasure/anhedonia: no  somatic symptoms: no  anxiety/panic: no  darian: no  guilty/hopeless: no  self injurious behavior/risky behavior: no    Historical Information   Past Psychiatric History:   None  Currently in treatment with a neurologist   Past Suicide attempts:  None  Past Violent behavior:  None  Past Psychiatric medication trial:  None    Substance Abuse History:  No drugs or alcohol     I have assessed this patient for substance use within the past 12 months     History of IP/OP rehabilitation program: none  Smoking history:  Never smoked  Family Psychiatric History: According to the record dementia in the mother    Social History  Education: post college graduate work or degree  Learning Disabilities: None  Marital history:   Living arrangement, social support: She live with family  Occupational History: retired  Functioning Relationships: good support system    Other Pertinent History: None    Traumatic History:   Abuse: None  Other Traumatic Events: None    Past Medical History:   Diagnosis Date    GERD (gastroesophageal reflux disease)     Osteoporosis     Parkinson disease ([facility] [address] )        Medical Review Of Systems:  Review of Systems - Negative except body ache, left scapular pain all other systems reviewed were negative    Meds/Allergies   current meds:   Current Facility-Administered Medications   Medication Dose Route Frequency    acetaminophen (TYLENOL) tablet 650 mg  650 mg Oral Q6H PRN    aluminum-magnesium hydroxide-simethicone (MYLANTA) oral suspension 30 mL  30 mL Oral Q6H PRN    calcium carbonate (OYSTER SHELL,OSCAL) 500 mg tablet 2 tablet  2 tablet Oral Daily With Breakfast    carbidopa-levodopa-entacapone (STALEVO) 12 5- MG per tablet   Oral 6x Daily    cyanocobalamin (VITAMIN B-12) tablet 1,000 mcg  1,000 mcg Oral Daily    docusate sodium (COLACE) capsule 100 mg  100 mg Oral BID PRN    enoxaparin (LOVENOX) subcutaneous injection 40 mg  40 mg Subcutaneous Daily    famotidine (PEPCID) tablet 40 mg  40 mg Oral HS    guaiFENesin (MUCINEX) 12 hr tablet 600 mg  600 mg Oral BID    lidocaine (LIDODERM) 5 % patch 1 patch  1 patch Topical Daily    LORazepam (ATIVAN) injection 0 5 mg  0 5 mg Intravenous Q4H PRN    NON FORMULARY  137 mg Oral HS    ondansetron (ZOFRAN) injection 4 mg  4 mg Intravenous Q6H PRN    oxyCODONE (ROXICODONE) IR tablet 2 5 mg  2 5 mg Oral Q4H PRN    rasagiline (AZILECT) tablet 1 mg  1 mg Oral Daily     Allergies   Allergen Reactions    Sulfa Antibiotics      Other reaction(s): family history - anaphylaxis  Category: Allergy;     Doxycycline Rash     Category: Allergy;        Objective   Vital signs in last 24 hours:  Temp:  [97 °F (36 1 °C)-98 1 °F (36 7 °C)] 98 1 °F (36 7 °C)  HR:  [80-84] 82  Resp:  [16-18] 16  BP: ()/() 122/72      Intake/Output Summary (Last 24 hours) at 8/22/2022 1328  Last data filed at 8/22/2022 0820  Gross per 24 hour   Intake 0 ml   Output 1100 ml   Net -1100 ml       Mental Status Evaluation:  Appearance:  age appropriate and disheveled   Behavior:  Cooperative   Speech:  normal pitch and normal volume   Mood:  euthymic   Affect:  mood-congruent   Language: naming objects and repeating phrases   Thought Process:  goal directed   Associations: intact associations   Thought Content:  normal   Perceptual Disturbances: Visual hallucinations   Risk Potential: Suicidal Ideations none, Homicidal Ideations none and Potential for Aggression No   Sensorium:  person, place, time/date and situation   Memory:  recent and remote memory grossly intact   Cognition:  recent and remote memory grossly intact   Consciousness:  alert and awake    Attention: attention span and concentration were age appropriate   Intellect: within normal limits   Fund of Knowledge: awareness of current events: Good, past history: Good and vocabulary: Good   Insight:  good   Judgment: good   Muscle Strength and Tone: Within  normal limits   Gait/Station: Unable to assess patient is in bed   Motor Activity: abnormal movement noted  dyskinetic movements In all extremities     Lab Results:    I have personally reviewed all pertinent laboratory/tests results    Labs in last 72 hours:   Recent Labs 08/22/22  0429   WBC 9 24   RBC 3 80*   HGB 11 8   HCT 36 0      RDW 11 9   NEUTROABS 6 17   SODIUM 139   K 3 3*   *   CO2 25   BUN 11   CREATININE 0 53*   GLUC 103   CALCIUM 8 0*   AST 18   ALT 8*   ALKPHOS 110   TP 6 1*   ALB 2 2*   TBILI 0 34   [id number] 1 460     Recent Imaging Studies: Procedure: XR chest 1 view portable    Result Date: 8/21/2022  Narrative: CHEST INDICATION:   weak  COMPARISON:  None EXAM PERFORMED/VIEWS:  XR CHEST PORTABLE FINDINGS: Cardiomediastinal silhouette appears unremarkable  Biapical pulmonary infiltrates, more prominent on the left, in keeping with pneumonia  No effusion or pneumothorax  Osseous structures appear within normal limits for patient age  Impression: Biapical pulmonary infiltrates, more prominent on the left, in keeping with pneumonia  Workstation performed: [id number]     Procedure: CT head without contrast    Result Date: 8/21/2022  Narrative: CT BRAIN - WITHOUT CONTRAST INDICATION:   found down  COMPARISON:  None  TECHNIQUE:  CT examination of the brain was performed  In addition to axial images, sagittal and coronal 2D reformatted images were created and submitted for interpretation  Radiation dose length product (DLP) for this visit:  870 mGy-cm   This examination, like all CT scans performed in the [facility], was performed utilizing techniques to minimize radiation dose exposure, including the use of iterative reconstruction and automated exposure control  IMAGE QUALITY:  Diagnostic  FINDINGS: PARENCHYMA: Decreased attenuation is noted in periventricular and subcortical white matter demonstrating an appearance that is statistically most likely to represent mild microangiopathic change  No CT signs of acute infarction  No intracranial mass, mass effect or midline shift  No acute parenchymal hemorrhage  VENTRICLES AND EXTRA-AXIAL SPACES:  Normal for the patient's age  VISUALIZED ORBITS AND PARANASAL SINUSES:  Unremarkable  CALVARIUM AND EXTRACRANIAL SOFT TISSUES:  Normal      Impression: No acute intracranial abnormality  Workstation performed: [id number]     Procedure: CT spine cervical without contrast    Result Date: 8/21/2022  Narrative: CT CERVICAL SPINE - WITHOUT CONTRAST INDICATION:   found down  COMPARISON:  None  TECHNIQUE:  CT examination of the cervical spine was performed without intravenous contrast   Contiguous axial images were obtained  Sagittal and coronal reconstructions were performed  Radiation dose length product (DLP) for this visit:  270 mGy-cm   This examination, like all CT scans performed in the [facility], was performed utilizing techniques to minimize radiation dose exposure, including the use of iterative reconstruction and automated exposure control  IMAGE QUALITY:  Diagnostic  FINDINGS: ALIGNMENT:  Normal alignment of the cervical spine  No subluxation  VERTEBRAL BODIES:  No fracture  DEGENERATIVE CHANGES:  Mild multilevel cervical degenerative changes are noted without critical central canal stenosis  PREVERTEBRAL AND PARASPINAL SOFT TISSUES:  Unremarkable  THORACIC INLET:  Normal      Impression: No cervical spine fracture or traumatic malalignment  Workstation performed: [id number]     Procedure: CT recon only lumbar spine    Result Date: 8/21/2022  Narrative: CT LUMBAR SPINE INDICATION:   found down, back pain  COMPARISON:  7/9/2019; 7/10/2019 TECHNIQUE: Axial CT examination of the lumbar spine was obtained utilizing reconstructed images from CT of the chest, abdomen and pelvis performed the same day  Images were reformatted in the sagittal and coronal planes  This examination, like all CT scans performed in the [facility], was performed utilizing techniques to minimize radiation dose exposure, including the use of iterative reconstruction and automated exposure control  FINDINGS: ALIGNMENT: Normal alignment of lumbar vertebral bodies   No spondylolisthesis or spondylolysis  No scoliosis  VERTEBRAL BODIES: Again demonstrated is vertebroplasty material in the L1 and T12 vertebra  Some of the methylmethacrylate has extravasated into the T11-T12 intervertebral disc space  Vacuum disc phenomenon at T11-T12 T12-L1 and L1-L2 noted  Mild loss of height of the superior endplate of L3 with a Schmorl's node  Moderate loss of height of the superior endplate of L4 with approximately 50% vertebral body loss of height  Mild bony retropulsion  Moderate loss of superior endplate height of L5 with a Schmorl's node in this region  Bony sclerosis associated with these compression deformities at L3-L5 suggesting chronic Schmorl's nodes  No definite acute fracture  DEGENERATIVE CHANGES: Mild multilevel degenerative disc disease  PREVERTEBRAL AND PARASPINAL SOFT TISSUES: Atherosclerotic calcifications noted  Impression: 1  Prior vertebroplasty of T12-L1 redemonstrated  2   L3-L5 compression deformities with mild bony retropulsion of L4, age determinant appearance, although subtle sclerotic margins may favor chronic deformities  Correlation for focal tenderness recommended  3   Given the multilevel compression deformities osteoporosis should be excluded  Consider follow-up DEXA scan if not previously performed  Workstation performed: [id number]     Procedure: CT abdomen pelvis with contrast    Result Date: 8/21/2022  Narrative: CT ABDOMEN AND PELVIS WITH IV CONTRAST INDICATION:   found down, back pain  COMPARISON:  None  TECHNIQUE:  CT examination of the abdomen and pelvis was performed  Axial, sagittal, and coronal 2D reformatted images were created from the source data and submitted for interpretation  Radiation dose length product (DLP) for this visit:  314 mGy-cm     This examination, like all CT scans performed in the [facility], was performed utilizing techniques to minimize radiation dose exposure, including the use of iterative reconstruction and automated exposure control  IV Contrast:  65 mL of iohexol (OMNIPAQUE) Enteric Contrast:  Enteric contrast was not administered  FINDINGS: ABDOMEN LOWER CHEST: 6 mm groundglass nodule in the right middle lobe  Based on current Fleischner Society 2017 Guidelines on incidental pulmonary nodule, followup noncontrast CT is recommended at 6-12 months from the initial examination to confirm persistence; if stable at that time, additional followup CT is recommended for every 2 years until 5 years of stability is demonstrated  Lingular linear atelectasis  LIVER/BILIARY TREE:  Unremarkable  GALLBLADDER:  No calcified gallstones  No pericholecystic inflammatory change  SPLEEN:  Unremarkable  PANCREAS:  Unremarkable  ADRENAL GLANDS:  Unremarkable  KIDNEYS/URETERS:  Unremarkable  No hydronephrosis  STOMACH AND BOWEL:  Unremarkable  APPENDIX:  A normal appendix was visualized  ABDOMINOPELVIC CAVITY:  No ascites  No pneumoperitoneum  No lymphadenopathy  VESSELS:  Unremarkable for patient's age  PELVIS REPRODUCTIVE ORGANS:  Unremarkable for patient's age  URINARY BLADDER:  Unremarkable  ABDOMINAL WALL/INGUINAL REGIONS:  Unremarkable  OSSEOUS STRUCTURES:  No acute fracture or destructive osseous lesion  Post T12 and L1 kyphoplasty is  Impression: No acute pathology visualized on CT of the abdomen and pelvis with IV without oral contrast  6 mm groundglass nodule in the right middle lobe  Based on current Fleischner Society 2017 Guidelines on incidental pulmonary nodule, followup noncontrast CT is recommended at 6-12 months from the initial examination to confirm persistence; if stable at that time, additional followup CT is recommended for every 2 years until 5 years of stability is demonstrated  Lingular linear atelectasis  The study was marked in [facility] for immediate notification   Workstation performed: [id number]     Code Status: )Level 1 - Full Code    Assessment/Plan     Assessment:  Toby Cortez is a 80 y o  female with longstanding history of Parkinson's disease, vitamin B12 deficiency, incidental pulmonary nodule, ambulatory dysfunction, vitamin D deficiency and esophageal reflux presented to hospital with increasing ambulatory dysfunction  She had been evaluated for suicidal thoughts  She states that she does not have any intention to hurt herself but she had discussed the Euthanasia with her family but if she get to the point everything will be with family approval   She denies ever seen a psychiatrist, she denies being depressed    She does states that she feels worse in her movement after she came to the hospital    Diagnosis:  Adjustment disorder unspecified type  Plan:   Continue medical management  Discontinue one-to-one observation  No intervention at this time  Discussed with primary team  I will sign off but call me back if necessary  Risks, benefits and possible side effects of Medications:   No medication recommended by me    Diamond Rubin MD

## 2022-08-22 NOTE — PROGRESS NOTES
[facility]  Progress Note - Dot Ezra 1950, 80 y o  female MRN: [id number]  Unit/Bed#: CW2 210-02 Encounter: [id number]  Primary Care Provider: Juan Alberto Huggins MD   Date and time admitted to hospital: 8/20/2022 11:03 PM    * Ambulatory dysfunction  Assessment & Plan  Patient presents for a fall at home  Noted history of parkinsons  Patient lives alone and was found by daughter on the floor  · Had mobility issues previously related to parkinson's improved w/ amantadine  · This was discontinued due to causing hallucinations    · Neurology consulted  · Suspecting that medication mixup likely a significant contributor to her set back in regard to her movement  · Patient may restart home medications with the exception of Gocovri (amantadine extended release)  · Suggesting decreasing ropinirole given uncontrolled movements likely caused by increased dopamine  · Physical and occupational therapy with case management  · Awaiting formal recommendations    Abnormal chest x-ray  Assessment & Plan  Concern for pneumonia noted on chest x-ray  · Patient is currently asymptomatic at this time  · She has mild elevation white count, however procalcitonin is negative  · Patient is afebrile  · Initially given ceftriaxone, and vancomycin in the emergency department  · Will monitor off antibiotics for now  · Pulmonary consult discontinued, will re-consult if patient clinically deteriorates    Incidental pulmonary nodule  Assessment & Plan  Noted on imaging w/ 6 mm ground glass nodule in the right middle lobe   · Recommending follow up in 6-12 months     Hallucinations  Assessment & Plan  · Per chart review appears to be 2/2 amantadine use   · Pt self discontinued amantadine due to this   · Continue to hold per Neurology recommendations    Vitamin B12 deficiency  Assessment & Plan  · Continue vitamin B12 supplementation      Parkinson's disease [facility])  Assessment & Plan  Patient placed on amantadine which was put on hold by patient recently due to complaints of hallucinations  She is supposed to receive additional amantadine tablets by tomorrow however comes to the emergency room with fall   · Hold further amantadine at this time per Neurology recommendations   · Neurology will likely decrease ropinirole as well, continue other home medications as prescribed  · PT/OT consult   · Continue Stalevo 12 5/50/201 tablet 6 times daily  Vitamin D deficiency  Assessment & Plan  · Continue cholecalciferol and calcium supplementation  Esophagitis, reflux  Assessment & Plan  · On Mylanta as needed  · Continue Pepcid 40 mg daily  Urinary catheter in place-resolved as of 8/22/2022  Assessment & Plan  · Placed in the emergency department for on reason  · Will try voiding trial now, and discontinue Chen catheter        VTE Pharmacologic Prophylaxis: VTE Score: 2 Moderate Risk (Score 3-4) - Pharmacological DVT Prophylaxis Ordered: enoxaparin (Lovenox)  Patient Centered Rounds: I performed bedside rounds with nursing staff today  Discussions with Specialists or Other Care Team Provider:  Neurology    Education and Discussions with Family / Patient: Attempted to update  (daughter) via phone  Left voicemail  Time Spent for Care: 15 minutes  More than 50% of total time spent on counseling and coordination of care as described above  Current Length of Stay: 1 day(s)  Current Patient Status: Inpatient   Certification Statement: The patient will continue to require additional inpatient hospital stay due to Placement  Discharge Plan: Anticipate discharge in 24-48 hrs to discharge location to be determined pending rehab evaluations  Code Status: Level 1 - Full Code    Subjective:   Patient seen resting in bed  States that she is having some shoulder pain however she has worked with both physical and occupational therapy as an outpatient regarding this problem  She states that she feels that it is near her left scapula  It does not radiate  She denies any chest pain, nausea, vomiting, diaphoresis  Objective:     Vitals:   Temp (24hrs), Av 7 °F (36 5 °C), Min:97 °F (36 1 °C), Max:98 3 °F (36 8 °C)    Temp:  [97 °F (36 1 °C)-98 3 °F (36 8 °C)] 97 8 °F (36 6 °C)  HR:  [80-84] 83  Resp:  [16-18] 16  BP: ()/() 94/59  SpO2:  [94 %-95 %] 94 %  Body mass index is 20 6 kg/m²  Input and Output Summary (last 24 hours): Intake/Output Summary (Last 24 hours) at 2022 1046  Last data filed at 2022 1401  Gross per 24 hour   Intake --   Output 1100 ml   Net -1100 ml       Physical Exam:   Physical Exam  Vitals and nursing note reviewed  Constitutional:       General: She is not in acute distress  Appearance: Normal appearance  She is not ill-appearing, toxic-appearing or diaphoretic  Eyes:      General: No scleral icterus  Conjunctiva/sclera: Conjunctivae normal    Cardiovascular:      Rate and Rhythm: Normal rate and regular rhythm  Heart sounds: No murmur heard  No friction rub  No gallop  Pulmonary:      Effort: Pulmonary effort is normal  No respiratory distress  Breath sounds: Normal breath sounds  No stridor  No wheezing, rhonchi or rales  Chest:      Chest wall: No tenderness  Abdominal:      General: Abdomen is flat  Bowel sounds are normal  There is no distension  Palpations: Abdomen is soft  Tenderness: There is no abdominal tenderness  Musculoskeletal:      Cervical back: Normal range of motion  Right lower leg: No edema  Left lower leg: No edema  Skin:     General: Skin is warm and dry  Capillary Refill: Capillary refill takes less than 2 seconds  Coloration: Skin is not jaundiced or pale  Neurological:      General: No focal deficit present  Mental Status: She is alert and oriented to person, place, and time  Cranial Nerves: No facial asymmetry        Comments: With uncontrolled movements of upper extremities          Additional Data:     Labs:  Results from last 7 days   Lab Units 08/22/22  0429   WBC Thousand/uL 9 24   HEMOGLOBIN g/dL 11 8   HEMATOCRIT % 36 0   PLATELETS Thousands/uL 312   NEUTROS PCT % 67   LYMPHS PCT % 13*   MONOS PCT % 13*   EOS PCT % 6     Results from last 7 days   Lab Units 08/22/22  0429   SODIUM mmol/L 139   POTASSIUM mmol/L 3 3*   CHLORIDE mmol/L 109*   CO2 mmol/L 25   BUN mg/dL 11   CREATININE mg/dL 0 53*   ANION GAP mmol/L 5   CALCIUM mg/dL 8 0*   ALBUMIN g/dL 2 2*   TOTAL BILIRUBIN mg/dL 0 34   ALK PHOS U/L 110   ALT U/L 8*   AST U/L 18   GLUCOSE RANDOM mg/dL 103                 Results from last 7 days   Lab Units 08/22/22  0429 08/21/22  0018   PROCALCITONIN ng/ml 0 10 0 09       Lines/Drains:  Invasive Devices  Report    Peripheral Intravenous Line  Duration           Peripheral IV 08/21/22 Left Wrist 1 day                      Imaging: No pertinent imaging reviewed  Recent Cultures (last 7 days):   Results from last 7 days   Lab Units 08/21/22  1320 08/21/22  0309   BLOOD CULTURE  Received in Microbiology Lab  Culture in Progress  Received in Microbiology Lab  Culture in Progress    --    LEGIONELLA URINARY ANTIGEN   --  Negative       Last 24 Hours Medication List:   Current Facility-Administered Medications   Medication Dose Route Frequency Provider Last Rate    acetaminophen  650 mg Oral Q6H PRN Chyna Gordon MD      aluminum-magnesium hydroxide-simethicone  30 mL Oral Q6H PRN Chyna Gordon MD      calcium carbonate  2 tablet Oral Daily With Breakfast Chyna Gordon MD      carbidopa-levodopa-entacapone   Oral 6x Daily Alpesh Asher PA-C      cyanocobalamin  1,000 mcg Oral Daily Chyna Gordon MD      docusate sodium  100 mg Oral BID PRN Chyna Gordon MD      enoxaparin  40 mg Subcutaneous Daily Chyna Gordon MD      famotidine  40 mg Oral HS MD Kassidy Robles guaiFENesin  600 mg Oral BID Neal Kumar MD      lidocaine  1 patch Topical Daily Mague Moss PA-C      LORazepam  0 5 mg Intravenous Q4H PRN Neal Kumar MD      ondansetron  4 mg Intravenous Q6H PRN Neal Kumar MD      oxyCODONE  2 5 mg Oral Q4H PRN Mague Moss PA-C      rasagiline  1 mg Oral Daily Hoa Belle PA-C      rOPINIRole  6 mg Oral Daily Ailyn Rodgers PA-C          Today, Patient Was Seen By: Tanya Salazar PA-C    **Please Note: This note may have been constructed using a voice recognition system  **

## 2022-08-22 NOTE — ASSESSMENT & PLAN NOTE
Noted on imaging w/ 6 mm ground glass nodule in the right middle lobe   · Recommending follow up in 6-12 months

## 2022-08-23 ENCOUNTER — APPOINTMENT (INPATIENT)
Dept: RADIOLOGY | Facility: HOSPITAL | Age: 72
DRG: 057 | End: 2022-08-23
Payer: MEDICARE

## 2022-08-23 LAB
ERYTHROCYTE [DISTWIDTH] IN BLOOD BY AUTOMATED COUNT: 11.8 % (ref 11.6–15.1)
HCT VFR BLD AUTO: 38.2 % (ref 34.8–46.1)
HGB BLD-MCNC: 12.8 G/DL (ref 11.5–15.4)
MCH RBC QN AUTO: 31 PG (ref 26.8–34.3)
MCHC RBC AUTO-ENTMCNC: 33.5 G/DL (ref 31.4–37.4)
MCV RBC AUTO: 93 FL (ref 82–98)
PLATELET # BLD AUTO: 326 THOUSANDS/UL (ref 149–390)
PMV BLD AUTO: 9.8 FL (ref 8.9–12.7)
RBC # BLD AUTO: 4.13 MILLION/UL (ref 3.81–5.12)
WBC # BLD AUTO: 9.86 THOUSAND/UL (ref 4.31–10.16)

## 2022-08-23 PROCEDURE — 99232 SBSQ HOSP IP/OBS MODERATE 35: CPT | Performed by: PHYSICIAN ASSISTANT

## 2022-08-23 PROCEDURE — 70450 CT HEAD/BRAIN W/O DYE: CPT

## 2022-08-23 PROCEDURE — 97116 GAIT TRAINING THERAPY: CPT

## 2022-08-23 PROCEDURE — 85027 COMPLETE CBC AUTOMATED: CPT | Performed by: PHYSICIAN ASSISTANT

## 2022-08-23 PROCEDURE — 97530 THERAPEUTIC ACTIVITIES: CPT

## 2022-08-23 PROCEDURE — 97112 NEUROMUSCULAR REEDUCATION: CPT

## 2022-08-23 PROCEDURE — 99232 SBSQ HOSP IP/OBS MODERATE 35: CPT | Performed by: PSYCHIATRY & NEUROLOGY

## 2022-08-23 PROCEDURE — G1004 CDSM NDSC: HCPCS

## 2022-08-23 RX ORDER — LORAZEPAM 0.5 MG/1
0.5 TABLET ORAL ONCE
Status: COMPLETED | OUTPATIENT
Start: 2022-08-23 | End: 2022-08-23

## 2022-08-23 RX ORDER — GABAPENTIN 100 MG/1
100 CAPSULE ORAL ONCE
Status: COMPLETED | OUTPATIENT
Start: 2022-08-23 | End: 2022-08-23

## 2022-08-23 RX ADMIN — LIDOCAINE 5% 1 PATCH: 700 PATCH TOPICAL at 09:37

## 2022-08-23 RX ADMIN — AMANTADINE 137 MG: 137 CAPSULE, COATED PELLETS ORAL at 22:53

## 2022-08-23 RX ADMIN — CYANOCOBALAMIN TAB 500 MCG 1000 MCG: 500 TAB at 09:37

## 2022-08-23 RX ADMIN — FAMOTIDINE 40 MG: 20 TABLET ORAL at 22:21

## 2022-08-23 RX ADMIN — LORAZEPAM 0.5 MG: 2 INJECTION INTRAMUSCULAR; INTRAVENOUS at 12:55

## 2022-08-23 RX ADMIN — GABAPENTIN 100 MG: 100 CAPSULE ORAL at 02:03

## 2022-08-23 RX ADMIN — CALCIUM 2 TABLET: 500 TABLET ORAL at 06:25

## 2022-08-23 RX ADMIN — CARBIDOPA, LEVODOPA, AND ENTACAPONE: 12.5; 50; 2 TABLET, FILM COATED ORAL at 20:04

## 2022-08-23 RX ADMIN — CARBIDOPA, LEVODOPA, AND ENTACAPONE: 12.5; 50; 2 TABLET, FILM COATED ORAL at 16:14

## 2022-08-23 RX ADMIN — CARBIDOPA, LEVODOPA, AND ENTACAPONE 1 TABLET: 12.5; 50; 2 TABLET, FILM COATED ORAL at 22:22

## 2022-08-23 RX ADMIN — RASAGILINE 1 MG: 1 TABLET ORAL at 09:38

## 2022-08-23 RX ADMIN — ACETAMINOPHEN 650 MG: 325 TABLET ORAL at 18:05

## 2022-08-23 RX ADMIN — ACETAMINOPHEN 650 MG: 325 TABLET ORAL at 01:52

## 2022-08-23 RX ADMIN — CARBIDOPA, LEVODOPA, AND ENTACAPONE: 12.5; 50; 2 TABLET, FILM COATED ORAL at 06:26

## 2022-08-23 RX ADMIN — GUAIFENESIN 600 MG: 600 TABLET, EXTENDED RELEASE ORAL at 09:37

## 2022-08-23 RX ADMIN — LORAZEPAM 0.5 MG: 0.5 TABLET ORAL at 20:03

## 2022-08-23 RX ADMIN — CARBIDOPA, LEVODOPA, AND ENTACAPONE: 12.5; 50; 2 TABLET, FILM COATED ORAL at 09:38
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
## 2022-08-23 NOTE — ASSESSMENT & PLAN NOTE
Patient placed on amantadine which was put on hold by patient recently due to complaints of hallucinations    She is supposed to receive additional amantadine tablets by tomorrow however comes to the emergency room with fall   · Patient is maintained on Stalevo, Azilect, ropinirole, and Gocovri as an outpatient  · Discontinue ropinirole per Neurology recommendations  · Continue Stalevo 12 5/50/201 tablet 6 times daily, Azilect 1 mg daily, Gocovri 137 mg daily  · Awaiting family members to bring Melanie Queen from home  · PT/OT recommending rehab-patient is stable from a neurologic/medical standpoint  · Case management or
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
## 2022-08-23 NOTE — PROGRESS NOTES
Progress Note - Neurology   Hermilo Dies 80 y o  female [id number]  Unit/Bed#: CW2 210/CW2 210-02    Assessment:    Parkinson's disease [facility])  Assessment & Plan  41-year-old female with Parkinson's disease, GERD, osteoporosis, and recent gallstone pancreatitis who presented on 8/20/2022 with significant freezing after missing several days of some of her Parkinson's medications (Azilect and ropinirole)  CT head unremarkable  Home medications include:  · Stalevo 50 mg/12 5 mg/200 mg 6 times daily approximately 2-3 hours between doses  · Azilect 1 mg daily  · Ropinirole XL 6 mg daily  · Gocovri (amandatine extended release) 137 mg - not taken since 8/1/22    Suspect medication mixup likely significant contributor to patient's setback with regard to her movement  Would also screen for infectious/metabolic derangements  On exam today (8/22), patient has significant dyskinesias/hyperkinetic movements likely due excess dopamine from restarting all of her home medications other than Gocovri  Plan:  - Patient's daughter brought in her home medications as not all of them are on formulary here  - Due to dyskinesias, discussed with patient's outpatient Neurologist who recommended restarting Gocovri 137 mg 1 tab nightly and stopping the ropinirole (which was dc'd on 8/22)  Continue Stalevo 6x daily and Azilect 1 mg daily at this time  - Observe for increased hallucinations since restarting Gocovri  - PT/OT  - Delirium precautions  - Regulation of sleep/wake cycle  Would avoid overuse of Ativan  Patient does take this at home p r n  for sleep and reportedly tolerates it well  Can consider alternative low-dose medications such as Zyprexa for behavioral control should she have paradoxical agitation on the Ativan  - Monitor neuro exam; notify with any changes  - Medical management and supportive care per primary team  Correction of any metabolic or infectious disturbances          Hermilo Dies will need follow up in at the next regular appointment with movement disorder and memory attending/AP  She will not require outpatient neurological testing  Patient has follow up scheduled with Dr Riley Morgan on 10/17/22  Case and treatment plan reviewed with attending neurologist, Dr Eduardo Aguirre  Subjective:   Patient resting in bed  She states that her back pain has significantly improved with the gabapentin  She does reports some left shoulder pain still  She notes that the dyskinetic movements are still the same          Past Medical History:   Diagnosis Date    GERD (gastroesophageal reflux disease)     Osteoporosis     Parkinson disease ([address] )      Past Surgical History:   Procedure Laterality Date    ARTERIOGRAM N/A 7/10/2019    Procedure: KYPHOPLASTY;  Surgeon: Brett Simmons MD;  [facility];  Service: Interventional Radiology    IR KYPHOPLASTY/VERTEBROPLASTY  7/10/2019    TONSILLECTOMY       Family History   Problem Relation Age of Onset    Dementia Mother     Heart disease Father     No Known Problems Daughter     No Known Problems Maternal Grandmother     No Known Problems Maternal Grandfather     No Known Problems Paternal Grandmother     Prostate cancer Paternal Grandfather     No Known Problems Daughter     No Known Problems Daughter     Alcohol abuse Neg Hx     Mental illness Neg Hx     Substance Abuse Neg Hx     Depression Neg Hx      Social History     Socioeconomic History    Marital status: /Civil Union     Spouse name: None    Number of children: None    Years of education: None    Highest education level: None   Occupational History    None   Tobacco Use    Smoking status: Never Smoker    Smokeless tobacco: Never Used   Vaping Use    Vaping Use: Never used   Substance and Sexual Activity    Alcohol use: Not Currently     Comment: Rare    Drug use: Never    Sexual activity: None   Other Topics Concern    None   Social History Narrative    5 children     Social Determinants of Health     Financial Resource Strain: Not on file   Food Insecurity: Not on file   Transportation Needs: Not on file   Physical Activity: Not on file   Stress: Not on file   Social Connections: Not on file   Intimate Partner Violence: Not on file   Housing Stability: Not on file         Medications: All current active meds have been reviewed and current meds:  Scheduled Meds:  Current Facility-Administered Medications   Medication Dose Route Frequency Provider Last Rate    acetaminophen  650 mg Oral Q6H PRN Wesley Guerra MD      aluminum-magnesium hydroxide-simethicone  30 mL Oral Q6H PRN Wesley Guerra MD      calcium carbonate  2 tablet Oral Daily With Breakfast Wesley Guerra MD      carbidopa-levodopa-entacapone   Oral 6x Daily Darryl Ceballos PA-C      cyanocobalamin  1,000 mcg Oral Daily Wesley Guerra MD      docusate sodium  100 mg Oral BID PRN Wesley Guerra MD      enoxaparin  40 mg Subcutaneous Daily Wesley Guerra MD      famotidine  40 mg Oral HS Wesley Guerra MD      guaiFENesin  600 mg Oral BID Wesley Guerra MD      lidocaine  1 patch Topical Daily Gely Ramos PA-C      LORazepam  0 5 mg Intravenous Q4H PRN Wesley Guerra MD      NON FORMULARY  137 mg Oral HS Nicole Myles PA-C      ondansetron  4 mg Intravenous Q6H PRN Wesley Guerra MD      oxyCODONE  2 5 mg Oral Q4H PRN Gely Ramos PA-C      rasagiline  1 mg Oral Daily Hoa Rawls PA-C       Continuous Infusions:   PRN Meds:   acetaminophen    aluminum-magnesium hydroxide-simethicone    docusate sodium    LORazepam    ondansetron    oxyCODONE       ROS:   Review of Systems   Constitutional: Negative for fever  HENT: Negative for trouble swallowing  Eyes: Negative for photophobia and visual disturbance  Respiratory: Negative for shortness of breath  Cardiovascular: Negative for chest pain     Gastrointestinal: Negative for abdominal pain  Musculoskeletal: Positive for arthralgias and back pain  Skin: Negative for rash  Neurological: Negative for dizziness, tremors, seizures, syncope, facial asymmetry, speech difficulty, weakness, light-headedness, numbness and headaches  Psychiatric/Behavioral: Negative for confusion  All other systems reviewed and are negative  Vitals:   /70   Pulse 84   Temp (!) 97 4 °F (36 3 °C)   Resp 18   Wt 54 4 kg (120 lb)   SpO2 97%   BMI 20 60 kg/m²       Physical Exam:   Physical Exam  Vitals and nursing note reviewed  Constitutional:       General: She is not in acute distress  Appearance: She is not ill-appearing or diaphoretic  HENT:      Head: Normocephalic  Mouth/Throat:      Mouth: Mucous membranes are moist       Pharynx: Oropharynx is clear  Eyes:      General: No scleral icterus  Right eye: No discharge  Left eye: No discharge  Extraocular Movements: Extraocular movements intact and EOM normal       Conjunctiva/sclera: Conjunctivae normal    Cardiovascular:      Rate and Rhythm: Normal rate  Pulmonary:      Effort: Pulmonary effort is normal  No respiratory distress  Musculoskeletal:         General: Normal range of motion  Cervical back: Normal range of motion  Skin:     General: Skin is warm and dry  Coloration: Skin is not jaundiced or pale  Neurological:      Mental Status: She is alert and oriented to person, place, and time  Neurologic Exam     Mental Status   Oriented to person, place, and time  Level of consciousness: alert  Able to follow commands  No dysarthria noted  Cranial Nerves     CN II   Right visual field deficit: none  Left visual field deficit: none     CN III, IV, VI   Extraocular motions are normal      CN V   Facial sensation intact  CN VII   Facial expression full, symmetric       CN VIII   Hearing: intact    CN IX, X   Palate: symmetric    CN XI   CN XI normal      CN XII   CN XII normal      Motor Exam   Muscle bulk: normal  Bilateral UE strength 5/5 deltoids, biceps, triceps, hand   Bilateral LE strength 5/5 hip flexion, dorsiflexion, plantar flexion     Sensory Exam   Light touch normal      Gait, Coordination, and Reflexes     Tremor   Resting tremor: absent    Reflexes   Right plantar: normal  Left plantar: normal  Hyperkinetic/dyskinesia in all extremities           Labs: I have personally reviewed pertinent reports  Recent Results (from the past 24 hour(s))   CBC    Collection Time: 08/23/22  5:38 AM   Result Value Ref Range    WBC 9 86 4 31 - 10 16 Thousand/uL    RBC 4 13 3 81 - 5 12 Million/uL    Hemoglobin 12 8 11 5 - 15 4 g/dL    Hematocrit 38 2 34 8 - 46 1 %    MCV 93 82 - 98 fL    MCH 31 0 26 8 - 34 3 pg    MCHC 33 5 31 4 - 37 4 g/dL    RDW 11 8 11 6 - 15 1 %    Platelets 909 142 - 624 Thousands/uL    MPV 9 8 8 9 - 12 7 fL       Imaging: I have personally reviewed pertinent imaging in PACS, and I have personally reviewed PACS reports  EKG, Pathology, and Other Studies: I have personally reviewed pertinent reports  VTE Prophylaxis: Sequential compression device (Venodyne)  and Enoxaparin (Lovenox)        Total time spent today 24 minutes  Greater than 50% of total time was spent with the patient and / or family counseling and / or coordination of care  A description of the counseling / coordination of care: Patient seen and evaluated  Case reviewed with attending  Chart thoroughly reviewed including imaging and medications  Coordination of care with primary team  Discussion of medications and follow up with patient

## 2022-08-23 NOTE — PLAN OF CARE
Problem: PHYSICAL THERAPY ADULT  Goal: Performs mobility at highest level of function for planned discharge setting  See evaluation for individualized goals  Description: Treatment/Interventions: Functional transfer training, LE strengthening/ROM, Elevations, Therapeutic exercise, Endurance training, Equipment eval/education, Bed mobility, Gait training, Spoke to nursing, Spoke to case management, OT          See flowsheet documentation for full assessment, interventions and recommendations  Outcome: Progressing  Note: Prognosis: Fair  Problem List: Decreased strength, Decreased endurance, Decreased coordination, Decreased mobility, Impaired balance, Decreased cognition, Decreased safety awareness, Pain  Assessment: The patient was found attempting to get out of bed as she needed to look for her family  She was redirectable and reoriented  She continues to have dyskinetic movements and she has difficulty with different patterns on the floor as well as with turns and obstacles  Today she was able to ambulate beyond household distances with assistance for management of the walker around obstacles and turns  She then was assisted to the bathroom which further provided difficulty due to the small space  She was then able to ambulate again, and was ultimately redirected to the bed  Her Masimo was changed from her index finger to her ring finger which she appreciated  The alarm was activated, and the patient had all needs within reach post session  Barriers to Discharge: Inaccessible home environment, Decreased caregiver support     PT Discharge Recommendation: Post acute rehabilitation services    See flowsheet documentation for full assessment

## 2022-08-23 NOTE — PROGRESS NOTES
[facility]  Progress Note - Everett Sun 1950, 80 y o  female MRN: [id number]  Unit/Bed#: CW2 210-02 Encounter: [id number]  Primary Care Provider: Lizy Ng MD   Date and time admitted to hospital: 8/20/2022 11:03 PM    * Ambulatory dysfunction  Assessment & Plan  Patient presents for a fall at home  Noted history of parkinsons  Patient lives alone and was found by daughter on the floor  · Had mobility issues previously related to parkinson's improved w/ amantadine  · This was discontinued due to causing hallucinations    · Neurology consulted  · Suspecting that medication mixup likely a significant contributor to her set back in regard to her movement  · Restart home medications, awaiting family to bring in [facility]  · Suggesting stopping ropinirole given uncontrolled movements likely caused by increased dopamine  · Physical and occupational therapy with case management  · Recommending rehab, awaiting placement, CM aware    Abnormal chest x-ray  Assessment & Plan  Concern for pneumonia noted on chest x-ray  · Patient is currently asymptomatic at this time  · She has mild elevation white count, however procalcitonin is negative  · Patient is afebrile  · Initially given ceftriaxone, and vancomycin in the emergency department  · Will monitor off antibiotics for now  · Pulmonary consult discontinued, will re-consult if patient clinically deteriorates    Incidental pulmonary nodule  Assessment & Plan  Noted on imaging w/ 6 mm ground glass nodule in the right middle lobe   · Recommending follow up in 6-12 months     Hallucinations  Assessment & Plan  · Per chart review appears to be 2/2 amantadine use   · Pt self discontinued amantadine due to this   · Continue to hold per Neurology recommendations    Vitamin B12 deficiency  Assessment & Plan  · Continue vitamin B12 supplementation      Parkinson's disease [facility])  Assessment & Plan  Patient placed on amantadine which was put on hold by patient recently due to complaints of hallucinations  She is supposed to receive additional amantadine tablets by tomorrow however comes to the emergency room with fall   · Patient is maintained on Stalevo, Azilect, ropinirole, and Gocovri as an outpatient  · Discontinue ropinirole per Neurology recommendations  · Continue Stalevo 12 550/201 tablet 6 times daily, Azilect 1 mg daily, Gocovri 137 mg daily  · Awaiting family members to bring Kalyan Aldridge from home  · PT/OT recommending rehab-patient is stable from a neurologic/medical standpoint  · Case management or    Vitamin D deficiency  Assessment & Plan  · Continue cholecalciferol and calcium supplementation  Esophagitis, reflux  Assessment & Plan  · On Mylanta as needed  · Continue Pepcid 40 mg daily  VTE Pharmacologic Prophylaxis: VTE Score: 2 Moderate Risk (Score 3-4) - Pharmacological DVT Prophylaxis Ordered: enoxaparin (Lovenox)  Patient Centered Rounds: I performed bedside rounds with nursing staff today  Discussions with Specialists or Other Care Team Provider: CM, neurology    Education and Discussions with Family / Patient: Updated  (daughter) via phone  Time Spent for Care: 15 minutes  More than 50% of total time spent on counseling and coordination of care as described above  Current Length of Stay: 2 day(s)  Current Patient Status: Inpatient   Certification Statement: The patient will continue to require additional inpatient hospital stay due to Placement  Discharge Plan: Anticipate discharge tomorrow to rehab facility  Code Status: Level 1 - Full Code    Subjective:   Patient is seen resting in bed  She is feeling much better than yesterday  Denies any pain  She is requesting a [employer]  Care plan discussed with patient's  at bedside      Objective:     Vitals:   Temp (24hrs), Av 1 °F (36 7 °C), Min:97 3 °F (36 3 °C), Max:100 2 °F (37 9 °C)    Temp:  [97 3 °F (36 3 °C)-100 2 °F (37 9 °C)] 97 4 °F (36 3 °C)  HR:  [72-84] 84  Resp:  [15-24] 18  BP: ()/(65-89) 110/70  SpO2:  [94 %-97 %] 97 %  Body mass index is 20 6 kg/m²  Input and Output Summary (last 24 hours): Intake/Output Summary (Last 24 hours) at 8/23/2022 1008  Last data filed at 8/23/2022 0201  Gross per 24 hour   Intake --   Output 1000 ml   Net -1000 ml       Physical Exam:   Physical Exam  Vitals and nursing note reviewed  Constitutional:       General: She is not in acute distress  Appearance: Normal appearance  She is not ill-appearing, toxic-appearing or diaphoretic  Eyes:      General: No scleral icterus  Conjunctiva/sclera: Conjunctivae normal    Cardiovascular:      Rate and Rhythm: Normal rate and regular rhythm  Heart sounds: No murmur heard  No friction rub  No gallop  Pulmonary:      Effort: Pulmonary effort is normal  No respiratory distress  Breath sounds: Normal breath sounds  No stridor  No wheezing, rhonchi or rales  Chest:      Chest wall: No tenderness  Abdominal:      General: Abdomen is flat  Bowel sounds are normal  There is no distension  Palpations: Abdomen is soft  Tenderness: There is no abdominal tenderness  Musculoskeletal:      Right lower leg: No edema  Left lower leg: No edema  Skin:     General: Skin is warm and dry  Coloration: Skin is not jaundiced or pale  Neurological:      General: No focal deficit present  Mental Status: She is alert and oriented to person, place, and time  Cranial Nerves: No cranial nerve deficit        Comments: Hyperkinetic movements of upper and lower extremities          Additional Data:     Labs:  Results from last 7 days   Lab Units 08/23/22  0538 08/22/22  0429   WBC Thousand/uL 9 86 9 24   HEMOGLOBIN g/dL 12 8 11 8   HEMATOCRIT % 38 2 36 0   PLATELETS Thousands/uL 326 312   NEUTROS PCT %  --  67   LYMPHS PCT %  --  13*   MONOS PCT %  --  13*   EOS PCT %  --  6     Results from last 7 days   Lab Units 08/22/22  0429   SODIUM mmol/L 139   POTASSIUM mmol/L 3 3*   CHLORIDE mmol/L 109*   CO2 mmol/L 25   BUN mg/dL 11   CREATININE mg/dL 0 53*   ANION GAP mmol/L 5   CALCIUM mg/dL 8 0*   ALBUMIN g/dL 2 2*   TOTAL BILIRUBIN mg/dL 0 34   ALK PHOS U/L 110   ALT U/L 8*   AST U/L 18   GLUCOSE RANDOM mg/dL 103                 Results from last 7 days   Lab Units 08/22/22  0429 08/21/22  0018   PROCALCITONIN ng/ml 0 10 0 09       Lines/Drains:  Invasive Devices  Report    Peripheral Intravenous Line  Duration           Peripheral IV 08/21/22 Left Wrist 2 days                      Imaging: No pertinent imaging reviewed  Recent Cultures (last 7 days):   Results from last 7 days   Lab Units 08/21/22  1320 08/21/22  0309   BLOOD CULTURE  No Growth at 24 hrs  No Growth at 24 hrs    --    LEGIONELLA URINARY ANTIGEN   --  Negative       Last 24 Hours Medication List:   Current Facility-Administered Medications   Medication Dose Route Frequency Provider Last Rate    acetaminophen  650 mg Oral Q6H PRN Elida Cortez MD      aluminum-magnesium hydroxide-simethicone  30 mL Oral Q6H PRN Elida Cortez MD      calcium carbonate  2 tablet Oral Daily With Breakfast Elida Cortez MD      carbidopa-levodopa-entacapone   Oral 6x Daily Yandel Gamez PA-C      cyanocobalamin  1,000 mcg Oral Daily Elida Cortez MD      docusate sodium  100 mg Oral BID PRN Elida Cortez MD      enoxaparin  40 mg Subcutaneous Daily Elida Cortez MD      famotidine  40 mg Oral HS Elida Cortez MD      guaiFENesin  600 mg Oral BID Elida Cortez MD      lidocaine  1 patch Topical Daily Thelma Lennon PA-C      LORazepam  0 5 mg Intravenous Q4H PRN Elida Cortez MD      NON FORMULARY  137 mg Oral HS [address]      ondansetron  4 mg Intravenous Q6H PRN Elida Cortez MD      oxyCODONE  2 5 mg Oral Q4H PRN Thelma Lennon PA-C      rasagiline  1 mg Oral Daily Mary Patterson PA-C          Today, Patient Was Seen By: Tio Parisi PA-C    **Please Note: This note may have been constructed using a voice recognition system  **

## 2022-08-23 NOTE — ASSESSMENT & PLAN NOTE
Patient presents for a fall at home  Noted history of parkinsons  Patient lives alone and was found by daughter on the floor  · Had mobility issues previously related to parkinson's improved w/ amantadine  · This was discontinued due to causing hallucinations    · Neurology consulted  · Suspecting that medication mixup likely a significant contributor to her set back in regard to her movement  · Restart home medications, awaiting family to bring in [address]  · Suggesting stopping ropinirole given uncontrolled movements likely caused by increased dopamine  · Physical and occupational therapy with case management    · Recommending rehab, awaiting placement, CM aware

## 2022-08-23 NOTE — PLAN OF CARE
Problem: Potential for Falls  Goal: Patient will remain free of falls  Description: INTERVENTIONS:  - Educate patient/family on patient safety including physical limitations  - Instruct patient to call for assistance with activity   - Consult OT/PT to assist with strengthening/mobility   - Keep Call bell within reach  - Keep bed low and locked with side rails adjusted as appropriate  - Keep care items and personal belongings within reach  - Initiate and maintain comfort rounds  - Make Fall Risk Sign visible to staff  - Offer Toileting every   Hours, in advance of need  - Initiate/Maintain  alarm  - Obtain necessary fall risk management equipment:    - Apply yellow socks and bracelet for high fall risk patients  - Consider moving patient to room near nurses station  Outcome: Progressing     Problem: MOBILITY - ADULT  Goal: Maintain or return to baseline ADL function  Description: INTERVENTIONS:  -  Assess patient's ability to carry out ADLs; assess patient's baseline for ADL function and identify physical deficits which impact ability to perform ADLs (bathing, care of mouth/teeth, toileting, grooming, dressing, etc )  - Assess/evaluate cause of self-care deficits   - Assess range of motion  - Assess patient's mobility; develop plan if impaired  - Assess patient's need for assistive devices and provide as appropriate  - Encourage maximum independence but intervene and supervise when necessary  - Involve family in performance of ADLs  - Assess for home care needs following discharge   - Consider OT consult to assist with ADL evaluation and planning for discharge  - Provide patient education as appropriate  Outcome: Progressing  Goal: Maintains/Returns to pre admission functional level  Description: INTERVENTIONS:  - Perform BMAT or MOVE assessment daily    - Set and communicate daily mobility goal to care team and patient/family/caregiver     - Collaborate with rehabilitation services on mobility goals if consulted  - Perform Range of Motion   times a day  - Reposition patient every   hours    - Dangle patient   times a day  - Stand patient   times a day  - Ambulate patient   times a day  - Out of bed to chair   times a day   - Out of bed for meals   times a day  - Out of bed for toileting  - Record patient progress and toleration of activity level   Outcome: Progressing     Problem: Prexisting or High Potential for Compromised Skin Integrity  Goal: Skin integrity is maintained or improved  Description: INTERVENTIONS:  - Identify patients at risk for skin breakdown  - Assess and monitor skin integrity  - Assess and monitor nutrition and hydration status  - Monitor labs   - Assess for incontinence   - Turn and reposition patient  - Assist with mobility/ambulation  - Relieve pressure over bony prominences  - Avoid friction and shearing  - Provide appropriate hygiene as needed including keeping skin clean and dry  - Evaluate need for skin moisturizer/barrier cream  - Collaborate with interdisciplinary team   - Patient/family teaching  - Consider wound care consult   Outcome: Progressing     Problem: Potential for Falls  Goal: Patient will remain free of falls  Description: INTERVENTIONS:  - Educate patient/family on patient safety including physical limitations  - Instruct patient to call for assistance with activity   - Consult OT/PT to assist with strengthening/mobility   - Keep Call bell within reach  - Keep bed low and locked with side rails adjusted as appropriate  - Keep care items and personal belongings within reach  - Initiate and maintain comfort rounds  - Make Fall Risk Sign visible to staff  - Offer Toileting every   Hours, in advance of need  - Initiate/Maintain  alarm  - Obtain necessary fall risk management equipment:    - Apply yellow socks and bracelet for high fall risk patients  - Consider moving patient to room near nurses station  Outcome: Progressing     Problem: MOBILITY - ADULT  Goal: Maintain or return to baseline ADL function  Description: INTERVENTIONS:  -  Assess patient's ability to carry out ADLs; assess patient's baseline for ADL function and identify physical deficits which impact ability to perform ADLs (bathing, care of mouth/teeth, toileting, grooming, dressing, etc )  - Assess/evaluate cause of self-care deficits   - Assess range of motion  - Assess patient's mobility; develop plan if impaired  - Assess patient's need for assistive devices and provide as appropriate  - Encourage maximum independence but intervene and supervise when necessary  - Involve family in performance of ADLs  - Assess for home care needs following discharge   - Consider OT consult to assist with ADL evaluation and planning for discharge  - Provide patient education as appropriate  Outcome: Progressing  Goal: Maintains/Returns to pre admission functional level  Description: INTERVENTIONS:  - Perform BMAT or MOVE assessment daily    - Set and communicate daily mobility goal to care team and patient/family/caregiver  - Collaborate with rehabilitation services on mobility goals if consulted  - Perform Range of Motion   times a day  - Reposition patient every   hours    - Dangle patient   times a day  - Stand patient   times a day  - Ambulate patient   times a day  - Out of bed to chair times a day   - Out of bed for meals      times a day  - Out of bed for toileting  - Record patient progress and toleration of activity level   Outcome: Progressing     Problem: Prexisting or High Potential for Compromised Skin Integrity  Goal: Skin integrity is maintained or improved  Description: INTERVENTIONS:  - Identify patients at risk for skin breakdown  - Assess and monitor skin integrity  - Assess and monitor nutrition and hydration status  - Monitor labs   - Assess for incontinence   - Turn and reposition patient  - Assist with mobility/ambulation  - Relieve pressure over bony prominences  - Avoid friction and shearing  - Provide appropriate hygiene as needed including keeping skin clean and dry  - Evaluate need for skin moisturizer/barrier cream  - Collaborate with interdisciplinary team   - Patient/family teaching  - Consider wound care consult   Outcome: Progressing   +++++++

## 2022-08-23 NOTE — PHYSICAL THERAPY NOTE
Physical Therapy Progress Note     08/23/22 1115   PT Last Visit   PT Visit Date 08/23/22   Note Type   Note Type Treatment   Pain Assessment   Pain Assessment Tool 0-10   Pain Score No Pain   Restrictions/Precautions   Other Precautions Impulsive;Cognitive; Chair Alarm; Bed Alarm; Fall Risk  (Alarm active post session )   Subjective   Subjective The patient states that she needs to go find her family, and that she wants to go home today  Bed Mobility   Supine to Sit 4  Minimal assistance   Additional items Assist x 1; Increased time required; Impulsive;Verbal cues   Sit to Supine 4  Minimal assistance   Additional items Assist x 1; Increased time required;Verbal cues;LE management   Transfers   Sit to Stand 4  Minimal assistance   Additional items Assist x 1; Increased time required;Verbal cues   Stand to Sit 4  Minimal assistance   Additional items Assist x 1; Increased time required;Verbal cues   Ambulation/Elevation   Gait pattern Excessively slow; Redundant gait at times; Step to;Short stride;Decreased heel strike; Foward flexed; Inconsistent radha; Shuffling;Decreased foot clearance; Forward Flexion;Narrow SACHI;L Foot drag;R Foot drag   Gait Assistance 4  Minimal assist   Additional items Assist x 1;Verbal cues   Assistive Device Rolling walker   Distance 110 feet x 2, 30 feet x 2, 80 feet x 2  Balance   Static Sitting Fair   Dynamic Sitting Fair -   Static Standing Fair -   Ambulatory Poor +   Activity Tolerance   Activity Tolerance Patient tolerated treatment well   Assessment   Prognosis Fair   Problem List Decreased strength;Decreased endurance;Decreased coordination;Decreased mobility; Impaired balance;Decreased cognition;Decreased safety awareness;Pain   Assessment The patient was found attempting to get out of bed as she needed to look for her family  She was redirectable and reoriented   She continues to have dyskinetic movements and she has difficulty with different patterns on the floor as well as with turns and obstacles  Today she was able to ambulate beyond household distances with assistance for management of the walker around obstacles and turns  She then was assisted to the bathroom which further provided difficulty due to the small space  She was then able to ambulate again, and was ultimately redirected to the bed  Her Masimo was changed from her index finger to her ring finger which she appreciated  The alarm was activated, and the patient had all needs within reach post session  Barriers to Discharge Inaccessible home environment;Decreased caregiver support   Goals   Patient Goals To go home today  STG Expiration Date 09/05/22   PT Treatment Day 1   Plan   Treatment/Interventions Functional transfer training;LE strengthening/ROM; Elevations; Therapeutic exercise; Endurance training;Cognitive reorientation;Patient/family training;Bed mobility;Gait training   Progress Progressing toward goals   PT Frequency 3-5x/wk   Recommendation   PT Discharge Recommendation Post acute rehabilitation services   Equipment Recommended [facility] Recommended Wheeled walker   AM-PAC Basic Mobility Inpatient   Turning in Bed Without Bedrails 3   Lying on Back to Sitting on Edge of Flat Bed 3   Moving Bed to Chair 3   Standing Up From Chair 3   Walk in Room 3   Climb 3-5 Stairs 2   Basic Mobility Inpatient Raw Score 17   Basic Mobility Standardized Score 39 67   Highest Level Of Mobility   JH-HLM Goal 5: Stand one or more mins   JH-HLM Achieved 7: Walk 25 feet or more       An AM-PAC Basic Mobility standardized score less than 40 78 suggests the patient may benefit from discharge to post-acute rehab services      Caesar Cespedes, PTA

## 2022-08-24 LAB
ANION GAP SERPL CALCULATED.3IONS-SCNC: 8 MMOL/L (ref 4–13)
BUN SERPL-MCNC: 14 MG/DL (ref 5–25)
CALCIUM SERPL-MCNC: 9 MG/DL (ref 8.3–10.1)
CHLORIDE SERPL-SCNC: 106 MMOL/L (ref 96–108)
CO2 SERPL-SCNC: 23 MMOL/L (ref 21–32)
CREAT SERPL-MCNC: 0.55 MG/DL (ref 0.6–1.3)
DME PARACHUTE DELIVERY DATE REQUESTED: NORMAL
DME PARACHUTE DELIVERY NOTE: NORMAL
DME PARACHUTE ITEM DESCRIPTION: NORMAL
DME PARACHUTE ORDER STATUS: NORMAL
DME PARACHUTE SUPPLIER NAME: NORMAL
DME PARACHUTE SUPPLIER PHONE: NORMAL
ERYTHROCYTE [DISTWIDTH] IN BLOOD BY AUTOMATED COUNT: 11.6 % (ref 11.6–15.1)
GFR SERPL CREATININE-BSD FRML MDRD: 95 ML/MIN/1.73SQ M
GLUCOSE SERPL-MCNC: 116 MG/DL (ref 65–140)
HCT VFR BLD AUTO: 39.9 % (ref 34.8–46.1)
HGB BLD-MCNC: 13.3 G/DL (ref 11.5–15.4)
MCH RBC QN AUTO: 30.6 PG (ref 26.8–34.3)
MCHC RBC AUTO-ENTMCNC: 33.3 G/DL (ref 31.4–37.4)
MCV RBC AUTO: 92 FL (ref 82–98)
PLATELET # BLD AUTO: 369 THOUSANDS/UL (ref 149–390)
PMV BLD AUTO: 9.7 FL (ref 8.9–12.7)
POTASSIUM SERPL-SCNC: 3.8 MMOL/L (ref 3.5–5.3)
RBC # BLD AUTO: 4.35 MILLION/UL (ref 3.81–5.12)
SODIUM SERPL-SCNC: 137 MMOL/L (ref 135–147)
WBC # BLD AUTO: 10.24 THOUSAND/UL (ref 4.31–10.16)

## 2022-08-24 PROCEDURE — 99232 SBSQ HOSP IP/OBS MODERATE 35: CPT | Performed by: INTERNAL MEDICINE

## 2022-08-24 PROCEDURE — 85027 COMPLETE CBC AUTOMATED: CPT | Performed by: PHYSICIAN ASSISTANT

## 2022-08-24 PROCEDURE — 80048 BASIC METABOLIC PNL TOTAL CA: CPT | Performed by: PHYSICIAN ASSISTANT

## 2022-08-24 PROCEDURE — 99222 1ST HOSP IP/OBS MODERATE 55: CPT | Performed by: INTERNAL MEDICINE

## 2022-08-24 RX ORDER — DIVALPROEX SODIUM 125 MG/1
125 CAPSULE, COATED PELLETS ORAL
Status: DISCONTINUED | OUTPATIENT
Start: 2022-08-24 | End: 2022-08-25 | Stop reason: HOSPADM

## 2022-08-24 RX ADMIN — DIVALPROEX SODIUM 125 MG: 125 CAPSULE, COATED PELLETS ORAL at 22:16

## 2022-08-24 RX ADMIN — ACETAMINOPHEN 650 MG: 325 TABLET ORAL at 17:26

## 2022-08-24 RX ADMIN — CARBIDOPA, LEVODOPA, AND ENTACAPONE: 12.5; 50; 2 TABLET, FILM COATED ORAL at 22:16

## 2022-08-24 RX ADMIN — ACETAMINOPHEN 650 MG: 325 TABLET ORAL at 06:26

## 2022-08-24 RX ADMIN — RASAGILINE 1 MG: 1 TABLET ORAL at 09:37

## 2022-08-24 RX ADMIN — CARBIDOPA, LEVODOPA, AND ENTACAPONE: 12.5; 50; 2 TABLET, FILM COATED ORAL at 17:24

## 2022-08-24 RX ADMIN — CALCIUM 2 TABLET: 500 TABLET ORAL at 09:32

## 2022-08-24 RX ADMIN — CARBIDOPA, LEVODOPA, AND ENTACAPONE: 12.5; 50; 2 TABLET, FILM COATED ORAL at 09:35

## 2022-08-24 RX ADMIN — CYANOCOBALAMIN TAB 500 MCG 1000 MCG: 500 TAB at 09:32

## 2022-08-24 RX ADMIN — CARBIDOPA, LEVODOPA, AND ENTACAPONE: 12.5; 50; 2 TABLET, FILM COATED ORAL at 06:26

## 2022-08-24 RX ADMIN — CARBIDOPA, LEVODOPA, AND ENTACAPONE: 12.5; 50; 2 TABLET, FILM COATED ORAL at 20:28

## 2022-08-24 RX ADMIN — OXYCODONE HYDROCHLORIDE 2.5 MG: 5 TABLET ORAL at 23:08

## 2022-08-24 RX ADMIN — FAMOTIDINE 40 MG: 20 TABLET ORAL at 22:19

## 2022-08-24 RX ADMIN — ENOXAPARIN SODIUM 40 MG: 40 INJECTION SUBCUTANEOUS at 09:32

## 2022-08-24 RX ADMIN — ALUMINUM HYDROXIDE, MAGNESIUM HYDROXIDE, AND SIMETHICONE 30 ML: 200; 200; 20 SUSPENSION ORAL at 18:09

## 2022-08-24 RX ADMIN — CARBIDOPA, LEVODOPA, AND ENTACAPONE: 12.5; 50; 2 TABLET, FILM COATED ORAL at 13:52

## 2022-08-24 NOTE — ASSESSMENT & PLAN NOTE
Patient placed on amantadine which was put on hold by patient recently due to complaints of hallucinations    She is supposed to receive additional amantadine tablets by mail however comes to the emergency room with fall   · Patient is maintained on Stalevo, Azilect, ropinirole, and Gocovri as an outpatient  · Discontinue ropinirole per Neurology recommendations  · Continue Stalevo 12 5/50/201 tablet 6 times daily, Azilect 1 mg daily, Gocovri 137 mg daily  · PT/OT recommending rehab-patient is stable from a neurologic/medical standpoin  · Long discussion with daughter in presence of CM for safe dc plans- please refer to CM note  · Also palliative care apprecaited- family meeting in am

## 2022-08-24 NOTE — PLAN OF CARE
Problem: Potential for Falls  Goal: Patient will remain free of falls  Description: INTERVENTIONS:  - Educate patient/family on patient safety including physical limitations  - Instruct patient to call for assistance with activity   - Consult OT/PT to assist with strengthening/mobility   - Keep Call bell within reach  - Keep bed low and locked with side rails adjusted as appropriate  - Keep care items and personal belongings within reach  - Initiate and maintain comfort rounds  - Make Fall Risk Sign visible to staff  - Offer Toileting every 2 Hours, in advance of need  - Initiate/Maintain bedalarm  - Obtain necessary fall risk management equipment: yellowbraclet  - Apply yellow socks and bracelet for high fall risk patients  - Consider moving patient to room near nurses station  Outcome: Progressing     Problem: Prexisting or High Potential for Compromised Skin Integrity  Goal: Skin integrity is maintained or improved  Description: INTERVENTIONS:  - Identify patients at risk for skin breakdown  - Assess and monitor skin integrity  - Assess and monitor nutrition and hydration status  - Monitor labs   - Assess for incontinence   - Turn and reposition patient  - Assist with mobility/ambulation  - Relieve pressure over bony prominences  - Avoid friction and shearing  - Provide appropriate hygiene as needed including keeping skin clean and dry  - Evaluate need for skin moisturizer/barrier cream  - Collaborate with interdisciplinary team   - Patient/family teaching  - Consider wound care consult   Outcome: Progressing     Problem: PAIN - ADULT  Goal: Verbalizes/displays adequate comfort level or baseline comfort level  Description: Interventions:  - Encourage patient to monitor pain and request assistance  - Assess pain using appropriate pain scale  - Administer analgesics based on type and severity of pain and evaluate response  - Implement non-pharmacological measures as appropriate and evaluate response  - Consider cultural and social influences on pain and pain management  - Notify physician/advanced practitioner if interventions unsuccessful or patient reports new pain  Outcome: Progressing     Problem: DISCHARGE PLANNING  Goal: Discharge to home or other facility with appropriate resources  Description: INTERVENTIONS:  - Identify barriers to discharge w/patient and caregiver  - Arrange for needed discharge resources and transportation as appropriate  - Identify discharge learning needs (meds, wound care, etc )  - Arrange for interpretive services to assist at discharge as needed  - Refer to Case Management Department for coordinating discharge planning if the patient needs post-hospital services based on physician/advanced practitioner order or complex needs related to functional status, cognitive ability, or social support system  Outcome: Progressing     Problem: NEUROSENSORY - ADULT  Goal: Achieves maximal functionality and self care  Description: INTERVENTIONS  - Monitor swallowing and airway patency with patient fatigue and changes in neurological status  - Encourage and assist patient to increase activity and self care     - Encourage visually impaired, hearing impaired and aphasic patients to use assistive/communication devices  Outcome: Progressing     Problem: MUSCULOSKELETAL - ADULT  Goal: Maintain or return mobility to safest level of function  Description: INTERVENTIONS:  - Assess patient's ability to carry out ADLs; assess patient's baseline for ADL function and identify physical deficits which impact ability to perform ADLs (bathing, care of mouth/teeth, toileting, grooming, dressing, etc )  - Assess/evaluate cause of self-care deficits   - Assess range of motion  - Assess patient's mobility  - Assess patient's need for assistive devices and provide as appropriate  - Encourage maximum independence but intervene and supervise when necessary  - Involve family in performance of ADLs  - Assess for home care needs following discharge   - Consider OT consult to assist with ADL evaluation and planning for discharge  - Provide patient education as appropriate  Outcome: Progressing  Goal: Maintain proper alignment of affected body part  Description: INTERVENTIONS:  - Support, maintain and protect limb and body alignment  - Provide patient/ family with appropriate education  Outcome: Progressing

## 2022-08-24 NOTE — PROGRESS NOTES
08/24/22 1400   Clinical Encounter Type   Visited With Patient   Routine Visit Introduction   Sacramental Encounters   Sacrament Other Other (Comment)  ([place]et by Fr Wilfredo Dykes)

## 2022-08-24 NOTE — ASSESSMENT & PLAN NOTE
Concern for pneumonia noted on chest x-ray  · Patient is currently asymptomatic at this time  · She has mild elevation white count, however procalcitonin is negative  · Patient is afebrile  · Initially given ceftriaxone, and vancomycin in the emergency department  · Will monitor off antibiotics for now    · Pulmonary consult discontinued, will re-consult if patient clinically deteriorates Xolair Pregnancy And Lactation Text: This medication is Pregnancy Category B and is considered safe during pregnancy. This medication is excreted in breast milk.

## 2022-08-24 NOTE — PROGRESS NOTES
[facility]  Progress Note - Everett Sun 1950, 80 y o  female MRN: [id number]  [facility] Encounter: [id number]  Primary Care Provider: Lizy Ng MD   Date and time admitted to hospital: 8/20/2022 11:03 PM    Parkinson's disease [facility])  Assessment & Plan  Patient placed on amantadine which was put on hold by patient recently due to complaints of hallucinations  She is supposed to receive additional amantadine tablets by mail however comes to the emergency room with fall   · Patient is maintained on Stalevo, Azilect, ropinirole, and Gocovri as an outpatient  · Discontinue ropinirole per Neurology recommendations  · Continue Stalevo 12 5/50/201 tablet 6 times daily, Azilect 1 mg daily, Gocovri 137 mg daily  · PT/OT recommending rehab-patient is stable from a neurologic/medical standpoin  · Long discussion with daughter in presence of CM for safe dc plans- please refer to CM note  · Also palliative care apprecaited- family meeting in am    * Ambulatory dysfunction  Assessment & Plan  Patient presents for a fall at home  Noted history of parkinsons  Patient lives alone and was found by daughter on the floor  · Had mobility issues previously related to parkinson's improved w/ amantadine  · This was discontinued due to causing hallucinations    · Neurology consulted  · Suspecting that medication mixup likely a significant contributor to her set back in regard to her movement  · Restart home medications, awaiting family to bring in [address] [facility]  · Suggesting stopping ropinirole given uncontrolled movements likely caused by increased dopamine  · Physical and occupational therapy with case management  · DC palnnin gongoing likely rehab if not family might take home with proper arrangement    Abnormal chest x-ray  Assessment & Plan  Concern for pneumonia noted on chest x-ray  · Patient is currently asymptomatic at this time    · She has mild elevation white count, however procalcitonin is negative  · Patient is afebrile  · Initially given ceftriaxone, and vancomycin in the emergency department  · Will monitor off antibiotics for now  · Pulmonary consult discontinued, will re-consult if patient clinically deteriorates    Incidental pulmonary nodule  Assessment & Plan  Noted on imaging w/ 6 mm ground glass nodule in the right middle lobe   · Recommending follow up in 6-12 months     Hallucinations  Assessment & Plan  · Per chart review appears to be 2/2 amantadine use   · Pt self discontinued amantadine due to this   · Continue to hold per Neurology recommendations    Vitamin B12 deficiency  Assessment & Plan  · Continue vitamin B12 supplementation  Vitamin D deficiency  Assessment & Plan  · Continue cholecalciferol and calcium supplementation  Esophagitis, reflux  Assessment & Plan  · On Mylanta as needed  · Continue Pepcid 40 mg daily  VTE Pharmacologic Prophylaxis: VTE Score: 2 Moderate Risk (Score 3-4) - Pharmacological DVT Prophylaxis Ordered: enoxaparin (Lovenox)  Patient Centered Rounds: I performed bedside rounds with nursing staff today  Discussions with Specialists or Other Care Team Provider: palliative    Education and Discussions with Family / Patient: Updated  (daughter) at bedside  Time Spent for Care: 20 minutes  More than 50% of total time spent on counseling and coordination of care as described above  Current Length of Stay: 3 day(s)  Current Patient Status: Inpatient   Certification Statement: The patient will continue to require additional inpatient hospital stay due to dc to rehab vs home  Discharge Plan: Anticipate discharge tomorrow to rehab facility  Code Status: Level 1 - Full Code    Subjective:   Resting in bed, daughter at bedside  Patient him to be awake alert oriented well pleasantly confused  She is off of restraints  RN at bedside as well    Denies any chest pain, shortness for breath, nausea, vomiting  No tremors at present  Objective:     Vitals:   Temp (24hrs), Av 1 °F (36 7 °C), Min:97 6 °F (36 4 °C), Max:98 3 °F (36 8 °C)    Temp:  [97 6 °F (36 4 °C)-98 3 °F (36 8 °C)] 97 6 °F (36 4 °C)  HR:  [70-82] 77  Resp:  [18-23] 20  BP: (106-133)/(58-79) 106/58  SpO2:  [94 %-96 %] 96 %  Body mass index is 20 6 kg/m²  Input and Output Summary (last 24 hours): Intake/Output Summary (Last 24 hours) at 2022 1713  Last data filed at 2022 1316  Gross per 24 hour   Intake 230 ml   Output --   Net 230 ml       Physical Exam:   Physical Exam  Vitals and nursing note reviewed  Constitutional:       General: She is not in acute distress  Appearance: She is well-developed  Comments: Elderly, frail   HENT:      Head: Normocephalic and atraumatic  Mouth/Throat:      Mouth: Mucous membranes are moist    Eyes:      Conjunctiva/sclera: Conjunctivae normal    Cardiovascular:      Rate and Rhythm: Normal rate and regular rhythm  Heart sounds: No murmur heard  Pulmonary:      Effort: Pulmonary effort is normal  No respiratory distress  Breath sounds: Normal breath sounds  Abdominal:      General: Bowel sounds are normal       Palpations: Abdomen is soft  Tenderness: There is no abdominal tenderness  Musculoskeletal:         General: Normal range of motion  Cervical back: Neck supple  Skin:     General: Skin is warm and dry  Neurological:      General: No focal deficit present  Mental Status: She is alert  Mental status is at baseline            Additional Data:     Labs:  Results from last 7 days   Lab Units 22  0652 22  0538 22  0429   WBC Thousand/uL 10 24*   < > 9 24   HEMOGLOBIN g/dL 13 3   < > 11 8   HEMATOCRIT % 39 9   < > 36 0   PLATELETS Thousands/uL 369   < > 312   NEUTROS PCT %  --   --  67   LYMPHS PCT %  --   --  13*   MONOS PCT %  --   --  13*   EOS PCT %  --   --  6    < > = values in this interval not displayed  Results from last 7 days   Lab Units 08/24/22  0652 08/22/22  0429   SODIUM mmol/L 137 139   POTASSIUM mmol/L 3 8 3 3*   CHLORIDE mmol/L 106 109*   CO2 mmol/L 23 25   BUN mg/dL 14 11   CREATININE mg/dL 0 55* 0 53*   ANION GAP mmol/L 8 5   CALCIUM mg/dL 9 0 8 0*   ALBUMIN g/dL  --  2 2*   TOTAL BILIRUBIN mg/dL  --  0 34   ALK PHOS U/L  --  110   ALT U/L  --  8*   AST U/L  --  18   GLUCOSE RANDOM mg/dL 116 103                 Results from last 7 days   Lab Units 08/22/22  0429 08/21/22  0018   PROCALCITONIN ng/ml 0 10 0 09       Lines/Drains:  Invasive Devices  Report    Peripheral Intravenous Line  Duration           Peripheral IV 08/21/22 Left Wrist 3 days                      Imaging: Reviewed radiology reports from this admission including: CT head    Recent Cultures (last 7 days):   Results from last 7 days   Lab Units 08/21/22  1320 08/21/22  0309   BLOOD CULTURE  No Growth at 72 hrs  No Growth at 72 hrs    --    LEGIONELLA URINARY ANTIGEN   --  Negative       Last 24 Hours Medication List:   Current Facility-Administered Medications   Medication Dose Route Frequency Provider Last Rate    acetaminophen  650 mg Oral Q6H PRN Nancy Booth MD      aluminum-magnesium hydroxide-simethicone  30 mL Oral Q6H PRN Nancy Booth MD      Amantadine HCl ER  137 mg Oral Daily [place], [place]      calcium carbonate  2 tablet Oral Daily With Breakfast Nancy Booth MD      carbidopa-levodopa-entacapone   Oral 6x Daily Vinay Ramirez PA-C      cyanocobalamin  1,000 mcg Oral Daily Nancy Booth MD      divalproex sodium  125 mg Oral HS Cathryn Jha DO      docusate sodium  100 mg Oral BID PRN Nancy Booth MD      enoxaparin  40 mg Subcutaneous Daily Nancy Booth MD      famotidine  40 mg Oral HS Nancy Booth MD      guaiFENesin  600 mg Oral BID Nancy Booth MD      lidocaine  1 patch Topical Daily GREGORIO Irene ondansetron  4 mg Intravenous Q6H PRN Chyna Gordon MD      oxyCODONE  2 5 mg Oral Q4H PRN Blaine Deng PA-C      rasagiline  1 mg Oral Daily Alpesh Asher PA-C          Today, Patient Was Seen By: Edvin Grant MD    **Please Note: This note may have been constructed using a voice recognition system  **

## 2022-08-24 NOTE — ASSESSMENT & PLAN NOTE
Patient presents for a fall at home  Noted history of parkinsons  Patient lives alone and was found by daughter on the floor  · Had mobility issues previously related to parkinson's improved w/ amantadine  · This was discontinued due to causing hallucinations    · Neurology consulted  · Suspecting that medication mixup likely a significant contributor to her set back in regard to her movement  · Restart home medications, awaiting family to bring in [address]  · Suggesting stopping ropinirole given uncontrolled movements likely caused by increased dopamine  · Physical and occupational therapy with case management    · WALDO moyer likely rehab if not family might take home with proper arrangement

## 2022-08-24 NOTE — CASE MANAGEMENT
Case Management Discharge Planning Note    Patient name Hernan Cardozo  Location [facility] 205/[facility] 077-94 MRN [id number]  : 1950 Date 2022       Current Admission Date: 2022  Current Admission Diagnosis:Ambulatory dysfunction   Patient Active Problem List    Diagnosis Date Noted    Incidental pulmonary nodule 2022    Abnormal chest x-ray 2022    Hallucinations 2022    Vitamin B12 deficiency 2022    Cervical spondylosis 02/15/2022    Cervical radiculopathy 2021    Stress fracture of femur 2020    Hearing loss, bilateral 2020    Compression fracture of T12 first lumbar vertebra ([facility] [address] ) 2019    Back pain 2019    Ambulatory dysfunction 2019    Abnormal thyroid function test 2019    Hyperlipidemia 2017    Vitamin D deficiency 2017    Mild cognitive impairment 2014    REM sleep behavior disorder 2014    Esophagitis, reflux 2012    Localized osteoporosis with current pathological fracture with routine healing 2012    Parkinson's disease ([facility] [address] ) 2012    Torticollis 10/21/2009      LOS (days): 3  Geometric Mean LOS (GMLOS) (days): 3 90  Days to GMLOS:0 4     OBJECTIVE:  Risk of Unplanned Readmission Score: 11 7         Current admission status: Inpatient   Preferred Pharmacy:   [place] [address], [address]  Phone: [phone] Fax: [address] [place], [address]  [address] [address]  Phone: [phone] Fax: [phone]    [facility]) [address], [address]  [address]  Phone: [phone] Fax: [phone]    Primary Care Provider: Gil Pierre MD    Primary Insurance: MEDICARE  Secondary Insurance: BLUE CROSS    [facility] DETAILS: [facility]         Is the patient interested in [address] at discharge?: Yes  [address] requested[de-identified] Nursing, Occupational Therapy, Physical Therapy, [address] Name[de-identified] Other ([facility])  [address] Provider[de-identified] PCP  Home Health Services Needed[de-identified] Evaluate Functional Status and Safety, Gait/ADL Training, Strengthening/Theraputic Exercises to Improve Function, Other (comment) (medication management)  Homebound Criteria Met[de-identified] Requires the Assistance of Another Person for Safe Ambulation or to Leave the Home, Uses an Assist Device (i e  cane, walker, etc)  Supporting Clincal Findings[de-identified] Limited Endurance, Fatigues Easliy in [employer]    DME Referral Provided  Referral made for DME?: Yes  DME referral completed for the following items[de-identified] Hospital Bed  DME Supplier Name[de-identified] [employer] (to be delivered directly to the home)                    Additional Comments: Disha Mendes still assessing for possible admission in 24-48 hours; family is contemplating taking patient home  List of private pay caregivers provided to daughter Saul Comer  [facility] to provide nursing/PT/OT services in the home  Hospital ordered through [address] at family's request, they are aware patient does not meet medical criteria for same and will pay out of pocket  [employer] to contact Saul Comer to arrange delivery and payment  CM to follow

## 2022-08-24 NOTE — PLAN OF CARE
Problem: Potential for Falls  Goal: Patient will remain free of falls  Description: INTERVENTIONS:  - Educate patient/family on patient safety including physical limitations  - Instruct patient to call for assistance with activity   - Consult OT/PT to assist with strengthening/mobility   - Keep Call bell within reach  - Keep bed low and locked with side rails adjusted as appropriate  - Keep care items and personal belongings within reach  - Initiate and maintain comfort rounds  - Make Fall Risk Sign visible to staff  - Offer Toileting every 2 Hours, in advance of need  - Initiate/Maintain alarm  - Obtain necessary fall risk management equipment  - Apply yellow socks and bracelet for high fall risk patients  - Consider moving patient to room near nurses station  Outcome: Progressing     Problem: MOBILITY - ADULT  Goal: Maintain or return to baseline ADL function  Description: INTERVENTIONS:  -  Assess patient's ability to carry out ADLs; assess patient's baseline for ADL function and identify physical deficits which impact ability to perform ADLs (bathing, care of mouth/teeth, toileting, grooming, dressing, etc )  - Assess/evaluate cause of self-care deficits   - Assess range of motion  - Assess patient's mobility; develop plan if impaired  - Assess patient's need for assistive devices and provide as appropriate  - Encourage maximum independence but intervene and supervise when necessary  - Involve family in performance of ADLs  - Assess for home care needs following discharge   - Consider OT consult to assist with ADL evaluation and planning for discharge  - Provide patient education as appropriate  Outcome: Progressing  Goal: Maintains/Returns to pre admission functional level  Description: INTERVENTIONS:  - Perform BMAT or MOVE assessment daily    - Set and communicate daily mobility goal to care team and patient/family/caregiver     - Collaborate with rehabilitation services on mobility goals if consulted  - Perform Range of Motion 3 times a day  - Reposition patient every 2 hours    - Dangle patient 3 times a day  - Stand patient 3 times a day  - Ambulate patient 3 times a day  - Out of bed to chair 3 times a day   - Out of bed for meals 3 times a day  - Out of bed for toileting  - Record patient progress and toleration of activity level   Outcome: Progressing     Problem: Prexisting or High Potential for Compromised Skin Integrity  Goal: Skin integrity is maintained or improved  Description: INTERVENTIONS:  - Identify patients at risk for skin breakdown  - Assess and monitor skin integrity  - Assess and monitor nutrition and hydration status  - Monitor labs   - Assess for incontinence   - Turn and reposition patient  - Assist with mobility/ambulation  - Relieve pressure over bony prominences  - Avoid friction and shearing  - Provide appropriate hygiene as needed including keeping skin clean and dry  - Evaluate need for skin moisturizer/barrier cream  - Collaborate with interdisciplinary team   - Patient/family teaching  - Consider wound care consult   Outcome: Progressing     Problem: PAIN - ADULT  Goal: Verbalizes/displays adequate comfort level or baseline comfort level  Description: Interventions:  - Encourage patient to monitor pain and request assistance  - Assess pain using appropriate pain scale  - Administer analgesics based on type and severity of pain and evaluate response  - Implement non-pharmacological measures as appropriate and evaluate response  - Consider cultural and social influences on pain and pain management  - Notify physician/advanced practitioner if interventions unsuccessful or patient reports new pain  Outcome: Progressing     Problem: DISCHARGE PLANNING  Goal: Discharge to home or other facility with appropriate resources  Description: INTERVENTIONS:  - Identify barriers to discharge w/patient and caregiver  - Arrange for needed discharge resources and transportation as appropriate  - Identify discharge learning needs (meds, wound care, etc )  - Arrange for interpretive services to assist at discharge as needed  - Refer to Case Management Department for coordinating discharge planning if the patient needs post-hospital services based on physician/advanced practitioner order or complex needs related to functional status, cognitive ability, or social support system  Outcome: Progressing     Problem: NEUROSENSORY - ADULT  Goal: Achieves maximal functionality and self care  Description: INTERVENTIONS  - Monitor swallowing and airway patency with patient fatigue and changes in neurological status  - Encourage and assist patient to increase activity and self care     - Encourage visually impaired, hearing impaired and aphasic patients to use assistive/communication devices  Outcome: Progressing     Problem: MUSCULOSKELETAL - ADULT  Goal: Maintain or return mobility to safest level of function  Description: INTERVENTIONS:  - Assess patient's ability to carry out ADLs; assess patient's baseline for ADL function and identify physical deficits which impact ability to perform ADLs (bathing, care of mouth/teeth, toileting, grooming, dressing, etc )  - Assess/evaluate cause of self-care deficits   - Assess range of motion  - Assess patient's mobility  - Assess patient's need for assistive devices and provide as appropriate  - Encourage maximum independence but intervene and supervise when necessary  - Involve family in performance of ADLs  - Assess for home care needs following discharge   - Consider OT consult to assist with ADL evaluation and planning for discharge  - Provide patient education as appropriate  Outcome: Progressing  Goal: Maintain proper alignment of affected body part  Description: INTERVENTIONS:  - Support, maintain and protect limb and body alignment  - Provide patient/ family with appropriate education  Outcome: Progressing

## 2022-08-24 NOTE — CONSULTS
Consultation - Palliative and [address] 80 y o  female [phone]    Patient Active Problem List   Diagnosis    Esophagitis, reflux    Hyperlipidemia    Mild cognitive impairment    Localized osteoporosis with current pathological fracture with routine healing    Vitamin D deficiency    Torticollis    REM sleep behavior disorder    Parkinson's disease ([address] )    Abnormal thyroid function test    Back pain    Ambulatory dysfunction    Compression fracture of T12 first lumbar vertebra (HCC)    Hearing loss, bilateral    Stress fracture of femur    Cervical radiculopathy    Cervical spondylosis    Vitamin B12 deficiency    Hallucinations    Incidental pulmonary nodule    Abnormal chest x-ray     Active issues specifically addressed today include:   Parkinson's disease with behavioral disturbances  Severe protein calorie malnutrition  Palliative care patient  Goals of care    Plan:  1  Symptom management - will start very low-dose Depakote 125 mg HS to help with behaviors       2  Goals - family meeting scheduled for tomorrow morning at 10:00 a m        3  Psychosocial support - time spent providing supportive listening, all questions and concerns were addressed to the satisfaction       4  PSC follow-up- will continue to follow       Code Status:  Full - Level 1   Decisional apparatus:  Patient is marginally competent on my exam today  If competence is lost, patient's substitute decision maker would default to spouse by [place]  Advance Directive / Living Will / POLST:  None on file     I have reviewed the patient's controlled substance dispensing history in the Prescription Drug Monitoring Program in compliance with the [facility] regulations before prescribing any controlled substances  We appreciate the invitation to be involved in this patient's care  We will follow    Please do not hesitate to reach our on call provider through our clinic answering service at  should you have acute symptom control concerns  Juvenal Last DO  Palliative and Supportive Care  Clinic/Answering Service: [phone]  You can find me on [facility]! IDENTIFICATION:  Inpatient consult to Palliative Care  Consult performed by: Juvenal Last DO  Consult ordered by: Solo Rivero PA-C        Physician Requesting Consult: Daron Hayward MD  Reason for Consult / Principal Problem:  Family request, goals  Hx and PE limited by:  Patient with marginal competence and difficulty providing adequate history    HISTORY OF PRESENT ILLNESS:       Lety George is a 80 y o  female who presented on August 21st for worsening Parkinson's symptoms including hallucinations  Patient has known Parkinson's disease and other comorbidities as outlined below  Apparently she had been on amantadine the has stopped taking it because she noted worsening visual hallucinations  She also had worsening ambulatory dysfunction with a shuffling gait and other concerns of progression of Parkinson's disease  Patient has been seen by Neurology with adjustments to her Parkinson's meds  Plan initially to go to rehab however given her behaviors family is considering bring her home  Patient seen today with spouse at bedside  She tells me that she knows that she is having delusions after the fact but when she has that delusions there very well very scary to her  Patient is able to provide history, however it is very disjointed, she gets focused on her gallbladder disease and cannot recall why she came to the hospital for this hospitalization  She is well supported by her spouse and children  Discussion about whether not to pursue short-term rehab versus returning home  Review of Systems   Neurological: Positive for difficulty with concentration and weakness  Psychiatric/Behavioral: Positive for hallucinations  All other systems reviewed and are negative        Past Medical History:   Diagnosis Date    GERD (gastroesophageal reflux disease)     Osteoporosis     Parkinson disease ([facility] [address] )      Past Surgical History:   Procedure Laterality Date    ARTERIOGRAM N/A 7/10/2019    Procedure: KYPHOPLASTY;  Surgeon: Mike Light MD;  [facility]: [facility];  Service: Interventional Radiology    IR KYPHOPLASTY/VERTEBROPLASTY  7/10/2019    TONSILLECTOMY       Social History     Socioeconomic History    Marital status: /Civil Union     Spouse name: Not on file    Number of children: Not on file    Years of education: Not on file    Highest education level: Not on file   Occupational History    Not on file   Tobacco Use    Smoking status: Never Smoker    Smokeless tobacco: Never Used   Vaping Use    Vaping Use: Never used   Substance and Sexual Activity    Alcohol use: Not Currently     Comment: Rare    Drug use: Never    Sexual activity: Not on file   Other Topics Concern    Not on file   Social History Narrative    5 children     Social Determinants of Health     Financial Resource Strain: Not on file   Food Insecurity: Not on file   Transportation Needs: Not on file   Physical Activity: Not on file   Stress: Not on file   Social Connections: Not on file   Intimate Partner Violence: Not on file   Housing Stability: Not on file     Family History   Problem Relation Age of Onset    Dementia Mother     Heart disease Father     No Known Problems Daughter     No Known Problems Maternal Grandmother     No Known Problems Maternal Grandfather     No Known Problems Paternal Grandmother     Prostate cancer Paternal Grandfather     No Known Problems Daughter     No Known Problems Daughter     Alcohol abuse Neg Hx     Mental illness Neg Hx     Substance Abuse Neg Hx     Depression Neg Hx        MEDICATIONS / ALLERGIES:    all current active meds have been reviewed and current meds:   Current Facility-Administered Medications   Medication Dose Route Frequency    acetaminophen (TYLENOL) tablet 650 mg  650 mg Oral Q6H PRN    aluminum-magnesium hydroxide-simethicone (MYLANTA) oral suspension 30 mL  30 mL Oral Q6H PRN    Amantadine HCl ER CP24 137 mg  137 mg Oral Daily    calcium carbonate (OYSTER SHELL,OSCAL) 500 mg tablet 2 tablet  2 tablet Oral Daily With Breakfast    carbidopa-levodopa-entacapone (STALEVO) 12 5- MG per tablet   Oral 6x Daily    cyanocobalamin (VITAMIN B-12) tablet 1,000 mcg  1,000 mcg Oral Daily    divalproex sodium (DEPAKOTE SPRINKLE) capsule 125 mg  125 mg Oral HS    docusate sodium (COLACE) capsule 100 mg  100 mg Oral BID PRN    enoxaparin (LOVENOX) subcutaneous injection 40 mg  40 mg Subcutaneous Daily    famotidine (PEPCID) tablet 40 mg  40 mg Oral HS    guaiFENesin (MUCINEX) 12 hr tablet 600 mg  600 mg Oral BID    lidocaine (LIDODERM) 5 % patch 1 patch  1 patch Topical Daily    ondansetron (ZOFRAN) injection 4 mg  4 mg Intravenous Q6H PRN    oxyCODONE (ROXICODONE) IR tablet 2 5 mg  2 5 mg Oral Q4H PRN    rasagiline (AZILECT) tablet 1 mg  1 mg Oral Daily       Allergies   Allergen Reactions    Sulfa Antibiotics      Other reaction(s): family history - anaphylaxis  Category: Allergy;     Doxycycline Rash     Category: Allergy;        OBJECTIVE:    Physical Exam  Physical Exam  Vitals and nursing note reviewed  Constitutional:       General: She is not in acute distress  Comments: Frail and cachectic   HENT:      Head: Normocephalic and atraumatic  Right Ear: External ear normal       Left Ear: External ear normal    Eyes:      General:         Right eye: No discharge  Left eye: No discharge  Cardiovascular:      Rate and Rhythm: Normal rate  Pulmonary:      Effort: Pulmonary effort is normal  No respiratory distress  Abdominal:      General: There is no distension  Palpations: Abdomen is soft  Skin:     Coloration: Skin is pale  Neurological:      Motor: Weakness present        Comments: Alert and oriented but tangential         Lab Results:   I have personally reviewed pertinent labs  , CBC:   Lab Results   Component Value Date    WBC 10 24 (H) 08/24/2022    HGB 13 3 08/24/2022    HCT 39 9 08/24/2022    MCV 92 08/24/2022     08/24/2022    MCH 30 6 08/24/2022    MCHC 33 3 08/24/2022    RDW 11 6 08/24/2022    MPV 9 7 08/24/2022   , CMP:   Lab Results   Component Value Date    SODIUM 137 08/24/2022    K 3 8 08/24/2022     08/24/2022    CO2 23 08/24/2022    BUN 14 08/24/2022    CREATININE 0 55 (L) 08/24/2022    CALCIUM 9 0 08/24/2022    EGFR 95 08/24/2022   , PT/PTT:No results found for: PT, PTT  Imaging Studies:  Reviewed  EKG, Pathology, and Other Studies:  Reviewed    Counseling / Coordination of Care    Total floor / unit time spent today 55+ minutes  Greater than 50% of total time was spent with the patient and / or family counseling and / or coordination of care  A description of the counseling / coordination of care:  Chart review, medication review, medication adjustments, goals of care, supportive listening  Portions of this document may have been created using dictation software and as such some "sound alike" terms may have been generated by the system  Do not hesitate to contact me with any questions or clarifications

## 2022-08-25 ENCOUNTER — TELEPHONE (OUTPATIENT)
Dept: NEUROLOGY | Facility: CLINIC | Age: 72
End: 2022-08-25

## 2022-08-25 VITALS
TEMPERATURE: 97.9 F | HEIGHT: 64 IN | WEIGHT: 117 LBS | HEART RATE: 77 BPM | BODY MASS INDEX: 19.97 KG/M2 | OXYGEN SATURATION: 95 % | SYSTOLIC BLOOD PRESSURE: 129 MMHG | RESPIRATION RATE: 18 BRPM | DIASTOLIC BLOOD PRESSURE: 69 MMHG

## 2022-08-25 LAB — SARS-COV-2 RNA RESP QL NAA+PROBE: NEGATIVE

## 2022-08-25 PROCEDURE — 97535 SELF CARE MNGMENT TRAINING: CPT

## 2022-08-25 PROCEDURE — 99232 SBSQ HOSP IP/OBS MODERATE 35: CPT | Performed by: INTERNAL MEDICINE

## 2022-08-25 PROCEDURE — 99239 HOSP IP/OBS DSCHRG MGMT >30: CPT | Performed by: INTERNAL MEDICINE

## 2022-08-25 PROCEDURE — U0005 INFEC AGEN DETEC AMPLI PROBE: HCPCS | Performed by: INTERNAL MEDICINE

## 2022-08-25 PROCEDURE — U0003 INFECTIOUS AGENT DETECTION BY NUCLEIC ACID (DNA OR RNA); SEVERE ACUTE RESPIRATORY SYNDROME CORONAVIRUS 2 (SARS-COV-2) (CORONAVIRUS DISEASE [COVID-19]), AMPLIFIED PROBE TECHNIQUE, MAKING USE OF HIGH THROUGHPUT TECHNOLOGIES AS DESCRIBED BY CMS-2020-01-R: HCPCS | Performed by: INTERNAL MEDICINE

## 2022-08-25 RX ORDER — DOCUSATE SODIUM 100 MG/1
100 CAPSULE, LIQUID FILLED ORAL 2 TIMES DAILY PRN
Qty: 30 CAPSULE | Refills: 0
Start: 2022-08-25

## 2022-08-25 RX ORDER — DIVALPROEX SODIUM 125 MG/1
125 CAPSULE, COATED PELLETS ORAL
Qty: 30 CAPSULE | Refills: 0
Start: 2022-08-25

## 2022-08-25 RX ADMIN — CARBIDOPA, LEVODOPA, AND ENTACAPONE 1 TABLET: 12.5; 50; 2 TABLET, FILM COATED ORAL at 12:35

## 2022-08-25 RX ADMIN — RASAGILINE 1 MG: 1 TABLET ORAL at 09:40

## 2022-08-25 RX ADMIN — CARBIDOPA, LEVODOPA, AND ENTACAPONE: 12.5; 50; 2 TABLET, FILM COATED ORAL at 09:40

## 2022-08-25 RX ADMIN — AMANTADINE 137 MG: 137 CAPSULE, COATED PELLETS ORAL at 09:40

## 2022-08-25 RX ADMIN — CARBIDOPA, LEVODOPA, AND ENTACAPONE 1 TABLET: 12.5; 50; 2 TABLET, FILM COATED ORAL at 06:08

## 2022-08-25 RX ADMIN — CALCIUM 2 TABLET: 500 TABLET ORAL at 06:09

## 2022-08-25 RX ADMIN — ENOXAPARIN SODIUM 40 MG: 40 INJECTION SUBCUTANEOUS at 09:39

## 2022-08-25 RX ADMIN — CYANOCOBALAMIN TAB 500 MCG 1000 MCG: 500 TAB at 09:40

## 2022-08-25 NOTE — PLAN OF CARE
Problem: PAIN - ADULT  Goal: Verbalizes/displays adequate comfort level or baseline comfort level  Description: Interventions:  - Encourage patient to monitor pain and request assistance  - Assess pain using appropriate pain scale  - Administer analgesics based on type and severity of pain and evaluate response  - Implement non-pharmacological measures as appropriate and evaluate response  - Consider cultural and social influences on pain and pain management  - Notify physician/advanced practitioner if interventions unsuccessful or patient reports new pain  Outcome: Progressing     Problem: NEUROSENSORY - ADULT  Goal: Achieves maximal functionality and self care  Description: INTERVENTIONS  - Monitor swallowing and airway patency with patient fatigue and changes in neurological status  - Encourage and assist patient to increase activity and self care     - Encourage visually impaired, hearing impaired and aphasic patients to use assistive/communication devices  Outcome: Progressing     Problem: MUSCULOSKELETAL - ADULT  Goal: Maintain or return mobility to safest level of function  Description: INTERVENTIONS:  - Assess patient's ability to carry out ADLs; assess patient's baseline for ADL function and identify physical deficits which impact ability to perform ADLs (bathing, care of mouth/teeth, toileting, grooming, dressing, etc )  - Assess/evaluate cause of self-care deficits   - Assess range of motion  - Assess patient's mobility  - Assess patient's need for assistive devices and provide as appropriate  - Encourage maximum independence but intervene and supervise when necessary  - Involve family in performance of ADLs  - Assess for home care needs following discharge   - Consider OT consult to assist with ADL evaluation and planning for discharge  - Provide patient education as appropriate  Outcome: Progressing

## 2022-08-25 NOTE — NURSING NOTE
AVS reviewed with daughter  All prescriptions picked up from pharmacy and sent with patient to [facility]  IV removed  Transported pt by wheelchair to be transported by daughter

## 2022-08-25 NOTE — PLAN OF CARE
Problem: OCCUPATIONAL THERAPY ADULT  Goal: Performs self-care activities at highest level of function for planned discharge setting  See evaluation for individualized goals  Description: Treatment Interventions: ADL retraining, Functional transfer training, Visual perceptual retraining, UE strengthening/ROM, Endurance training, Cognitive reorientation, Patient/family training, Equipment evaluation/education, Compensatory technique education, Fine motor coordination activities, Continued evaluation, Energy conservation, Activityengagement          See flowsheet documentation for full assessment, interventions and recommendations  Outcome: Progressing  Note: Limitation: Decreased ADL status, Decreased Safe judgement during ADL, Decreased cognition, Decreased endurance, Visual deficit, Decreased self-care trans, Decreased fine motor control, Decreased high-level ADLs  Prognosis: Fair  Assessment: Pt seen on this date for OT session focusing on ADL retraining, cognitive reorientation, body mechanics,  increasing activity tolerance/endurance and EOB sitting to increase ability to participate in ADL/functional tasks  Pt was found in bed and was left in bed w/ all needs within reach, bed alarm on - pt reports she does not feel herself this morning and is having trouble executing tasks  Sat EOB w S while completing grooming/bathing tasks- mod A to complete  UB dressing also completed w mod A- please see flowsheet for further detail  Pt began session  Pt w/ improvements in activity tolerance, however is still limited 2* decreased ADL/High-level ADL status, decreased activity tolerance/endurance, decreased cognition, decreased self-care trans, decreased safety awareness and insight to condition  The patient's raw score on the AM-PAC Daily Activity inpatient short form is 14, standardized score is 33 39, less than 39 4  Patients at this level are likely to benefit from discharge to post-acute rehabilitation services  Please refer to the recommendation of the Occupational Therapist for safe discharge planning  Recommending pt D/C to STR when medically stable  Pt will continue to benefit from acute OT services to meet goals    Recommendation: (S) Psych Consult  OT Discharge Recommendation: Post acute rehabilitation services

## 2022-08-25 NOTE — DISCHARGE SUMMARY
[facility]  Discharge- Lety George 1950, 80 y o  female MRN: [id number]  [facility]: [id number]  Primary Care Provider: Magno Johnson MD   Date and time admitted to hospital: 8/20/2022 11:03 PM    Parkinson's disease [facility])  Assessment & Plan  Patient placed on amantadine which was put on hold by patient recently due to complaints of hallucinations  She is supposed to receive additional amantadine tablets by mail however comes to the emergency room with fall   · Patient is maintained on Stalevo, Azilect, ropinirole, and Gocovri as an outpatient  · Discontinue ropinirole per Neurology recommendations  · Continue Stalevo 12 5/50/201 tablet 6 times daily, Azilect 1 mg daily, Gocovri 137 mg daily- however later Neurology reviwed and recommeded to stop Gocvri as well sec to halluciations  Neurology discussed changed in regimen with daughter Nicanor Villanueva who agreed, I followed up and she is aware of the medications changes and agrees to see how her mother will do with Stalevo and Azilect for now, out pt follow up  · Palliative was also consulted and had a family meeting today  · PT/OT recommending rehab- patient accepted at [address] [place] [address] rehab    * Ambulatory dysfunction  Assessment & Plan  Patient presents for a fall at home  Noted history of parkinsons  Patient lives alone and was found by daughter on the floor  · Had mobility issues previously related to parkinson's improved w/ amantadine  · This was discontinued due to causing hallucinations    · Neurology consulted  · Suspecting that medication mixup likely a significant contributor to her set back in regard to her movement    · Suggesting stopping ropinirole given uncontrolled movements likely caused by increased dopamine and stopping Gocovri sec to hallucnations  · Physical and occupational therapy apprecaited- for Ac rehad upon dc    Abnormal chest x-ray  Assessment & Plan  Concern for pneumonia noted on chest x-ray  · Patient is currently asymptomatic at this time  · She has mild elevation white count, however procalcitonin is negative  · Patient is afebrile  · Initially given ceftriaxone, and vancomycin in the emergency department  · Will monitor off antibiotics for now  · No cough, no sob, nofever    Incidental pulmonary nodule  Assessment & Plan  Noted on imaging w/ 6 mm ground glass nodule in the right middle lobe   · Recommending follow up in 6-12 months - PCP to f/u    Hallucinations  Assessment & Plan  · Per chart review appears to be 2/2 amantadine use   · Pt self discontinued amantadine due to this   · Continue to hold per Neurology recommendations    Vitamin B12 deficiency  Assessment & Plan  · Continue vitamin B12 supplementation  Vitamin D deficiency  Assessment & Plan  · Continue cholecalciferol and calcium supplementation  Esophagitis, reflux  Assessment & Plan  · On Mylanta as needed  · Continue Pepcid 40 mg daily  Medical Problems             Resolved Problems  Date Reviewed: 8/25/2022          Resolved    Urinary catheter in place 8/22/2022     Resolved by  Zhang Alexander PA-C              Discharging Physician / Practitioner: Morris Owens MD  PCP: Mikhail Vang MD  Admission Date:   Admission Orders (From admission, onward)     Ordered        08/21/22 0338  Inpatient Admission  Once                      Discharge Date: 08/25/22    Consultations During Hospital Stay:  · Neurology neurology, palliative    Procedures Performed:   · none      Incidental Findings:   · Lung nodule    Outpatient Tests Requested:  · Repeat CT chest in 6months      Hospital Course: Toby Cortez is a 80 y o  female patient who originally presented to the hospital on 8/20/2022  She has past medical history significant for Parkinson disease currently on levodopa carbidopa entacapone, amantadine 137 mg 2 capsules daily in addition to Prolia, rasagiline and Requip  In addition, she has a history of vitamin-D deficiency and B12  Patient had cholecystectomy approximately a week ago and prior to that she discontinued the use of amantadine as she has complaints of hallucinations with its use  According to her, she sees people and things  All visual hallucinations no auditory  The patient therefore did not refill her medication however she did apply for the refill and was due to get that by tomorrow  Meanwhile, she lives alone although family members live close by  Patient is complaining of rigidity and difficulty with ambulation  Likewise, her affect as of the moment is somewhat flat which according to her symptoms, signify worsening Parkinson's  According to patient, her daughter found her on the floor but she cannot recall having fallen  Therefore, she presents as a safety issue  She denies any fever  She may have occasional cough but nonproductive  She denies chills  No dysuria  Patient states that she can not swallow and eat food however has been having difficulty due to her ability to move around the house      Here in the emergency room, she had a head CT scan along with cervicals spines and abdomen and pelvis done  Her cervical spines show mild degenerative disease but no cervical stenosis  An incidental pulmonary nodule was found  It is also notable for presence of atelectasis  Her current saturation on room air is 94% but when upright improves to 98%  Patient states that she has some problems taking a very deep breath  However, she denies having chest pains  Please see above list of diagnoses and related plan for additional information  Condition at Discharge: stable    Discharge Day Visit / Exam:   Subjective:  Resting in bed, denies any chest pain, cough, shortness of breath, nausea, vomiting, diarrhea      Vitals: Blood Pressure: 129/69 (08/25/22 1115)  Pulse: 77 (08/24/22 2326)  Temperature: 97 9 °F (36 6 °C) (08/25/22 1100)  Temp Source: Oral (08/25/22 1100)  Respirations: 18 (08/25/22 1115)  Height: 5' 4" (162 6 cm) (08/24/22 1316)  Weight - Scale: 53 1 kg (117 lb) (08/25/22 0624)  SpO2: 95 % (08/24/22 2326)  Exam:   Physical Exam  Vitals and nursing note reviewed  Constitutional:       General: She is not in acute distress  Appearance: She is well-developed  HENT:      Head: Normocephalic and atraumatic  Mouth/Throat:      Mouth: Mucous membranes are moist    Eyes:      Conjunctiva/sclera: Conjunctivae normal       Pupils: Pupils are equal, round, and reactive to light  Cardiovascular:      Rate and Rhythm: Normal rate and regular rhythm  Heart sounds: No murmur heard  Pulmonary:      Effort: Pulmonary effort is normal  No respiratory distress  Breath sounds: Normal breath sounds  Abdominal:      General: Bowel sounds are normal       Palpations: Abdomen is soft  Tenderness: There is no abdominal tenderness  Musculoskeletal:         General: Normal range of motion  Cervical back: Normal range of motion and neck supple  Skin:     General: Skin is warm and dry  Neurological:      General: No focal deficit present  Mental Status: She is alert  Mental status is at baseline  Psychiatric:         Behavior: Behavior normal           Discussion with Family: Updated  (daughter) via phone  Discharge instructions/Information to patient and family:   See after visit summary for information provided to patient and family  Provisions for Follow-Up Care:  See after visit summary for information related to follow-up care and any pertinent home health orders  Disposition:   Acute Rehab at [facility]    Planned Readmission: no     Discharge Statement:  I spent 35 minutes discharging the patient  This time was spent on the day of discharge  I had direct contact with the patient on the day of discharge   Greater than 50% of the total time was spent examining patient, answering all patient questions, arranging and discussing plan of care with patient as well as directly providing post-discharge instructions  Additional time then spent on discharge activities  Discharge Medications:  See after visit summary for reconciled discharge medications provided to patient and/or family        **Please Note: This note may have been constructed using a voice recognition system**

## 2022-08-25 NOTE — ASSESSMENT & PLAN NOTE
Noted on imaging w/ 6 mm ground glass nodule in the right middle lobe   · Recommending follow up in 6-12 months - PCP to f/u

## 2022-08-25 NOTE — OCCUPATIONAL THERAPY NOTE
Occupational Therapy Progress Note     Patient Name: Meghan Torres  JVCJD'T Date: 8/25/2022  Problem List  Principal Problem:    Ambulatory dysfunction  Active Problems:    Esophagitis, reflux    Vitamin D deficiency    Parkinson's disease ([address] )    Vitamin B12 deficiency    Hallucinations    Incidental pulmonary nodule    Abnormal chest x-ray            08/25/22 1133   OT Last Visit   OT Visit Date 08/25/22   Note Type   Note Type Treatment   Restrictions/Precautions   Weight Bearing Precautions Per Order No   Other Precautions Impulsive;Cognitive; Chair Alarm; Bed Alarm;Multiple lines; Fall Risk   General   Response to Previous Treatment Patient with no complaints from previous session   Pain Assessment   Pain Assessment Tool 0-10   Pain Score No Pain   ADL   Where Assessed Edge of bed   Grooming Assistance 3  Moderate Assistance   Grooming Deficit Setup; Increased time to complete;Brushing hair  (washing hair)   Grooming Comments mod A for washing hair- used shower cap, which she required mod A to gauri  Also rq mod A for successfully washing, as she reports she has difficulty with moving her arms behind her head  She also requires several VC t/o session to redirect and inc overall safety awareness  UB Dressing Assistance 3  Moderate Assistance   UB Dressing Deficit Setup; Increased time to complete; Thread RUE; Thread LUE;Pull around back   UB Dressing Comments mod A for threading UE's and situating  Presented pt w gown, and pt had difficulty correctly threading UE's and situating gown as needed  Bed Mobility   Supine to Sit 3  Moderate assistance   Additional items Assist x 1; Increased time required;Verbal cues;LE management   Sit to Supine 3  Moderate assistance   Additional items Assist x 1; Increased time required;Verbal cues;LE management   Additional Comments found and left supine in bed  Required inc time to follow one step commands and has overall dec safety awareness/insight to condition   Pt reports she is having difficulty navigating to EOB, as 'her brain is having trouble telling her body what to do'   Transfers   Sit to Stand Unable to assess   Additional Comments pt deferring OOB mobility at this time, reports she does not feel herself and is very tired  Spoke w RN who reports she was ambulating this morning   Cognition   Overall Cognitive Status Impaired   Arousal/Participation Alert; Responsive   Attention Difficulty attending to directions   Orientation Level Oriented X4   Memory Decreased recall of precautions;Decreased recall of recent events   Following Commands Follows one step commands with increased time or repetition   Comments pt requires inc time to follow one step commands, appears to have dec processing speeds as well  Dec safety awareness/insight to condition, but does appear to have overall improved cognition as compared to previous session  Activity Tolerance   Activity Tolerance Patient limited by fatigue  (cog)   Medical Staff Made Aware ok per RN   Assessment   Assessment Pt seen on this date for OT session focusing on ADL retraining, cognitive reorientation, body mechanics,  increasing activity tolerance/endurance and EOB sitting to increase ability to participate in ADL/functional tasks  Pt was found in bed and was left in bed w/ all needs within reach, bed alarm on - pt reports she does not feel herself this morning and is having trouble executing tasks  Sat EOB w S while completing grooming/bathing tasks- mod A to complete  UB dressing also completed w mod A- please see flowsheet for further detail  Pt began session  Pt w/ improvements in activity tolerance, however is still limited 2* decreased ADL/High-level ADL status, decreased activity tolerance/endurance, decreased cognition, decreased self-care trans, decreased safety awareness and insight to condition  The patient's raw score on the AM-PAC Daily Activity inpatient short form is 14, standardized score is 33 39, less than 39 4  Patients at this level are likely to benefit from discharge to post-acute rehabilitation services  Please refer to the recommendation of the Occupational Therapist for safe discharge planning  Recommending pt D/C to STR when medically stable  Pt will continue to benefit from acute OT services to meet goals  Plan   Treatment Interventions ADL retraining; Endurance training;Patient/family training; Activityengagement; Energy conservation   Goal Expiration Date 09/05/22   OT Treatment Day 1   OT Frequency 3-5x/wk   Recommendation   OT Discharge Recommendation Post acute rehabilitation services   AM-PAC Daily Activity Inpatient   Lower Body Dressing 2   Bathing 2   Toileting 2   Upper Body Dressing 2   Grooming 3   Eating 3   Daily Activity Raw Score 14   Daily Activity Standardized Score (Calc for Raw Score >=11) 33 39   AM-PAC Applied Cognition Inpatient   Following a Speech/Presentation 2   Understanding Ordinary Conversation 2   Taking Medications 1   Remembering Where Things Are Placed or Put Away 1   Remembering List of 4-5 Errands 1   Taking Care of Complicated Tasks 1   Applied Cognition Raw Score 8   Applied Cognition Standardized Score 19 32   Modified Alamo Scale   Modified Alamo Scale 4         VIVIANE Leger, OTR/L

## 2022-08-25 NOTE — ASSESSMENT & PLAN NOTE
Concern for pneumonia noted on chest x-ray  · Patient is currently asymptomatic at this time  · She has mild elevation white count, however procalcitonin is negative  · Patient is afebrile  · Initially given ceftriaxone, and vancomycin in the emergency department  · Will monitor off antibiotics for now    · No cough, no sob, nofever

## 2022-08-25 NOTE — CASE MANAGEMENT
Case Management Discharge Planning Note    Patient name Olimpia Baca  Location [facility] 567-05 MRN [id number]  : 1950 Date 2022       Current Admission Date: 2022  Current Admission Diagnosis:Ambulatory dysfunction   Patient Active Problem List    Diagnosis Date Noted    Incidental pulmonary nodule 2022    Abnormal chest x-ray 2022    Hallucinations 2022    Vitamin B12 deficiency 2022    Cervical spondylosis 02/15/2022    Cervical radiculopathy 2021    Stress fracture of femur 2020    Hearing loss, bilateral 2020    Compression fracture of T12 first lumbar vertebra ([facility] [address] ) 2019    Back pain 2019    Ambulatory dysfunction 2019    Abnormal thyroid function test 2019    Hyperlipidemia 2017    Vitamin D deficiency 2017    Mild cognitive impairment 2014    REM sleep behavior disorder 2014    Esophagitis, reflux 2012    Localized osteoporosis with current pathological fracture with routine healing 2012    Parkinson's disease ([facility] ) 2012    Torticollis 10/21/2009      LOS (days): 4  Geometric Mean LOS (GMLOS) (days): 3 90  Days to GMLOS:-0 5     OBJECTIVE:  Risk of Unplanned Readmission Score: 10 92         Current admission status: Inpatient   Preferred Pharmacy:   [address]  Phone: [phone] Fax: [phone] [place], [address]  [address] [address]  Phone: [phone] Fax: [phone]    [facility] ([facility]) [address], [address]  [address]  Phone: [phone] Fax: [phone]    Primary Care Provider: Pepper Sanchez MD    Primary Insurance: MEDICARE  Secondary Insurance: BLUE CROSS    DISCHARGE DETAILS:         Treatment Team Recommendation: Acute Rehab  Discharge Destination Plan[de-identified] Acute Rehab  Transport at Discharge : Family           ETA of Transport (Date): 08/25/22  ETA of Transport (Time): 6478     Transfer Mode: Caregiver        IMM Given (Date):: 08/25/22  IMM Given to[de-identified] Patient  Family notified[de-identified] Mikayla Cedeno, daughter  Additional Comments: Met with patient, , daughter [facility] and palliative team   Plan for patient to go to rehab at [place] [address], if rehab unable to accept, family will discuss home plan  Acceptance from [place] [address] received, family to transport with estimated arrival time to [place] [address] at 1600  Patient, family, nursing, facility aware      [facility] : [facility]  Receiving Facility/Agency Phone Number: [phone]  Facility/Agency Fax Number: [phone]

## 2022-08-25 NOTE — PROGRESS NOTES
Progress note - Palliative and Supportive Care   Marc Osullivan 80 y o  female [id number]    Patient Active Problem List   Diagnosis    Esophagitis, reflux    Hyperlipidemia    Mild cognitive impairment    Localized osteoporosis with current pathological fracture with routine healing    Vitamin D deficiency    Torticollis    REM sleep behavior disorder    Parkinson's disease ([address] )    Abnormal thyroid function test    Back pain    Ambulatory dysfunction    Compression fracture of T12 first lumbar vertebra (HCC)    Hearing loss, bilateral    Stress fracture of femur    Cervical radiculopathy    Cervical spondylosis    Vitamin B12 deficiency    Hallucinations    Incidental pulmonary nodule    Abnormal chest x-ray     Active issues specifically addressed today include:   Palliative care encounter  Goals of care discussion  Parkinson's disease with behavioral disturbances    Plan:  1  Symptom management -    Good response from depakote 125mg HS (recommend continuation upon discharge)   Parkinson's regimen per neurology  Asked Dr Fernando Mejia to discuss his recommendations on medications with daughter, Anca Gonzalez  2  Goals -    Plan for d/c to [facility] with ultimate goal to return home under family's 24/7 care with additional private caregiving support  Further details as below     Code Status: full - Level 1   Decisional apparatus:  Patient is not competent on my exam today  If competence is lost, patient's substitute decision maker would default to spouse by [place]  However, spouse is aided by their children's support in coordination of care (Cathryn and Anca Lipoma lead)   Advance Directive / Living Will / POLST:  none    3  Social support-   Patient lives with her spouse, but is significantly supported by children   Family is working to arrange 24/7 support from family member/private caregiver in addition to spouse upon eventual discharge home   They will navigate whether this is required short vs long term pending progress  4  Follow-up   Recommend short interim outpatient follow-up with palliative medicine upon discharge from rehab/home  5  Coordination of care  · Care has been coordinated among patient's daughters Maira Yeboah, spouse, , primary team, inpatient neurologist and outpatient neurology provider  Interval history:       Patient had a better night with addition of Depakote  Per daughter she did call at approximately 3:30 a m  with mild anxiety, but was not aggressive, impulsive, or agitated as she has been previous nights  Patient endorses generalized aches/pains  Daughter states this is her baseline  Kevin Felton admits to feeling overwhelmed her situation, looks forward to being out of the hospital environment  At this time conversation with continued outside of the room to alleviate patient feeling overwhelmed at "the details"  Discussed goals/plan of care with daughter Nicola Castillo) in person and Belle Huntley) over the phone  Both agree that ideally patient would be discharge to [facility] rehab  However, if she is not admitted for any number of reasons feel she needs to be discharged home as hospital environment is exacerbating delirium  They recognize that whether patient goes home directly from the hospital or following rehab she will need increased support  They are hopeful that with better balance of her Parkinson's medications she will near previous baseline of independence with ADLs  They seem to think that she was will optimized prior to missing several doses of amantadine  Discussed consideration of supervised medications versus pill dispenser  Family has also initiated process of receiving waiver services  They recognize it may be some time until these services are activated if they are indeed approved  They are prepared to increased support themselves versus with paid caregivers in the interim      MEDICATIONS / ALLERGIES:     all current active meds have been reviewed    Allergies   Allergen Reactions    Sulfa Antibiotics      Other reaction(s): family history - anaphylaxis  Category: Allergy;     Doxycycline Rash     Category: Allergy;        OBJECTIVE:    Physical Exam  Physical Exam  HENT:      Head: Normocephalic and atraumatic  Eyes:      Conjunctiva/sclera: Conjunctivae normal    Pulmonary:      Effort: Pulmonary effort is normal  No respiratory distress  Abdominal:      General: There is no distension  Tenderness: There is no guarding  Musculoskeletal:         General: No swelling  Skin:     General: Skin is warm and dry  Neurological:      Mental Status: She is alert  Comments: Rigid, confused at times  Psychiatric:      Comments: Tearful at times         Lab Results:   Results from last 7 days   Lab Units 08/24/22  0652 08/23/22  0538 08/22/22  0429 08/21/22  0018   WBC Thousand/uL 10 24* 9 86 9 24 10 75*   HEMOGLOBIN g/dL 13 3 12 8 11 8 11 6   HEMATOCRIT % 39 9 38 2 36 0 35 1   PLATELETS Thousands/uL 369 326 312 317   NEUTROS PCT %  --   --  67 74   MONOS PCT %  --   --  13* 12     Results from last 7 days   Lab Units 08/24/22  0652 08/22/22  0429 08/21/22  0018   POTASSIUM mmol/L 3 8 3 3* 3 5   CHLORIDE mmol/L 106 109* 109*   CO2 mmol/L 23 25 24   BUN mg/dL 14 11 21   CREATININE mg/dL 0 55* 0 53* 0 65   CALCIUM mg/dL 9 0 8 0* 8 7   ALK PHOS U/L  --  110 119*   ALT U/L  --  8* 12   AST U/L  --  18 20       Imaging Studies: reviewed pertinent studies   EKG, Pathology, and Other Studies: reviewed pertinent studies    Counseling / Coordination of Care    Total floor / unit time spent today 75+ minutes  Greater than 50% of total time was spent with the patient and / or family counseling and / or coordination of care  A description of the counseling / coordination of care:  Time spent in family meeting from 10:00 a m  to 11:00 a m     Additional time spent coordinating care among multidisciplinary team as above      This note was not shared with the patient due to privacy exception: note includes other individuals

## 2022-08-25 NOTE — ASSESSMENT & PLAN NOTE
Patient presents for a fall at home  Noted history of parkinsons  Patient lives alone and was found by daughter on the floor  · Had mobility issues previously related to parkinson's improved w/ amantadine  · This was discontinued due to causing hallucinations    · Neurology consulted  · Suspecting that medication mixup likely a significant contributor to her set back in regard to her movement    · Suggesting stopping ropinirole given uncontrolled movements likely caused by increased dopamine and stopping Gocovri sec to hallucnations  · Physical and occupational therapy apprecaited- for [facility] upon dc

## 2022-08-25 NOTE — TELEPHONE ENCOUNTER
Late entry  Pt's daughter Derrick Verduzco M informing that pt is currently admitted to hospital  Daughter states that pt is getting back on medications but she is states pt is definitely having some cognitive issues and confusion  Says pt keeps getting agitated, they give her ativan to help her sleep but pt's daughter doesn't think she's supposed to be getting that and doesn't think that it's helping her mind at all  Wondering if there is something to give her at night to help calm her and help her sleep that would be approved by our neurologist  Pt's daughter states that she has been talking with provider at hospital and has been trying to talk with neurologist there but feels she is not able to connect people to other people  Daughter would like to call to see if Huma Pierce can connect with the attending there or the neurologist that's there and help them come up with something they can give to pt  to help her rest that's "not going to make her psychotic"   [phone]    Huma Pierce - Please advise

## 2022-08-26 ENCOUNTER — TELEPHONE (OUTPATIENT)
Dept: PALLIATIVE MEDICINE | Facility: CLINIC | Age: 72
End: 2022-08-26

## 2022-08-26 LAB
BACTERIA BLD CULT: NORMAL
BACTERIA BLD CULT: NORMAL

## 2022-08-26 NOTE — TELEPHONE ENCOUNTER
Please let the daughter know that the inpatient team has been in contact with me throughout her hospital course  I did recommend perhaps a trial of Seroquel could be tried if hallucinations and sleep issues remain  Could try this if she continues to have trouble once discharged  It sounds like the concern remain that the Gocovri/Amantadine is causing some of the issues with hallucinations and confusion, may be better to remain off of this medication now  Looks like they also stopped the ropinirole which can cause issues dyskinesia and hallucinations as well  Will just need to watch for worsening of her PD symptoms however it is often a very fine line with all of these medications  Thanks!

## 2022-08-26 NOTE — TELEPHONE ENCOUNTER
Spoke with pt's daughter Tiffany Orantes and advised her of Fermin's message and recommendations  Daughter states that pt is currently at [facility] and remains on Govocri  Says she is definitely still having problems with paranoia especially at night  She says pt still having serious freezing issues  Pt's daughter reports that she had trouble with doctors doing different things in the hospital so she is asking that we reach out to [address] [place] [address] and go over pt's medications so we are on the same page, also asking if Jessi Livingston would make any changes or corrections as she sees fit  Daughter explains that on discharge notes there are some discrepancies and that some of it is not totally correct and that if Jessi Livingston needs clarification she would be more that happy to discuss with Jessi Livingston - Please see mychart message from 8/25 attached to encounter 8/20  Please advise  Best cheryl [phone], ok to leave a detailed message

## 2022-08-29 NOTE — TELEPHONE ENCOUNTER
I called cell and mailbox is full  I left message on home phone to call office to schedule hfu  @nd message left

## 2022-09-01 NOTE — TELEPHONE ENCOUNTER
I called left a 3rd and final call for patient to call office to schedule a hospital follow up  Closing out in que

## 2022-09-01 NOTE — TELEPHONE ENCOUNTER
Spoke with Ludmila regarding her mother  She is now at rehab, doing better in general   She is probably going to be discharged in the next few days  She does still have some dyskinesia at times  She is taking Sinemet and Amantadine  She does still wake in the AM frozen  She has episodes of paranoia in the middle of the night, she will questioning if she can trust the people at the facility     She is at a fall risk at the facility because there was some concern that she had fallen at home, per the daughter she did NOT  Daughter lives next door and will likely start taking over her medications   Daughter will keep me posted when she returns home and we can adjust her medications as needed

## 2022-09-09 ENCOUNTER — TELEPHONE (OUTPATIENT)
Dept: INTERNAL MEDICINE CLINIC | Facility: CLINIC | Age: 72
End: 2022-09-09

## 2022-09-09 NOTE — TELEPHONE ENCOUNTER
Patient [place] [facility] [place], [place], and [place] [facility]  Please fax order to [phone]

## 2022-09-27 ENCOUNTER — TELEPHONE (OUTPATIENT)
Dept: NEUROLOGY | Facility: CLINIC | Age: 72
End: 2022-09-27

## 2022-09-27 NOTE — TELEPHONE ENCOUNTER
Hi, this is Le Baumann calling with the [facility], our specialty pharmacy  I was calling today to speak with one of the representatives regarding patient Ludivina Bonilla, my tail and patient date of birth is [id number]  We were calling today to see if you all have any additional phone numbers for Ms Patrice Keene  We have been trying to contact Ms Cruzito Hogan to schedule a delivery for her  Go covered medication with no attempt and patient have a return call  Can you let the patient to give us a call back with scheduling of her medication at area [facility]? [phone]  Again, that's [phone]  If she can give us a call back, let's get some of our medication  Thank you and have a great day  VM states Le Baumann from [facility] has been trying to reach patient to schedule delivery of medication Gocoveri  They have been unsuccessful reaching patient to schedule delivery of medication  Asked if we are able to reach patient to have her contact Mc Marinelli

## 2022-09-29 NOTE — TELEPHONE ENCOUNTER
Called pt's daughter Tiffany Orantes and let her know about previous  She states that about 2 months ago, pt's medication was lost when she went to [facility]  Says that medication was found and returned to pt so she has med available at this time  States that they will not be accepting refills at this time until pt is running low due to cost of medication  Provided phone number for [facility]  Dtr says she will be happy to call them to explain

## 2022-10-03 DIAGNOSIS — G20 PARKINSON'S DISEASE (HCC): ICD-10-CM

## 2022-10-07 RX ORDER — CARBIDOPA, LEVODOPA AND ENTACAPONE 12.5; 200; 5 MG/1; MG/1; MG/1
1 TABLET, FILM COATED ORAL
Qty: 540 TABLET | Refills: 2 | Status: SHIPPED | OUTPATIENT
Start: 2022-10-07

## 2022-10-07 NOTE — TELEPHONE ENCOUNTER
Pt has called and informed her she will be called back shortly by [employer]       Thank you,     Elizabeth Chiu

## 2022-10-07 NOTE — TELEPHONE ENCOUNTER
Called and Left a message on pt's answering machine for a call back   My chart message sent regarding below

## 2022-10-07 NOTE — TELEPHONE ENCOUNTER
Zoraida Junior PA-C  Neurology [facility] Clinical Team 6 3 days ago         Can we please just clarify what dose of Sinemet she is currently taking   I know that there were a lot of changes with her medication and just want to be sure we have the right doses  Thanks!     Message text

## 2022-10-08 DIAGNOSIS — G20 PARKINSON'S DISEASE (HCC): ICD-10-CM

## 2022-10-11 RX ORDER — RASAGILINE 1 MG/1
TABLET ORAL
Qty: 90 TABLET | Refills: 3 | Status: SHIPPED | OUTPATIENT
Start: 2022-10-11

## 2022-10-17 ENCOUNTER — OFFICE VISIT (OUTPATIENT)
Dept: NEUROLOGY | Facility: CLINIC | Age: 72
End: 2022-10-17
Payer: MEDICARE

## 2022-10-17 VITALS — HEART RATE: 82 BPM | SYSTOLIC BLOOD PRESSURE: 110 MMHG | TEMPERATURE: 97.6 F | DIASTOLIC BLOOD PRESSURE: 50 MMHG

## 2022-10-17 DIAGNOSIS — G31.84 MILD COGNITIVE IMPAIRMENT: ICD-10-CM

## 2022-10-17 DIAGNOSIS — G20 PARKINSON'S DISEASE (HCC): Primary | ICD-10-CM

## 2022-10-17 PROCEDURE — 99215 OFFICE O/P EST HI 40 MIN: CPT | Performed by: PSYCHIATRY & NEUROLOGY

## 2022-10-17 RX ORDER — GABAPENTIN 300 MG/1
300 CAPSULE ORAL
COMMUNITY
Start: 2022-09-06 | End: 2022-10-17 | Stop reason: SDUPTHER

## 2022-10-17 RX ORDER — GABAPENTIN 300 MG/1
300 CAPSULE ORAL
Qty: 90 CAPSULE | Refills: 1 | Status: SHIPPED | OUTPATIENT
Start: 2022-10-17

## 2022-10-17 RX ORDER — GABAPENTIN 100 MG/1
100 CAPSULE ORAL 2 TIMES DAILY
COMMUNITY

## 2022-10-17 RX ORDER — AMANTADINE 137 MG/1
CAPSULE, COATED PELLETS ORAL
COMMUNITY

## 2022-10-17 NOTE — PROGRESS NOTES
Review of Systems   Constitutional: Negative  Negative for appetite change and fever  HENT: Negative  Negative for hearing loss, tinnitus, trouble swallowing and voice change  Eyes: Negative  Negative for photophobia, pain and visual disturbance  Respiratory: Negative  Negative for shortness of breath  Cardiovascular: Negative  Negative for palpitations  Gastrointestinal: Negative  Negative for nausea and vomiting  Endocrine: Negative  Negative for cold intolerance  Genitourinary: Negative  Negative for dysuria, frequency and urgency  Musculoskeletal: Positive for gait problem, myalgias (shoulders), neck pain and neck stiffness  Balance Issues  Dyskinesias throughout the body     Skin: Negative  Negative for rash  Allergic/Immunologic: Negative  Neurological: Negative for dizziness, tremors, seizures, syncope, facial asymmetry, speech difficulty, weakness, light-headedness, numbness and headaches  Hematological: Negative  Does not bruise/bleed easily  Psychiatric/Behavioral: Positive for confusion, hallucinations and sleep disturbance (up often at night)  All other systems reviewed and are negative

## 2022-10-17 NOTE — PROGRESS NOTES
Patient ID: Brandie Mar is a 80 y o  female    Assessment/Plan:    Parkinson's disease [facility])  Patient with a 23 year history of Parkinson's disease complicated by motor fluctuations, dyskinesias, and postural instability with freezing while in the off state  She had been functioning fairly well up until earlier this year  She had developed hallucinations but have since resolved  This may have in part been due to underlying illness (pancreatitis) and medication alterations  Medication compliance has been an issue as well  Currently she continues to have motor fluctuations but she has been accustomed to taking medications when she feels her medications are wearing off  This may lead to more off time given the time now needed for onset of levodopa  She appears to have peak dose dyskinesia  Medications may be lasting about 2-3 hours  Currently examined 3 hours into her dose and she was in the on state  While she has freezing of gait and postural instability  Will continue on current schedule with Stalevo every 3 hours 6 times daily along with Gocovri and rasagiline  If after a few weeks we note a pattern of off time/ freezing or dyskinesia, they are to contact the office and inform us of timing of these events to assure that this is in the off state  With deep brain stimulation has been discussed in the past   There has a been a recent question of whether she has any mild cognitive symptoms  It is unclear if this was in part related to alterations medication her hospital stay    Time spent discussing deep brains stimulation surgery, it use in PD to treat levodopa responsive symptoms such as tremor, bradykinesia, rigidity and wearing off, the procedure and potential risks and benefits  Goals of surgery should he wish to proceed at this point would be to reduce off periods and improved dyskinesia  She understands surgery is not meant to slow progression of illness and is not a cure  Although it is possible toreduce medication but she understands this may be limited by it need for nonmotor symptoms  Primary balance difficulties in Parkinson's disease is common with advancement of the condition  This is not likely response to stimulation  She is interested in seeing if she would be a candidate  We will therefore refer for neuropsych testing  for evaluation for MCI and whether she would be a DBS candidate  We can meet after this to further discuss results  There is concern with regards to depression and anxiety with regards to her thinking about her progression of her illness  She is interested in psychotherapy in needs assistance finding a psychologist   Will have  contact [facility]  regarding psychotherapy  Diagnoses and all orders for this visit:    Parkinson's disease [facility])  -     Ambulatory referral to Neuropsychology; Future  -     gabapentin (NEURONTIN) 300 mg capsule; Take 1 capsule (300 mg total) by mouth daily at bedtime    Mild cognitive impairment    Other orders  -     Amantadine HCl ER (Gocovri) 137 MG CP24; Take by mouth daily after breakfast  -     gabapentin (NEURONTIN) 100 mg capsule; Take 100 mg by mouth 2 (two) times a day  -     Discontinue: gabapentin (NEURONTIN) 300 mg capsule; Take 300 mg by mouth daily at bedtime    Spent over 60 minutes on patient visit, review of chart and previous history, counseling, answering questions from family and patient, discussing DBS, and partial documentation  Subjective: Gus Woodward is a 80year-old woman with young onset, levodopa responsive Parkinson's disease who presents for movement follow up  This is my first visit with the patient  Review of chart reveals PD symptom onset 1999 (47yo) with left shoulder stiffness, later complicated by motor fluctuations, early wearing off, unpredictable offs, delayed on, mild dyskinesias and off freezing of gait      Prior medication trials:  selegiline   Sinemet IR  Ropinirole IR   - ropinirole XL 6mg daily - dose reduced due to hallucinations and dyskinesia  Current medications and timing:   - Gocovri 1 po qhs  - stalevo 12 5/50/200 6 times per day Q3 hours   - rasagiline 1mg daily   -gabapentin 100mg bid and 300mg qhs (ran out of this dose 5 days ago)      Review of chart reveals Gwynda Render had been started in Feb2022, increased March 2022  Hallucination/ confusion reported in June 2022 and also had UTI at time  Daughter states she had repeated urine steps which were inconclusive in she was treated with antibiotics after trips to the urgent care  Eventually she was seen by a urogynecologist to determine that she likely did not have UTIs  She developed pancreatitis over the summer  Gocovri to discontinue during this time  Last seen in our office by Liliam Parham PA-C in 8/9/22  Gwynda Render was reordered but never obtained prior to hospitalization on 08/20/22  Detail the hospitalization was provided by her daughters accompanied her today  It is known that she had decided to adjust her medications a couple days before the hospitalization  She had stopped ropinirole  She was found confused and having difficulty with movement by her daughter  She was taken to the ER and admitted  They report much difficulty during this hospitalization with regards to medication adjustments and management of hospital induced delirium  Gocovri not available (pt awaiting it in mail) therefore never restarted  Ropinirole was added in the discontinued due to dyskinesia  Medications were reduced and she had significant difficulty with mobility  She was started on Depakote sprinkles 125mg qhs which she is no longer on  She was transferred to rehab where she a continues to have difficulty the 2nd week of a medication adjustment  Since she has been home she has more periods of freezing and falling    These spoke with Liliam Parham PA-C and increased Gocovri to 2 tabs but this worsened dyskinesia, therefore it was return to prior dosing  There was talk of perhaps adding a lower dose pill  She admits to not taking her medications at certain times  She has a Med dispensing machine to remind her  She is still opts to take medications when she feels that it is needed  She continues to have periods of slowness, shuffling freezing and losing her balance  She does not currently have any hallucinations or delusions  She has been having more confusion since the reduction in Gocovri per her daughter  Dyskinesias by history appear to be peak dose  They have questions with regards to deep brain stimulation surgery  Objective:    /50 (BP Location: Right arm, Patient Position: Standing, Cuff Size: Standard)   Pulse 82   Temp 97 6 °F (36 4 °C)       Physical Exam  Vitals reviewed  Eyes:      Extraocular Movements: Extraocular movements intact  Pupils: Pupils are equal, round, and reactive to light  Neurological:      Mental Status: She is alert  Deep Tendon Reflexes: Strength normal          Neurological Exam  Mental Status  Alert  Oriented only to person, place and situation  Speech: hypophonia  Language is fluent with no aphasia  Attention and concentration are normal     Cranial Nerves  CN III, IV, VI: Extraocular movements intact bilaterally  Pupils equal round and reactive to light bilaterally  CN VII: Full and symmetric facial movement  CN VIII: Hearing is normal   CN IX, X: Palate elevates symmetrically  CN XI: Shoulder shrug strength is normal   CN XII: Tongue midline without atrophy or fasciculations  Motor   Increased muscle tone  Neck and RLE  Strength is 5/5 throughout all four extremities  Sensory  Light touch is normal in upper and lower extremities       Coordination  Right: Finger-to-nose normal  Rapid alternating movement abnormality:Left: Finger-to-nose normal  Rapid alternating movement abnormality:  See MDS UPDRS     Decrement on Ft and NARGIS but good speed  Mild bilateral postural tremors  No rest treor  Gait  Casual gait: Able to rise from chair without using arms  Reduced stride and arm swing  No freezing             MDS UPDRS III                                       Time since last dose:    Speech  0   Facial Expression  2   Rigidity - Neck  2   Rigidity - Upper Extremity (R)  0   Rigidity - Upper Extremity (L)   0   Rigidity - Lower Extremity (R)  1   Rigidity - Lower Extremity (L)   0   Finger Taps (R)   1   Finger Taps (L)   1   Hand Movement (R)  0   Hand Movement (L)   0   Pronation/Supination (R)  1   Pronation/Supination (L)   1   Toe Tapping (R) 0   Toe Tapping (L) 1   Leg Agility (R)  0   Leg Agility (L)   0   Arising from Chair   0   Gait   1   Freezing of Gait 0   Postural Stability      Posture 2   Global spontaneity of movement 1   Postural Tremor (Ri 1   Postural Tremor (L) 1   Kinetic Tremor (R)  0   Kinetic Tremor (L)  0   Rest tremor amplitude RUE 0   Rest tremor amplitude LUE 0   Rest tremor amplitude RLE 0   Reset tremor amplitude LLE 0   Lip/Jaw Tremor  0   Consistency of tremor 0   Motor Exam Total:        Mild generalized dyskinesia most apparent in extremities when activated,      Current Outpatient Medications on File Prior to Visit   Medication Sig Dispense Refill   • Amantadine HCl ER (Gocovri) 137 MG CP24 Take by mouth daily after breakfast     • Calcium Carbonate-Vit D-Min (CALCIUM 1200 PO) Take 1,200 mg by mouth daily     • carbidopa-levodopa-entacapone (STALEVO) 12 5- MG per tablet TAKE 1 TABLET BY MOUTH 6 (SIX) TIMES A  tablet 2   • cyanocobalamin (VITAMIN B-12) 1000 MCG tablet Take 1 tablet (1,000 mcg total) by mouth daily 90 tablet 1   • famotidine (PEPCID) 20 mg tablet TAKE 2 TABLETS (40 MG TOTAL) BY MOUTH DAILY AT BEDTIME (Patient taking differently: Take 40 mg by mouth if needed) 180 tablet 1   • gabapentin (NEURONTIN) 100 mg capsule Take 100 mg by mouth 2 (two) times a day     • rasagiline (AZILECT) 1 MG TAKE 1 TABLET BY MOUTH EVERY DAY 90 tablet 3   • divalproex sodium (DEPAKOTE SPRINKLE) 125 MG capsule Take 1 capsule (125 mg total) by mouth daily at bedtime (Patient not taking: Reported on 10/17/2022) 30 capsule 0   • docusate sodium (COLACE) 100 mg capsule Take 1 capsule (100 mg total) by mouth 2 (two) times a day as needed for constipation (Patient not taking: Reported on 10/17/2022) 30 capsule 0     No current facility-administered medications on file prior to visit           Karan Padgett MD  Movement disorder physician  [address] [place] [address]

## 2022-10-17 NOTE — PATIENT INSTRUCTIONS
Will continue on current schedule with Stalevo every 3 hours 6 times daily along with Gocovri and rasagiline  If after a few weeks we note a pattern of off time/ freezing or dyskinesia, let us know   Will refer for neuropsych testing  for evaluation for MCI and whether she would be a DBS candidate  Will have  contact [facility] regarding psychotherapy

## 2022-10-18 PROBLEM — R44.3 HALLUCINATIONS: Status: RESOLVED | Noted: 2022-07-06 | Resolved: 2022-10-18

## 2022-10-18 NOTE — ASSESSMENT & PLAN NOTE
Patient with a 23 year history of Parkinson's disease complicated by motor fluctuations, dyskinesias, and postural instability with freezing while in the off state  She had been functioning fairly well up until earlier this year  She had developed hallucinations but have since resolved  This may have in part been due to underlying illness (pancreatitis) and medication alterations  Medication compliance has been an issue as well  Currently she continues to have motor fluctuations but she has been accustomed to taking medications when she feels her medications are wearing off  This may lead to more off time given the time now needed for onset of levodopa  She appears to have peak dose dyskinesia  Medications may be lasting about 2-3 hours  Currently examined 3 hours into her dose and she was in the on state  While she has freezing of gait and postural instability  Will continue on current schedule with Stalevo every 3 hours 6 times daily along with Gocovri and rasagiline  If after a few weeks we note a pattern of off time/ freezing or dyskinesia, they are to contact the office and inform us of timing of these events to assure that this is in the off state  With deep brain stimulation has been discussed in the past   There has a been a recent question of whether she has any mild cognitive symptoms  It is unclear if this was in part related to alterations medication her hospital stay    Time spent discussing deep brains stimulation surgery, it use in PD to treat levodopa responsive symptoms such as tremor, bradykinesia, rigidity and wearing off, the procedure and potential risks and benefits  Goals of surgery should he wish to proceed at this point would be to reduce off periods and improved dyskinesia  She understands surgery is not meant to slow progression of illness and is not a cure    Although it is possible toreduce medication but she understands this may be limited by it need for nonmotor symptoms  Primary balance difficulties in Parkinson's disease is common with advancement of the condition  This is not likely response to stimulation  She is interested in seeing if she would be a candidate  We will therefore refer for neuropsych testing  for evaluation for MCI and whether she would be a DBS candidate  We can meet after this to further discuss results  There is concern with regards to depression and anxiety with regards to her thinking about her progression of her illness  She is interested in psychotherapy in needs assistance finding a psychologist   Will have  contact [facility] regarding psychotherapy

## 2022-11-09 ENCOUNTER — PATIENT MESSAGE (OUTPATIENT)
Dept: NEUROLOGY | Facility: CLINIC | Age: 72
End: 2022-11-09

## 2022-11-25 ENCOUNTER — TRANSITIONAL CARE MANAGEMENT (OUTPATIENT)
Dept: INTERNAL MEDICINE CLINIC | Facility: CLINIC | Age: 72
End: 2022-11-25

## 2022-12-08 ENCOUNTER — TELEPHONE (OUTPATIENT)
Dept: NEUROLOGY | Facility: CLINIC | Age: 72
End: 2022-12-08

## 2022-12-09 ENCOUNTER — TELEPHONE (OUTPATIENT)
Dept: NEUROLOGY | Facility: CLINIC | Age: 72
End: 2022-12-09

## 2022-12-09 DIAGNOSIS — G20 PARKINSON'S DISEASE (HCC): ICD-10-CM

## 2022-12-09 RX ORDER — CARBIDOPA, LEVODOPA AND ENTACAPONE 12.5; 200; 5 MG/1; MG/1; MG/1
1 TABLET, FILM COATED ORAL
Qty: 540 TABLET | Refills: 0 | Status: CANCELLED | OUTPATIENT
Start: 2022-12-09

## 2022-12-13 ENCOUNTER — TELEPHONE (OUTPATIENT)
Dept: NEUROLOGY | Facility: CLINIC | Age: 72
End: 2022-12-13

## 2022-12-20 ENCOUNTER — TELEPHONE (OUTPATIENT)
Dept: NEUROLOGY | Facility: CLINIC | Age: 72
End: 2022-12-20

## 2022-12-29 ENCOUNTER — TELEPHONE (OUTPATIENT)
Dept: NEUROLOGY | Facility: CLINIC | Age: 72
End: 2022-12-29

## 2022-12-29 NOTE — TELEPHONE ENCOUNTER
Left message to call back to schedule intake and testing appt   As this is the third attempt to contact pt, we will have to close out the referral

## 2023-03-08 ENCOUNTER — OFFICE VISIT (OUTPATIENT)
Dept: NEUROLOGY | Facility: CLINIC | Age: 73
End: 2023-03-08

## 2023-03-08 ENCOUNTER — TELEPHONE (OUTPATIENT)
Dept: NEUROLOGY | Facility: CLINIC | Age: 73
End: 2023-03-08

## 2023-03-08 VITALS
HEART RATE: 95 BPM | HEIGHT: 64 IN | BODY MASS INDEX: 24.92 KG/M2 | WEIGHT: 146 LBS | DIASTOLIC BLOOD PRESSURE: 74 MMHG | TEMPERATURE: 98.3 F | OXYGEN SATURATION: 97 % | SYSTOLIC BLOOD PRESSURE: 128 MMHG

## 2023-03-08 DIAGNOSIS — G20 PARKINSON'S DISEASE (HCC): Primary | ICD-10-CM

## 2023-03-08 RX ORDER — BUSPIRONE HYDROCHLORIDE 5 MG/1
5 TABLET ORAL 2 TIMES DAILY
COMMUNITY
Start: 2023-01-18

## 2023-03-08 RX ORDER — APIXABAN 5 MG/1
5 TABLET, FILM COATED ORAL 2 TIMES DAILY
COMMUNITY
Start: 2023-02-09

## 2023-03-08 NOTE — PATIENT INSTRUCTIONS
Patient with Parkinson's disease with symptoms onset in 8369 complicated by motor fluctuations, peak dose dyskinesias, and postural instability with freezing while in the off state  At this time her dyskinesia is much better controlled overall  She does however continue to have wearing between doses with increased freezing and gait issues  She has a good on with the afternoon and evening doses of medication  We had a long discussion in regards to options at this time for her wearing off  Unfortunately we do need to be cautious with making any adjustments given her issues with dyskinesia and hallucinations  One option would be to make an adjustment to the Stalevo dose itself and reduce the interval of time between doses to try and avoid wearing off  We also discussed the option of switching her Stalevo to Rytary which is a combination of immediate release and extended release Sinemet and 1 tab  There is also a medication called [place] which is used strictly for wearing off between doses of medication  This is however a newer medication and is sometimes hard to get covered  After review of the options she would like to start by making an adjustment to her Stalevo  She will start by taking 1 tab every 2 5 hours throughout the day  With this change she will need to watch for any worsening of her dyskinesia or development of hallucinations  She does continue to have improvement with the Gocovri however higher dosing unfortunately caused hallucinations  She also continues to take gabapentin currently taking 300 mg 3 times a day for her pain  This does appear to be beneficial for pain however it is causing more daytime sedation  We did discuss the option of making some adjustments to this if she would like to however this may make her daytime pain worse    If she would like she could try taking 300 mg in the morning and 600 mg before bed and cutting out her midday dose to see if that provides any benefit  She has had home PT and OT in the past which was helpful  She recently completed therapy following her surgery, would like to get back into therapy geared more towards her PD symptoms given she continues to struggle with gait and freezing  She remains interested in DBS as well  The goal with this would be to help with her wearing off without potentially making her dyskinesia worse  We did however discussed that prior to doing the DBS surgery we would need to do the neuropsych evaluation as well as on-off testing so that we could get a clear indication of what the goals would be with this surgery  At this time we will start with neuropsych testing  A new referral for this was placed in her chart  If she does not hear from them in the next week she will call our office back so we can help facilitate

## 2023-03-08 NOTE — ASSESSMENT & PLAN NOTE
Patient with Parkinson's disease with symptoms onset in 8169 complicated by motor fluctuations, peak dose dyskinesias, and postural instability with freezing while in the off state  At this time her dyskinesia is much better controlled overall  She does however continue to have wearing between doses with increased freezing and gait issues  She has a good on with the afternoon and evening doses of medication  We had a long discussion in regards to options at this time for her wearing off  Unfortunately we do need to be cautious with making any adjustments given her issues with dyskinesia and hallucinations  One option would be to make an adjustment to the Stalevo dose itself and reduce the interval of time between doses to try and avoid wearing off  We also discussed the option of switching her Stalevo to Rytary which is a combination of immediate release and extended release Sinemet and 1 tab  There is also a medication called Cathale[place] Ismael which is used strictly for wearing off between doses of medication  This is however a newer medication and is sometimes hard to get covered  After review of the options she would like to start by making an adjustment to her Stalevo  She will start by taking 1 tab every 2 5 hours throughout the day  With this change she will need to watch for any worsening of her dyskinesia or development of hallucinations  She does continue to have improvement with the Gocovri however higher dosing unfortunately caused hallucinations  She also continues to take gabapentin currently taking 300 mg 3 times a day for her pain  This does appear to be beneficial for pain however it is causing more daytime sedation  We did discuss the option of making some adjustments to this if she would like to however this may make her daytime pain worse    If she would like she could try taking 300 mg in the morning and 600 mg before bed and cutting out her midday dose to see if that provides any benefit  She has had home PT and OT in the past which was helpful  She recently completed therapy following her surgery, would like to get back into therapy geared more towards her PD symptoms given she continues to struggle with gait and freezing  She remains interested in DBS as well  The goal with this would be to help with her wearing off without potentially making her dyskinesia worse  We did however discussed that prior to doing the DBS surgery we would need to do the neuropsych evaluation as well as on-off testing so that we could get a clear indication of what the goals would be with this surgery  At this time we will start with neuropsych testing  A new referral for this was placed in her chart  If she does not hear from them in the next week she will call our office back so we can help facilitate

## 2023-03-08 NOTE — Clinical Note
Patient would like to get back into home PT and OT  Just completed from a knee perspective so not sure if possible from a PD perspective now

## 2023-03-08 NOTE — PROGRESS NOTES
Patient ID: Nayana Carranza is a 71 y o  female  Assessment/Plan:    Parkinson's disease ([facility] )  Patient with Parkinson's disease with symptoms onset in 9631 complicated by motor fluctuations, peak dose dyskinesias, and postural instability with freezing while in the off state  At this time her dyskinesia is much better controlled overall  She does however continue to have wearing between doses with increased freezing and gait issues  She has a good on with the afternoon and evening doses of medication  We had a long discussion in regards to options at this time for her wearing off  Unfortunately we do need to be cautious with making any adjustments given her issues with dyskinesia and hallucinations  One option would be to make an adjustment to the Stalevo dose itself and reduce the interval of time between doses to try and avoid wearing off  We also discussed the option of switching her Stalevo to Rytary which is a combination of immediate release and extended release Sinemet and 1 tab  There is also a medication called Darcia Sarna which is used strictly for wearing off between doses of medication  This is however a newer medication and is sometimes hard to get covered  After review of the options she would like to start by making an adjustment to her Stalevo  She will start by taking 1 tab every 2 5 hours throughout the day  With this change she will need to watch for any worsening of her dyskinesia or development of hallucinations  She does continue to have improvement with the Gocovri however higher dosing unfortunately caused hallucinations  She also continues to take gabapentin currently taking 300 mg 3 times a day for her pain  This does appear to be beneficial for pain however it is causing more daytime sedation  We did discuss the option of making some adjustments to this if she would like to however this may make her daytime pain worse    If she would like she could try taking 300 mg in the morning and 600 mg before bed and cutting out her midday dose to see if that provides any benefit  She has had home PT and OT in the past which was helpful  She recently completed therapy following her surgery, would like to get back into therapy geared more towards her PD symptoms given she continues to struggle with gait and freezing  She remains interested in DBS as well  The goal with this would be to help with her wearing off without potentially making her dyskinesia worse  We did however discussed that prior to doing the DBS surgery we would need to do the neuropsych evaluation as well as on-off testing so that we could get a clear indication of what the goals would be with this surgery  At this time we will start with neuropsych testing  A new referral for this was placed in her chart  If she does not hear from them in the next week she will call our office back so we can help facilitate  Subjective: Heather Young is a 80year-old woman with young onset, levodopa responsive Parkinson's disease who presents for movement follow up  Review of chart reveals PD symptom onset 1999 (47yo) with left shoulder stiffness, later complicated by motor fluctuations, early wearing off, unpredictable offs, delayed on, mild dyskinesias and off freezing of gait  At her last visit no changes were made to her medication  Discussed DBS and she was sent for neuropysch testing  INTERVAL HISTORY:  Since the last visit she got knocked down by a dog and suffered a left tibial plateau fracture s/p ORIF 11/15/2022  She went to rehab following this surgery  She was later found to have a DVT (1/10/23) and started on Eliquis     She has NOT been able to get in with Neuropsych testing given being in and out of the hospital and rehab   Recently started Buspar for anxiety     She continues to struggle with wearing off and freezing of gait, this is worse when she is wearing off   Since being home she has been trying to take the Stalevo every 3 hours but she feels that this is wearing off prior to her next dose   She had liked how she was taking the Stalevo in the past when she was taking it closer to every 2 5 hours   She will often feel that she does NOT kick on at all with the first two doses, the rest of the doses she will feel an on but some doses will still wear off   Dyskinesia is much better  She did not take the Suella Sack for a few days and this made the freezing worse   Spine physician increased the Neurontin to 300mg tid for pain, this has made the daytime fatigue worse, pain however is better   She will often sleep from 10-5, she will also take naps   She completed her PT and OT following her knee surgery     Current medications and timing:   - Gocovri 1 po qhs - worsened hallucinations on higher dosing   - stalevo 12 5/50/200 6 times per day Q3 hours   - rasagiline 1mg daily   - gabapentin 300mg tid - for pain   - Buspar - for anxiety      Prior medication trials:  selegiline   Sinemet IR  Ropinirole IR   ropinirole XL 6mg daily - dose reduced due to hallucinations and dyskinesia  Depakote - started inpatient for mood       I personally reviewed and updated the ROS  I have spent a total time of 50 minutes on 03/08/23 in caring for this patient including Prognosis, Risks and benefits of tx options, Instructions for management, Patient and family education, Risk factor reductions, Impressions, Counseling / Coordination of care and Reviewing / ordering tests, medicine, procedures    Objective:    Blood pressure 128/74, pulse 95, temperature 98 3 °F (36 8 °C), temperature source Temporal, height 5' 4" (1 626 m), weight 66 2 kg (146 lb), SpO2 97 %, not currently breastfeeding  Physical Exam  Constitutional:       General: She is awake  Appearance: Normal appearance     HENT:      Right Ear: Hearing normal       Left Ear: Hearing normal    Eyes:      General: Lids are normal  Extraocular Movements: Extraocular movements intact  Pupils: Pupils are equal, round, and reactive to light  Pulmonary:      Effort: Pulmonary effort is normal    Neurological:      Mental Status: She is alert  Motor: Motor strength is normal    Psychiatric:         Speech: Speech normal          Neurological Exam  Mental Status  Awake and alert  Oriented only to person, place and situation  Speech is normal  Speech: hypophonia  Language is fluent with no aphasia  Attention and concentration are normal   MoCA 21/25 - 8/9/22    Cranial Nerves  CN III, IV, VI: Extraocular movements intact bilaterally  Normal lids and orbits bilaterally  Pupils equal round and reactive to light bilaterally  CN V:  Right: Facial sensation is normal   Left: Facial sensation is normal on the left  CN VII: Full and symmetric facial movement  CN VIII: Hearing is normal   Right: Hearing is normal   Left: Hearing is normal   CN IX, X: Palate elevates symmetrically  CN XI: Shoulder shrug strength is normal   CN XII: Tongue midline without atrophy or fasciculations  Motor   Increased muscle tone  Neck and RLE  Strength is 5/5 throughout all four extremities  Sensory  Light touch is normal in upper and lower extremities  Coordination  Right: Finger-to-nose normal  Rapid alternating movement abnormality:Left: Finger-to-nose normal  Rapid alternating movement abnormality:  See MDS UPDRS   Intermittent LE dyskinesia noted     Gait  Casual gait: Able to rise from chair without using arms  Reduced stride and arm swing  No freezing          MDS UPDRS III                              10/17/22 3/8/23   Time since last dose:      Speech  0 1   Facial Expression  2 2   Rigidity - Neck  2    Rigidity - Upper Extremity (R)  0 0   Rigidity - Upper Extremity (L)   0 1   Rigidity - Lower Extremity (R)  1 1   Rigidity - Lower Extremity (L)   0 0   Finger Taps (R)   1 1   Finger Taps (L)   1 1   Hand Movement (R)  0 0   Hand Movement (L)   0 1   Pronation/Supination (R)  1 1   Pronation/Supination (L)   1 1   Toe Tapping (R) 0 0   Toe Tapping (L) 1 1   Leg Agility (R)  0 0   Leg Agility (L)   0 0   Arising from Chair   0 0   Gait   1 1   Freezing of Gait 0 0   Postural Stability        Posture 2 2   Global spontaneity of movement 1 1   Postural Tremor (Ri 1 1   Postural Tremor (L) 1 1   Kinetic Tremor (R)  0 0   Kinetic Tremor (L)  0 0   Rest tremor amplitude RUE 0 0   Rest tremor amplitude LUE 0 0   Rest tremor amplitude RLE 0 0   Reset tremor amplitude LLE 0 0   Lip/Jaw Tremor  0 0   Consistency of tremor 0 0   Motor Exam Total:             ROS:    Review of Systems   Constitutional: Negative  Negative for appetite change and fever  HENT: Negative  Negative for hearing loss, tinnitus, trouble swallowing and voice change  Eyes: Negative  Negative for photophobia, pain and visual disturbance  Respiratory: Negative  Negative for shortness of breath  Cardiovascular: Negative  Negative for palpitations  Gastrointestinal: Negative  Negative for nausea and vomiting  Endocrine: Negative  Negative for cold intolerance  Genitourinary: Negative  Negative for dysuria, frequency and urgency  Musculoskeletal: Positive for gait problem (freezing), myalgias and neck pain  Skin: Negative  Negative for rash  Allergic/Immunologic: Negative  Neurological: Negative for dizziness, tremors, seizures, syncope, facial asymmetry, speech difficulty, weakness, light-headedness, numbness and headaches  Hematological: Negative  Does not bruise/bleed easily  Psychiatric/Behavioral: Negative  Negative for confusion, hallucinations and sleep disturbance  All other systems reviewed and are negative

## 2023-03-08 NOTE — Clinical Note
Patient would also like to see if there are any grants available for coverage for the Gocovri, thanks!

## 2023-03-09 NOTE — TELEPHONE ENCOUNTER
MSW checked this date with the following results:     Good Days - no PD funding  [facility] - [facility] Disorders closed  The [facility]  Patient [employer] - need for PD funding identified but no funding is available       MSW will continue to check for copay assistance each business day  MSW attempted to reach patient to inquire about what agencies she used for therapy to help determine if she would be eligible for additional therapy  No answer at [phone]  MSW left a message requesting callback  MSW also advised on the message that at this time all the charitable organizations are either closed/waitlisted  MSW will provide information on how to get on waitlists when patient calls back

## 2023-03-10 ENCOUNTER — APPOINTMENT (OUTPATIENT)
Dept: RADIOLOGY | Facility: AMBULARY SURGERY CENTER | Age: 73
End: 2023-03-10
Attending: ORTHOPAEDIC SURGERY

## 2023-03-10 ENCOUNTER — OFFICE VISIT (OUTPATIENT)
Dept: OBGYN CLINIC | Facility: CLINIC | Age: 73
End: 2023-03-10

## 2023-03-10 DIAGNOSIS — M84.351A STRESS FRACTURE OF SHAFT OF RIGHT FEMUR, INITIAL ENCOUNTER: ICD-10-CM

## 2023-03-10 DIAGNOSIS — M84.352A STRESS FRACTURE OF LEFT FEMUR, INITIAL ENCOUNTER: ICD-10-CM

## 2023-03-10 DIAGNOSIS — M84.352A STRESS FRACTURE OF LEFT FEMUR, INITIAL ENCOUNTER: Primary | ICD-10-CM

## 2023-03-10 NOTE — TELEPHONE ENCOUNTER
MSW checked this date with the following results:     Good Days - no PD funding  [facility] - [facility] Disorders closed  [facility]  Patient [employer] - need for PD funding identified but no funding is available       MSW will continue to check for copay assistance each business day  MSW received call from patient ([phone])  Patient stated that she used [facility] for her therapy  MSW advised that this writer will inquire about resuming therapy with [facility] and will update her as able  Patient was in agreement with this plan  In regard to [facility] [address], she stated that she was in contact with someone from [employer], and they directed her to MARK Josue  MSW advised that there are 3 charitable organizations that can assist with PD funding - one is closed ([address] [place] [address]) and two are waitlisted ([address] [place] [address])  MSW will send patient the links to join the waitlists for the [address] [place] [address] to patient via [address] this date  MSW advised that this writer will check for funding assistance each business day and will notify her if any becomes available  MSW advised that the premise of the waitlist is those registered will get notified before the general public if funding becomes available  MSW did advise that if funding does become available once a patient is on the waiting list, that patient/family must respond within a specific time frame in the hopes of obtaining funding (funding is given on a first come, first serve basis)

## 2023-03-10 NOTE — TELEPHONE ENCOUNTER
MSW phoned Coco Holly at [facility] (OUTPATIENT CAMPUS)  She stated that patient is still active with them for PT and ST  Zelda stated OT discharged patient on 2/22/23 as she achieved all of the functional goals and was independent with ADL's  Zelda will reach out to patient's physical therapist, Cindy Mota, to see if she can put focus on strategies for PD in addition to the orthotic strategies she is receiving  Zelda stated that if there has been a significant change/decline in patient's abilities, OT can be re-ordered  Zelda stated that they will always go out to evaluate for OT services, but if there is no need OT services may not be restarted  Zelda will inquire with Cindy Mota, physical therapist, to see if patient has experienced any decline       Zelda will update this writer after she speaks with Cindy Mota, physical therapist

## 2023-03-10 NOTE — PROGRESS NOTES
Assessment:   Diagnosis ICD-10-CM Associated Orders   1  Stress fracture of left femur, initial encounter  M84 352A XR femur 2 vw left      2  Stress fracture of shaft of right femur, initial encounter  M84 351A XR femur 2 vw right          Plan:  • New xrays of the bilateral femurs were obtained today  • Patient has not had any symptoms since switching to Prolia from Forteo  • She has no pain on exam  • We will continue to monitor with serial xrays  To do next visit:  Return in about 1 year (around 3/10/2024) for bilateral femurs  The above stated was discussed in layman's terms and the patient expressed understanding  All questions were answered to the patient's satisfaction  Scribe Attestation    I,:  Chris Marin am acting as a scribe while in the presence of the attending physician :       I,:  Erika Humphrey MD personally performed the services described in this documentation    as scribed in my presence :             Subjective: Vu Catherine is a 71 y o  female who presents today for a 1 year follow-up for her bilateral femoral stress fractures  Overall, she has had no pain in the femurs  She has a recent hx of a left tibia ORIF, 11/13/22 by Dr Jake Hensley at [facility]  Patient is on Prolia at this time  She has recently had a DEXA scan that noted that there is denser bone  Review of systems negative unless otherwise specified in HPI  Review of Systems   Constitutional: Negative for chills, fever and unexpected weight change  HENT: Negative for hearing loss, nosebleeds and sore throat  Eyes: Negative for pain, redness and visual disturbance  Respiratory: Negative for cough, shortness of breath and wheezing  Cardiovascular: Negative for chest pain, palpitations and leg swelling  Gastrointestinal: Negative for abdominal pain and nausea  Genitourinary: Negative for dyspareunia, dysuria and frequency  Skin: Negative for rash and wound     Neurological: Negative for dizziness, numbness and headaches  Psychiatric/Behavioral: Negative for decreased concentration and suicidal ideas  The patient is not nervous/anxious          Past Medical History:   Diagnosis Date   • GERD (gastroesophageal reflux disease)    • Osteoporosis    • Parkinson disease ([address] )        Past Surgical History:   Procedure Laterality Date   • ARTERIOGRAM N/A 07/10/2019    Procedure: KYPHOPLASTY;  Surgeon: Rossana Thomas MD;  Location: [facility];  Service: Interventional Radiology   • IR KYPHOPLASTY/VERTEBROPLASTY  07/10/2019   • LEG SURGERY Left 11/2022   • TONSILLECTOMY         Family History   Problem Relation Age of Onset   • Dementia Mother    • Heart disease Father    • No Known Problems Daughter    • No Known Problems Maternal Grandmother    • No Known Problems Maternal Grandfather    • No Known Problems Paternal Grandmother    • Prostate cancer Paternal Grandfather    • No Known Problems Daughter    • No Known Problems Daughter    • Alcohol abuse Neg Hx    • Mental illness Neg Hx    • Substance Abuse Neg Hx    • Depression Neg Hx        Social History     Occupational History   • Not on file   Tobacco Use   • Smoking status: Never   • Smokeless tobacco: Never   Vaping Use   • Vaping Use: Never used   Substance and Sexual Activity   • Alcohol use: Not Currently     Comment: Rare   • Drug use: Never   • Sexual activity: Not on file         Current Outpatient Medications:   •  Amantadine HCl ER (Gocovri) 137 MG CP24, Take by mouth daily after breakfast, Disp: , Rfl:   •  busPIRone (BUSPAR) 5 mg tablet, Take 5 mg by mouth 2 (two) times a day, Disp: , Rfl:   •  Calcium Carbonate-Vit D-Min (CALCIUM 1200 PO), Take 1,200 mg by mouth daily, Disp: , Rfl:   •  carbidopa-levodopa-entacapone (STALEVO) 12 5- MG per tablet, TAKE 1 TABLET BY MOUTH 6 (SIX) TIMES A DAY, Disp: 540 tablet, Rfl: 2  •  cyanocobalamin (VITAMIN B-12) 1000 MCG tablet, Take 1 tablet (1,000 mcg total) by mouth daily, Disp: 90 tablet, Rfl: 1  •  Eliquis 5 MG, Take 5 mg by mouth 2 (two) times a day, Disp: , Rfl:   •  famotidine (PEPCID) 20 mg tablet, TAKE 2 TABLETS (40 MG TOTAL) BY MOUTH DAILY AT BEDTIME (Patient taking differently: Take 40 mg by mouth if needed), Disp: 180 tablet, Rfl: 1  •  gabapentin (NEURONTIN) 300 mg capsule, Take 1 capsule (300 mg total) by mouth daily at bedtime (Patient taking differently: Take 300 mg by mouth 3 (three) times a day), Disp: 90 capsule, Rfl: 1  •  rasagiline (AZILECT) 1 MG, TAKE 1 TABLET BY MOUTH EVERY DAY, Disp: 90 tablet, Rfl: 3  •  divalproex sodium (DEPAKOTE SPRINKLE) 125 MG capsule, Take 1 capsule (125 mg total) by mouth daily at bedtime (Patient not taking: Reported on 10/17/2022), Disp: 30 capsule, Rfl: 0  •  docusate sodium (COLACE) 100 mg capsule, Take 1 capsule (100 mg total) by mouth 2 (two) times a day as needed for constipation (Patient not taking: Reported on 10/17/2022), Disp: 30 capsule, Rfl: 0  •  gabapentin (NEURONTIN) 100 mg capsule, Take 100 mg by mouth 2 (two) times a day (Patient not taking: Reported on 3/8/2023), Disp: , Rfl:     Allergies   Allergen Reactions   • Sulfa Antibiotics      Other reaction(s): family history - anaphylaxis  Category: Allergy;    • Doxycycline Rash     Category: Allergy; There were no vitals filed for this visit  Objective:                    Ortho Exam   Bilateral lower extremities:   Full hip flexion and Abduction of the hip  Full ROM of the knees bilaterally   Strength 5/5 flip flexion, abduction and adduction  - SLR  Sensation intact to light touch distally  Calf is soft and non tender    Diagnostics, reviewed and taken today if performed as documented: The attending physician has personally reviewed the pertinent films in PACS and interpretation is as follows:  Xrays of bilateral femurs 2 views: No significant changes noted of over the lateral beaking of the lateral femoral cortex- bilaterally       Procedures, if performed today:    Procedures    None performed      Portions of the record may have been created with voice recognition software  Occasional wrong word or "sound a like" substitutions may have occurred due to the inherent limitations of voice recognition software  Read the chart carefully and recognize, using context, where substitutions have occurred

## 2023-03-13 NOTE — TELEPHONE ENCOUNTER
LATE ENTRY FROM 3/10/23:    MSW received a callback from Morris Urbano with [facility] (OUTPATIENT CAMPUS)  She said that she was in contact with her therapist, Ronald Escobedo, who stated that she plans to discharge patient next week  Ronald Escobedo stated the patient has met all goals and there’s nothing that she can justify to keep patient active or even offer a maintenance program  Ronald Escobedo stated that the patient is very independent and high functioning  Ronald Escobedo will provide a home exercise plan on discharge  Zelda stated that they can certainly be re-consulted if patient has any decline in her abilities

## 2023-03-13 NOTE — TELEPHONE ENCOUNTER
MSW phoned the number for Jacqui Thomas at [phone] and spoke with Kasie Siegel to inquire if there were any other options for Gocovri financial assistance for this patient, a Medicare Part D recipient  Kasie Siegel stated that the only options for Medicare Part D patients is for [employer] such as [employer] ([phone]) and [employer] ([phone])  Kasie Siegel stated that patients should put their names on the waitlist since funding can change at any time  Kasie Siegel stated that there is no funding available at the current time       MSW checked this date with the following results:     Good Days - no PD funding  [facility]  The [facility] waitlist  Patient [employer] - need for PD funding identified but no funding is available       MSW will continue to check for copay assistance each business day      MSW noted that the [employer] message that was sent to patient on 3/10 has not yet been read  MSW attempted to reach patient at [phone] to follow-up regarding Mary Anne Andrade and Gocovri affordability  No answer  MSW left a message requesting callback  Awaiting same

## 2023-03-14 NOTE — TELEPHONE ENCOUNTER
MSW received the following MyChart message from patient:    "Arjun Prather,     Thanks so much for your work on this and getting back to me   I will follow your suggestions and keep my fingers crossed      Tyra"    MSW checked this date with the following results:     Good Days - no PD funding  [facility] - Movement Disorders closed  The [facility] waitlist  Patient [address] - need for PD funding identified but no funding is available       MSW will continue to check for copay assistance each business day

## 2023-03-15 NOTE — TELEPHONE ENCOUNTER
MSW checked this date with the following results:     Good Days - no PD funding  [facility] closed  The [facility] [place] [facility] waitlist  Patient [address] - need for PD funding identified but no funding is available       MSW will continue to check for copay assistance each business day      MSW did try to reach patient this date at [phone] to see if she was able to register for waitlists and to provide update about about ongoing therapy Jessa Naidu  No answer  MSW left a message requesting callback  Awaiting same  [employer] message sent to patient, requesting response  If no response by 3/17, MSW will then place an "Unable to Reach" letter in the mail to patient

## 2023-03-17 NOTE — TELEPHONE ENCOUNTER
MSW checked this date with the following results:     Good Days - no PD funding  [facility] - [facility] Disorders closed  The [facility] [place] [facility] waitlist  Patient [address] - need for PD funding identified but no funding is available       MSW will continue to check for copay assistance each business day      MSW did try to reach patient this date at [phone] to see if she was able to register for waitlists and to provide update about about ongoing therapy Sandra Michael  No answer  MSW left a message requesting callback  Awaiting same  [employer] message not yet read by patient  MSW placed an "Unable to Reach" in the mail to patient this date  MSW will be available to patient should she call back

## 2023-03-21 NOTE — TELEPHONE ENCOUNTER
LATE ENTRY FROM 3/17/23:    MSW received a callback from patient  She stated that she had not yet registered to be on the waiting list  MSW explained benefit of registering for the waitlist - to be notified before the general public if PD funding were to become available  Patient stated that she will register soon  MSW advised that this writer will continue to check [facility] organizations each business day and will notify her if any additional funds become available  MSW also reviewed response from [facility] (OUTPATIENT CAMPUS) - that since patient has met her goals and is very independent that they will discharge her  Patient is aware that home therapy services can be reordered if/when she has a decline in her functional abilities

## 2023-03-21 NOTE — TELEPHONE ENCOUNTER
LATE ENTRY FROM 3/20/23:    MSW checked this date with the following results:     Good Days - no PD funding  [facility] - Movement Disorders closed  The [place]  Patient [employer] - need for PD funding identified but no funding is available       MSW will continue to check for copay assistance each business day

## 2023-03-21 NOTE — TELEPHONE ENCOUNTER
MSW checked this date with the following results:     Good Days - no PD funding  [facility] - Movement Disorders closed  The [address] - patient was advised to join the waitlist  [facility] - patient was advised to join the waitlist  Patient [employer] - need for PD funding identified but no funding is available       MSW will continue to check for copay assistance each business day

## 2023-03-23 NOTE — TELEPHONE ENCOUNTER
MSW checked this date with the following results:     Good Days - no PD funding  [facility] - Movement Disorders closed  The [address] - patient was advised to join the waitlist  [facility] - patient was advised to join the waitlist  Patient [employer] - need for PD funding identified but no funding is available       MSW will continue to check for copay assistance each business day  MSW will follow-up with patient about the waiting lists next week

## 2023-03-23 NOTE — TELEPHONE ENCOUNTER
LATE ENTRY FROM 3/22/23:    MSW received correspondence from patient via [address]  Patient stated that she tried to apply for [facility], but experienced an error  Patient said she would try to reapply again later  Patient also stated that PD was not listed on the [facility] website as a covered disease so she did not apply  MSW sent a reply that if she cannot complete online application for the [facility] that she can call [facility] at [phone]  MSW also advised that the other charitable organization that she can join the waitlist for is [employer]  MSW again provided the website address and phone also provided the phone number for this organization as [phone]  Lastly, MSW advised that the funding for [employer] is listed under "Movement Disorders" not "PD" but that they are closed (no waitlist to join)  MSW will follow-up with patient in a few days to see if she was able to get added to the waitlist for [address] [facility]

## 2023-03-24 NOTE — TELEPHONE ENCOUNTER
MSW checked this date with the following results:     Good Days - no PD funding  [facility] - Movement Disorders closed  The [address] - patient was advised to join the waitlist  [facility] - patient was advised to join the waitlist  Patient [employer] - need for PD funding identified but no funding is available       MSW will continue to check for copay assistance each business day  MSW will follow-up with patient about the waiting lists next week

## 2023-03-24 NOTE — TELEPHONE ENCOUNTER
MSW received the following [employer] message from patient:    "I was able to get on the waitlist for the Assistance program  I will keep working on the others "  MSW responded with:    "Arjun Robbins,     Great news! The only other one with the waiting list for Parkinson's is [facility]  If you are not able to complete the application online, you can always call them at [phone]  Please keep me posted       Best Regards,     Ann Chiu, MSW   [facility]  [facility] [address], [address] [facility]  [phone] - phone  [phone] - fax   [employer]"

## 2023-03-25 ENCOUNTER — NURSE TRIAGE (OUTPATIENT)
Dept: OTHER | Facility: OTHER | Age: 73
End: 2023-03-25

## 2023-03-25 NOTE — TELEPHONE ENCOUNTER
Reason for Disposition  • [1] SEVERE vomiting (e g , 6 or more times/day) AND [2] present > 8 hours (Exception: patient sounds well, is drinking liquids, does not sound dehydrated, and vomiting has lasted less than 24 hours)    Answer Assessment - Initial Assessment Questions  1  VOMITING SEVERITY: "How many times have you vomited in the past 24 hours?"      - MILD:  1 - 2 times/day     - MODERATE: 3 - 5 times/day, decreased oral intake without significant weight loss or symptoms of dehydration     - SEVERE: 6 or more times/day, vomits everything or nearly everything, with significant weight loss, symptoms of dehydration      > Six times   2  ONSET: "When did the vomiting begin?"     3/24  3  FLUIDS: "What fluids or food have you vomited up today?" "Have you been able to keep any fluids down?"     Not able to keep fluids down   4  ABDOMINAL PAIN: "Are your having any abdominal pain?" If yes : "How bad is it and what does it feel like?" (e g , crampy, dull, intermittent, constant)       Denies   5  DIARRHEA: "Is there any diarrhea?" If Yes, ask: "How many times today?"     Confirms  3 episodes   6  CONTACTS: "Is there anyone else in the family with the same symptoms?"      Denies   7  CAUSE: "What do you think is causing your vomiting?"    Unsure   8  HYDRATION STATUS: "Any signs of dehydration?" (e g , dry mouth [not only dry lips], too weak to stand) "When did you last urinate?"    Urinated within the last few hours  Dry in oral mucosa, and weakness     9  OTHER SYMPTOMS: "Do you have any other symptoms?" (e g , fever, headache, vertigo, vomiting blood or coffee grounds, recent head injury)    Headache, nausea    Protocols used: VOMITING-ADULT-

## 2023-03-25 NOTE — TELEPHONE ENCOUNTER
C/o severe vomiting since 3/24 with diarrhea, weakness, dry mouth, headache, and nausea  Unable to hold fluid down, nor medications  No additional symptoms reported  Care advice given  Verbalized understanding and agreeable with disposition  No further questions  On call Provider contacted and informed of patient’s concerns and status

## 2023-03-27 ENCOUNTER — TELEPHONE (OUTPATIENT)
Dept: NEUROLOGY | Facility: CLINIC | Age: 73
End: 2023-03-27

## 2023-03-27 NOTE — TELEPHONE ENCOUNTER
Noted that pt's was assess in ED [facility] -3/25 and discharge with script for Zofran    Called pt to follow up, requested call back to give us update how she is feeling

## 2023-04-24 ENCOUNTER — TELEPHONE (OUTPATIENT)
Dept: PSYCHIATRY | Facility: CLINIC | Age: 73
End: 2023-04-24

## 2023-04-26 ENCOUNTER — TELEPHONE (OUTPATIENT)
Dept: PSYCHIATRY | Facility: CLINIC | Age: 73
End: 2023-04-26

## 2023-04-27 ENCOUNTER — OFFICE VISIT (OUTPATIENT)
Dept: PSYCHIATRY | Facility: CLINIC | Age: 73
End: 2023-04-27

## 2023-04-27 DIAGNOSIS — G31.84 MILD COGNITIVE IMPAIRMENT: ICD-10-CM

## 2023-04-27 DIAGNOSIS — G20 PARKINSON'S DISEASE (HCC): Primary | ICD-10-CM

## 2023-04-27 DIAGNOSIS — Z00.8 PRE-SURGICAL PSYCHOLOGICAL ASSESSMENT, ENCOUNTER FOR: ICD-10-CM

## 2023-04-28 NOTE — PSYCH
Seema Norton was seen by rickie Malik, neuropsychometrist, for a neuropsychological assesment on 04/27/2023

## 2023-05-01 ENCOUNTER — FOLLOW UP (OUTPATIENT)
Dept: URBAN - METROPOLITAN AREA CLINIC 6 | Facility: CLINIC | Age: 73
End: 2023-05-01

## 2023-05-01 DIAGNOSIS — H25.13: ICD-10-CM

## 2023-05-01 DIAGNOSIS — H04.123: ICD-10-CM

## 2023-05-01 PROCEDURE — 92014 COMPRE OPH EXAM EST PT 1/>: CPT

## 2023-05-01 ASSESSMENT — VISUAL ACUITY
OD_CC: 20/25-2
OU_CC: J1
OS_CC: 20/25-1
OU_CC: 20/25-1

## 2023-05-01 ASSESSMENT — TONOMETRY
OD_IOP_MMHG: 17
OS_IOP_MMHG: 19

## 2023-05-08 ENCOUNTER — TELEPHONE (OUTPATIENT)
Dept: PSYCHIATRY | Facility: CLINIC | Age: 73
End: 2023-05-08

## 2023-05-10 ENCOUNTER — TELEMEDICINE (OUTPATIENT)
Dept: PSYCHIATRY | Facility: CLINIC | Age: 73
End: 2023-05-10

## 2023-05-10 DIAGNOSIS — Z00.8 PRE-SURGICAL PSYCHOLOGICAL ASSESSMENT, ENCOUNTER FOR: ICD-10-CM

## 2023-05-10 DIAGNOSIS — G20 PARKINSON'S DISEASE (HCC): Primary | ICD-10-CM

## 2023-05-10 DIAGNOSIS — G31.84 MILD COGNITIVE IMPAIRMENT: ICD-10-CM

## 2023-05-10 NOTE — PSYCH
Virtual Regular Visit  Encounter provider Josefa Reyes PSYD     Provider located at   [address] [place] [address]    The patient was identified by name and date of birth  Raj Adam was informed that this is a telemedicine visit and that the visit is being conducted through [address] [facility] and patient was informed that this is a secure, HIPAA-compliant platform  She agrees to proceed  My office door was closed  No one else was in the room  She acknowledged consent and understanding of privacy and security of the video platform  The patient has agreed to participate and understands they can discontinue the visit at any time  Patient is aware this is a billable service  Verification of patient location: Patient is located in the following state in which I hold an active license: [place]    Feedback following neuropsychological evaluation:  Provided feedback to patient and daughter via Noman Conroy from neuropsychological evaluation initiated and completed on 4/27/23  Extensively discussed validity issues and variability in performance that necessitates cautious interpretation of the neuropsychological data  Discussed performance within each cognitive domain, emphasizing nuanced interpretation when appropriate, with consideration of non-cognitive factors that may have impacted performance (e g , anxiety, vision difficulty); patient also discussed the possible impact of fatigue especially in the last third of the testing session  Discussed the MCI diagnosis, 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  Patient and daughter had excellent questions and all were answered to the best of my ability  They plan to discuss advantages vs  risks more comprehensively regarding DBS in general and specific to her cognitive data with the neurologist at the next appointment, and they anticipate further conversations with the neurosurgeon when referred   Discussed data from psychological measures included in the evaluation with minimal depressive and mild anxious symptomatology possibly adjustment-related, would not be contraindication for DBS  Daughter inquired about the possibility of counseling; recommended [address] [place] [address] Psychology group for their expertise in the intersection of neurological, emotional, and cognitive health  Report routed to Ms  Júnior Adinajose and Dr Guadalupe Jacobs; patient is scheduled for neurological follow-up on 7/10/23  Report also faxed to PCP (Dr Perez at [facility]) and mailed to patient’s home, per request       Enzo Espy Dianne Snellen Neuropsychologist    VIRTUAL VISIT DISCLAIMER   Rita Mcgovern verbally agrees to participate in [employer]  Pt is aware that [employer] could be limited without vital signs or the ability to perform a full hands-on physical exam  Rita Mcgovern understands she or the provider may request at any time to terminate the video visit and request the patient to seek care or treatment in person      Billing notes:  28304: 1 unit (Interview 82 min)  75081: 1 unit (Neuropsychologist Scoring 34 min)  42787: 1 unit; 66518: 8 units (Psychometrist Testing/Scoring 269 min)  71311: 1 unit; 51748: 2 units (Chart Review/Interpretation/Feedback 189 min) *including 59 min virtual

## 2023-05-10 NOTE — PSYCH
NEUROPSYCHOLOGY EVALUATION    Name:    Carmen Oakes  YOB: 1950  Age:    71  Date of Service:  4/27/2023  Referred By:   Arti Ochoa PA-C, Vivi Rodriguez MD         REASON FOR REFERRAL  Carmen Oakes is a 71 y o  right-handed  female referred by Ms Juan Manuel Chun and Dr Huma Dorado for a neuropsychological evaluation prior to consideration of DBS for PD      CHIEF COMPLAINT  Patient denied cognitive difficulties  She was unaware of any concerns from family  Mild Cognitive Impairment is listed on patient’s problem list, noted in 2014  Patient surmised that her cognitive experiences at that time were, rather, related to normal aging  Patient experienced altered mental status in August 2022 which was attributed to delirium due to medication side-effect  Functionally, patient is independent with ADLs and IADLs  She had experienced a couple of episodes of getting lost, though in hindsight thinks these may have been related to medication side effects  She hadn’t been driving recently due to broken leg, medication-related episode of hallucinations, and not at night due to cataracts  At this time, she has limited her driving to “straight roads without complications ” Patient shops using a list or [employer]  She denied issues with cooking or use of technology  Other than financial constraints, she denied problems with financial management, and indicated that while she would like to transfer this responsibility to her , he is experiencing cognitive issues  Patient stated that her children were concerned about her medication management following the hallucination episode, thinking she had made an error, and she began using a programmable pillbox dispenser at that time, but not currently  Her children had also obtained a large calendar for appointment management; patient denies problems with scheduling      Past Medical History:   Diagnosis Date   • GERD (gastroesophageal reflux disease)    • Osteoporosis    • Parkinson disease ([facility] [address] )          Current Outpatient Medications:   •  Amantadine HCl ER (Gocovri) 137 MG CP24, Take 1tab qhs, Disp: 30 capsule, Rfl: 5  •  busPIRone (BUSPAR) 5 mg tablet, Take 5 mg by mouth 2 (two) times a day, Disp: , Rfl:   •  Calcium Carbonate-Vit D-Min (CALCIUM 1200 PO), Take 1,200 mg by mouth daily, Disp: , Rfl:   •  carbidopa-levodopa-entacapone (STALEVO) 12 5- MG per tablet, TAKE 1 TABLET BY MOUTH 6 (SIX) TIMES A DAY, Disp: 540 tablet, Rfl: 2  •  cyanocobalamin (VITAMIN B-12) 1000 MCG tablet, Take 1 tablet (1,000 mcg total) by mouth daily, Disp: 90 tablet, Rfl: 1  •  divalproex sodium (DEPAKOTE SPRINKLE) 125 MG capsule, Take 1 capsule (125 mg total) by mouth daily at bedtime (Patient not taking: Reported on 10/17/2022), Disp: 30 capsule, Rfl: 0  •  docusate sodium (COLACE) 100 mg capsule, Take 1 capsule (100 mg total) by mouth 2 (two) times a day as needed for constipation (Patient not taking: Reported on 10/17/2022), Disp: 30 capsule, Rfl: 0  •  Eliquis 5 MG, Take 5 mg by mouth 2 (two) times a day, Disp: , Rfl:   •  famotidine (PEPCID) 20 mg tablet, TAKE 2 TABLETS (40 MG TOTAL) BY MOUTH DAILY AT BEDTIME (Patient taking differently: Take 40 mg by mouth if needed), Disp: 180 tablet, Rfl: 1  •  gabapentin (NEURONTIN) 100 mg capsule, Take 100 mg by mouth 2 (two) times a day (Patient not taking: Reported on 3/8/2023), Disp: , Rfl:   •  gabapentin (NEURONTIN) 300 mg capsule, Take 1 capsule (300 mg total) by mouth daily at bedtime (Patient taking differently: Take 300 mg by mouth 3 (three) times a day), Disp: 90 capsule, Rfl: 1  •  rasagiline (AZILECT) 1 MG, TAKE 1 TABLET BY MOUTH EVERY DAY, Disp: 90 tablet, Rfl: 3      NEUROIMAGING  CT 8/23/22  No acute intracranial abnormality  PSYCHOSOCIAL HISTORY  Developmental/Educational history:  Ms Emily Singh was born full-term and developed normally  She was raised in the [facility]   She achieved good grades in school (Bs, As), with math being more challenging  She obtained 3 MA degrees (education, spirituality, counseling psychology)  Occupational history:  Patient is retired  She had taught in elementary school, 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  She subsequently worked in a , private practice, [email or web address], and in spiritual counseling  Social history:  Patient is  to her  of 55 years  They have five children (3 daughters, 2 sons) and three grandchildren with another due in   One daughter lives in the other half of their twin home, along with her partner and son  Another daughter lives next door  Two other children live close by and a son resides in [place]  Patient’s parents are ; her mother  in  at age 80 with advanced dementia, and her father  in  at age 76 with CHF  Patient has an older brother and a younger brother, from whom she is estranged  Patient named her children as her support system  Two close friends have moved out of state  She identified finances and a possible home renovation as her current major stressors  She enjoys many activities including puzzles, crosswords, relearning [place], walking with her family, and reading  Psychiatric history:  Chart indicates a diagnosis of anxiety for which patient is prescribed Buspar which she takes PRN  She was previously prescribed Ativan which reportedly caused hallucinations  She does not take Depakote  She has engaged in therapy during previous life transitions  Patient described her current mood as “stable for the most part ” She acknowledged having “breakdowns” (manifest as crying) when “everything piles up” as related to PD  She has thoughts such as, “What’s to become of me?” and “How will I handle it?” when she considers progression of PD   When asked specifically about depression, she acknowledged a “level” that she fights “every day ” She described grief/loss as related to PD symptoms impacting her job and the activity level she was hoping to maintain with her children  She endorsed anxiety, but feels that it is at an appropriate level as related to her circumstances (being the mother of 5 kids, finances)  She denied darian or substantial irritability  She acknowledged frustration with regard to physical symptoms of PD  She has not experienced psychotic symptoms other than hallucinations due to medications  With regard to SI, she stated that intellectually she feels a person should be allowed to make that choice for themselves if they are debilitated  She denied having suicidal thoughts herself, stating that she has too much to live for (specifically her family)  She denied HI  Patient is sleeping excessively, with daytime naps, and she wonders about cutting back on Gabapentin  She goes to bed at 9/10 PM and watches Netflix until falling asleep around 11 PM  She usually can go into a “good, deep sleep,” but frequently will wake up (around 2 AM) due to her “mind going ” She may take a bath or get a snack, and resume sleep until 6/7 AM  She denied any recent change in appetite or weight  Alcohol/Drug use history:  Patient occasionally will consume Carli Rakers when she has trouble sleeping  She denied a history of excessive alcohol use  She denied current or past use of tobacco or illicit substances  BEHAVIORAL OBSERVATIONS   During the interview, Ms Freddy Burt appeared appropriately groomed and dressed  She was alert and fully oriented  Affect was normal range  Mild word-finding difficulty was noted  During the testing, patient presented with shuffled gait  She appeared motivated and cooperated with all testing procedures  She required rewording of instructions for clarification, at times  She presented as anxious, frustrated with difficulties on testing, and overwhelmed with presented memory tasks   She took PD meds at 11am     TESTS ADMINISTERED  Wechsler Adult Intelligence Scale (WAIS-IV; selected subtests)  [place] Neuropsychological Battery: Forced Choice Test; Arnold Complex Figure Test; Animal Naming; One-Minute Time Estimation; Controlled Oral Word Association Test; Dichotic Listening; [employer] Adult Reading Test (NAART); Sentence Repetition Test; Arnold Auditory Verbal Learning Test; Judgment of Line Orientation; Tru & Tru; Finger Tapping Test; Finger Localization; Trail Making Test A and B; Token Test; [place] Category Test - [place] Version  Se[place]ira Depression Inventory (BDI-II)  [place] Anxiety Inventory (SHITAL)  Generalized Anxiety Disorder 7-item scale (HILTON-7)  Symptom Checklist (SCL-90-R)    NEUROPSYCHOLOGICAL FINDINGS  Ms Oneil Edmond was administered a battery of neuropsychological measures (listed above) which assessed her abilities, compared to individuals matched for age, education, gender, ethnicity, and handedness, as appropriate  The patient’s performance on multiple validity procedures indicates that the cognitive data obtained during this assessment is questionably valid for interpretation  She failed a stand-alone validity indicator in the attention domain and an embedded validity indicator in the visual reasoning domain, rendering performance in these areas likely not valid for interpretation, and necessitating caution in interpretation of performance in other domains  PREMORBID INTELLECTUAL FUNCTIONING  The patient’s premorbid intellectual functioning is estimated to be in the average to high average range  Performance on subsequent measures will be considered relative to this premorbid estimate  Overall cognitive performance on this evaluation was in the mildly deficient range, and below her expected performance level, though with statistically significant variability in performance across tasks in the battery  PROCESSING SPEED  Overall, cognitive speed and efficiency of information processing is in the average range, and within her expected performance level   Graphic symbol-digit substitution was in the average range  Timed visual scanning and simple numerical sequencing was in the upper end of the low average range  ATTENTION AND WORKING MEMORY  Overall deficient performance in attention and working memory is not considered to be a valid representation of her actual abilities  She failed a validity indicator in this domain and demonstrated statistically significant variability across performance on these tasks, suggestive of possible interference from anxiety  LANGUAGE  Overall, verbal expression and conceptualization is in the average range, and within her expected performance level  Auditory processing was in the average range for both ears  Basic comprehension and execution of simple instructions was in the average range  Deductive reasoning skills were in the high average range  Expression of general factual knowledge was in the average range  Confrontational naming was in the average range  Both semantic fluency by category and verbal fluency by starting letter were in the average range  VISUOSPATIAL AND CONSTRUCTIONAL FUNCTIONING   Overall impaired performance in perceptual organizational abilities is not considered to be a valid representation of her actual abilities  She failed a validity indicator in this domain and demonstrated statistically significant variability across performance on these tasks  EXECUTIVE FUNCTIONING  Overall, reasoning and problem-solving skills are in the mildly deficient range, and below her expected performance level, with statistically significant variability in performance across tasks (verbal > visual)  Mental flexibility as measured with alphanumeric set switching was in the impaired range  Visual analytic reasoning and novel problem-solving was in the impaired range  Verbal abstract conceptual reasoning was in the high average range  Verbal fluency by starting letter was in the average range       MEMORY  VERBAL MEMORY  Overall, the patient’s ability for learning, encoding, and recalling auditory verbal information is in the moderately deficient range, and below her expected performance level  For a 15-word list, the amount of information absorbed from single exposure was in the impaired range (0)  Learning over five trials was in the superior range (0, 2, 6, 7, 13)  After repeated exposures, the amount of information retained was in the impaired range  Her recall of the original word list after distraction with a second word list was in the mildly deficient range (6), with impaired retention  After a 30-minute delay, recall was in the moderately deficient range (5), with impaired retention  Recognition of the words was in the average range (13), though discriminability was impaired (14 false positives)  VISUAL MEMORY  Overall, the patient’s ability for learning, encoding, and recalling visual-spatial information is in the low average range, and slightly below her expected performance level  Short-term recall of the complex figure (after distraction) was in the moderately deficient range, though with average retention  Delayed recall (after 30 minutes) was in the average range with very superior retention  Delayed recognition of components of the design was in the low average range  SENSORY-MOTOR  Fine motor speed was impaired for both hands  Tactile perception was within normal limits for both hands  EMOTIONAL FUNCTIONING  On the BDI-2, patient scored 9, suggestive of minimal depressive symptomatology  She endorsed severe punishment feelings and mild sadness, pessimism, suicidal ideation (without intent), loss of energy, change in sleeping pattern, and fatigue  On the SHITAL, patient scored 14, suggestive of mild anxious symptomatology   She endorsed moderate feeling hot, feelings of choking, and indigestion/discomfort in abdomen; and mild wobbliness in legs, unable to relax, fear of the worst happening, dizzy/lightheaded, unsteady, nervous, fear of dying, and scared  On the HILTON-7, patient scored 8, suggestive of mild anxious symptomatology  She endorsed moderate nervousness and mild inability to control worry, excessive worry, trouble relaxing, restlessness, irritability, and feeling afraid as if something awful might happen  No clinically significant emotional syndromes were revealed on the SCL-90-R      SUMMARY and IMPRESSIONS  Due to several failed validity indicators and significant variability in performance both within certain cognitive domains and across the battery, the neuropsychological data is cautiously interpreted  As compared to demographically similar peers, Ms Sandee Victor overall performance in processing speed and verbal reasoning was in the average range, which can be considered a lower limit of her true abilities and, therefore, intact  Visual memory was in the low average range, overall, possibly slightly below her expected performance level, though with scores impacted by her impaired initial drawing with grossly intact retention  Executive functioning appeared to be mildly deficient overall, with visually based tasks impaired and verbally based tasks intact  Attention and verbal memory appeared to be moderately deficient, with a failed validity indicator and variability in the attention domain  Visual reasoning appeared to be impaired, though with a failed validity indicator and variability  Though nuanced by cautious interpretation of the neuropsychological data, I concur with the MCI diagnosis  Parkinson’s disease is likely a primary etiological factor  There are suggestions from the data that anxiety may have negatively impacted her performance   Also, as she demonstrated differentially worse performance on visually-based tasks, vision deficits may have impacted her performance and should be addressed with her ophthalmologist; imaging will assist in ruling out right hemisphere pathology  With regard to emotional functioning, Ms Ramya Lara presented with minimal depressive symptoms and mild anxious symptoms, some of which may reflect adjustment to contextual circumstances  DBS candidacy will be further discussed with the patient by the neurology team with the above considerations  Thank you for this referral  Please feel free to contact me with any questions  Nancy Maradiaga  Clinical Neuropsychologist

## 2023-07-10 ENCOUNTER — OFFICE VISIT (OUTPATIENT)
Dept: NEUROLOGY | Facility: CLINIC | Age: 73
End: 2023-07-10
Payer: MEDICARE

## 2023-07-10 VITALS
TEMPERATURE: 97.8 F | DIASTOLIC BLOOD PRESSURE: 58 MMHG | BODY MASS INDEX: 25.66 KG/M2 | WEIGHT: 149.5 LBS | SYSTOLIC BLOOD PRESSURE: 112 MMHG | HEART RATE: 84 BPM

## 2023-07-10 DIAGNOSIS — G31.84 MILD COGNITIVE IMPAIRMENT: ICD-10-CM

## 2023-07-10 DIAGNOSIS — G20 PARKINSON'S DISEASE (HCC): Primary | ICD-10-CM

## 2023-07-10 PROCEDURE — 99215 OFFICE O/P EST HI 40 MIN: CPT | Performed by: PSYCHIATRY & NEUROLOGY

## 2023-07-10 NOTE — PATIENT INSTRUCTIONS
[facility]. Will also have you meet with neurosurgeon to further discuss surgery.    Will look into home PT/OT maintenance therapy with [facility] as she had in the past.   Good [facility] referral

## 2023-07-10 NOTE — PROGRESS NOTES
Patient ID: Maura Powell is a 71 y.o. female    Assessment/Plan:    Parkinson's disease [facility]  Patient with a 24 year history of Parkinson's disease complicated by motor fluctuations, dyskinesias, and postural instability with freezing while in the off state. Medical adherence has improved over the past years where she is now taking her medications consistently which has improved off time. Unfortunately medications are being taken every 2 hours and immediately leads to delayed on and therefore off time. She also continues to have peak dose dyskinesias. [place] Brand has helped with this but she has been unable to tolerate more than 1 tablet nightly. Neuropsychological testing was reviewed. It appeared to be consistent with mild cognitive impairment although there were some nuances in the interpretation. Referral to [facility] psychology was also recommended which was made today at patient and family's request.    She and her family continue to express interest in deep brain stimulation surgery. We reviewed its use in PD to treat levodopa responsive symptoms such as tremor, bradykinesia, rigidity and wearing off, the procedure and potential risks and benefits. Goals of surgery should she wish to proceed at this point would be to reduce off periods and improved dyskinesia. She understands surgery is not meant to slow progression of illness and we did not prevent her from having any cognitive decline following surgery. We once again discussed her neuropsychological testing results. With the suggestion of MCI there may be more risk in predicting risk of cognitive decline following surgery. Primary balance difficulties in Parkinson's disease is common with advancement of the condition and may not respond to stimulation. Patient continues to consider surgery but had questions with regards to alternative medication options.   We spoke about the possibility of adding a medication such as Nourianz to see if this will help reduce off time. She does understand this could worsen dyskinesias or cause hallucinations. We discussed the potential of switching her Stalevo to Rytary perhaps with less frequent dosing of entacapone. We reviewed currently available pumps carbidopa/levodopa intestinal ge    She wishes to proceed with adding Nourianz if possible. She is agreeable to meet with neurosurgery to further discuss deep brain stimulation surgery before making a decision. She would benefit from maintenance home PT OT for Parkinson's disease. Previously had [address] re[address] come to the home and wishes to have this reinstated if possible. Mild cognitive impairment  Mild cognitive symptoms not impairing daily activity. Neuropsychological testing was reviewed. She does also have some mild anxiety and emotional concerns. Referral to [facility] was made. Diagnoses and all orders for this visit:    Parkinson's disease [facility])  -     Ambulatory referral to Neurosurgery; Future  -     Ambulatory Referral to Psychology; Future  -     istradefylline (Nourianz) tablet; Take 1 tablet (20 mg total) by mouth daily  -     EXTERNAL Referral to [facility]; Future    Mild cognitive impairment  -     Ambulatory Referral to Psychology; Future         I have spent a total time of 60 minutes on 07/11/23 in caring for this patient including Diagnostic results, Prognosis, Risks and benefits of tx options, Instructions for management and Patient and family education. Subjective: Heidy Bundy is a 71year-old woman with young onset, levodopa responsive Parkinson's disease who presents for movement follow up. Review of chart reveals PD symptom onset 1999 (47yo) with left shoulder stiffness, later complicated by motor fluctuations, early wearing off, unpredictable offs, delayed on, mild dyskinesias and off freezing of gait.      When last seen in our office in March, gabapentin reduced in an attempt to improved daytime sedation and she was to start dosing her Stalevo every 2.5 hours in attempt to improve off time. She was once again referred for neuropsychological testing for evaluation in consideration for DBS. Neuropsychological testing reviewed. There were nuances with interpretation of the neuropsych data, but it was MCI diagnosis was likely accurate. Noted were mild deficiencies in executive function. " Attention and verbal memory appear to be moderately deficient with failed validity indicator and variability in attention domain. Visual reasoning appear to be impaired, though with failed validity indicator and variability. "Some suggestions from the data that anxiety may have negatively impacted her performance. Vision deficits may also impacted her performance. They recommended she follow-up with her ophthalmologist.     Family is aware results. They did express concerns regarding her emotional cognitive function function. [facility]. She presents today with her daughters. She continues to have fluctuations with medications lasting about 2 hours. If medications are taken in on time she can function well throughout the day. If she is late at all can take longer for the next dose to take effect. For the past year she has undergone multiple surgeries without any difficulty with recovery. She has trouble falling asleep. No trouble with sleep maintaince. She has daytime sedation. Pain better with PT. No dysphagia. No drooling. She is ambulating well when on. When she wears off she can shuffle and freeze. Increasing balance issues.     Current medications and timing:   - Gocovri 1 po qhs - worsened hallucinations on higher dosing   - Stalevo 12.5/50/200 6-8 times per day about P4tvmih (400mg daily )   - rasagiline 1mg daily   - gabapentin 300mg 1 po qam and 2 po qhs - for pain  - Buspar - for anxiety      Prior medication trials:  selegiline   Sinemet IR  Ropinirole IR   ropinirole XL 6mg daily - dose reduced due to hallucinations and dyskinesia. Depakote - started inpatient for           Objective:    /58 (BP Location: Left arm, Patient Position: Standing, Cuff Size: Standard)   Pulse 84   Temp 97.8 °F (36.6 °C)   Wt 67.8 kg (149 lb 8 oz)   BMI 25.66 kg/m²       Physical Exam  Vitals reviewed. Eyes:      Extraocular Movements: Extraocular movements intact. Pupils: Pupils are equal, round, and reactive to light. Neurological:      Mental Status: She is alert. Motor: Motor strength is normal.        Neurological Exam  Mental Status  Alert. Oriented only to person, place and situation. Speech: hypophonia. Language is fluent with no aphasia. Attention and concentration are normal.    Cranial Nerves  CN III, IV, VI: Extraocular movements intact bilaterally. Pupils equal round and reactive to light bilaterally. CN VII: Full and symmetric facial movement. CN VIII:  Right: Hearing is decreased. Left: Hearing is decreased. CN IX, X: Palate elevates symmetrically  CN XI: Shoulder shrug strength is normal.  CN XII: Tongue midline without atrophy or fasciculations. Motor   Increased muscle tone. Strength is 5/5 throughout all four extremities. Sensory  Light touch is normal in upper and lower extremities. Coordination    See motor UPDRS. Gait   Able to rise from chair without using arms. Slight reduction in stride then blocked turn. Overall good speed and arm swing. No freezing or festination. .          MDS UPDRS III                              10/17/22 3/8/23 7/10/23   Time since last dose:     30 min     Speech  0 1 1   Facial Expression  2 2 2   Rigidity - Neck  2   2   Rigidity - Upper Extremity (R)  0 0 0   Rigidity - Upper Extremity (L)   0 1 0   Rigidity - Lower Extremity (R)  1 1 0   Rigidity - Lower Extremity (L)   0 0 0   Finger Taps (R)   1 1 1   Finger Taps (L)   1 1 1   Hand Movement (R)  0 0 0   Hand Movement (L)   0 1 0   Pronation/Supination (R)  1 1 0   Pronation/Supination (L)   1 1 1   Toe Tapping (R) 0 0 0   Toe Tapping (L) 1 1 1   Leg Agility (R)  0 0 0   Leg Agility (L)   0 0 1   Arising from Chair   0 0 0   Gait   1 1 1   Freezing of Gait 0 0 0   Postural Stability       0   Posture 2 2 2   Global spontaneity of movement 1 1 1   Postural Tremor (Ri 1 1 1   Postural Tremor (L) 1 1 1   Kinetic Tremor (R)  0 0 0   Kinetic Tremor (L)  0 0 0   Rest tremor amplitude RUE 0 0 0   Rest tremor amplitude LUE 0 0 0   Rest tremor amplitude RLE 0 0 0   Reset tremor amplitude LLE 0 0 0   Lip/Jaw Tremor  0 0 0   Consistency of tremor 0 0 0   Motor Exam Total:                        Kathrin Liang MD  Movement disorder physician  [id number]

## 2023-07-10 NOTE — PROGRESS NOTES
Review of Systems   Constitutional: Negative for appetite change, fatigue and fever. HENT: Negative. Negative for hearing loss, tinnitus, trouble swallowing and voice change. Eyes: Negative. Negative for photophobia, pain and visual disturbance. Respiratory: Negative. Negative for shortness of breath. Cardiovascular: Negative. Negative for palpitations. Gastrointestinal: Negative. Negative for nausea and vomiting. Endocrine: Negative. Negative for cold intolerance. Genitourinary: Negative. Negative for dysuria, frequency and urgency. Musculoskeletal: Positive for gait problem (Shuffling, stumbling, freezing, has stayed the same  , freezing has gotten a little worse), myalgias (Calf muscles), neck pain and neck stiffness. Negative for back pain. Balance Issues, has gotten a little worse      Skin: Negative. Negative for rash. Allergic/Immunologic: Negative. Neurological: Positive for speech difficulty and numbness (Pointer and middle finger of Left hand ). Negative for dizziness, tremors, seizures, syncope, facial asymmetry, weakness, light-headedness and headaches. Hematological: Negative. Does not bruise/bleed easily. Psychiatric/Behavioral: Positive for sleep disturbance. Negative for confusion and hallucinations. All other systems reviewed and are negative.

## 2023-07-11 NOTE — ASSESSMENT & PLAN NOTE
Patient with a 24 year history of Parkinson's disease complicated by motor fluctuations, dyskinesias, and postural instability with freezing while in the off state. Medical adherence has improved over the past years where she is now taking her medications consistently which has improved off time. Unfortunately medications are being taken every 2 hours and immediately leads to delayed on and therefore off time. She also continues to have peak dose dyskinesias. [place] has helped with this but she has been unable to tolerate more than 1 tablet nightly. Neuropsychological testing was reviewed. It appeared to be consistent with mild cognitive impairment although there were some nuances in the interpretation. Referral to [facility] psychology was also recommended which was made today at patient and family's request.    She and her family continue to express interest in deep brain stimulation surgery. We reviewed its use in PD to treat levodopa responsive symptoms such as tremor, bradykinesia, rigidity and wearing off, the procedure and potential risks and benefits. Goals of surgery should she wish to proceed at this point would be to reduce off periods and improved dyskinesia. She understands surgery is not meant to slow progression of illness and we did not prevent her from having any cognitive decline following surgery. We once again discussed her neuropsychological testing results. With the suggestion of MCI there may be more risk in predicting risk of cognitive decline following surgery. Primary balance difficulties in Parkinson's disease is common with advancement of the condition and may not respond to stimulation. Patient continues to consider surgery but had questions with regards to alternative medication options. We spoke about the possibility of adding a medication such as Nourianz to see if this will help reduce off time.   She does understand this could worsen dyskinesias or cause hallucinations. We discussed the potential of switching her Stalevo to Rytary perhaps with less frequent dosing of entacapone. We reviewed currently available pumps carbidopa/levodopa intestinal ge    She wishes to proceed with adding Nourianz if possible. She is agreeable to meet with neurosurgery to further discuss deep brain stimulation surgery before making a decision. She would benefit from maintenance home PT OT for Parkinson's disease. Previously had [address] re[address] come to the home and wishes to have this reinstated if possible.

## 2023-07-11 NOTE — ASSESSMENT & PLAN NOTE
Mild cognitive symptoms not impairing daily activity. Neuropsychological testing was reviewed. She does also have some mild anxiety and emotional concerns. Referral to [facility] psychology was made.

## 2023-07-21 DIAGNOSIS — G20 PARKINSON'S DISEASE (HCC): ICD-10-CM

## 2023-07-21 RX ORDER — CARBIDOPA, LEVODOPA AND ENTACAPONE 12.5; 200; 5 MG/1; MG/1; MG/1
1 TABLET, FILM COATED ORAL
Qty: 540 TABLET | Refills: 2 | Status: SHIPPED | OUTPATIENT
Start: 2023-07-21

## 2023-07-25 ENCOUNTER — TELEPHONE (OUTPATIENT)
Dept: PSYCHIATRY | Facility: CLINIC | Age: 73
End: 2023-07-25

## 2023-07-31 NOTE — TELEPHONE ENCOUNTER
Reached out to patient in regards to Neuropsychology referral. Left VM for pt to contact intake department.

## 2023-08-03 NOTE — TELEPHONE ENCOUNTER
Patient contacted the office requesting to speak with the . Writer informed the patient that he will notify the  and she will be in contact at her earliest convenience.

## 2023-08-10 ENCOUNTER — TELEPHONE (OUTPATIENT)
Dept: NEUROLOGY | Facility: CLINIC | Age: 73
End: 2023-08-10

## 2023-08-10 DIAGNOSIS — G20 PARKINSON'S DISEASE (HCC): ICD-10-CM

## 2023-08-10 NOTE — TELEPHONE ENCOUNTER
----- Message from Judith Manley RN sent at 8/8/2023 10:11 AM EDT -----  Regarding: FW: new prescription  Contact: [phone]    ----- Message -----  From: Lilla Homans  Sent: 8/8/2023   9:56 AM EDT  To: Neurology [facility]  Subject: new prescription                                 Dear Dr:  At my last visit we decided that (i think) that i  would    proceed with adding Ocie Read if possible  (depending on cost,etc)  to my medications. To date I have not heard the status of a script to move forward with this. Did I miss a step?     Thanks for your help  Victoria Parsons  1950

## 2023-08-10 NOTE — TELEPHONE ENCOUNTER
I spoke to [facility] pharmacist who said they are unable to fill; must be filled at specialty pharmacy. Phone number to specialty care team: [phone], pharmacy help line: [phone]      I called pharmacy help line ([phone]) and was told to transfer script to [facility] (phone [phone]); confirmed with [facility] that they do dispense the medication, [place] address; they already have patient on file. Will need a PA   [facility]  PA auth:  [id number]   Submitted via Tabatha Salmeron, gray Key: [id number]; determination pending      Please sign script to redirect to [facility] to fill nourianz if in agreement, thank you.

## 2023-08-10 NOTE — TELEPHONE ENCOUNTER
Approval recd via [facility] Approved. This drug has been approved under the Member's Medicare Part D benefit. Approved quantity: 30 tablets per 30 day(s). You may fill up to a 90 day supply except for those on Specialty Tier 5, which can be filled up to a 30 day supply. Please call the pharmacy to process the prescription claim.

## 2023-08-18 ENCOUNTER — TELEPHONE (OUTPATIENT)
Dept: NEUROLOGY | Facility: CLINIC | Age: 73
End: 2023-08-18

## 2023-08-18 NOTE — TELEPHONE ENCOUNTER
Dale Ferrer from [facility] left a message requesting initial eval plan of care for PT be reviewed and signed by the provider. They faxed this form to [phone].   [phone] opt 2

## 2023-08-28 NOTE — TELEPHONE ENCOUNTER
Received VM transcription from 9:35 AM:    Hi, good morning, my name is Cornelia Hartman and I am calling from [facility]. I am calling in regards to our mutual patient of Dr. Loreto Odom. Pt's name is Rosa Isela Turcios. Date of birth is [birth year]. For this patient we have faxed over an initial evaluation plan of care for physical therapy for doctor's review and signature. So this call is just to get an update whether these forms have been received and signed by the doctor or not. To ensure that our fax has been received, we are re-faxing them to your fax number [phone]. So please reach us back if we are using the wrong fax number or if you have any concerns or questions at [phone], with option 2. Again, it's [phone], with option 2. Thank you so much. You have a great day bye.  -----------------------------------------------    Signed form scanned to pt's chart. No indication documented if this was faxed or not. Nazia Budge - Please fax completed form if not already done. Thank you!

## 2023-09-08 ENCOUNTER — TELEPHONE (OUTPATIENT)
Dept: NEUROSURGERY | Facility: CLINIC | Age: 73
End: 2023-09-08

## 2023-09-08 ENCOUNTER — TELEPHONE (OUTPATIENT)
Dept: NEUROLOGY | Facility: CLINIC | Age: 73
End: 2023-09-08

## 2023-09-08 NOTE — TELEPHONE ENCOUNTER
Received VM transcription from 9/7/23, 5:25 PM:    Hi, my name is Tru Gutierrez calling with [address] [facility]. And this message is for someone to give us a call. We just call in to see if there's an alternative phone number for patient, Nadiya Cherry. Date of birth is [birth year]. And we've been trying to reach her for scheduling delivery for a specialty medication, her Gocovri. We haven't heard back from [facility]. So we're just giving you guys a call to see if there's an alternative number we can call for her. Or if you gutorsten can relay a message for her to give us a call for scheduling of her specialty medication. Our call back number is area code [phone]. Business hours are Monday through Friday 8 AM to 8 PM [facility] Standard Time. If you can give us a call back. Thank you. Have a great day.

## 2023-09-11 ENCOUNTER — CONSULT (OUTPATIENT)
Dept: NEUROSURGERY | Facility: CLINIC | Age: 73
End: 2023-09-11
Payer: MEDICARE

## 2023-09-11 VITALS
DIASTOLIC BLOOD PRESSURE: 70 MMHG | OXYGEN SATURATION: 97 % | WEIGHT: 140 LBS | HEART RATE: 80 BPM | SYSTOLIC BLOOD PRESSURE: 118 MMHG | BODY MASS INDEX: 24.8 KG/M2 | HEIGHT: 63 IN | TEMPERATURE: 97.6 F

## 2023-09-11 DIAGNOSIS — G20 PARKINSON'S DISEASE: ICD-10-CM

## 2023-09-11 PROCEDURE — 99204 OFFICE O/P NEW MOD 45 MIN: CPT | Performed by: STUDENT IN AN ORGANIZED HEALTH CARE EDUCATION/TRAINING PROGRAM

## 2023-09-11 NOTE — PROGRESS NOTES
Office Note - Neurosurgery   Charlotte Haas 71 y.o. female MRN: [id number]      Assessment:    Charlotte Haas is a 71 y.o. female with idiopathic Parkinson's disease who presents for evaluation for deep brain stimulation surgery. We discussed the benefits that could be reasonably expected from DBS surgery, including reduction in her on/off fluctuations and improvement in peak dose dyskinesias, as well as improvement in tremor and bradykinesia. We did discuss that freezing of gait would unlikely to be affected and that DBS is not a cure for Parkinson's disease or its progression. She was given a soft diagnosis of MCI on her neuropsychologic testing, which we discussed as controversial regarding risk of cognitive decline post-DBS. They are already very well versed in DBS and had very reasonable questions. Currently, she reports that she is doing reasonably ok from day to day, and that while her symptoms are bothersome she hasn't yet made up her mind regarding proceeding with DBS. We discussed that this was elective and that any decision didn't need to be made now or even the immediate future. I did discuss that it would be reasonable to proceed with an MRI for planning purposes, and I will discuss on/off testing with Dr. Amarilys Ybarra, though given the description of her off periods I imagine she would be an appropriate candidate from a UPDRS scoring perspective. For now I will order her MRI and am always available should she want to discuss DBS further or proceed with surgical planning. Subjective/Objective     Chief Complaint    Parkinson's disease    HPI    Amadou eD La Garza a 71year old female with idiopathic Parkinson's disease referred by Dr. Remi Ruiz for discussion of DBS. She is accompanied by her 2 daughters. She reports onset of stiffness and what she describes as proprioceptive deficits in her legs in her late 45s progressing to significant motor fluctuations.  She currently takes Stalevo about every 2 hours usually peaking about an hour after a dose. She describes these fluctuations as her most bothersome symptom. In general she feels that she is doing relatively ok with this regimen but is curious about DBS and what she could expect post surgery. She underwent neuropsychologic evaluation which after some issues with validity measures was suggestive of MCI. She has not had a recent MRI of her brain or on/off testing. BRYNN SWAIN personally reviewed and updated.     Review of Systems    Family History    Family History   Problem Relation Age of Onset   • Dementia Mother    • Heart disease Father    • No Known Problems Daughter    • No Known Problems Maternal Grandmother    • No Known Problems Maternal Grandfather    • No Known Problems Paternal Grandmother    • Prostate cancer Paternal Grandfather    • No Known Problems Daughter    • No Known Problems Daughter    • Alcohol abuse Neg Hx    • Mental illness Neg Hx    • Substance Abuse Neg Hx    • Depression Neg Hx        Social History    Social History     Tobacco Use   • Smoking status: Never   • Smokeless tobacco: Never   Vaping Use   • Vaping Use: Never used   Substance Use Topics   • Alcohol use: Not Currently     Comment: Rare   • Drug use: Never       Past Medical History    Past Medical History:   Diagnosis Date   • GERD (gastroesophageal reflux disease)    • Osteoporosis    • Parkinson disease ([address])        Surgical History    Past Surgical History:   Procedure Laterality Date   • ARTERIOGRAM N/A 07/10/2019    Procedure: KYPHOPLASTY;  Surgeon: Radha Coughlin MD;  Location: [facility];  Service: Interventional Radiology   • IR KYPHOPLASTY/VERTEBROPLASTY  07/10/2019   • LEG SURGERY Left 11/2022   • TONSILLECTOMY         Medications      Current Outpatient Medications:   •  Amantadine HCl ER (Gocovri) 137 MG CP24, TAKE 2 CAPSULES (274MG TOTAL) BY MOUTH EVERY NIGHT AT BEDTIME (Patient taking differently: 1 tab in the AM (Name: Tanisha Crystal)), Disp: 61 capsule, Rfl: 3  •  busPIRone (BUSPAR) 5 mg tablet, Take 5 mg by mouth if needed, Disp: , Rfl:   •  Calcium Carbonate-Vit D-Min (CALCIUM 1200 PO), Take 1,200 mg by mouth daily, Disp: , Rfl:   •  carbidopa-levodopa-entacapone (STALEVO) 12.5- MG per tablet, TAKE 1 TABLET BY MOUTH 6 (SIX) TIMES A DAY, Disp: 540 tablet, Rfl: 2  •  famotidine (PEPCID) 20 mg tablet, TAKE 2 TABLETS (40 MG TOTAL) BY MOUTH DAILY AT BEDTIME (Patient taking differently: Take 40 mg by mouth if needed), Disp: 180 tablet, Rfl: 1  •  istradefylline (Nourianz) tablet, Take 1 tablet (20 mg total) by mouth daily, Disp: 30 tablet, Rfl: 11  •  rasagiline (AZILECT) 1 MG, TAKE 1 TABLET BY MOUTH EVERY DAY, Disp: 90 tablet, Rfl: 3    Allergies    Allergies   Allergen Reactions   • Sulfa Antibiotics      Other reaction(s): family history - anaphylaxis  Category: Allergy;    • Doxycycline Rash     Category: Allergy; Investigations    MRI brain pending    Physical Exam    Vitals:  Blood pressure 118/70, pulse 80, temperature 97.6 °F (36.4 °C), height 5' 3" (1.6 m), weight 63.5 kg (140 lb), SpO2 97 %, not currently breastfeeding. Mental Status:  Speech: Mild hypophonia. Language is fluent with no aphasia. Cranial Nerves:  CN III, IV, VI: Extraocular movements intact bilaterally. Pupils equal round and reactive to light bilaterally. CN VII: Full and symmetric facial movement. CN VIII: Hearing is normal.  CN IX, X: Palate elevates symmetrically  CN XI: Shoulder shrug strength is normal.  CN XII: Tongue midline without atrophy or fasciculations. Motor: (dose of Stalevo 1 hour prior)  Normal muscle tone  Strength is 5/5 throughout all four extremities.   Mild resting tremor in BUE  Mild postural tremor in BUE     Gait with mild reduction in arm swing

## 2023-09-12 ENCOUNTER — TELEPHONE (OUTPATIENT)
Dept: SLEEP CENTER | Facility: CLINIC | Age: 73
End: 2023-09-12

## 2023-09-25 NOTE — TELEPHONE ENCOUNTER
I spoke to patient; she said she is aware they have been calling her but she did not need a refill yet; She will soon and will call to schedule same.

## 2023-09-29 NOTE — ASSESSMENT & PLAN NOTE
Now on Prolia, taking supplements daily  Bone density due October  Follow up with Endo, rheum  PAST MEDICAL HISTORY:  Dementia     HTN (hypertension)

## 2023-10-09 NOTE — TELEPHONE ENCOUNTER
Pts daughter wants her to be seen to discuss and try to get to bottom of this  States she has had 3 in the last 2 months Spoke with patient/spouse that foot xray show no fractures but arthritis per provider. Patient has upcoming foot doctor appointment.

## 2023-11-06 ENCOUNTER — HOSPITAL ENCOUNTER (OUTPATIENT)
Dept: RADIOLOGY | Age: 73
Discharge: HOME | End: 2023-11-06
Payer: MEDICARE

## 2023-11-06 DIAGNOSIS — G20.A1 PARKINSON'S DISEASE: ICD-10-CM

## 2023-11-06 PROCEDURE — 70553 MRI BRAIN STEM W/O & W/DYE: CPT

## 2023-11-06 PROCEDURE — G1004 CDSM NDSC: HCPCS

## 2023-11-06 PROCEDURE — A9585 GADOBUTROL INJECTION: HCPCS | Performed by: STUDENT IN AN ORGANIZED HEALTH CARE EDUCATION/TRAINING PROGRAM

## 2023-11-06 RX ORDER — GADOBUTROL 604.72 MG/ML
6 INJECTION INTRAVENOUS
Status: COMPLETED | OUTPATIENT
Start: 2023-11-06 | End: 2023-11-06

## 2023-11-06 RX ADMIN — GADOBUTROL 6 ML: 604.72 INJECTION INTRAVENOUS at 12:40

## 2023-11-21 ENCOUNTER — OFFICE VISIT (OUTPATIENT)
Dept: NEUROLOGY | Facility: CLINIC | Age: 73
End: 2023-11-21
Payer: MEDICARE

## 2023-11-21 VITALS
BODY MASS INDEX: 25.47 KG/M2 | HEART RATE: 85 BPM | DIASTOLIC BLOOD PRESSURE: 76 MMHG | TEMPERATURE: 97.7 F | SYSTOLIC BLOOD PRESSURE: 120 MMHG | WEIGHT: 143.8 LBS

## 2023-11-21 DIAGNOSIS — G20.A2 PARKINSON'S DISEASE WITHOUT DYSKINESIA, WITH FLUCTUATING MANIFESTATIONS: Primary | ICD-10-CM

## 2023-11-21 PROCEDURE — 99215 OFFICE O/P EST HI 40 MIN: CPT | Performed by: PHYSICIAN ASSISTANT

## 2023-11-21 NOTE — PROGRESS NOTES
Patient ID: Warren Cullen is a 71 y.o. female. Assessment/Plan:    Parkinson's disease ([address])  Patient with over 24 year history of young onset, levodopa responsive Parkinson's disease complicated by motor fluctuations, dyskinesias, and postural instability with freezing while in the off state. She unfortunately had side effects including hallucinations and worsening dyskinesia with Nourianz. She continues to have dyskinesia as well as wearing off with worsening freezing of gait. At this time her wearing off remains her biggest complaint. We discussed alternative options including switching her Stalevo to Rytary. Unfortunately this is not a one-to-one conversion and it may take a few days for us to get to the right dosing. 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 Would like take some time get through all of this before making any changes. She will call around Jan and let us know if she would like to proceed. For now she will remain on her current dose of medications. Also reviewed the option of DBS which would be used to treat levodopa responsive symptoms such as tremor, bradykinesia, rigidity and wearing off. Unlikely that it would help with primary balance issues and freezing of gait may remain as well. It is not meant to slow progression of illness. We once again discussed her neuropsychological testing results. With the suggestion of MCI there may be more risk in predicting risk of cognitive decline following surgery. Reviewed the option of Duopa for wearing off as well however she does not feel she would be good at managing this. Daughter would like to do a little more research into these options however. Subjective: Manas Sood is a 71year-old woman with young onset, levodopa responsive Parkinson's disease who presents for movement follow up.  Review of chart reveals PD symptom onset 1999 (49yo) with left shoulder stiffness, later complicated by motor fluctuations, early wearing off, unpredictable offs, delayed on, mild dyskinesias and off freezing of gait. Neuropsychological testing revealed MCI diagnosis was likely accurate although there were some nuances to interpretation. Also with some vision deficits and recommended follow-up with her ophthalmologist. Question of anxiety playing a role as well, referred to [facility]. At her last visit she was started in [place] for her wearing off. Also referred to Neurosurgery to discuss DBS further. INTERVAL HISTORY:  [place] made her freezing worse, caused hallucinations and increased dyskinesia. She stopped the medication on her own   She had a consult with Neurosurgery 9/11/23 regarding DBS, per the note expectations would include "reduction in her on/off fluctuations and improvement in peak dose dyskinesias, as well as improvement in tremor and bradykinesia". Unlikely to help freezing of gait.    No further hallucinations since being off Nourianz   Taking Stalevo every 2-2.5 hrs  If she takes the medication on time she does feel overall good  Can still have some episodes of off, when off she will have more freezing of gait   Some days are better than others   Daughter does not notice the dyskinesia as much   When nervous the dyskinesia will come out more   She can perform all of her ADLs on her own   Can struggle with buttons   She has been having construction in the home, bars in the shower, higher toilet   She is under increased stress right now with all of this   She never started psychotherapy given concern for getting rides to and from          Current medications and timing:   - Gocovri 1 po qhs - worsened hallucinations on higher dosing, helps with wearing off and dyskinesia   - Stalevo 12.5/50/200 6-8 times per day about D8mhosk (400mg daily )   - rasagiline 1mg daily   - gabapentin 300mg 1 po qam and 2 po qhs - for pain  - Buspar - for anxiety      Prior medication trials:  Lorry Moh - caused worsening freezing, dyskinesia and hallucinations   selegiline   Sinemet IR  Ropinirole IR   ropinirole XL 6mg daily - dose reduced due to hallucinations and dyskinesia. Depakote - started inpatient for      I personally reviewed and updated the ROS. I have spent a total time of 65 minutes on 11/21/23 in caring for this patient including Risks and benefits of tx options, Patient and family education, Impressions, and Counseling / Coordination of care. Objective:    Blood pressure 120/76, pulse 85, temperature 97.7 °F (36.5 °C), weight 65.2 kg (143 lb 12.8 oz), not currently breastfeeding. Physical Exam  Constitutional:       Appearance: Normal appearance. Eyes:      Extraocular Movements: Extraocular movements intact. Pupils: Pupils are equal, round, and reactive to light. Pulmonary:      Effort: Pulmonary effort is normal.   Neurological:      Mental Status: She is alert. Motor: Motor strength is normal.        Neurological Exam  Mental Status  Alert. Oriented only to person, place and situation. Speech: hypophonia. Language is fluent with no aphasia. Attention and concentration are normal.    Cranial Nerves  CN III, IV, VI: Extraocular movements intact bilaterally. Pupils equal round and reactive to light bilaterally. CN VII: Full and symmetric facial movement. CN VIII:  Right: Hearing is decreased. Left: Hearing is decreased. CN IX, X: Palate elevates symmetrically  CN XI: Shoulder shrug strength is normal.  CN XII: Tongue midline without atrophy or fasciculations. Motor   Increased muscle tone. Strength is 5/5 throughout all four extremities. Sensory  Light touch is normal in upper and lower extremities. Coordination    See motor UPDRS. Gait   Able to rise from chair without using arms. Slight reduction in stride then blocked turn. Overall good speed and arm swing.   No freezing or festination. .        ROS:    Review of Systems   Constitutional:  Negative for appetite change, fatigue and fever. HENT: Negative. Negative for hearing loss, tinnitus, trouble swallowing and voice change. Eyes: Negative. Negative for photophobia, pain and visual disturbance. Respiratory: Negative. Negative for shortness of breath. Cardiovascular: Negative. Negative for palpitations. Gastrointestinal: Negative. Negative for nausea and vomiting. Endocrine: Negative. Negative for cold intolerance. Genitourinary: Negative. Negative for dysuria, frequency and urgency. Musculoskeletal:  Positive for gait problem (Shuffling of feet, stumbles, freezing , states when she was on new med and needed to drop the med she noticed her symptoms were worse while on med). Negative for back pain, myalgias and neck pain. Skin: Negative. Negative for rash. Allergic/Immunologic: Negative. Neurological:  Positive for tremors (some in arms but states not noticeable to her) and speech difficulty (Slurred speech). Negative for dizziness, seizures, syncope, facial asymmetry, weakness, light-headedness, numbness and headaches. Hematological: Negative. Does not bruise/bleed easily. Psychiatric/Behavioral:  Positive for confusion (At times, states occasional, has stayed the same) and sleep disturbance (At times). Negative for hallucinations. All other systems reviewed and are negative.

## 2023-11-21 NOTE — ASSESSMENT & PLAN NOTE
Patient with over 24 year history of young onset, levodopa responsive Parkinson's disease complicated by motor fluctuations, dyskinesias, and postural instability with freezing while in the off state. She unfortunately had side effects including hallucinations and worsening dyskinesia with Nourianz. She continues to have dyskinesia as well as wearing off with worsening freezing of gait. At this time her wearing off remains her biggest complaint. We discussed alternative options including switching her Stalevo to Rytary. Unfortunately this is not a one-to-one conversion and it may take a few days for us to get to the right dosing. 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 Would like take some time get through all of this before making any changes. She will call around Jan and let us know if she would like to proceed. For now she will remain on her current dose of medications. Also reviewed the option of DBS which would be used to treat levodopa responsive symptoms such as tremor, bradykinesia, rigidity and wearing off. Unlikely that it would help with primary balance issues and freezing of gait may remain as well. It is not meant to slow progression of illness. We once again discussed her neuropsychological testing results. With the suggestion of MCI there may be more risk in predicting risk of cognitive decline following surgery. Reviewed the option of Duopa for wearing off as well however she does not feel she would be good at managing this. Daughter would like to do a little more research into these options however.

## 2023-11-21 NOTE — PATIENT INSTRUCTIONS
Patient with over 24 year history of young onset, levodopa responsive Parkinson's disease complicated by motor fluctuations, dyskinesias, and postural instability with freezing while in the off state. She unfortunately had side effects including hallucinations and worsening dyskinesia with Nourianz. She continues to have dyskinesia as well as wearing off with worsening freezing of gait. At this time her wearing off remains her biggest complaint. We discussed alternative options including switching her Stalevo to Rytary. Unfortunately this is not a one-to-one conversion and it may take a few days for us to get to the right dosing. At this time she is going through some stress with house construction and the [employer]. Would like take some time get through all of this before making any changes. She will call around Jan and let us know if she would like to proceed. For now she will remain on her current dose of medications. Also reviewed the option of DBS which would be used to treat levodopa responsive symptoms such as tremor, bradykinesia, rigidity and wearing off. Unlikely that it would help with primary balance issues and freezing of gait may remain as well. It is not meant to slow progression of illness. We once again discussed her neuropsychological testing results. With the suggestion of MCI there may be more risk in predicting risk of cognitive decline following surgery. Reviewed the option of Duopa for wearing off as well however she does not feel she would be good at managing this. Daughter would like to do a little more research into these options however.

## 2023-12-14 DIAGNOSIS — G20.A1 PARKINSON'S DISEASE: ICD-10-CM

## 2023-12-15 RX ORDER — RASAGILINE 1 MG/1
TABLET ORAL
Qty: 90 TABLET | Refills: 3 | Status: SHIPPED | OUTPATIENT
Start: 2023-12-15

## 2024-01-05 DIAGNOSIS — G20.A1 PARKINSON'S DISEASE: ICD-10-CM

## 2024-01-05 RX ORDER — AMANTADINE 137 MG/1
CAPSULE, COATED PELLETS ORAL
Qty: 90 CAPSULE | Refills: 3 | Status: SHIPPED | OUTPATIENT
Start: 2024-01-05

## 2024-01-05 NOTE — TELEPHONE ENCOUNTER
Neck x-rays showed some narrowing on both sides, may explain neck symptoms  Recommend to try physical therapy to help with pain and radiating symptoms  No

## 2024-01-10 NOTE — TELEPHONE ENCOUNTER
Spoke with pharmacistFelisha. They just wanted to clarify if pt is now only taking one cap of Gocovri at bedtime because previous sig was to take 2 caps. Per OV note of 11/21/23, pt is to take one cap of Gocovri at bedtime. New script was sent for pt to take only one cap.  Pt had worsened hallucinations on higher dosing. The pharmacist was made aware of this.

## 2024-01-10 NOTE — TELEPHONE ENCOUNTER
1/9/24 9:43am  Hi. My name is Angeline calling from [facility] pharmacy calling regarding patient of chloe spence. Malaika Chavis. Her date of birth is [id number]. Calling to request the time of dosing for the patient's Gocovri. Per , the medication is usually administered in the evening or at bedtime due to possible side effects. If someone could please call the pharmacy back with that information, we would appreciate it. The number is [phone]. Thank you so much for your help and enjoy your day. Eduin.  _____________________________________________    Pharmacy requesting clarification on timing of administration of Gocovri.

## 2024-01-12 NOTE — TELEPHONE ENCOUNTER
received  from 1/10 at 12:32pm- hi, yes, good afternoon. My name is Angeline calling from [facility] specialty pharmacy. I am calling regarding patient by the name of silvia. Her last name is Partha, Miss kimbrough's YOB: 1950. Calling regarding a gocoveri prescription, we need to know the time of day for dosing, per  The medication is usually given in the evening or bed time due to possible side effects. The prescription we have is one tab daily. If someone could please call the pharmacy or faxed over a prescription with the amended sig, excuse me, with the amended directions, we would appreciate it. Pharmacies, phone number is [phone]. And the fax number is [phone]. Or um, Preferably call this number instead. Pharmacy is [phone]. And the fax number is [phone]. Thank you so much. Have a good day bye.  ----------------------------------  Already addressed

## 2024-02-07 ENCOUNTER — ESTABLISHED COMPREHENSIVE EXAM (OUTPATIENT)
Dept: URBAN - METROPOLITAN AREA CLINIC 6 | Facility: CLINIC | Age: 74
End: 2024-02-07

## 2024-02-07 DIAGNOSIS — H04.123: ICD-10-CM

## 2024-02-07 DIAGNOSIS — H02.884: ICD-10-CM

## 2024-02-07 DIAGNOSIS — H25.813: ICD-10-CM

## 2024-02-07 DIAGNOSIS — G20: ICD-10-CM

## 2024-02-07 DIAGNOSIS — H02.881: ICD-10-CM

## 2024-02-07 DIAGNOSIS — B88.0: ICD-10-CM

## 2024-02-07 PROCEDURE — 92014 COMPRE OPH EXAM EST PT 1/>: CPT

## 2024-02-07 ASSESSMENT — TONOMETRY
OS_IOP_MMHG: 13
OD_IOP_MMHG: 12

## 2024-02-07 ASSESSMENT — VISUAL ACUITY
OS_CC: 20/25
OD_CC: 20/30-2

## 2024-02-08 DIAGNOSIS — G20.A1 PARKINSON'S DISEASE: ICD-10-CM

## 2024-02-08 RX ORDER — AMANTADINE 137 MG/1
CAPSULE, COATED PELLETS ORAL
Qty: 90 CAPSULE | Refills: 0 | Status: CANCELLED | OUTPATIENT
Start: 2024-02-08

## 2024-02-09 RX ORDER — CARBIDOPA, LEVODOPA AND ENTACAPONE 12.5; 200; 5 MG/1; MG/1; MG/1
1 TABLET, FILM COATED ORAL
Qty: 540 TABLET | Refills: 3 | Status: SHIPPED | OUTPATIENT
Start: 2024-02-09

## 2024-02-09 NOTE — TELEPHONE ENCOUNTER
Last visit 11/21 Fermin Mitchell    Amantadine sent 1/5 to [facility] (patient is aware)    Carbidopa-levodopa (no refills remaining on current script) please sign script if in agreement, thank you.

## 2024-02-11 DIAGNOSIS — G20.A1 PARKINSON'S DISEASE: ICD-10-CM

## 2024-02-12 RX ORDER — RASAGILINE 1 MG/1
TABLET ORAL
Qty: 90 TABLET | Refills: 4 | Status: SHIPPED | OUTPATIENT
Start: 2024-02-12

## 2024-03-15 ENCOUNTER — OFFICE VISIT (OUTPATIENT)
Dept: OBGYN CLINIC | Facility: CLINIC | Age: 74
End: 2024-03-15
Payer: MEDICARE

## 2024-03-15 ENCOUNTER — APPOINTMENT (OUTPATIENT)
Dept: RADIOLOGY | Facility: AMBULARY SURGERY CENTER | Age: 74
End: 2024-03-15
Attending: ORTHOPAEDIC SURGERY
Payer: MEDICARE

## 2024-03-15 VITALS — WEIGHT: 141 LBS | HEIGHT: 63 IN | BODY MASS INDEX: 24.98 KG/M2

## 2024-03-15 DIAGNOSIS — M84.351A STRESS FRACTURE OF SHAFT OF RIGHT FEMUR, INITIAL ENCOUNTER: ICD-10-CM

## 2024-03-15 DIAGNOSIS — M84.352A STRESS FRACTURE OF LEFT FEMUR, INITIAL ENCOUNTER: ICD-10-CM

## 2024-03-15 DIAGNOSIS — M84.351A STRESS FRACTURE OF SHAFT OF RIGHT FEMUR, INITIAL ENCOUNTER: Primary | ICD-10-CM

## 2024-03-15 PROCEDURE — 99213 OFFICE O/P EST LOW 20 MIN: CPT | Performed by: ORTHOPAEDIC SURGERY

## 2024-03-15 PROCEDURE — 73552 X-RAY EXAM OF FEMUR 2/>: CPT

## 2024-03-15 NOTE — PROGRESS NOTES
Orthopedics          Malaika Chavis 73 y.o. female MRN: [id number]      Chief Complaint:   Follow-up for bilateral femur pain    HPI:   73 y.o.female for follow-up regarding bilateral femur pain.  Patient has had stress fractures in her bilateral femurs in the past since switching from Forteo to Prolia she has not had any pain or fevers.  Patient presents today for follow-up x-rays.  She states not having any pain in her bilateral femurs.  She denies any numbness tingling or pain.                  Review Of Systems:   Skin: Normal  Neuro: See HPI  Musculoskeletal: See HPI  All other systems reviewed and are negative    Past Medical History:   Past Medical History:   Diagnosis Date    GERD (gastroesophageal reflux disease)     Osteoporosis     Parkinson disease        Past Surgical History:   Past Surgical History:   Procedure Laterality Date    ARTERIOGRAM N/A 07/10/2019    Procedure: KYPHOPLASTY;  Surgeon: Toni Stephens MD;  Location: [facility];  Service: Interventional Radiology    IR KYPHOPLASTY/VERTEBROPLASTY  07/10/2019    LEG SURGERY Left 11/2022    TONSILLECTOMY         Family History:  Family history reviewed and non-contributory  Family History   Problem Relation Age of Onset    Dementia Mother     Heart disease Father     No Known Problems Daughter     No Known Problems Maternal Grandmother     No Known Problems Maternal Grandfather     No Known Problems Paternal Grandmother     Prostate cancer Paternal Grandfather     No Known Problems Daughter     No Known Problems Daughter     Alcohol abuse Neg Hx     Mental illness Neg Hx     Substance Abuse Neg Hx     Depression Neg Hx          Social History:  Social History     Socioeconomic History    Marital status: /Civil Union     Spouse name: None    Number of children: None    Years of education: None    Highest education level: None   Occupational History    None   Tobacco Use    Smoking status: Never    Smokeless tobacco: Never   Vaping Use     Vaping status: Never Used   Substance and Sexual Activity    Alcohol use: Not Currently     Comment: Rare    Drug use: Never    Sexual activity: None   Other Topics Concern    None   Social History Narrative    5 children     Social Determinants of Health     Financial Resource Strain: Not on file   Food Insecurity: Not on file   Transportation Needs: No Transportation Needs (11/21/2022)    [facility] from [facility] - Transportation     Lack of Transportation (Medical): No     Lack of Transportation (Non-Medical): No   Physical Activity: Not on file   Stress: Not on file   Social Connections: Not on file   Intimate Partner Violence: Not on file   Housing Stability: Not on file       Allergies:   Allergies   Allergen Reactions    Sulfa Antibiotics      Other reaction(s): family history - anaphylaxis  Category: Allergy;     Doxycycline Rash     Category: Allergy;        Labs:  0   Lab Value Date/Time    HCT 39.9 08/24/2022 0652    HCT 38.2 08/23/2022 0538    HCT 36.0 08/22/2022 0429    HGB 13.3 08/24/2022 0652    HGB 12.8 08/23/2022 0538    HGB 11.8 08/22/2022 0429    PT 13.5 07/30/2022 1435    INR 1.0 07/30/2022 1435    WBC 10.24 (H) 08/24/2022 0652    WBC 9.86 08/23/2022 0538    WBC 9.24 08/22/2022 0429       Meds:    Current Outpatient Medications:     Amantadine HCl ER (Gocovri) 137 MG CP24, 1 tab daily  (Name: Gocovri), Disp: 90 capsule, Rfl: 3    busPIRone (BUSPAR) 5 mg tablet, Take 5 mg by mouth if needed, Disp: , Rfl:     Calcium Carbonate-Vit D-Min (CALCIUM 1200 PO), Take 1,200 mg by mouth daily, Disp: , Rfl:     carbidopa-levodopa-entacapone (STALEVO) 12.5- MG per tablet, Take 1 tablet by mouth 6 (six) times a day, Disp: 540 tablet, Rfl: 3    famotidine (PEPCID) 20 mg tablet, TAKE 2 TABLETS (40 MG TOTAL) BY MOUTH DAILY AT BEDTIME (Patient taking differently: Take 40 mg by mouth if needed), Disp: 180 tablet, Rfl: 1    rasagiline (AZILECT) 1 MG, TAKE 1 TABLET BY MOUTH EVERY  DAY, Disp: 90 tablet, Rfl: 4    Body mass index is 24.98 kg/m².  Wt Readings from Last 3 Encounters:   03/15/24 64 kg (141 lb)   11/21/23 65.2 kg (143 lb 12.8 oz)   09/11/23 63.5 kg (140 lb)     Physical Exam:   Vitals:       General Appearance:    Alert, cooperative, no distress, appears stated age   Head:    Normocephalic, without obvious abnormality, atraumatic   Eyes:    conjunctiva/corneas clear, both eyes        Nose:   Nares normal, septum midline, no drainage    Throat:   Lips normal; teeth and gums normal   Neck:    symmetrical, trachea midline, ;     thyroid:  no enlargement/   Back:     Symmetric, no curvature, ROM normal   Lungs:   No audible wheezing or labored breathing   Chest Wall:    No tenderness or deformity    Heart:    Regular rate and rhythm               Pulses:   2+ and symmetric all extremities   Skin:   Skin color, texture, turgor normal, no rashes or lesions   Neurologic:   normal strength, sensation and reflexes     throughout       Musculoskeletal: bilateral lower extremity  Examination bilateral femurs no effusion no erythema no pain to palpation of her greater enteric region no pain in the midshaft thigh region hip flexion abduction abduction strength 5 out of 5 negative modified straight leg raise knee flexion extension strength 5 out of 5 without pain sensation intact distal pulses present    Radiology:   I personally reviewed the films.  X-rays bilateral femurs reveal no worsening osseous abnormality no arthritis no evidence of fracture or further osseous abnormality compared to previous films    _*_*_*_*_*_*_*_*_*_*_*_*_*_*_*_*_*_*_*_*_*_*_*_*_*_*_*_*_*_*_*_*_*_*_*_*_*_*_*_*_*    Assessment:  73 y.o.female with bilateral femoral stress fractures asymptomatic at this time    Plan:   Weight bearing as tolerated  bilateral lower extremity  Case discussed with Dr. Carrera  Patient was to follow-up in as-needed basis regarding her bilateral femurs as she has not had any symptoms over  the past years time  Advised patient to contact our office or present to emergency department should she have any increasing pain in her bilateral thigh regions  Follow up on an as-needed basis      Jeronimo Davies PA-C

## 2024-04-03 ENCOUNTER — OFFICE VISIT (OUTPATIENT)
Dept: NEUROLOGY | Facility: CLINIC | Age: 74
End: 2024-04-03
Payer: MEDICARE

## 2024-04-03 VITALS
TEMPERATURE: 98.3 F | HEIGHT: 63 IN | SYSTOLIC BLOOD PRESSURE: 130 MMHG | HEART RATE: 88 BPM | DIASTOLIC BLOOD PRESSURE: 67 MMHG | BODY MASS INDEX: 24.93 KG/M2 | WEIGHT: 140.7 LBS

## 2024-04-03 DIAGNOSIS — F02.818 DEMENTIA ASSOCIATED WITH OTHER UNDERLYING DISEASE WITH BEHAVIORAL DISTURBANCE (HCC): ICD-10-CM

## 2024-04-03 DIAGNOSIS — G20.A2 PARKINSON'S DISEASE WITHOUT DYSKINESIA, WITH FLUCTUATING MANIFESTATIONS: Primary | ICD-10-CM

## 2024-04-03 PROCEDURE — G2211 COMPLEX E/M VISIT ADD ON: HCPCS | Performed by: PHYSICIAN ASSISTANT

## 2024-04-03 PROCEDURE — 99215 OFFICE O/P EST HI 40 MIN: CPT | Performed by: PHYSICIAN ASSISTANT

## 2024-04-03 RX ORDER — LOTILANER OPHTHALMIC SOLUTION 2.5 MG/ML
SOLUTION/ DROPS OPHTHALMIC
COMMUNITY
Start: 2024-02-12

## 2024-04-03 RX ORDER — CARBIDOPA, LEVODOPA AND ENTACAPONE 18.75; 200; 75 MG/1; MG/1; MG/1
TABLET, FILM COATED ORAL
Qty: 240 TABLET | Refills: 3 | Status: SHIPPED | OUTPATIENT
Start: 2024-04-03

## 2024-04-03 NOTE — PATIENT INSTRUCTIONS
Patient with over 24 year history of young onset, levodopa responsive Parkinson's disease complicated by motor fluctuations, dyskinesias, and postural instability with freezing while in the off state.   Her main concern remains wearing off, when off she has increased difficulty with mobility and gait. She did not tolerate Nourianz and unable to increase Gocovri due to side effects. She is taking Stalevo every 2 hours. Could consider increase the Stalevo dose slightly however would need to be cautious of causing hallucinations or worsening dyskinesia. Could also consider switching to Rytary to see if perhaps this would help more with her wearing off.     At this time she would like to try and make a slight increase to her Stalevo to see if this would help with wearing off without causing side effects.  She will start by alternating Stalevo 50mg and 75mg tabs throughout the day.  IF improvement and tolerated could consider increasing to Stalevo 75mg every dose. IF she has side effects including worsening dyskinesia OR hallucinations she can return to current dosing.     She will continue with Gocovri 1 atb taken before bed and Azilect 1tab in the AM.      She was encouraged to remain active.  She has recently restarted home PT.     Also reviewed the option of DBS which would be used to treat levodopa responsive symptoms such as tremor, bradykinesia, rigidity and wearing off. Unlikely that it would help with primary balance issues and freezing of gait may remain as well.  It is not meant to slow progression of illness. We once again discussed her neuropsychological testing results.  With the suggestion of MCI there may be more risk in cognitive decline following surgery. She is leaning more towards having the DBS procedure. Reviewed the option of Duopa for wearing off as well however she does not feel she would be good at managing this. Daughter would like to do a little more research into these options however. Will  schedule on/off testing with Dr Roger for DBS planning.

## 2024-04-03 NOTE — PROGRESS NOTES
Patient ID: Malaika Chavis is a 73 y.o. female.    Assessment/Plan:    Parkinson's disease (HCC)  Patient with over 24 year history of young onset, levodopa responsive Parkinson's disease complicated by motor fluctuations, dyskinesias, and postural instability with freezing while in the off state.   Her main concern remains wearing off, when off she has increased difficulty with mobility and gait. She did not tolerate Nourianz and unable to increase Gocovri due to side effects. She is taking Stalevo every 2 hours. Could consider increase the Stalevo dose slightly however would need to be cautious of causing hallucinations or worsening dyskinesia. Could also consider switching to Rytary to see if perhaps this would help more with her wearing off.     At this time she would like to try and make a slight increase to her Stalevo to see if this would help with wearing off without causing side effects.  She will start by alternating Stalevo 50mg and 75mg tabs throughout the day.  IF improvement and tolerated could consider increasing to Stalevo 75mg every dose. IF she has side effects including worsening dyskinesia OR hallucinations she can return to current dosing.     She will continue with Gocovri 1 atb taken before bed and Azilect 1tab in the AM.      She was encouraged to remain active.  She has recently restarted home PT.     Also reviewed the option of DBS which would be used to treat levodopa responsive symptoms such as tremor, bradykinesia, rigidity and wearing off. Unlikely that it would help with primary balance issues and freezing of gait may remain as well.  It is not meant to slow progression of illness. We once again discussed her neuropsychological testing results.  With the suggestion of MCI there may be more risk in cognitive decline following surgery. She is leaning more towards having the DBS procedure. Reviewed the option of Duopa for wearing off as well however she does not feel she would be good  "at managing this. Daughter would like to do a little more research into these options however. Will schedule on/off testing with Dr Roger for DBS planning.      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     Patient was encouraged to increase mind stimulating activities such as reading, crosswords, word searches, puzzles, Soduku, solitaire, coloring and other brain games.  We also discussed the importance of staying physically active and eating a health diet such as the Mediterranean or MIND diet.        Subjective:    Malaika Chavis is a 73 year-old woman with young onset, levodopa responsive Parkinson's disease who presents for movement follow up. Review of chart reveals PD symptom onset 1999 (47yo) with left shoulder stiffness, later complicated by motor fluctuations, early wearing off, unpredictable offs, delayed on, mild dyskinesias and off freezing of gait. She had a consult with Neurosurgery 9/11/23 regarding DBS, per the note expectations would include \"reduction in her on/off fluctuations and improvement in peak dose dyskinesias, as well as improvement in tremor and bradykinesia\". Unlikely to help freezing of gait.   Neuropsychological testing revealed MCI diagnosis was likely accurate although there were some nuances to interpretation. Question of anxiety playing a role as well, referred to [facility] rehab psychology group.      At her last visit no changes were made to her medications.  Daughter wanted to look into the surgical options.      INTERVAL HISTORY:  She remains under increased stress due to health issues with her  with vascular dementia   He was taken to the hospital due to low sodium   He is now home and following with all of the specialist   She feels that the medication is working well as long as she takes it on time   She is having some trouble with remembering all of her appointments lately   She has had some episodes where " "she will feel depressed, recently made an appointment with psychiatrist   She has an aide coming twice a week for 3 hours   She helps with wash and is someone to talk to   Dyskinesia present, worse when stressed   She will feel some cramps in the legs at night   When she falls asleep she sleeps well though the night   No hallucinations, she may have some delusions at times which she can easily tell are not real   She was able to restart home PT  She manages all of her ADLs on her own      Current medications and timing:   - Gocovri 1 po qhs - worsened hallucinations on higher dosing, helps with wearing off and dyskinesia   - Stalevo 12.5/50/200 6-8 times per day about O4xtioa (400mg daily )   - rasagiline 1mg daily   - gabapentin 300mg 1 po qam and 2 po qhs - for pain  - Buspar - for anxiety      Prior medication trials:  Nourianz - caused worsening freezing, dyskinesia and hallucinations   selegiline   Sinemet IR  Ropinirole IR   ropinirole XL 6mg daily - dose reduced due to hallucinations and dyskinesia.   Depakote - started inpatient       I personally reviewed and updated the ROS.       Objective:    Blood pressure 130/67, pulse 88, temperature 98.3 °F (36.8 °C), temperature source Temporal, height 5' 3\" (1.6 m), weight 63.8 kg (140 lb 11.2 oz), not currently breastfeeding.    Physical Exam  Constitutional:       Appearance: Normal appearance.   Eyes:      Extraocular Movements: Extraocular movements intact.      Pupils: Pupils are equal, round, and reactive to light.   Pulmonary:      Effort: Pulmonary effort is normal.   Neurological:      Mental Status: She is alert.      Motor: Motor strength is normal.        Neurological Exam  Mental Status  Alert. Oriented only to person, place and situation. Speech: hypophonia. Language is fluent with no aphasia. Attention and concentration are normal.    Cranial Nerves  CN III, IV, VI: Extraocular movements intact bilaterally. Pupils equal round and reactive to light " bilaterally.  CN VII: Full and symmetric facial movement.  CN VIII:  Right: Hearing is decreased.  Left: Hearing is decreased.  CN IX, X: Palate elevates symmetrically  CN XI: Shoulder shrug strength is normal.  CN XII: Tongue midline without atrophy or fasciculations.    Motor   Increased muscle tone. Strength is 5/5 throughout all four extremities.    Sensory  Light touch is normal in upper and lower extremities.     Coordination    See motor UPDRS.    Gait   Able to rise from chair without using arms.  Slight reduction in stride then blocked turn.  Overall good speed and arm swing.  No freezing or festination..    MDS UPDRS III                              4/4/24 3/8/23 7/10/23   Time since last dose:     30 min     Speech  1 1 1   Facial Expression  2 2 2   Rigidity - Neck     2   Rigidity - Upper Extremity (R)  0 0 0   Rigidity - Upper Extremity (L)   0 1 0   Rigidity - Lower Extremity (R)  1 1 0   Rigidity - Lower Extremity (L)   0 0 0   Finger Taps (R)   1 1 1   Finger Taps (L)   1 1 1   Hand Movement (R)  0 0 0   Hand Movement (L)   0 1 0   Pronation/Supination (R)  1 1 0   Pronation/Supination (L)   1 1 1   Toe Tapping (R) 0 0 0   Toe Tapping (L) 1 1 1   Leg Agility (R)  0 0 0   Leg Agility (L)   0 0 1   Arising from Chair   0 0 0   Gait   1 1 1   Freezing of Gait 0 0 0   Postural Stability       0   Posture 2 2 2   Global spontaneity of movement 1 1 1   Postural Tremor (Ri 1 1 1   Postural Tremor (L) 1 1 1   Kinetic Tremor (R)  0 0 0   Kinetic Tremor (L)  0 0 0   Rest tremor amplitude RUE 0 0 0   Rest tremor amplitude LUE 0 0 0   Rest tremor amplitude RLE 0 0 0   Reset tremor amplitude LLE 0 0 0   Lip/Jaw Tremor  0 0 0   Consistency of tremor 0 0 0   Motor Exam Total:            Mild generalized dyskinesia     ROS:    Review of Systems   Constitutional:  Negative for appetite change, fatigue and fever.   HENT: Negative.  Negative for hearing loss, tinnitus, trouble swallowing and voice change.    Eyes:  Negative.  Negative for photophobia, pain and visual disturbance.   Respiratory: Negative.  Negative for shortness of breath.    Cardiovascular: Negative.  Negative for palpitations.   Gastrointestinal: Negative.  Negative for nausea and vomiting.   Endocrine: Negative.  Negative for cold intolerance.   Genitourinary: Negative.  Negative for dysuria, frequency and urgency.   Musculoskeletal:  Negative for back pain, gait problem, myalgias, neck pain and neck stiffness.   Skin: Negative.  Negative for rash.   Allergic/Immunologic: Negative.    Neurological: Negative.  Negative for dizziness, tremors, seizures, syncope, facial asymmetry, speech difficulty, weakness, light-headedness, numbness and headaches.   Hematological: Negative.  Does not bruise/bleed easily.   Psychiatric/Behavioral: Negative.  Negative for confusion, hallucinations and sleep disturbance.    All other systems reviewed and are negative.

## 2024-04-03 NOTE — Clinical Note
Hey Hugo, we need to try and get this patient scheduled for on/off testing with Dr Roger.  Looks like on 4/22 Dr Roger has a patient at 8am (Tiana Myers) that can be moved to my schedule instead.  Looks like I could see her in [place] on 4/19 or 4/26.  If she is able to move then could we please schedule this patient in that slot with Dr Roger for on/off testing.  Spoke with Dr Roger and she was okay with a 30 min appt.  Thanks!!

## 2024-04-04 NOTE — ASSESSMENT & PLAN NOTE
No change.     Patient was encouraged to increase mind stimulating activities such as reading, crosswords, word searches, puzzles, Soduku, solitaire, coloring and other brain games.  We also discussed the importance of staying physically active and eating a health diet such as the Mediterranean or MIND diet.

## 2024-04-15 ENCOUNTER — TELEPHONE (OUTPATIENT)
Dept: NEUROLOGY | Facility: CLINIC | Age: 74
End: 2024-04-15

## 2024-04-15 NOTE — TELEPHONE ENCOUNTER
Patient called stated she missed a call from Hugo trying to schedule on/off DBS appt.  Please assist

## 2024-04-22 ENCOUNTER — OFFICE VISIT (OUTPATIENT)
Dept: NEUROLOGY | Facility: CLINIC | Age: 74
End: 2024-04-22
Payer: MEDICARE

## 2024-04-22 ENCOUNTER — TELEPHONE (OUTPATIENT)
Dept: NEUROLOGY | Facility: CLINIC | Age: 74
End: 2024-04-22

## 2024-04-22 VITALS
DIASTOLIC BLOOD PRESSURE: 68 MMHG | HEART RATE: 88 BPM | TEMPERATURE: 97.8 F | BODY MASS INDEX: 24.71 KG/M2 | WEIGHT: 139.5 LBS | SYSTOLIC BLOOD PRESSURE: 120 MMHG

## 2024-04-22 DIAGNOSIS — G20.B2 PARKINSON'S DISEASE WITH DYSKINESIA AND FLUCTUATING MANIFESTATIONS: Primary | ICD-10-CM

## 2024-04-22 DIAGNOSIS — R25.2 LEG CRAMPS: ICD-10-CM

## 2024-04-22 DIAGNOSIS — M43.6 TORTICOLLIS: ICD-10-CM

## 2024-04-22 DIAGNOSIS — G31.84 MILD COGNITIVE IMPAIRMENT: ICD-10-CM

## 2024-04-22 PROBLEM — F02.818 DEMENTIA ASSOCIATED WITH OTHER UNDERLYING DISEASE WITH BEHAVIORAL DISTURBANCE (HCC): Status: RESOLVED | Noted: 2024-04-03 | Resolved: 2024-04-22

## 2024-04-22 PROCEDURE — 99215 OFFICE O/P EST HI 40 MIN: CPT | Performed by: PSYCHIATRY & NEUROLOGY

## 2024-04-22 RX ORDER — GABAPENTIN 300 MG/1
300 CAPSULE ORAL
Qty: 30 CAPSULE | Refills: 5 | Status: SHIPPED | OUTPATIENT
Start: 2024-04-22

## 2024-04-22 RX ORDER — CARBIDOPA, LEVODOPA AND ENTACAPONE 12.5; 200; 5 MG/1; MG/1; MG/1
1 TABLET, FILM COATED ORAL
COMMUNITY

## 2024-04-22 NOTE — ASSESSMENT & PLAN NOTE
Patient with a 25 year history of Parkinson's disease complicated by motor fluctuations, dyskinesias, and postural instability with intermittent freezing who presents for on-off testing and further discussion of the brain simulation surgery.     And examined today in the office today having last taken levodopa the previous night.  She appears to be partially off as she had taken extended release amantadine at night.  Motor UPDRS 22 and off state and reexamined about 50 minutes after taking her normal dose of Stalevo 50 and rasagiline.  UPDRS on was 12.  Improved bradykinesia and rigidity.    Continues to have frequent fluctuations with wearing off medications within 2 to 3 hours.  Taking low doses of levodopa for every 2 hours during the day 8-10 times daily.  Also with reports of peak dose dyskinesia.  She has intermittent episodes of freezing particularly when in small spaces.  Can be more frequent when off but may also be present when on.  Not noted on today's off exam.    Time spent once again discussing deep brain stimulation surgery.  We reviewed goals of surgery which would be to reduce motor fluctuations and dampen dyskinesias. With the suggestion of MCI on neuropsych logical testing there may be more risk in predicting risk of cognitive decline following surgery. Primary balance difficulties in Parkinson's disease is common with advancement of the condition and may not respond to stimulation.  Bilateral Gpi placement could be consider given tendency for fluctuations and dyskinesia on low doses of levodopa and MCI.      Questions with regards to other options in treating motor fluctuations were answered.  With regards to medication we could try switching her Stalevo to Rytary plus opicapone.  Option of carbidopa/levodopa intestinal gel pump reviewed.  Questions with regards to future treatment options answered.    She did wish to proceed with deep brain stimulation surgery.  Will reach out to Dr. Gonsales and  patient informed that his office will call with next steps.

## 2024-04-22 NOTE — PROGRESS NOTES
Review of Systems   Constitutional:  Positive for fatigue (A little bit). Negative for appetite change and fever.   HENT: Negative.  Negative for hearing loss, tinnitus, trouble swallowing and voice change.    Eyes: Negative.  Negative for photophobia, pain and visual disturbance.   Respiratory: Negative.  Negative for shortness of breath.    Cardiovascular: Negative.  Negative for palpitations.   Gastrointestinal: Negative.  Negative for nausea and vomiting.   Endocrine: Negative.  Negative for cold intolerance.   Genitourinary: Negative.  Negative for dysuria, frequency and urgency.   Musculoskeletal:  Positive for gait problem (Shuffling feet, stumbles, freezing, has stayed the same) and myalgias (Calf muscle). Negative for back pain, neck pain and neck stiffness.        Balance Issues, has stayed the same      Skin:  Negative for rash.        Itchy Scalp     Allergic/Immunologic: Negative.    Neurological:  Positive for dizziness (A little bit) and speech difficulty (Slurred, Stutter, and Mumbles). Negative for tremors, seizures, syncope, facial asymmetry, weakness, light-headedness, numbness and headaches.   Hematological: Negative.  Does not bruise/bleed easily.   Psychiatric/Behavioral:  Positive for sleep disturbance (Trouble going and staying asleep). Negative for confusion and hallucinations (Only when was on higher dosage of Stalevo).    All other systems reviewed and are negative.

## 2024-04-22 NOTE — PROGRESS NOTES
Patient ID: Malaika Chavis is a 73 y.o. female    Assessment/Plan:    Parkinson's disease (HCC)  Patient with a 25 year history of Parkinson's disease complicated by motor fluctuations, dyskinesias, and postural instability with intermittent freezing who presents for on-off testing and further discussion of the brain simulation surgery.     And examined today in the office today having last taken levodopa the previous night.  She appears to be partially off as she had taken extended release amantadine at night.  Motor UPDRS 22 and off state and reexamined about 50 minutes after taking her normal dose of Stalevo 50 and rasagiline.  UPDRS on was 12.  Improved bradykinesia and rigidity.    Continues to have frequent fluctuations with wearing off medications within 2 to 3 hours.  Taking low doses of levodopa for every 2 hours during the day 8-10 times daily.  Also with reports of peak dose dyskinesia.  She has intermittent episodes of freezing particularly when in small spaces.  Can be more frequent when off but may also be present when on.  Not noted on today's off exam.    Time spent once again discussing deep brain stimulation surgery.  We reviewed goals of surgery which would be to reduce motor fluctuations and dampen dyskinesias. With the suggestion of MCI on neuropsych logical testing there may be more risk in predicting risk of cognitive decline following surgery. Primary balance difficulties in Parkinson's disease is common with advancement of the condition and may not respond to stimulation.  Bilateral Gpi placement could be consider given tendency for fluctuations and dyskinesia on low doses of levodopa and MCI.      Questions with regards to other options in treating motor fluctuations were answered.  With regards to medication we could try switching her Stalevo to Rytary plus opicapone.  Option of carbidopa/levodopa intestinal gel pump reviewed.  Questions with regards to future treatment options  answered.    She did wish to proceed with deep brain stimulation surgery.  Will reach out to Dr. Gonsales and patient informed that his office will call with next steps.      Diagnoses and all orders for this visit:    Parkinson's disease with dyskinesia and fluctuating manifestations    Mild cognitive impairment    Leg cramps  -     gabapentin (Neurontin) 300 mg capsule; Take 1 capsule (300 mg total) by mouth daily at bedtime    Torticollis    Other orders  -     carbidopa-levodopa-entacapone (STALEVO) 12.5- MG per tablet; Take 1 tablet by mouth States she takes 8-10 a day    I have spent a total time of 70 minutes on 04/22/24 in caring for this patient including Prognosis, Risks and benefits of tx options, Patient and family education, Impressions, Counseling / Coordination of care, Documenting in the medical record, Reviewing / ordering tests, medicine, procedures  , Obtaining or reviewing history  , and Communicating with other healthcare professionals .     Subjective:      Malaika Chavis is a 73 year-old woman with young onset, levodopa responsive Parkinson's disease who presents for movement follow up. Review of chart reveals PD symptom onset 1999 (49yo) with left shoulder stiffness, later complicated by motor fluctuations, wearing off, unpredictable offs, delayed on, mild dyskinesias and off freezing of gait.   Neuropsychological testing revealed MCI diagnosis was likely accurate although there were some nuances to interpretation. Question of anxiety playing a role as well, referred to [facility] rehab psychology group. She has met with Dr. Gonsales, functional neurosurgery 9/11/23. Goals of surgery would include improvement in tremor, peak dose dyskinesia, and reduction in her on/ff fluctuations.Unlikely to help freezing of gait.     She presents today for on/off testing in consideration for deep brain stimulation surgery.   She tried increase Stalevo to 75mg to see if it would increase on time but  this worsened peak dose dyskinesia.  She therefore returned to 50mg dose. Dyskinesia  is exhausting when present and she has generalized movements.    She continues to have wearing off within 2-3 hours of each dose. Will be delayed in caught up in activity.   She will notice freezing and shuffling when off.  Freezing is worse in smaller spaces.   She fell twice a couple of weeks ago while walking down steps one time and another time she was at a doorway and suddenly fell backward. Unclear why she fell.   No dysphagia.    No tremor noted by patient but may have been noted by children if asked.   Sleeps maintenance is an issue. Typically due to ruminating thoughts or leg cramps. She had stopped gabapentin as she felt this was taken for her neck issue which ahs resolved.     Current medications and timing:   - Gocovri 1 po qhs - worsened hallucinations on higher dosing, helps with wearing off and dyskinesia   - Stalevo 12.5/50/200 8-10 times per day about Q2-3hours (5  00mg daily )   - rasagiline 1mg daily   -Buspar prn anxiety     Prior medication trials:  Nourianz - caused worsening freezing, dyskinesia and hallucinations   selegiline   Sinemet IR  Ropinirole IR   ropinirole XL 6mg daily - dose reduced due to hallucinations and dyskinesia.   Depakote - started inpatient           Objective:    /68 (BP Location: Right arm, Patient Position: Standing, Cuff Size: Standard)   Pulse 88   Temp 97.8 °F (36.6 °C)   Wt 63.3 kg (139 lb 8 oz)   BMI 24.71 kg/m²       Physical Exam  Vitals reviewed.   Eyes:      Extraocular Movements: Extraocular movements intact.      Pupils: Pupils are equal, round, and reactive to light.   Neurological:      Mental Status: She is alert.      Motor: Motor strength is normal.        Neurological Exam  Mental Status  Alert. Oriented to person, place, time and situation. Orientation: Knows month, year, not date 18th  . Speech: hypophonia. Language is fluent with no aphasia. Attention and  concentration are normal.    Cranial Nerves  CN III, IV, VI: Extraocular movements intact bilaterally. Pupils equal round and reactive to light bilaterally.  CN VII: Full and symmetric facial movement.  CN VIII: Hearing is normal.  CN IX, X: Palate elevates symmetrically  CN XI: Shoulder shrug strength is normal.  CN XII: Tongue midline without atrophy or fasciculations.    Motor   Increased muscle tone. RUE . Strength is 5/5 throughout all four extremities.    Sensory  Light touch is normal in upper and lower extremities.     Coordination  Right: Finger-to-nose normal.Left: Finger-to-nose normal.  See motor UPDRS    Decrement L>R     .    Gait   Able to rise from chair without using arms.  Reduced stride   En bloc turn  No freezing. .       UPDRS III                              Last dose midnight,  Took Gocovri last night- partially off    7:19am,         Time since last dose:     Speech  2 1   Facial Expression  2 1   Rigidity - Neck  0 0   Rigidity - Upper Extremity (R)  1 0   Rigidity - Upper Extremity (L)   0 0   Rigidity - Lower Extremity (R)  1 1   Rigidity - Lower Extremity (L)   1 1   Finger Taps (R)   2 1   Finger Taps (L)   2 1   Hand Movement (R)  0 0   Hand Movement (L)   0 0   Pronation/Supination (R)  1 0   Pronation/Supination (L)   1 1   Toe Tapping (R) 1 0   Toe Tapping (L) 1 1   Leg Agility (R)  0 0   Leg Agility (L)   1 0   Arising from Chair   0 0   Gait   2 1   Freezing of Gait 0 0   Postural Stability   0 0   Posture 2 2   Global spontaneity of movement 2 1   Postural Tremor (Ri 0 0   Postural Tremor (L) 0 0   Kinetic Tremor (R)  0 0   Kinetic Tremor (L)  0 0   Rest tremor amplitude RUE 0 0   Rest tremor amplitude LUE 0 0   Rest tremor amplitude RLE 0 0   Reset tremor amplitude LLE 0 0   Lip/Jaw Tremor  0 0   Consistency of tremor 0 0   Motor Exam Total:  22  12       Dystonia: mild left laterocollis, no tremor  Dyskinesia: none          Ny Mcintyre MD  Movement disorder physician  [facility]  [facility]

## 2024-04-22 NOTE — PATIENT INSTRUCTIONS
Restart gabapentin 300mg nightly for leg cramps.     Will have neurosurgery reach out for next steps.

## 2024-04-23 ENCOUNTER — TELEPHONE (OUTPATIENT)
Dept: NEUROSURGERY | Facility: CLINIC | Age: 74
End: 2024-04-23

## 2024-04-23 NOTE — TELEPHONE ENCOUNTER
Called pt left VM for pt to return our call to book appt.  ===View-only below this line===  ----- Message -----  From: Jacek Gonsales MD  Sent: 4/22/2024  10:00 AM EDT  To: Nini Ravi; Mirela Krause    Could we set up Malaika for an hour long appointment with me?    ----- Message -----  From: Ny Mcintyre MD  Sent: 4/22/2024   8:44 AM EDT  To: Jacek Gonsales MD    ----- Message from Ny Mcintyre MD sent at 4/22/2024  8:44 AM EDT -----  Patient seen today and is interested in proceeding with DBS. You last saw her in Sept. Informed her your office would reach out with next steps.

## 2024-05-22 ENCOUNTER — FOLLOW UP (OUTPATIENT)
Dept: URBAN - METROPOLITAN AREA CLINIC 6 | Facility: CLINIC | Age: 74
End: 2024-05-22

## 2024-05-22 DIAGNOSIS — H02.884: ICD-10-CM

## 2024-05-22 DIAGNOSIS — H02.881: ICD-10-CM

## 2024-05-22 DIAGNOSIS — H04.123: ICD-10-CM

## 2024-05-22 DIAGNOSIS — H25.813: ICD-10-CM

## 2024-05-22 PROCEDURE — 99214 OFFICE O/P EST MOD 30 MIN: CPT

## 2024-05-22 ASSESSMENT — TONOMETRY
OS_IOP_MMHG: 15
OD_IOP_MMHG: 15

## 2024-05-22 ASSESSMENT — VISUAL ACUITY
OS_PH: 20/50
OD_SC: 20/70-2
OS_SC: 20/200

## 2024-06-03 ENCOUNTER — PRE-OP CATARACT MEASUREMENTS (OUTPATIENT)
Dept: URBAN - METROPOLITAN AREA CLINIC 6 | Facility: CLINIC | Age: 74
End: 2024-06-03

## 2024-06-03 DIAGNOSIS — H04.123: ICD-10-CM

## 2024-06-03 DIAGNOSIS — H02.881: ICD-10-CM

## 2024-06-03 DIAGNOSIS — H02.884: ICD-10-CM

## 2024-06-03 DIAGNOSIS — H25.813: ICD-10-CM

## 2024-06-03 PROCEDURE — 92012 INTRM OPH EXAM EST PATIENT: CPT

## 2024-06-03 PROCEDURE — 92136 OPHTHALMIC BIOMETRY: CPT

## 2024-06-03 ASSESSMENT — VISUAL ACUITY
OU_CC: J1
OS_SC: 20/100
OS_PH: 20/50+2
OD_PH: 20/40-1
OD_SC: 20/70

## 2024-06-03 ASSESSMENT — TONOMETRY
OD_IOP_MMHG: 10
OS_IOP_MMHG: 11

## 2024-06-05 ENCOUNTER — OFFICE VISIT (OUTPATIENT)
Dept: NEUROSURGERY | Facility: CLINIC | Age: 74
End: 2024-06-05
Payer: MEDICARE

## 2024-06-05 VITALS
RESPIRATION RATE: 18 BRPM | SYSTOLIC BLOOD PRESSURE: 118 MMHG | BODY MASS INDEX: 24.63 KG/M2 | TEMPERATURE: 98.1 F | HEART RATE: 85 BPM | WEIGHT: 139 LBS | DIASTOLIC BLOOD PRESSURE: 72 MMHG | HEIGHT: 63 IN | OXYGEN SATURATION: 96 %

## 2024-06-05 DIAGNOSIS — G20.A1 PARKINSON'S DISEASE: Primary | ICD-10-CM

## 2024-06-05 PROCEDURE — 99214 OFFICE O/P EST MOD 30 MIN: CPT | Performed by: STUDENT IN AN ORGANIZED HEALTH CARE EDUCATION/TRAINING PROGRAM

## 2024-06-05 RX ORDER — CHLORHEXIDINE GLUCONATE ORAL RINSE 1.2 MG/ML
15 SOLUTION DENTAL ONCE
OUTPATIENT
Start: 2024-06-05 | End: 2024-06-05

## 2024-06-05 NOTE — PROGRESS NOTES
Office Note - Neurosurgery   Malaika Chavis 73 y.o. female MRN: [id number]      Assessment/Plan:    Malaika Chavis is a 73 year old female with idiopathic Parkinson's disease who returns to discuss DBS implantation. She has since undergone on/off testing and MR imaging. She returns today because she would like to proceed with surgery. Goals for her would be reduction of peak dose dyskinesia and motor fluctuations. I believe she would be an appropriate candidate for bilateral GPI implantation.    She carries a diagnosis of idiopathic Parkinson's disease with cardinal symptoms of PD - tremor, rigidity, bradykinesia. In review of Dr. Kana Husain's note she has dopamine responsive motor symptoms on UPDRS on/off testing. She finds her symptoms to be persistently disabling even with medical management.     I did review her MRI which demonstrates no anatomic contra-indication to DBS placement. However unfortunately thin cut T2 sequence and the axial white matter null sequence were not included which I will need for GPI targeting. Will order this today.     We again discussed risks which include bleeding, infection, device malfunction. This will be a stage procedure. The first stage will be outpatient placement of a left sided subclavicular battery, extension wires and skull fiducials under general anesthesia. The second stage approximately 2 weeks later will be partially awake placement of bilateral GPI leads with an admission for observation overnight.    After discussion of the procedure she is agreeable to proceed and signed consent in clinic today. We will work to schedule a surgical date.       Subjective/Objective     Chief Complaint    Consult (DISCUSS DBS SURGERY /)       HPI    Malaika Chavis is a 73 year old female with idiopathic Parkinson's disease who returns to discuss DBS implantation. See assessment/Plan    ROS  ROS personally reviewed and updated.    Review of Systems   Constitutional: Negative.     HENT: Negative.     Eyes: Negative.    Respiratory: Negative.     Cardiovascular: Negative.    Gastrointestinal: Negative.    Endocrine: Negative.    Genitourinary: Negative.    Musculoskeletal:  Positive for myalgias (in both legs). Negative for gait problem.   Skin: Negative.    Allergic/Immunologic: Negative.    Neurological:  Positive for tremors (doesnt notice them). Negative for seizures, speech difficulty, weakness, numbness and headaches.   Hematological: Negative.    Psychiatric/Behavioral:  Positive for sleep disturbance (at times sleeping ok).        Family History    Family History   Problem Relation Age of Onset    Dementia Mother     Heart disease Father     No Known Problems Daughter     No Known Problems Maternal Grandmother     No Known Problems Maternal Grandfather     No Known Problems Paternal Grandmother     Prostate cancer Paternal Grandfather     No Known Problems Daughter     No Known Problems Daughter     Alcohol abuse Neg Hx     Mental illness Neg Hx     Substance Abuse Neg Hx     Depression Neg Hx        Social History    Social History     Socioeconomic History    Marital status: /Civil Union     Spouse name: Not on file    Number of children: Not on file    Years of education: Not on file    Highest education level: Not on file   Occupational History    Not on file   Tobacco Use    Smoking status: Never    Smokeless tobacco: Never   Vaping Use    Vaping status: Never Used   Substance and Sexual Activity    Alcohol use: Not Currently     Comment: Rare    Drug use: Never    Sexual activity: Not on file   Other Topics Concern    Not on file   Social History Narrative    5 children     Social Determinants of Health     Financial Resource Strain: Not on file   Food Insecurity: Not on file   Transportation Needs: No Transportation Needs (11/21/2022)    [facility]    PRAPARE - Transportation     Lack of Transportation  (Medical): No     Lack of Transportation (Non-Medical): No   Physical Activity: Not on file   Stress: Not on file   Social Connections: Not on file   Intimate Partner Violence: Not on file   Housing Stability: Not on file       Past Medical History    Past Medical History:   Diagnosis Date    GERD (gastroesophageal reflux disease)     Osteoporosis     Parkinson disease        Surgical History    Past Surgical History:   Procedure Laterality Date    ARTERIOGRAM N/A 07/10/2019    Procedure: KYPHOPLASTY;  Surgeon: Toni Stephens MD;  [facility];  Service: Interventional Radiology    IR KYPHOPLASTY/VERTEBROPLASTY  07/10/2019    LEG SURGERY Left 11/2022    TONSILLECTOMY         Medications      Current Outpatient Medications:     Amantadine HCl ER (Gocovri) 137 MG CP24, 1 tab daily  (Name: Gocovri), Disp: 90 capsule, Rfl: 3    busPIRone (BUSPAR) 5 mg tablet, Take 5 mg by mouth if needed, Disp: , Rfl:     Calcium Carbonate-Vit D-Min (CALCIUM 1200 PO), Take 1,200 mg by mouth daily, Disp: , Rfl:     carbidopa-levodopa-entacapone (STALEVO) 12.5- MG per tablet, Take 1 tablet by mouth States she takes 8-10 a day, Disp: , Rfl:     denosumab (PROLIA) 60 mg/mL, , Disp: , Rfl:     famotidine (PEPCID) 20 mg tablet, TAKE 2 TABLETS (40 MG TOTAL) BY MOUTH DAILY AT BEDTIME (Patient taking differently: Take 40 mg by mouth if needed), Disp: 180 tablet, Rfl: 1    gabapentin (Neurontin) 300 mg capsule, Take 1 capsule (300 mg total) by mouth daily at bedtime, Disp: 30 capsule, Rfl: 5    rasagiline (AZILECT) 1 MG, TAKE 1 TABLET BY MOUTH EVERY DAY, Disp: 90 tablet, Rfl: 4    Xdemvy 0.25 % SOLN, , Disp: , Rfl:     Allergies    Allergies   Allergen Reactions    Sulfa Antibiotics      Other reaction(s): family history - anaphylaxis  Category: Allergy;     Doxycycline Rash     Category: Allergy;          Physical Exam    Vitals:  Blood pressure 118/72, pulse 85, temperature 98.1 °F (36.7 °C), temperature source Temporal, resp.  "rate 18, height 5' 3\" (1.6 m), weight 63 kg (139 lb), SpO2 96%, not currently breastfeeding.,Body mass index is 24.62 kg/m².    Physical Exam  Neurologic Exam  Awake and alert  Oriented and appropriate  Noticeable dyskinesia  "

## 2024-06-11 ENCOUNTER — TELEPHONE (OUTPATIENT)
Dept: NEUROSURGERY | Facility: CLINIC | Age: 74
End: 2024-06-11

## 2024-06-11 NOTE — TELEPHONE ENCOUNTER
06/14/2024 @ 11:43am - 3rd ATTEMPT- LMOM FOR CALL BACK TO CONFIRM IS 2 PART SX DATES 7/23 AND 08/06 WORK FOR PT    06/13/2024 @ 10:34am - 2nd ATTEMPT: LMOM TO SEE IF 7/23 (FOR FIRST PART OF SX) AND 08/06 (SECOND PART OF SX) DATES ARE OK TO SCHEDULE.      06/11/2024 @ 3:13pm - LMOM TO SCHEDULE 2 PART SX CARLIE/KALIA

## 2024-06-12 DIAGNOSIS — G20.B2 PARKINSON'S DISEASE WITH DYSKINESIA AND FLUCTUATING MANIFESTATIONS: Primary | ICD-10-CM

## 2024-06-12 RX ORDER — CARBIDOPA, LEVODOPA AND ENTACAPONE 12.5; 200; 5 MG/1; MG/1; MG/1
TABLET, FILM COATED ORAL
Qty: 240 TABLET | Refills: 8 | Status: SHIPPED | OUTPATIENT
Start: 2024-06-12

## 2024-06-12 NOTE — TELEPHONE ENCOUNTER
Pt left a VM requesting a refill of Stalevo.    Transcribed VM:   Juice, this is Malaika Chavis [birth year]. Shoot, I don't know if it's still recording 1950. I need to get my carbidopa 50 refilled. It got closed down, and they gave me a 75, but I don't take the 75. So I need you to call in a new prescription with the carbidopa-levodopa-entacapone 50. Thank you.    Please sign off if agreeable. Thank you.

## 2024-06-12 NOTE — TELEPHONE ENCOUNTER
Pt called in to have carbidopa-levodopa-entacapone (STALEVO) 12.5- MG per tablet   Refilled. She said the dose was changed but she needs to go back to this dose. Please assist.

## 2024-06-12 NOTE — TELEPHONE ENCOUNTER
6/12 1:15    Margi from [facility] left a VM re: pharmacy to send pt's Stalevo.    Transcribed VM:   Hi, good afternoon. This is Margi from [facility]. I am calling regarding patient silvia kimbrough. Her birthday is 1950. She sent us a message on the patient portal that she needs a refill for her, what I'm thinking is, the Stalevo. The carbidopa-levodopa-entacapone, her medication for her parkinsons. She said she could not get through to your office. If you would not mind reaching out to her and giving her a call to verify her pharmacy. Her numbers [phone]. My name is Margi and I am calling on behalf of patient silvia kimbrough. Thank you.    Already addressed.

## 2024-06-12 NOTE — TELEPHONE ENCOUNTER
06-, 12:43:11 pm EDT    Patient called and left vm regarding stalevo to [facility] [address]#: [phone]

## 2024-06-16 ENCOUNTER — HOSPITAL ENCOUNTER (OUTPATIENT)
Dept: RADIOLOGY | Facility: HOSPITAL | Age: 74
Discharge: HOME | End: 2024-06-16
Attending: STUDENT IN AN ORGANIZED HEALTH CARE EDUCATION/TRAINING PROGRAM
Payer: MEDICARE

## 2024-06-16 DIAGNOSIS — G20.A1 PARKINSON'S DISEASE: ICD-10-CM

## 2024-06-16 PROCEDURE — 70551 MRI BRAIN STEM W/O DYE: CPT

## 2024-06-18 ENCOUNTER — SURGERY/PROCEDURE (OUTPATIENT)
Dept: URBAN - METROPOLITAN AREA SURGICAL CENTER 6 | Facility: SURGICAL CENTER | Age: 74
End: 2024-06-18

## 2024-06-18 DIAGNOSIS — H25.812: ICD-10-CM

## 2024-06-18 PROCEDURE — 68841 INSJ RX ELUT IMPLT LAC CANAL: CPT

## 2024-06-18 PROCEDURE — 66984 XCAPSL CTRC RMVL W/O ECP: CPT

## 2024-06-19 ENCOUNTER — 1 DAY POST-OP (OUTPATIENT)
Dept: URBAN - METROPOLITAN AREA CLINIC 6 | Facility: CLINIC | Age: 74
End: 2024-06-19

## 2024-06-19 DIAGNOSIS — Z96.1: ICD-10-CM

## 2024-06-19 DIAGNOSIS — H25.811: ICD-10-CM

## 2024-06-19 PROCEDURE — 92136 OPHTHALMIC BIOMETRY: CPT | Mod: 26,RT

## 2024-06-19 PROCEDURE — 99024 POSTOP FOLLOW-UP VISIT: CPT

## 2024-06-19 ASSESSMENT — VISUAL ACUITY: OS_SC: 20/30-2

## 2024-06-19 ASSESSMENT — TONOMETRY
OS_IOP_MMHG: 19
OD_IOP_MMHG: 14

## 2024-06-26 ENCOUNTER — 1 WEEK POST-OP (OUTPATIENT)
Dept: URBAN - METROPOLITAN AREA CLINIC 6 | Facility: CLINIC | Age: 74
End: 2024-06-26

## 2024-06-26 DIAGNOSIS — H25.811: ICD-10-CM

## 2024-06-26 DIAGNOSIS — Z96.1: ICD-10-CM

## 2024-06-26 PROCEDURE — 99024 POSTOP FOLLOW-UP VISIT: CPT

## 2024-06-26 ASSESSMENT — VISUAL ACUITY
OD_PH: 20/50
OD_SC: 20/60
OS_SC: 20/200
OD_SC: 20/400
OS_SC: 20/40

## 2024-06-26 ASSESSMENT — TONOMETRY
OD_IOP_MMHG: 17
OS_IOP_MMHG: 13

## 2024-07-04 DIAGNOSIS — G20.B2 PARKINSON'S DISEASE WITH DYSKINESIA AND FLUCTUATING MANIFESTATIONS: ICD-10-CM

## 2024-07-05 RX ORDER — CARBIDOPA, LEVODOPA AND ENTACAPONE 12.5; 200; 5 MG/1; MG/1; MG/1
TABLET, FILM COATED ORAL
Qty: 720 TABLET | Refills: 3 | Status: SHIPPED | OUTPATIENT
Start: 2024-07-05

## 2024-07-08 NOTE — TELEPHONE ENCOUNTER
Patient notified Vascular/Interventional Neurology Service Consult Note  [facility]   Patient Name: Carroll Greenberg  : [facility] on 3/5/

## 2024-07-10 ENCOUNTER — ANESTHESIA EVENT (OUTPATIENT)
Dept: PERIOP | Facility: HOSPITAL | Age: 74
End: 2024-07-10
Payer: MEDICARE

## 2024-07-23 ENCOUNTER — SURGERY/PROCEDURE (OUTPATIENT)
Dept: URBAN - METROPOLITAN AREA SURGICAL CENTER 6 | Facility: SURGICAL CENTER | Age: 74
End: 2024-07-23

## 2024-07-23 DIAGNOSIS — H25.811: ICD-10-CM

## 2024-07-23 PROCEDURE — 68841 INSJ RX ELUT IMPLT LAC CANAL: CPT | Mod: 79,RT

## 2024-07-23 PROCEDURE — 66984 XCAPSL CTRC RMVL W/O ECP: CPT | Mod: 79,RT

## 2024-07-24 ENCOUNTER — TELEPHONE (OUTPATIENT)
Age: 74
End: 2024-07-24

## 2024-07-24 ENCOUNTER — 1 DAY POST-OP (OUTPATIENT)
Dept: URBAN - METROPOLITAN AREA CLINIC 6 | Facility: CLINIC | Age: 74
End: 2024-07-24

## 2024-07-24 DIAGNOSIS — Z96.1: ICD-10-CM

## 2024-07-24 PROCEDURE — 99024 POSTOP FOLLOW-UP VISIT: CPT

## 2024-07-24 ASSESSMENT — VISUAL ACUITY
OD_PH: 20/50-1
OD_SC: 20/60
OS_SC: 20/30+2

## 2024-07-24 ASSESSMENT — TONOMETRY
OD_IOP_MMHG: 22
OS_IOP_MMHG: 14

## 2024-07-24 NOTE — TELEPHONE ENCOUNTER
Pt called in returning a call from Jessica.   Attempted to get pt connected with Jessica but she was not available.   Please call pt back.    Thank you.

## 2024-07-31 ENCOUNTER — OFFICE VISIT (OUTPATIENT)
Dept: URGENT CARE | Age: 74
End: 2024-07-31
Payer: MEDICARE

## 2024-07-31 VITALS — HEART RATE: 95 BPM | TEMPERATURE: 97.9 F | RESPIRATION RATE: 16 BRPM | OXYGEN SATURATION: 96 %

## 2024-07-31 DIAGNOSIS — B34.9 VIRAL SYNDROME: Primary | ICD-10-CM

## 2024-07-31 DIAGNOSIS — U07.1 COVID: ICD-10-CM

## 2024-07-31 LAB
SARS-COV-2 AG UPPER RESP QL IA: POSITIVE
VALID CONTROL: ABNORMAL

## 2024-07-31 PROCEDURE — G0463 HOSPITAL OUTPT CLINIC VISIT: HCPCS

## 2024-07-31 PROCEDURE — 99213 OFFICE O/P EST LOW 20 MIN: CPT

## 2024-07-31 PROCEDURE — 87811 SARS-COV-2 COVID19 W/OPTIC: CPT

## 2024-07-31 RX ORDER — NIRMATRELVIR AND RITONAVIR 300-100 MG
3 KIT ORAL 2 TIMES DAILY
Qty: 30 TABLET | Refills: 0 | Status: SHIPPED | OUTPATIENT
Start: 2024-07-31 | End: 2024-08-05

## 2024-07-31 NOTE — PATIENT INSTRUCTIONS
Rapid COVID positive in office.   Please begin Paxlovid as directed. Patient and daughter cautioned against co-administration with Buspar per drugs.[email or web address] website which reported an increased risk of Parkinson-like symptoms.   Continue Tylenol/Ibuprofen as needed for headache.   Ensure good hydration.   Follow up with PCP as soon as possible.   Go to ED for worsening symptoms.

## 2024-07-31 NOTE — PROGRESS NOTES
"[facility]        NAME: Malaika Chavis is a 73 y.o. female  : 1950    MRN: [id number]  DATE: 2024  TIME: 7:42 PM    Assessment and Plan   Viral syndrome [B34.9]  1. Viral syndrome  Poct Covid 19 Rapid Antigen Test      2. COVID  nirmatrelvir & ritonavir (Paxlovid, 300/100,) tablet therapy pack      Rapid COVID positive in office.   Please begin Paxlovid as directed. Patient and daughter cautioned against co-administration with Buspar per [employer] website which reported an increased risk of Parkinson-like symptoms.   Continue Tylenol/Ibuprofen as needed for headache.   Ensure good hydration.   Follow up with PCP as soon as possible.   Go to ED for worsening symptoms.       Patient Instructions   Patient Education     COVID-19 in adults - Discharge instructions   The Basics   Written by the doctors and editors at UpToDate   What are discharge instructions? -- Discharge instructions are information about how to take care of yourself after getting medical care for a health problem.  What is COVID-19? -- COVID-19 stands for \"coronavirus disease 2019.\" It is caused by a virus called SARS-CoV-2.  The virus that causes COVID-19 mainly spreads from person to person. This usually happens when an infected person coughs, sneezes, or talks near other people. A person can be infected, and spread the virus to others, even without having any symptoms.  How do I care for myself at home? -- Ask the doctor or nurse what you should do when you go home. Make sure that you understand exactly what you need to do to care for yourself. Ask questions if there is anything you do not understand.  You can also:   Follow all instructions for taking your medicines, if your doctor prescribed any.   Drink plenty of fluids, such as water, juice, or broth. This helps replace fluids lost from a fever.   Take acetaminophen (sample brand name: Tylenol) to help reduce a fever. If this does not help, you can try medicines " like ibuprofen (sample brand names: Advil, Motrin).   Use a cool mist humidifier. This might make it easier to breathe.   Lower the chance of passing the infection to others:   Stay home while you are feeling sick or have a fever.   At home, try to limit close contact with other people. You can also help protect others by wearing a face mask.   Wash your hands often (figure 1).   Cover your mouth and nose with the inside of your elbow when you cough or sneeze.   Do not go to work or school until your symptoms are improving and your fever has been gone for at least 24 hours without taking medicine such as acetaminophen.  If you have not already been vaccinated, consider getting a COVID-19 vaccine as soon as you have recovered. Being vaccinated is the best way to protect yourself and others.  When should I call the doctor? -- Call for an ambulance (in the [place] and [place], call 9-1-1) if:   You are having so much trouble breathing that you cannot speak a full sentence.   You are very confused or cannot stay awake.   Your lips or skin start to turn blue.   You think that you might be having a medical emergency - Examples include severe chest pain, feeling extremely weak or like you might pass out, or losing control of your body (like being unable to speak normally or move your arm or leg).  Call your doctor if:   You develop new shortness of breath, or your breathing gets worse (but you can still talk in full sentences).   You become weak or dizzy.   You have very dark urine or do not urinate for more than 8 hours.   You have new or worsening symptoms that concern you - COVID-19 symptoms can include fever, cough, feeling very tired, shaking, chills, headache, and trouble swallowing. They can also include digestive problems like vomiting or diarrhea.  All topics are updated as new evidence becomes available and our peer review process is complete.  This topic retrieved from [employer] on: Mar 13, 2024.  Topic 983468 Version  2.0  Release: 32.2.4 - C32.71  © 2024 UpToDate, Inc. and/or its affiliates. All rights reserved.  figure 1: How to wash your hands     Wet your hands with clean water, and apply a small amount of soap. Lather and rub hands together for at least 20 seconds. Clean your wrists, palms, backs of your hands, between your fingers, tips of your fingers, thumbs, and under and around your nails. Rinse well, and dry your hands using a clean towel.  Graphic 883671 Version 7.0  Consumer Information Use and Disclaimer   Disclaimer: This generalized information is a limited summary of diagnosis, treatment, and/or medication information. It is not meant to be comprehensive and should be used as a tool to help the user understand and/or assess potential diagnostic and treatment options. It does NOT include all information about conditions, treatments, medications, side effects, or risks that may apply to a specific patient. It is not intended to be medical advice or a substitute for the medical advice, diagnosis, or treatment of a health care provider based on the health care provider's examination and assessment of a patient's specific and unique circumstances. Patients must speak with a health care provider for complete information about their health, medical questions, and treatment options, including any risks or benefits regarding use of medications. This information does not endorse any treatments or medications as safe, effective, or approved for treating a specific patient. UpToDate, Inc. and its affiliates disclaim any warranty or liability relating to this information or the use thereof.The use of this information is governed by the Terms of Use, available at https://www.woltersFederal Financeuwer.com/en/know/clinical-effectiveness-terms. 2024© UpToDate, Inc. and its affiliates and/or licensors. All rights reserved.  Copyright   © 2024 UpToDate, Inc. and/or its affiliates. All rights reserved.       Follow up with PCP in 3-5  days.  Proceed to  ER if symptoms worsen.    Chief Complaint     Chief Complaint   Patient presents with    COVID-19     Patient states she tested positive for covid today. Patient states she feels pressure head and nausea.    Nausea         History of Present Illness       73 year old female with PMH significant for Parkinson's disease presents with daughter for evaluation of viral symptoms after testing positive for COVID at home today.She reports that symptoms began this morning and include headache and nausea. She reports recent familial contacts positive for COVID. She denies fever, cough, body aches, chills, vomiting/diarrhea, chest pain, shortness of breath.         Review of Systems   Review of Systems   Constitutional:  Negative for fatigue and fever.   HENT:  Negative for congestion, ear discharge, ear pain, postnasal drip, rhinorrhea, sinus pressure, sinus pain, sneezing and sore throat.    Eyes: Negative.  Negative for pain, discharge, redness and itching.   Respiratory: Negative.  Negative for apnea, cough, choking, chest tightness, shortness of breath, wheezing and stridor.    Cardiovascular: Negative.  Negative for chest pain and palpitations.   Gastrointestinal:  Positive for nausea. Negative for abdominal distention, abdominal pain, anal bleeding, blood in stool, constipation, diarrhea, rectal pain and vomiting.   Endocrine: Negative.  Negative for polydipsia, polyphagia and polyuria.   Genitourinary: Negative.  Negative for decreased urine volume and flank pain.   Musculoskeletal: Negative.  Negative for arthralgias, back pain, myalgias, neck pain and neck stiffness.   Skin: Negative.  Negative for color change and rash.   Allergic/Immunologic: Negative.  Negative for environmental allergies.   Neurological:  Positive for headaches. Negative for dizziness, facial asymmetry, light-headedness and numbness.   Hematological: Negative.  Negative for adenopathy.   Psychiatric/Behavioral: Negative.            Current Medications       Current Outpatient Medications:     nirmatrelvir & ritonavir (Paxlovid, 300/100,) tablet therapy pack, Take 3 tablets by mouth 2 (two) times a day for 5 days Take 2 nirmatrelvir tablets + 1 ritonavir tablet together per dose, Disp: 30 tablet, Rfl: 0    Amantadine HCl ER (Gocovri) 137 MG CP24, 1 tab daily  (Name: Tigist), Disp: 90 capsule, Rfl: 3    busPIRone (BUSPAR) 5 mg tablet, Take 5 mg by mouth if needed, Disp: , Rfl:     Calcium Carbonate-Vit D-Min (CALCIUM 1200 PO), Take 1,200 mg by mouth daily, Disp: , Rfl:     carbidopa-levodopa-entacapone (STALEVO) 12.5- MG per tablet, TAKE 1 TABLET BY MOUTH EVERY 2 HOURS 7-8 TIMES A DAY, Disp: 720 tablet, Rfl: 3    denosumab (PROLIA) 60 mg/mL, , Disp: , Rfl:     famotidine (PEPCID) 20 mg tablet, TAKE 2 TABLETS (40 MG TOTAL) BY MOUTH DAILY AT BEDTIME (Patient taking differently: Take 40 mg by mouth if needed), Disp: 180 tablet, Rfl: 1    gabapentin (Neurontin) 300 mg capsule, Take 1 capsule (300 mg total) by mouth daily at bedtime, Disp: 30 capsule, Rfl: 5    rasagiline (AZILECT) 1 MG, TAKE 1 TABLET BY MOUTH EVERY DAY, Disp: 90 tablet, Rfl: 4    Xdemvy 0.25 % SOLN, , Disp: , Rfl:     Current Allergies     Allergies as of 07/31/2024 - Reviewed 06/05/2024   Allergen Reaction Noted    Sulfa antibiotics  08/26/2017    Doxycycline Rash 08/26/2017            The following portions of the patient's history were reviewed and updated as appropriate: allergies, current medications, past family history, past medical history, past social history, past surgical history and problem list.     Past Medical History:   Diagnosis Date    GERD (gastroesophageal reflux disease)     Osteoporosis     Parkinson disease        Past Surgical History:   Procedure Laterality Date    ARTERIOGRAM N/A 07/10/2019    Procedure: KYPHOPLASTY;  Surgeon: Toni Stephens MD;  Location: [facility];  Service: Interventional Radiology    IR KYPHOPLASTY/VERTEBROPLASTY   07/10/2019    LEG SURGERY Left 11/2022    TONSILLECTOMY         Family History   Problem Relation Age of Onset    Dementia Mother     Heart disease Father     No Known Problems Daughter     No Known Problems Maternal Grandmother     No Known Problems Maternal Grandfather     No Known Problems Paternal Grandmother     Prostate cancer Paternal Grandfather     No Known Problems Daughter     No Known Problems Daughter     Alcohol abuse Neg Hx     Mental illness Neg Hx     Substance Abuse Neg Hx     Depression Neg Hx          Medications have been verified.        Objective   Pulse 95   Temp 97.9 °F (36.6 °C) (Tympanic)   Resp 16   SpO2 96%        Physical Exam     Physical Exam  Vitals and nursing note reviewed.   Constitutional:       General: She is not in acute distress.     Appearance: Normal appearance. She is not ill-appearing, toxic-appearing or diaphoretic.      Interventions: She is not intubated.  HENT:      Head: Normocephalic and atraumatic.      Right Ear: Tympanic membrane, ear canal and external ear normal. There is no impacted cerumen.      Left Ear: Tympanic membrane, ear canal and external ear normal. There is no impacted cerumen.      Nose: Congestion and rhinorrhea present. Rhinorrhea is clear.      Mouth/Throat:      Mouth: Mucous membranes are moist.      Pharynx: Oropharynx is clear. Uvula midline. No pharyngeal swelling, oropharyngeal exudate, posterior oropharyngeal erythema, uvula swelling or postnasal drip.      Tonsils: No tonsillar exudate or tonsillar abscesses. 1+ on the right. 1+ on the left.   Eyes:      Extraocular Movements: Extraocular movements intact.      Conjunctiva/sclera: Conjunctivae normal.      Pupils: Pupils are equal, round, and reactive to light.   Neck:      Thyroid: No thyroid mass, thyromegaly or thyroid tenderness.   Cardiovascular:      Rate and Rhythm: Normal rate and regular rhythm.      Pulses: Normal pulses.      Heart sounds: Normal heart sounds, S1 normal and  S2 normal. Heart sounds not distant. No murmur heard.     No friction rub. No gallop.   Pulmonary:      Effort: Pulmonary effort is normal. No tachypnea, bradypnea, accessory muscle usage, prolonged expiration, respiratory distress or retractions. She is not intubated.      Breath sounds: Normal breath sounds. No stridor, decreased air movement or transmitted upper airway sounds. No decreased breath sounds, wheezing, rhonchi or rales.   Abdominal:      General: Bowel sounds are normal.      Palpations: Abdomen is soft.      Tenderness: There is no abdominal tenderness. There is no guarding or rebound.   Musculoskeletal:         General: Normal range of motion.      Cervical back: Full passive range of motion without pain, normal range of motion and neck supple. No rigidity or tenderness. No spinous process tenderness or muscular tenderness. Normal range of motion.   Lymphadenopathy:      Cervical: No cervical adenopathy.      Right cervical: No superficial cervical adenopathy.     Left cervical: No superficial cervical adenopathy.   Skin:     General: Skin is warm and dry.      Capillary Refill: Capillary refill takes less than 2 seconds.   Neurological:      General: No focal deficit present.      Mental Status: She is alert and oriented to person, place, and time.      Cranial Nerves: No cranial nerve deficit.   Psychiatric:         Mood and Affect: Mood normal.         Behavior: Behavior normal.

## 2024-08-03 ENCOUNTER — NURSE TRIAGE (OUTPATIENT)
Dept: OTHER | Facility: OTHER | Age: 74
End: 2024-08-03

## 2024-08-03 NOTE — TELEPHONE ENCOUNTER
"Regarding: parkinsons/ covid medication concern  ----- Message from Sada GALVAN sent at 8/3/2024 11:56 AM EDT -----  \" My mother has covid and is taking plaxlovid. She was told she can't take her parkinson's medication close to plaxovid but she is getting stuck, more often\"    "

## 2024-08-03 NOTE — TELEPHONE ENCOUNTER
Reviewed chart from urgent care and clarified recommendations with patient's daughter. Per note from urgent care patient should avoid buspar if possible to decrease risk of worsening parkinson symptoms on paxlovid. Daughter verbalized understanding

## 2024-08-03 NOTE — TELEPHONE ENCOUNTER
"Reason for Disposition  • Health Information question, no triage required and triager able to answer question    Answer Assessment - Initial Assessment Questions  1. REASON FOR CALL or QUESTION: \"What is your reason for calling today?\" or \"How can I best help you?\" or \"What question do you have that I can help answer?\"      Patient's daughter called to ask when patient should take her parkinson's medication because she was told not to take it within 4 hours of paxlovid.    Protocols used: Information Only Call - No Triage-ADULT-    "

## 2024-08-20 ENCOUNTER — ANESTHESIA (OUTPATIENT)
Dept: PERIOP | Facility: HOSPITAL | Age: 74
End: 2024-08-20
Payer: MEDICARE

## 2024-08-26 ENCOUNTER — 1 WEEK POST-OP (OUTPATIENT)
Dept: URBAN - METROPOLITAN AREA CLINIC 6 | Facility: CLINIC | Age: 74
End: 2024-08-26

## 2024-08-26 ENCOUNTER — APPOINTMENT (OUTPATIENT)
Dept: LAB | Age: 74
End: 2024-08-26
Payer: MEDICARE

## 2024-08-26 ENCOUNTER — OFFICE VISIT (OUTPATIENT)
Dept: LAB | Age: 74
End: 2024-08-26
Payer: MEDICARE

## 2024-08-26 DIAGNOSIS — G20.A1 PARKINSON'S DISEASE: ICD-10-CM

## 2024-08-26 DIAGNOSIS — Z96.1: ICD-10-CM

## 2024-08-26 LAB
ALBUMIN SERPL BCG-MCNC: 4.2 G/DL (ref 3.5–5)
ALP SERPL-CCNC: 78 U/L (ref 34–104)
ALT SERPL W P-5'-P-CCNC: 4 U/L (ref 7–52)
ANION GAP SERPL CALCULATED.3IONS-SCNC: 5 MMOL/L (ref 4–13)
APTT PPP: 26 SECONDS (ref 23–34)
AST SERPL W P-5'-P-CCNC: 16 U/L (ref 13–39)
BACTERIA UR QL AUTO: ABNORMAL /HPF
BASOPHILS # BLD AUTO: 0.08 THOUSANDS/ÂΜL (ref 0–0.1)
BASOPHILS NFR BLD AUTO: 1 % (ref 0–1)
BILIRUB SERPL-MCNC: 0.59 MG/DL (ref 0.2–1)
BILIRUB UR QL STRIP: NEGATIVE
BUN SERPL-MCNC: 15 MG/DL (ref 5–25)
CALCIUM SERPL-MCNC: 9.3 MG/DL (ref 8.4–10.2)
CHLORIDE SERPL-SCNC: 106 MMOL/L (ref 96–108)
CLARITY UR: CLEAR
CO2 SERPL-SCNC: 29 MMOL/L (ref 21–32)
COLOR UR: ABNORMAL
CREAT SERPL-MCNC: 0.97 MG/DL (ref 0.6–1.3)
EOSINOPHIL # BLD AUTO: 0.48 THOUSAND/ÂΜL (ref 0–0.61)
EOSINOPHIL NFR BLD AUTO: 7 % (ref 0–6)
ERYTHROCYTE [DISTWIDTH] IN BLOOD BY AUTOMATED COUNT: 11.9 % (ref 11.6–15.1)
EST. AVERAGE GLUCOSE BLD GHB EST-MCNC: 108 MG/DL
GFR SERPL CREATININE-BSD FRML MDRD: 58 ML/MIN/1.73SQ M
GLUCOSE P FAST SERPL-MCNC: 89 MG/DL (ref 65–99)
GLUCOSE UR STRIP-MCNC: NEGATIVE MG/DL
HBA1C MFR BLD: 5.4 %
HCT VFR BLD AUTO: 41.3 % (ref 34.8–46.1)
HGB BLD-MCNC: 14.3 G/DL (ref 11.5–15.4)
HGB UR QL STRIP.AUTO: NEGATIVE
HYALINE CASTS #/AREA URNS LPF: ABNORMAL /LPF
IMM GRANULOCYTES # BLD AUTO: 0.02 THOUSAND/UL (ref 0–0.2)
IMM GRANULOCYTES NFR BLD AUTO: 0 % (ref 0–2)
INR PPP: 0.97 (ref 0.85–1.19)
KETONES UR STRIP-MCNC: ABNORMAL MG/DL
LEUKOCYTE ESTERASE UR QL STRIP: ABNORMAL
LYMPHOCYTES # BLD AUTO: 1.05 THOUSANDS/ÂΜL (ref 0.6–4.47)
LYMPHOCYTES NFR BLD AUTO: 15 % (ref 14–44)
MCH RBC QN AUTO: 34 PG (ref 26.8–34.3)
MCHC RBC AUTO-ENTMCNC: 34.6 G/DL (ref 31.4–37.4)
MCV RBC AUTO: 98 FL (ref 82–98)
MONOCYTES # BLD AUTO: 0.78 THOUSAND/ÂΜL (ref 0.17–1.22)
MONOCYTES NFR BLD AUTO: 11 % (ref 4–12)
MUCOUS THREADS UR QL AUTO: ABNORMAL
NEUTROPHILS # BLD AUTO: 4.57 THOUSANDS/ÂΜL (ref 1.85–7.62)
NEUTS SEG NFR BLD AUTO: 66 % (ref 43–75)
NITRITE UR QL STRIP: NEGATIVE
NON-SQ EPI CELLS URNS QL MICRO: ABNORMAL /HPF
NRBC BLD AUTO-RTO: 0 /100 WBCS
PH UR STRIP.AUTO: 6.5 [PH]
PLATELET # BLD AUTO: 208 THOUSANDS/UL (ref 149–390)
PMV BLD AUTO: 10.5 FL (ref 8.9–12.7)
POTASSIUM SERPL-SCNC: 4.2 MMOL/L (ref 3.5–5.3)
PROT SERPL-MCNC: 6.9 G/DL (ref 6.4–8.4)
PROT UR STRIP-MCNC: ABNORMAL MG/DL
PROTHROMBIN TIME: 13.2 SECONDS (ref 12.3–15)
RBC # BLD AUTO: 4.21 MILLION/UL (ref 3.81–5.12)
RBC #/AREA URNS AUTO: ABNORMAL /HPF
SODIUM SERPL-SCNC: 140 MMOL/L (ref 135–147)
SP GR UR STRIP.AUTO: 1.02 (ref 1–1.03)
UROBILINOGEN UR STRIP-ACNC: <2 MG/DL
WBC # BLD AUTO: 6.98 THOUSAND/UL (ref 4.31–10.16)
WBC #/AREA URNS AUTO: ABNORMAL /HPF

## 2024-08-26 PROCEDURE — 99024 POSTOP FOLLOW-UP VISIT: CPT

## 2024-08-26 PROCEDURE — 80053 COMPREHEN METABOLIC PANEL: CPT

## 2024-08-26 PROCEDURE — 83036 HEMOGLOBIN GLYCOSYLATED A1C: CPT

## 2024-08-26 PROCEDURE — 36415 COLL VENOUS BLD VENIPUNCTURE: CPT

## 2024-08-26 PROCEDURE — 85730 THROMBOPLASTIN TIME PARTIAL: CPT

## 2024-08-26 PROCEDURE — 93005 ELECTROCARDIOGRAM TRACING: CPT

## 2024-08-26 PROCEDURE — 81001 URINALYSIS AUTO W/SCOPE: CPT

## 2024-08-26 PROCEDURE — 85610 PROTHROMBIN TIME: CPT

## 2024-08-26 PROCEDURE — 85025 COMPLETE CBC W/AUTO DIFF WBC: CPT

## 2024-08-26 ASSESSMENT — VISUAL ACUITY
OD_SC: 20/70
OS_PH: 20/40-1
OS_SC: 20/70-1
OD_PH: 20/60

## 2024-08-26 ASSESSMENT — TONOMETRY
OS_IOP_MMHG: 13
OD_IOP_MMHG: 14

## 2024-08-27 LAB
ATRIAL RATE: 72 BPM
P AXIS: 60 DEGREES
PR INTERVAL: 154 MS
QRS AXIS: 28 DEGREES
QRSD INTERVAL: 74 MS
QT INTERVAL: 398 MS
QTC INTERVAL: 435 MS
T WAVE AXIS: 29 DEGREES
VENTRICULAR RATE: 72 BPM

## 2024-08-27 PROCEDURE — 93010 ELECTROCARDIOGRAM REPORT: CPT | Performed by: INTERNAL MEDICINE

## 2024-09-04 ENCOUNTER — TELEPHONE (OUTPATIENT)
Age: 74
End: 2024-09-04

## 2024-09-04 NOTE — PRE-PROCEDURE INSTRUCTIONS
Pre-Surgery Instructions:   Medication Instructions    Amantadine HCl ER (Gocovri) 137 MG CP24 Take day of surgery.    busPIRone (BUSPAR) 5 mg tablet Uses PRN- OK to take day of surgery    Calcium Carbonate-Vit D-Min (CALCIUM 1200 PO) Stop taking 7 days prior to surgery.    carbidopa-levodopa-entacapone (STALEVO) 12.5- MG per tablet Take day of surgery.    denosumab (PROLIA) 60 mg/mL MONTHLY INJ      famotidine (PEPCID) 20 mg tablet Take day of surgery.    gabapentin (Neurontin) 300 mg capsule Take night before surgery    rasagiline (AZILECT) 1 MG Take day of surgery.    Medication instructions for day surgery reviewed. Please use only a sip of water to take your instructed medications. Avoid all over the counter vitamins, supplements and NSAIDS for one week prior to surgery per anesthesia guidelines. Tylenol is ok to take as needed.     You will receive a call one business day prior to surgery with an arrival time and hospital directions. If your surgery is scheduled on a Monday, the hospital will be calling you on the Friday prior to your surgery. If you have not heard from anyone by 8pm, please call the hospital supervisor through the hospital  at [phone]. ([place] [phone] or [place] [phone]).    Do not eat or drink anything after midnight the night before your surgery, including candy, mints, lifesavers, or chewing gum. Do not drink alcohol 24hrs before your surgery. Try not to smoke at least 24hrs before your surgery.       Follow the pre surgery showering instructions as listed in the “My Surgical Experience Booklet” or otherwise provided by your surgeon's office. Do not use a blade to shave the surgical area 1 week before surgery. It is okay to use a clean electric clippers up to 24 hours before surgery. Do not apply any lotions, creams, including makeup, cologne, deodorant, or perfumes after showering on the day of your surgery. Do not use dry shampoo, hair spray, hair gel, or  any type of hair products.     No contact lenses, eye make-up, or artificial eyelashes. Remove nail polish, including gel polish, and any artificial, gel, or acrylic nails if possible. Remove all jewelry including rings and body piercing jewelry.     Wear causal clothing that is easy to take on and off. Consider your type of surgery.    Keep any valuables, jewelry, piercings at home. Please bring any specially ordered equipment (sling, braces) if indicated.    Arrange for a responsible person to drive you to and from the hospital on the day of your surgery. Please confirm the visitor policy for the day of your procedure when you receive your phone call with an arrival time.     Call the surgeon's office with any new illnesses, exposures, or additional questions prior to surgery.    Please reference your “My Surgical Experience Booklet” for additional information to prepare for your upcoming surgery.    . See Geriatric Assessment below...    Cognitive Assessment:   CAM:   TUG <15 sec:  Falls (last 6 months): 1  Hand  score:  -Attempt 1:  -Attempt 2:  -Attempt 3:  W[place]ley Total Score: 22  PHQ- 9 Depression Scale:4  PASSED LAST MONTH  Nutrition Assessment Score:9  METS:8.33  Health goals:  -What are your overall health goals? (quit smoking, wt. loss, rest, decrease stress)  DECR STRESS  -What brings you strength? (family, friends, Mandaen, health)  FAMILY  -What activities are important to you? (exercise, reading, travel, work)  READING

## 2024-09-04 NOTE — TELEPHONE ENCOUNTER
PT CALLED REQUESTING TO SPEAK WITH SS BK IN REGARD TO PREPARATION FOR UPCOMING NSX 9/10/24.    ATTEMPTED TO TRANSFER PT TO  AND INSTRUCTED TO LEAVE A VM FOR CB IF NO ANSWER.

## 2024-09-06 ENCOUNTER — TELEPHONE (OUTPATIENT)
Age: 74
End: 2024-09-06

## 2024-09-10 ENCOUNTER — TELEPHONE (OUTPATIENT)
Dept: NEUROSURGERY | Facility: CLINIC | Age: 74
End: 2024-09-10

## 2024-09-10 ENCOUNTER — HOSPITAL ENCOUNTER (OUTPATIENT)
Facility: HOSPITAL | Age: 74
Setting detail: OUTPATIENT SURGERY
Discharge: HOME | End: 2024-09-10
Attending: STUDENT IN AN ORGANIZED HEALTH CARE EDUCATION/TRAINING PROGRAM | Admitting: STUDENT IN AN ORGANIZED HEALTH CARE EDUCATION/TRAINING PROGRAM
Payer: MEDICARE

## 2024-09-10 ENCOUNTER — APPOINTMENT (OUTPATIENT)
Dept: RADIOLOGY | Facility: HOSPITAL | Age: 74
End: 2024-09-10
Payer: MEDICARE

## 2024-09-10 VITALS
HEART RATE: 99 BPM | DIASTOLIC BLOOD PRESSURE: 74 MMHG | OXYGEN SATURATION: 96 % | RESPIRATION RATE: 18 BRPM | WEIGHT: 139 LBS | TEMPERATURE: 96.7 F | SYSTOLIC BLOOD PRESSURE: 149 MMHG | BODY MASS INDEX: 24.63 KG/M2 | HEIGHT: 63 IN

## 2024-09-10 DIAGNOSIS — Z98.890 POST-OPERATIVE STATE: ICD-10-CM

## 2024-09-10 DIAGNOSIS — G20.B2 PARKINSON'S DISEASE WITH DYSKINESIA AND FLUCTUATING MANIFESTATIONS: Primary | ICD-10-CM

## 2024-09-10 PROCEDURE — C1713 ANCHOR/SCREW BN/BN,TIS/BN: HCPCS | Performed by: STUDENT IN AN ORGANIZED HEALTH CARE EDUCATION/TRAINING PROGRAM

## 2024-09-10 PROCEDURE — C1883 ADAPT/EXT, PACING/NEURO LEAD: HCPCS | Performed by: STUDENT IN AN ORGANIZED HEALTH CARE EDUCATION/TRAINING PROGRAM

## 2024-09-10 PROCEDURE — 61886 IMPLANT NEUROSTIM ARRAYS: CPT | Performed by: PHYSICIAN ASSISTANT

## 2024-09-10 PROCEDURE — C1787 PATIENT PROGR, NEUROSTIM: HCPCS | Performed by: STUDENT IN AN ORGANIZED HEALTH CARE EDUCATION/TRAINING PROGRAM

## 2024-09-10 PROCEDURE — 70450 CT HEAD/BRAIN W/O DYE: CPT

## 2024-09-10 PROCEDURE — 61886 IMPLANT NEUROSTIM ARRAYS: CPT | Performed by: STUDENT IN AN ORGANIZED HEALTH CARE EDUCATION/TRAINING PROGRAM

## 2024-09-10 PROCEDURE — NC001 PR NO CHARGE: Performed by: STUDENT IN AN ORGANIZED HEALTH CARE EDUCATION/TRAINING PROGRAM

## 2024-09-10 PROCEDURE — C1767 GENERATOR, NEURO NON-RECHARG: HCPCS | Performed by: STUDENT IN AN ORGANIZED HEALTH CARE EDUCATION/TRAINING PROGRAM

## 2024-09-10 DEVICE — IMPLANTABLE DEVICE
Type: IMPLANTABLE DEVICE | Site: CRANIAL | Status: FUNCTIONAL
Brand: THINFLAP SYSTEM

## 2024-09-10 DEVICE — EXT B3400060M SENSIGHT W/MKR EMAN LJ53
Type: IMPLANTABLE DEVICE | Site: NECK | Status: FUNCTIONAL
Brand: SENSIGHT™

## 2024-09-10 DEVICE — ENVELOPE NMRM6122 ABSORB MED MR
Type: IMPLANTABLE DEVICE | Site: CHEST | Status: FUNCTIONAL
Brand: TYRX™

## 2024-09-10 DEVICE — NEUROSTIM DBS PERCPET PC BRAINSENSE: Type: IMPLANTABLE DEVICE | Site: CHEST | Status: FUNCTIONAL

## 2024-09-10 DEVICE — EXT B3400060 SENSIGHT NO MKR EMAN LJ53
Type: IMPLANTABLE DEVICE | Site: NECK | Status: FUNCTIONAL
Brand: SENSIGHT™

## 2024-09-10 DEVICE — CONNECTOR PLUG SENSIGHT: Type: IMPLANTABLE DEVICE | Site: NECK | Status: FUNCTIONAL

## 2024-09-10 DEVICE — IMPLANTABLE DEVICE: Type: IMPLANTABLE DEVICE | Site: CRANIAL | Status: FUNCTIONAL

## 2024-09-10 RX ORDER — LIDOCAINE HYDROCHLORIDE AND EPINEPHRINE 10; 10 MG/ML; UG/ML
INJECTION, SOLUTION INFILTRATION; PERINEURAL AS NEEDED
Status: DISCONTINUED | OUTPATIENT
Start: 2024-09-10 | End: 2024-09-10 | Stop reason: HOSPADM

## 2024-09-10 RX ORDER — FENTANYL CITRATE 50 UG/ML
INJECTION, SOLUTION INTRAMUSCULAR; INTRAVENOUS AS NEEDED
Status: DISCONTINUED | OUTPATIENT
Start: 2024-09-10 | End: 2024-09-10

## 2024-09-10 RX ORDER — OXYCODONE HYDROCHLORIDE 5 MG/1
2.5 TABLET ORAL EVERY 6 HOURS PRN
Qty: 6 TABLET | Refills: 0 | Status: SHIPPED | OUTPATIENT
Start: 2024-09-10 | End: 2024-09-13

## 2024-09-10 RX ORDER — DEXAMETHASONE SODIUM PHOSPHATE 10 MG/ML
INJECTION, SOLUTION INTRAMUSCULAR; INTRAVENOUS AS NEEDED
Status: DISCONTINUED | OUTPATIENT
Start: 2024-09-10 | End: 2024-09-10

## 2024-09-10 RX ORDER — CEPHALEXIN 500 MG/1
500 CAPSULE ORAL EVERY 6 HOURS SCHEDULED
Qty: 20 CAPSULE | Refills: 0 | Status: SHIPPED | OUTPATIENT
Start: 2024-09-10 | End: 2024-09-15

## 2024-09-10 RX ORDER — PHENYLEPHRINE HCL IN 0.9% NACL 1 MG/10 ML
SYRINGE (ML) INTRAVENOUS AS NEEDED
Status: DISCONTINUED | OUTPATIENT
Start: 2024-09-10 | End: 2024-09-10

## 2024-09-10 RX ORDER — ONDANSETRON 2 MG/ML
INJECTION INTRAMUSCULAR; INTRAVENOUS AS NEEDED
Status: DISCONTINUED | OUTPATIENT
Start: 2024-09-10 | End: 2024-09-10

## 2024-09-10 RX ORDER — ONDANSETRON 2 MG/ML
4 INJECTION INTRAMUSCULAR; INTRAVENOUS ONCE AS NEEDED
Status: DISCONTINUED | OUTPATIENT
Start: 2024-09-10 | End: 2024-09-10 | Stop reason: HOSPADM

## 2024-09-10 RX ORDER — LIDOCAINE HYDROCHLORIDE 10 MG/ML
INJECTION, SOLUTION EPIDURAL; INFILTRATION; INTRACAUDAL; PERINEURAL AS NEEDED
Status: DISCONTINUED | OUTPATIENT
Start: 2024-09-10 | End: 2024-09-10

## 2024-09-10 RX ORDER — SODIUM CHLORIDE, SODIUM LACTATE, POTASSIUM CHLORIDE, CALCIUM CHLORIDE 600; 310; 30; 20 MG/100ML; MG/100ML; MG/100ML; MG/100ML
INJECTION, SOLUTION INTRAVENOUS CONTINUOUS PRN
Status: DISCONTINUED | OUTPATIENT
Start: 2024-09-10 | End: 2024-09-10

## 2024-09-10 RX ORDER — CHLORHEXIDINE GLUCONATE ORAL RINSE 1.2 MG/ML
15 SOLUTION DENTAL ONCE
Status: DISCONTINUED | OUTPATIENT
Start: 2024-09-10 | End: 2024-09-10 | Stop reason: CLARIF

## 2024-09-10 RX ORDER — CEFAZOLIN SODIUM 1 G/3ML
INJECTION, POWDER, FOR SOLUTION INTRAMUSCULAR; INTRAVENOUS AS NEEDED
Status: DISCONTINUED | OUTPATIENT
Start: 2024-09-10 | End: 2024-09-10

## 2024-09-10 RX ORDER — VANCOMYCIN HYDROCHLORIDE 1 G/20ML
INJECTION, POWDER, LYOPHILIZED, FOR SOLUTION INTRAVENOUS AS NEEDED
Status: DISCONTINUED | OUTPATIENT
Start: 2024-09-10 | End: 2024-09-10 | Stop reason: HOSPADM

## 2024-09-10 RX ORDER — FENTANYL CITRATE/PF 50 MCG/ML
25 SYRINGE (ML) INJECTION
Status: DISCONTINUED | OUTPATIENT
Start: 2024-09-10 | End: 2024-09-10 | Stop reason: HOSPADM

## 2024-09-10 RX ORDER — ONDANSETRON 4 MG/1
4 TABLET, ORALLY DISINTEGRATING ORAL EVERY 6 HOURS PRN
Status: DISCONTINUED | OUTPATIENT
Start: 2024-09-10 | End: 2024-09-10 | Stop reason: HOSPADM

## 2024-09-10 RX ORDER — ROCURONIUM BROMIDE 10 MG/ML
INJECTION, SOLUTION INTRAVENOUS AS NEEDED
Status: DISCONTINUED | OUTPATIENT
Start: 2024-09-10 | End: 2024-09-10

## 2024-09-10 RX ORDER — PROPOFOL 10 MG/ML
INJECTION, EMULSION INTRAVENOUS CONTINUOUS PRN
Status: DISCONTINUED | OUTPATIENT
Start: 2024-09-10 | End: 2024-09-10

## 2024-09-10 RX ORDER — NEOMYCIN SULFATE, POLYMYXIN B SULFATE AND BACITRACIN ZINC 3.5; 10000; 4 MG/G; [USP'U]/G; [USP'U]/G
OINTMENT OPHTHALMIC AS NEEDED
Status: DISCONTINUED | OUTPATIENT
Start: 2024-09-10 | End: 2024-09-10 | Stop reason: HOSPADM

## 2024-09-10 RX ORDER — ACETAMINOPHEN 325 MG/1
975 TABLET ORAL EVERY 6 HOURS PRN
Status: DISCONTINUED | OUTPATIENT
Start: 2024-09-10 | End: 2024-09-10 | Stop reason: HOSPADM

## 2024-09-10 RX ADMIN — ROCURONIUM BROMIDE 10 MG: 10 INJECTION, SOLUTION INTRAVENOUS at 08:07

## 2024-09-10 RX ADMIN — Medication 100 MCG: at 07:51

## 2024-09-10 RX ADMIN — FENTANYL CITRATE 50 MCG: 50 INJECTION INTRAMUSCULAR; INTRAVENOUS at 07:43

## 2024-09-10 RX ADMIN — LIDOCAINE HYDROCHLORIDE 50 MG: 10 INJECTION, SOLUTION EPIDURAL; INFILTRATION; INTRACAUDAL; PERINEURAL at 07:43

## 2024-09-10 RX ADMIN — SODIUM CHLORIDE, SODIUM LACTATE, POTASSIUM CHLORIDE, AND CALCIUM CHLORIDE: .6; .31; .03; .02 INJECTION, SOLUTION INTRAVENOUS at 07:30

## 2024-09-10 RX ADMIN — PHENYLEPHRINE HYDROCHLORIDE 20 MCG/MIN: 10 INJECTION INTRAVENOUS at 08:03

## 2024-09-10 RX ADMIN — ACETAMINOPHEN 975 MG: 325 TABLET ORAL at 11:12

## 2024-09-10 RX ADMIN — CEFAZOLIN 2000 MG: 1 INJECTION, POWDER, FOR SOLUTION INTRAMUSCULAR; INTRAVENOUS at 07:53

## 2024-09-10 RX ADMIN — ONDANSETRON 4 MG: 2 INJECTION INTRAMUSCULAR; INTRAVENOUS at 08:47

## 2024-09-10 RX ADMIN — DEXAMETHASONE SODIUM PHOSPHATE 10 MG: 10 INJECTION, SOLUTION INTRAMUSCULAR; INTRAVENOUS at 07:43

## 2024-09-10 RX ADMIN — SUGAMMADEX 200 MG: 100 INJECTION, SOLUTION INTRAVENOUS at 08:53

## 2024-09-10 RX ADMIN — Medication 100 MCG: at 08:07

## 2024-09-10 RX ADMIN — FENTANYL CITRATE 25 MCG: 50 INJECTION INTRAMUSCULAR; INTRAVENOUS at 09:39

## 2024-09-10 RX ADMIN — PROPOFOL 60 MCG/KG/MIN: 10 INJECTION, EMULSION INTRAVENOUS at 07:54

## 2024-09-10 RX ADMIN — PROPOFOL 30 MG: 10 INJECTION, EMULSION INTRAVENOUS at 08:39

## 2024-09-10 RX ADMIN — PROPOFOL 120 MG: 10 INJECTION, EMULSION INTRAVENOUS at 07:43

## 2024-09-10 NOTE — OP NOTE
OPERATIVE REPORT  PATIENT NAME: Malaika Chavis    :  1950  MRN: [id number]  Pt Location: [facility] OR ROOM 09    SURGERY DATE: 9/10/2024    Surgeons and Role:     * Jacek Gonsales MD - Primary  Xavier JOHANSEN - Assisting    Preop Diagnosis:  Parkinson's disease [G20.A1]    Post-Op Diagnosis Codes:     * Parkinson's disease [G20.A1]    Procedure(s):  Left - Placement of left sided deep brain stimulator battery, extension wires and skull fiducials    Specimen(s):  * No specimens in log *    Estimated Blood Loss:   Minimal    Drains:  * No LDAs found *    Anesthesia Type:   General    Operative Indications:  Parkinson's disease [G20.A1]      Operative Findings:  Placement of battery, extension wires, and skull fiducials x 4 without issue     Patient Disposition:  PACU       Complications:   None    Procedure and Technique:  The patient was brought to the OR and placement under general anesthesia. The bed was rotated 180 degrees. Her scalp was shaved. A left subclavicular incision was planned along with a left posterior scalp incision. Two anterior and two posterior skull fiducial sites were marked. The planned incisions were then prepped and draped. To begin the posterior scalp incision was opened sharply and carried down to the skull with monopolar cautery. A high speed drill was used to drill a trough that would fit the extension wire connection sites. The subclavicular incision was then opened sharply and carried down to the fascia. A subfascial pocket was bluntly dissected for the battery. The extension wires were then tunneled down to the subclavicular pocket. The 2 electrode arrays were then connected to the battery and secured with a hex wrench. At the posterior scalp site plugs were placed in the extension wire connections and secured with a hex wrench. These were tucked into the demetrio trough and a small cranial plate was secured to the skull beneath them to prevent them from migrating downward.  Finally, the 4 skull fiducials sites were opened with stab small stab incisions down to the skull. Starfix skull fiducial anchors were drilled into the skull insuring good purchase. These stab incisions were closed with a figure of 8 nylon stitch. The other sites were copiously irrigated and vancomycin powder placed in the wound. The galea at the posterior scalp site and the deep dermal layer were closed with interrupted 2-0 vicryls. The skin was closed with staples. The subclavicular site was closed with 2-0 vicryls and a 5-0 chromic gut and skin glue. The drapes were taken down and she was rotated back to anesthesia for extubation. She was transported to the PACU in stable condition.      No qualified resident was available. Xavier JOHANSEN was present for the entire procedure, and provided essential assistance with with proper exposure, retraction, hemostasis, and wound closure.      I was present for the entire procedure.    Patient Disposition:  PACU     Implant Name Type Inv. Item Serial No.  Lot No. LRB No. Used Action   LOG [id number] - BIOMET CRANIAL FIXATION SET - 1           LOG [id number] - STARFIX DBS SKULL FIDUCIAL TRAY - 1           CONNECTOR PLUG SENSIGHT - [id number]  CONNECTOR PLUG SENSIGHT  [employer] [id number] Left 1 Implanted   ENVELOPE TYRX NEURO ABS ANBCTRL MED - [id number]  ENVELOPE TYRX NEURO ABS ANBCTRL MED  [employer] [id number] Left 1 Implanted   EXT KIT 60CM W/ MKR SENSIGHT - [id number]  EXT KIT 60CM W/ MKR SENSIGHT [id number] [employer]  Left 1 Implanted   EXT KIT 60CM NO MKR SENSIGHT - [id number]  EXT KIT 60CM NO MKR SENSIGHT [id number] [employer]  Left 1 Implanted   NEUROSTIM DBS PERCPET PC BRAINSENSE - [id number]  NEUROSTIM DBS PERCPET PC BRAINSENSE [id number] [employer]  Left 1 Implanted   PLATE THIN FLAP STRAIGHT LONG 4 HOLE 22MM - [id number]  PLATE THIN FLAP STRAIGHT LONG 4 HOLE 22MM  BIOMET MICROFIXATION  Left 1  Implanted   SCREW THINFLAP SELF DRILLING 1.5X4MM - [id number]  SCREW THINFLAP SELF DRILLING 1.5X4MM  BIOMET MICROFIXATION  Left 2 Implanted   ANCHOR WAYPOINT 5MM - [id number]  ANCHOR WAYPOINT 5MM  [facility]  Left 4 Implanted             SIGNATURE: Jacek Gonsales MD  DATE: September 10, 2024  TIME: 9:02 AM

## 2024-09-10 NOTE — DISCHARGE INSTR - AVS FIRST PAGE
Discharge Instructions  Deep Brain Stimulator  Implantable Pulse Generator (IPG/battery) placement    Activity:  Do not lift more than 10 pounds for 6 weeks.  Avoid bending, lifting and twisting for 6 weeks.  No driving for at least 2 weeks or until cleared by Neurosurgery.   When able to shower, continue to use clean towel and washcloth for 2 weeks post-op.  Do not use a hair dryer, and avoid hair products such as mousse, oils, and gels. Do not brush your hair away from the incision since this will put strain on the suture line.  Do not dye or perm hair for 6 weeks or until cleared by MD.  Continue to change bed linens and pajamas more frequently. Wear clean clothes daily.   Stimulator programming will occur with your neurologist approximately 2 weeks following this procedure, call to verify your scheduled appointments.   Continue home medications for Parkinson's or essential tremor.    Surgical incision care:  Keep dressings in place for 3 days.  Keep incisions dry for 3 days.  May allow clean water to flow over incisions after 3 days.  Do not immerse the incisions in water for 4 weeks.  After 3 days, incisions may be left open to air, but should remain clean.  Do not apply any creams or ointments to the incision, unless otherwise instructed by [facility].  Contact office if increasing redness, drainage, pain or swelling occurs around the incisions or if you develop a fever greater than 101F.  Do not dye/perm hair or use any hair products anticipated 6 weeks or until cleared by Neurosurgery.     Postoperative medication:  Complete course of 5 day postoperative course of antibiotic as directed.  [facility] will provide pain medication in the postoperative period. All prescriptions must come from a single provider.   Take medications as prescribed.  Call office with any questions/concerns.  May use over the counter Tylenol. No non steroidal anti-inflammatory drugs  (NSAIDs) (ie. Ibuprofen, Aleve, Advil, Naproxen).  Please contact office for questions regarding dosage and modifications.  No antiplatelet or  anticoagulation medication until cleared by [facility], unless otherwise instructed.   Do not operate heavy machinery or vehicles while taking sedating medications.  Use a bowel regimen while on opioids as they induce constipation. Ie. Senokot-S, Miralax, Colace, etc. Increase fiber and water intake.

## 2024-09-10 NOTE — TELEPHONE ENCOUNTER
Received a call from Cathryn the patient daughter with questions about her pain medication regimen. She states the nurse who discharged her told her not to take the acetaminophen with the oxycodone. Advised it is ok to take these together but she should be mindful of the amount of acetaminophen she is taking and should not exceed 4000mg (note: patient prescribed 975mg q6h at discharge, she purchased 500mg tabs) in a 24 hour period. She stated an understanding and was appreciative.

## 2024-09-10 NOTE — ANESTHESIA POSTPROCEDURE EVALUATION
Post-Op Assessment Note    CV Status:  Stable    Pain management: adequate       Mental Status:  Sleepy and arousable   Hydration Status:  Euvolemic   PONV Controlled:  Controlled   Airway Patency:  Patent     Post Op Vitals Reviewed: Yes    No anethesia notable event occurred.    Staff: MATILDA               /60 (09/10/24 0910)    Temp (!) 97.1 °F (36.2 °C) (09/10/24 0910)    Pulse 83 (09/10/24 0910)   Resp 20 (09/10/24 0910)    SpO2 100 % (09/10/24 0910)

## 2024-09-10 NOTE — ANESTHESIA PREPROCEDURE EVALUATION
Procedure:  Placement of left sided deep brain stimulator battery, extension wires and skull fiducials (Left: Head)      Relevant Problems   CARDIO   (+) Hyperlipidemia      MUSCULOSKELETAL   (+) Back pain   (+) Cervical spondylosis   (+) Torticollis      Neurology/Sleep   (+) Parkinson's disease      Reported negative experience (bad delirium) with Ativan in the past.    Physical Exam    Airway    Mallampati score: II  TM Distance: >3 FB  Neck ROM: full     Dental   No notable dental hx     Cardiovascular  Rhythm: regular, Cardiovascular exam normal    Pulmonary  Pulmonary exam normal Breath sounds clear to auscultation    Other Findings  post-pubertal.      Anesthesia Plan  ASA Score- 3     Anesthesia Type- general with ASA Monitors.         Additional Monitors:     Airway Plan: ETT.           Plan Factors-Exercise tolerance (METS): >4 METS.    Chart reviewed. EKG reviewed. Imaging results reviewed. Existing labs reviewed. Patient summary reviewed.          Obstructive sleep apnea risk education given perioperatively.        Induction- intravenous.    Postoperative Plan-     Perioperative Resuscitation Plan - Level 1 - Full Code.       Informed Consent- Anesthetic plan and risks discussed with patient and mother.  I personally reviewed this patient with the CRNA. Discussed and agreed on the Anesthesia Plan with the CRNA..

## 2024-09-10 NOTE — H&P
"Neurosurgery History and Physical    Prior clinic encounter from 6/5/24 reviewed. After examining the patient I find no changes in the patients condition since the encounter.    Patient personally seen and examined.  Neurological examination unchanged compared to last office/progress note, with the following exceptions:    /76   Pulse 87   Resp 17   Ht 5' 3\" (1.6 m)   Wt 63 kg (139 lb)   SpO2 97%   BMI 24.62 kg/m²      Awake and alert.  Regular cardiac rate and rhythm.  No respiratory distress.  Abdomen nontender.  Normocephalic. Extremities warm, well perfused.    Post operative instructions and medications have been reviewed with the patient.    Assessment and Plan:    All questions have been answered to the patient satisfaction.  Plan to proceed with placement of left sided deep brain stimulator battery, extension wires and skull fiducials. They are in agreement with proceeding.     "

## 2024-09-11 ENCOUNTER — TELEPHONE (OUTPATIENT)
Dept: NEUROSURGERY | Facility: CLINIC | Age: 74
End: 2024-09-11

## 2024-09-19 ENCOUNTER — ANESTHESIA EVENT (OUTPATIENT)
Dept: PERIOP | Facility: HOSPITAL | Age: 74
DRG: 027 | End: 2024-09-19
Payer: MEDICARE

## 2024-09-23 ENCOUNTER — TELEPHONE (OUTPATIENT)
Dept: NEUROSURGERY | Facility: CLINIC | Age: 74
End: 2024-09-23

## 2024-09-23 NOTE — TELEPHONE ENCOUNTER
Call received from patient questioning if she needs to use the wipes tonight since her surgery is tomorrow. Advised the wipes are used the night before. She stated she will stop by the office today for surgical wipes as she only has the soap.     She was appreciative of the call.

## 2024-09-24 ENCOUNTER — APPOINTMENT (INPATIENT)
Dept: RADIOLOGY | Facility: HOSPITAL | Age: 74
DRG: 027 | End: 2024-09-24
Payer: MEDICARE

## 2024-09-24 ENCOUNTER — HOSPITAL ENCOUNTER (INPATIENT)
Facility: HOSPITAL | Age: 74
LOS: 1 days | Discharge: HOME HEALTH | DRG: 027 | End: 2024-09-25
Attending: STUDENT IN AN ORGANIZED HEALTH CARE EDUCATION/TRAINING PROGRAM | Admitting: STUDENT IN AN ORGANIZED HEALTH CARE EDUCATION/TRAINING PROGRAM
Payer: MEDICARE

## 2024-09-24 ENCOUNTER — ANESTHESIA (OUTPATIENT)
Dept: PERIOP | Facility: HOSPITAL | Age: 74
DRG: 027 | End: 2024-09-24
Payer: MEDICARE

## 2024-09-24 DIAGNOSIS — Z98.890 POSTOPERATIVE STATE: ICD-10-CM

## 2024-09-24 DIAGNOSIS — G20.B2 PARKINSON'S DISEASE WITH DYSKINESIA AND FLUCTUATING MANIFESTATIONS (HCC): Primary | ICD-10-CM

## 2024-09-24 LAB
4HR DELTA HS TROPONIN: -2 NG/L
CARDIAC TROPONIN I PNL SERPL HS: 4 NG/L
CARDIAC TROPONIN I PNL SERPL HS: 6 NG/L
GLUCOSE SERPL-MCNC: 119 MG/DL (ref 65–140)
GLUCOSE SERPL-MCNC: 186 MG/DL (ref 65–140)

## 2024-09-24 PROCEDURE — C1883 ADAPT/EXT, PACING/NEURO LEAD: HCPCS | Performed by: STUDENT IN AN ORGANIZED HEALTH CARE EDUCATION/TRAINING PROGRAM

## 2024-09-24 PROCEDURE — 61867 IMPLANT NEUROELECTRODE: CPT | Performed by: PHYSICIAN ASSISTANT

## 2024-09-24 PROCEDURE — 61867 IMPLANT NEUROELECTRODE: CPT | Performed by: STUDENT IN AN ORGANIZED HEALTH CARE EDUCATION/TRAINING PROGRAM

## 2024-09-24 PROCEDURE — 61868 IMPLANT NEUROELECTRDE ADDL: CPT | Performed by: STUDENT IN AN ORGANIZED HEALTH CARE EDUCATION/TRAINING PROGRAM

## 2024-09-24 PROCEDURE — 70450 CT HEAD/BRAIN W/O DYE: CPT

## 2024-09-24 PROCEDURE — 00H03MZ INSERTION OF NEUROSTIMULATOR LEAD INTO BRAIN, PERCUTANEOUS APPROACH: ICD-10-PCS | Performed by: STUDENT IN AN ORGANIZED HEALTH CARE EDUCATION/TRAINING PROGRAM

## 2024-09-24 PROCEDURE — NC001 PR NO CHARGE: Performed by: STUDENT IN AN ORGANIZED HEALTH CARE EDUCATION/TRAINING PROGRAM

## 2024-09-24 PROCEDURE — 61868 IMPLANT NEUROELECTRDE ADDL: CPT | Performed by: PHYSICIAN ASSISTANT

## 2024-09-24 PROCEDURE — 82948 REAGENT STRIP/BLOOD GLUCOSE: CPT

## 2024-09-24 PROCEDURE — 84484 ASSAY OF TROPONIN QUANT: CPT | Performed by: REGISTERED NURSE

## 2024-09-24 PROCEDURE — 93005 ELECTROCARDIOGRAM TRACING: CPT

## 2024-09-24 PROCEDURE — 99223 1ST HOSP IP/OBS HIGH 75: CPT | Performed by: PSYCHIATRY & NEUROLOGY

## 2024-09-24 DEVICE — ACCY B32000 BURR HOLE DEV KIT EMAN LJ53
Type: IMPLANTABLE DEVICE | Site: BRAIN | Status: FUNCTIONAL
Brand: SENSIGHT™

## 2024-09-24 DEVICE — ACCY B31000 BURR HOLE DEV ACCY KIT LJ53
Type: IMPLANTABLE DEVICE | Site: BRAIN | Status: FUNCTIONAL
Brand: SENSIGHT™

## 2024-09-24 DEVICE — LEAD B3300542 SENSIGHT 0.5MM EMAN LJ53
Type: IMPLANTABLE DEVICE | Site: BRAIN | Status: FUNCTIONAL
Brand: SENSIGHT™

## 2024-09-24 RX ORDER — FENTANYL CITRATE/PF 50 MCG/ML
50 SYRINGE (ML) INJECTION
Status: DISCONTINUED | OUTPATIENT
Start: 2024-09-24 | End: 2024-09-24 | Stop reason: HOSPADM

## 2024-09-24 RX ORDER — CEPHALEXIN 500 MG/1
500 CAPSULE ORAL EVERY 6 HOURS SCHEDULED
Status: DISCONTINUED | OUTPATIENT
Start: 2024-09-25 | End: 2024-09-25 | Stop reason: HOSPADM

## 2024-09-24 RX ORDER — DEXAMETHASONE SODIUM PHOSPHATE 10 MG/ML
INJECTION, SOLUTION INTRAMUSCULAR; INTRAVENOUS AS NEEDED
Status: DISCONTINUED | OUTPATIENT
Start: 2024-09-24 | End: 2024-09-24

## 2024-09-24 RX ORDER — CARBIDOPA, LEVODOPA AND ENTACAPONE 12.5; 200; 5 MG/1; MG/1; MG/1
1 TABLET, FILM COATED ORAL
Status: DISCONTINUED | OUTPATIENT
Start: 2024-09-24 | End: 2024-09-24

## 2024-09-24 RX ORDER — POLYETHYLENE GLYCOL 3350 17 G/17G
17 POWDER, FOR SOLUTION ORAL DAILY
Status: DISCONTINUED | OUTPATIENT
Start: 2024-09-25 | End: 2024-09-25 | Stop reason: HOSPADM

## 2024-09-24 RX ORDER — FAMOTIDINE 20 MG/1
40 TABLET, FILM COATED ORAL
Status: DISCONTINUED | OUTPATIENT
Start: 2024-09-24 | End: 2024-09-25 | Stop reason: HOSPADM

## 2024-09-24 RX ORDER — ACETAMINOPHEN 325 MG/1
975 TABLET ORAL EVERY 6 HOURS
Status: DISCONTINUED | OUTPATIENT
Start: 2024-09-24 | End: 2024-09-25 | Stop reason: HOSPADM

## 2024-09-24 RX ORDER — SODIUM CHLORIDE, SODIUM LACTATE, POTASSIUM CHLORIDE, CALCIUM CHLORIDE 600; 310; 30; 20 MG/100ML; MG/100ML; MG/100ML; MG/100ML
INJECTION, SOLUTION INTRAVENOUS CONTINUOUS PRN
Status: DISCONTINUED | OUTPATIENT
Start: 2024-09-24 | End: 2024-09-24

## 2024-09-24 RX ORDER — CALCIUM CARBONATE 500 MG/1
1000 TABLET, CHEWABLE ORAL DAILY PRN
Status: CANCELLED | OUTPATIENT
Start: 2024-09-24

## 2024-09-24 RX ORDER — MAGNESIUM HYDROXIDE 1200 MG/15ML
LIQUID ORAL AS NEEDED
Status: DISCONTINUED | OUTPATIENT
Start: 2024-09-24 | End: 2024-09-24 | Stop reason: HOSPADM

## 2024-09-24 RX ORDER — LEVETIRACETAM 750 MG/1
750 TABLET ORAL EVERY 12 HOURS SCHEDULED
Status: DISCONTINUED | OUTPATIENT
Start: 2024-09-24 | End: 2024-09-25 | Stop reason: HOSPADM

## 2024-09-24 RX ORDER — RASAGILINE 1 MG/1
1 TABLET ORAL DAILY
Status: DISCONTINUED | OUTPATIENT
Start: 2024-09-25 | End: 2024-09-25 | Stop reason: HOSPADM

## 2024-09-24 RX ORDER — OXYCODONE HYDROCHLORIDE 5 MG/1
5 TABLET ORAL EVERY 4 HOURS PRN
Status: DISCONTINUED | OUTPATIENT
Start: 2024-09-24 | End: 2024-09-25 | Stop reason: HOSPADM

## 2024-09-24 RX ORDER — ACETAMINOPHEN 325 MG/1
975 TABLET ORAL EVERY 8 HOURS SCHEDULED
Status: DISCONTINUED | OUTPATIENT
Start: 2024-09-24 | End: 2024-09-24

## 2024-09-24 RX ORDER — HYDROMORPHONE HCL IN WATER/PF 6 MG/30 ML
0.2 PATIENT CONTROLLED ANALGESIA SYRINGE INTRAVENOUS
Status: DISCONTINUED | OUTPATIENT
Start: 2024-09-24 | End: 2024-09-24 | Stop reason: HOSPADM

## 2024-09-24 RX ORDER — LIDOCAINE HYDROCHLORIDE 10 MG/ML
INJECTION, SOLUTION EPIDURAL; INFILTRATION; INTRACAUDAL; PERINEURAL
Status: COMPLETED | OUTPATIENT
Start: 2024-09-24 | End: 2024-09-24

## 2024-09-24 RX ORDER — HYDROMORPHONE HCL/PF 1 MG/ML
0.5 SYRINGE (ML) INJECTION ONCE
Status: COMPLETED | OUTPATIENT
Start: 2024-09-24 | End: 2024-09-24

## 2024-09-24 RX ORDER — GABAPENTIN 300 MG/1
300 CAPSULE ORAL
Status: CANCELLED | OUTPATIENT
Start: 2024-09-24

## 2024-09-24 RX ORDER — HYDROMORPHONE HCL/PF 1 MG/ML
0.5 SYRINGE (ML) INJECTION EVERY 2 HOUR PRN
Status: DISCONTINUED | OUTPATIENT
Start: 2024-09-24 | End: 2024-09-25 | Stop reason: HOSPADM

## 2024-09-24 RX ORDER — ENTACAPONE 200 MG/1
200 TABLET ORAL
Status: DISCONTINUED | OUTPATIENT
Start: 2024-09-24 | End: 2024-09-25 | Stop reason: HOSPADM

## 2024-09-24 RX ORDER — ONDANSETRON 2 MG/ML
4 INJECTION INTRAMUSCULAR; INTRAVENOUS ONCE AS NEEDED
Status: DISCONTINUED | OUTPATIENT
Start: 2024-09-24 | End: 2024-09-24 | Stop reason: HOSPADM

## 2024-09-24 RX ORDER — VANCOMYCIN HYDROCHLORIDE 1 G/20ML
INJECTION, POWDER, LYOPHILIZED, FOR SOLUTION INTRAVENOUS AS NEEDED
Status: DISCONTINUED | OUTPATIENT
Start: 2024-09-24 | End: 2024-09-24 | Stop reason: HOSPADM

## 2024-09-24 RX ORDER — CALCIUM CARBONATE 500 MG/1
1000 TABLET, CHEWABLE ORAL DAILY PRN
Status: DISCONTINUED | OUTPATIENT
Start: 2024-09-24 | End: 2024-09-25 | Stop reason: HOSPADM

## 2024-09-24 RX ORDER — CEFAZOLIN SODIUM 1 G/50ML
1000 SOLUTION INTRAVENOUS EVERY 8 HOURS
Status: COMPLETED | OUTPATIENT
Start: 2024-09-24 | End: 2024-09-25

## 2024-09-24 RX ORDER — SODIUM CHLORIDE 9 MG/ML
75 INJECTION, SOLUTION INTRAVENOUS CONTINUOUS
Status: DISCONTINUED | OUTPATIENT
Start: 2024-09-24 | End: 2024-09-24

## 2024-09-24 RX ORDER — CHLORHEXIDINE GLUCONATE ORAL RINSE 1.2 MG/ML
15 SOLUTION DENTAL ONCE
Status: COMPLETED | OUTPATIENT
Start: 2024-09-24 | End: 2024-09-24

## 2024-09-24 RX ORDER — CARBIDOPA AND LEVODOPA 25; 100 MG/1; MG/1
0.5 TABLET ORAL
Status: DISCONTINUED | OUTPATIENT
Start: 2024-09-24 | End: 2024-09-25 | Stop reason: HOSPADM

## 2024-09-24 RX ORDER — LABETALOL HYDROCHLORIDE 5 MG/ML
INJECTION, SOLUTION INTRAVENOUS AS NEEDED
Status: DISCONTINUED | OUTPATIENT
Start: 2024-09-24 | End: 2024-09-24

## 2024-09-24 RX ORDER — ONDANSETRON 2 MG/ML
INJECTION INTRAMUSCULAR; INTRAVENOUS AS NEEDED
Status: DISCONTINUED | OUTPATIENT
Start: 2024-09-24 | End: 2024-09-24

## 2024-09-24 RX ORDER — HYDROMORPHONE HCL IN WATER/PF 6 MG/30 ML
0.2 PATIENT CONTROLLED ANALGESIA SYRINGE INTRAVENOUS EVERY 2 HOUR PRN
Status: DISCONTINUED | OUTPATIENT
Start: 2024-09-24 | End: 2024-09-24

## 2024-09-24 RX ORDER — PROPOFOL 10 MG/ML
INJECTION, EMULSION INTRAVENOUS AS NEEDED
Status: DISCONTINUED | OUTPATIENT
Start: 2024-09-24 | End: 2024-09-24

## 2024-09-24 RX ORDER — GABAPENTIN 300 MG/1
300 CAPSULE ORAL
Status: DISCONTINUED | OUTPATIENT
Start: 2024-09-24 | End: 2024-09-25 | Stop reason: HOSPADM

## 2024-09-24 RX ORDER — LIDOCAINE HYDROCHLORIDE AND EPINEPHRINE 10; 10 MG/ML; UG/ML
INJECTION, SOLUTION INFILTRATION; PERINEURAL AS NEEDED
Status: DISCONTINUED | OUTPATIENT
Start: 2024-09-24 | End: 2024-09-24 | Stop reason: HOSPADM

## 2024-09-24 RX ORDER — AMOXICILLIN 250 MG
1 CAPSULE ORAL
Status: DISCONTINUED | OUTPATIENT
Start: 2024-09-24 | End: 2024-09-25 | Stop reason: HOSPADM

## 2024-09-24 RX ORDER — CEFAZOLIN SODIUM 1 G/3ML
INJECTION, POWDER, FOR SOLUTION INTRAMUSCULAR; INTRAVENOUS AS NEEDED
Status: DISCONTINUED | OUTPATIENT
Start: 2024-09-24 | End: 2024-09-24

## 2024-09-24 RX ORDER — FENTANYL CITRATE 50 UG/ML
INJECTION, SOLUTION INTRAMUSCULAR; INTRAVENOUS AS NEEDED
Status: DISCONTINUED | OUTPATIENT
Start: 2024-09-24 | End: 2024-09-24

## 2024-09-24 RX ORDER — ACETAMINOPHEN 325 MG/1
975 TABLET ORAL EVERY 8 HOURS SCHEDULED
Status: CANCELLED | OUTPATIENT
Start: 2024-09-24

## 2024-09-24 RX ORDER — HYDROMORPHONE HCL/PF 1 MG/ML
0.2 SYRINGE (ML) INJECTION EVERY 2 HOUR PRN
Status: CANCELLED | OUTPATIENT
Start: 2024-09-24

## 2024-09-24 RX ORDER — POLYETHYLENE GLYCOL 3350 17 G/17G
17 POWDER, FOR SOLUTION ORAL DAILY
Status: CANCELLED | OUTPATIENT
Start: 2024-09-24

## 2024-09-24 RX ORDER — ONDANSETRON 2 MG/ML
4 INJECTION INTRAMUSCULAR; INTRAVENOUS EVERY 6 HOURS PRN
Status: CANCELLED | OUTPATIENT
Start: 2024-09-24

## 2024-09-24 RX ORDER — SODIUM CHLORIDE, SODIUM LACTATE, POTASSIUM CHLORIDE, CALCIUM CHLORIDE 600; 310; 30; 20 MG/100ML; MG/100ML; MG/100ML; MG/100ML
100 INJECTION, SOLUTION INTRAVENOUS CONTINUOUS
Status: DISCONTINUED | OUTPATIENT
Start: 2024-09-24 | End: 2024-09-24

## 2024-09-24 RX ORDER — PROPOFOL 10 MG/ML
INJECTION, EMULSION INTRAVENOUS CONTINUOUS PRN
Status: DISCONTINUED | OUTPATIENT
Start: 2024-09-24 | End: 2024-09-24

## 2024-09-24 RX ORDER — MAGNESIUM SULFATE HEPTAHYDRATE 40 MG/ML
2 INJECTION, SOLUTION INTRAVENOUS ONCE
Status: COMPLETED | OUTPATIENT
Start: 2024-09-24 | End: 2024-09-25

## 2024-09-24 RX ORDER — OXYCODONE HYDROCHLORIDE 5 MG/1
2.5 TABLET ORAL EVERY 4 HOURS PRN
Status: CANCELLED | OUTPATIENT
Start: 2024-09-24

## 2024-09-24 RX ORDER — FAMOTIDINE 20 MG/1
40 TABLET, FILM COATED ORAL
Status: CANCELLED | OUTPATIENT
Start: 2024-09-24

## 2024-09-24 RX ORDER — ONDANSETRON 2 MG/ML
4 INJECTION INTRAMUSCULAR; INTRAVENOUS EVERY 6 HOURS PRN
Status: DISCONTINUED | OUTPATIENT
Start: 2024-09-24 | End: 2024-09-25 | Stop reason: HOSPADM

## 2024-09-24 RX ORDER — OXYCODONE HYDROCHLORIDE 5 MG/1
5 TABLET ORAL EVERY 4 HOURS PRN
Status: CANCELLED | OUTPATIENT
Start: 2024-09-24

## 2024-09-24 RX ORDER — AMOXICILLIN 250 MG
1 CAPSULE ORAL
Status: CANCELLED | OUTPATIENT
Start: 2024-09-24

## 2024-09-24 RX ORDER — CEFAZOLIN SODIUM 1 G/50ML
1000 SOLUTION INTRAVENOUS EVERY 8 HOURS
Status: CANCELLED | OUTPATIENT
Start: 2024-09-24 | End: 2024-09-25

## 2024-09-24 RX ADMIN — HYDROMORPHONE HYDROCHLORIDE 0.2 MG: 0.2 INJECTION, SOLUTION INTRAMUSCULAR; INTRAVENOUS; SUBCUTANEOUS at 15:40

## 2024-09-24 RX ADMIN — LABETALOL HYDROCHLORIDE 5 MG: 5 INJECTION, SOLUTION INTRAVENOUS at 09:06

## 2024-09-24 RX ADMIN — LABETALOL HYDROCHLORIDE 5 MG: 5 INJECTION, SOLUTION INTRAVENOUS at 08:59

## 2024-09-24 RX ADMIN — FENTANYL CITRATE 25 MCG: 50 INJECTION INTRAMUSCULAR; INTRAVENOUS at 09:23

## 2024-09-24 RX ADMIN — CARBIDOPA AND LEVODOPA 0.5 TABLET: 25; 100 TABLET ORAL at 20:11

## 2024-09-24 RX ADMIN — FENTANYL CITRATE 25 MCG: 50 INJECTION INTRAMUSCULAR; INTRAVENOUS at 08:25

## 2024-09-24 RX ADMIN — FENTANYL CITRATE 50 MCG: 50 INJECTION INTRAMUSCULAR; INTRAVENOUS at 12:24

## 2024-09-24 RX ADMIN — ACETAMINOPHEN 975 MG: 325 TABLET ORAL at 20:11

## 2024-09-24 RX ADMIN — DEXMEDETOMIDINE HYDROCHLORIDE 0.2 MCG/KG/HR: 100 INJECTION, SOLUTION INTRAVENOUS at 08:12

## 2024-09-24 RX ADMIN — LEVETIRACETAM 750 MG: 750 TABLET, FILM COATED ORAL at 20:13

## 2024-09-24 RX ADMIN — SODIUM CHLORIDE 125 ML/HR: 0.9 INJECTION, SOLUTION INTRAVENOUS at 14:52

## 2024-09-24 RX ADMIN — FENTANYL CITRATE 50 MCG: 50 INJECTION INTRAMUSCULAR; INTRAVENOUS at 11:45

## 2024-09-24 RX ADMIN — CEFAZOLIN SODIUM 1000 MG: 1 SOLUTION INTRAVENOUS at 15:42

## 2024-09-24 RX ADMIN — FENTANYL CITRATE 25 MCG: 50 INJECTION INTRAMUSCULAR; INTRAVENOUS at 08:28

## 2024-09-24 RX ADMIN — DEXAMETHASONE SODIUM PHOSPHATE 10 MG: 10 INJECTION, SOLUTION INTRAMUSCULAR; INTRAVENOUS at 08:17

## 2024-09-24 RX ADMIN — SODIUM CHLORIDE, SODIUM LACTATE, POTASSIUM CHLORIDE, AND CALCIUM CHLORIDE: .6; .31; .03; .02 INJECTION, SOLUTION INTRAVENOUS at 07:21

## 2024-09-24 RX ADMIN — PROPOFOL 50 MCG/KG/MIN: 10 INJECTION, EMULSION INTRAVENOUS at 07:46

## 2024-09-24 RX ADMIN — OXYCODONE HYDROCHLORIDE 5 MG: 5 TABLET ORAL at 13:37

## 2024-09-24 RX ADMIN — HYDROMORPHONE HYDROCHLORIDE 0.5 MG: 1 INJECTION, SOLUTION INTRAMUSCULAR; INTRAVENOUS; SUBCUTANEOUS at 18:53

## 2024-09-24 RX ADMIN — ACETAMINOPHEN 975 MG: 325 TABLET ORAL at 13:36

## 2024-09-24 RX ADMIN — PROPOFOL 40 MG: 10 INJECTION, EMULSION INTRAVENOUS at 09:50

## 2024-09-24 RX ADMIN — ENTACAPONE 200 MG: 200 TABLET ORAL at 20:13

## 2024-09-24 RX ADMIN — CHLORHEXIDINE GLUCONATE 0.12% ORAL RINSE 15 ML: 1.2 LIQUID ORAL at 06:24

## 2024-09-24 RX ADMIN — LIDOCAINE HYDROCHLORIDE 0.5 ML: 10 INJECTION, SOLUTION EPIDURAL; INFILTRATION; INTRACAUDAL; PERINEURAL at 07:52

## 2024-09-24 RX ADMIN — PROPOFOL 30 MG: 10 INJECTION, EMULSION INTRAVENOUS at 08:16

## 2024-09-24 RX ADMIN — FENTANYL CITRATE 25 MCG: 50 INJECTION INTRAMUSCULAR; INTRAVENOUS at 08:13

## 2024-09-24 RX ADMIN — GABAPENTIN 300 MG: 300 CAPSULE ORAL at 20:13

## 2024-09-24 RX ADMIN — ENTACAPONE 200 MG: 200 TABLET ORAL at 21:56

## 2024-09-24 RX ADMIN — CARBIDOPA AND LEVODOPA 0.5 TABLET: 25; 100 TABLET ORAL at 17:24

## 2024-09-24 RX ADMIN — OXYCODONE HYDROCHLORIDE 5 MG: 5 TABLET ORAL at 17:45

## 2024-09-24 RX ADMIN — LABETALOL HYDROCHLORIDE 5 MG: 5 INJECTION, SOLUTION INTRAVENOUS at 09:21

## 2024-09-24 RX ADMIN — FENTANYL CITRATE 25 MCG: 50 INJECTION INTRAMUSCULAR; INTRAVENOUS at 08:16

## 2024-09-24 RX ADMIN — CEFAZOLIN 2000 MG: 1 INJECTION, POWDER, FOR SOLUTION INTRAMUSCULAR; INTRAVENOUS at 08:00

## 2024-09-24 RX ADMIN — ONDANSETRON 4 MG: 2 INJECTION INTRAMUSCULAR; INTRAVENOUS at 10:18

## 2024-09-24 RX ADMIN — MAGNESIUM SULFATE HEPTAHYDRATE 2 G: 40 INJECTION, SOLUTION INTRAVENOUS at 19:31

## 2024-09-24 RX ADMIN — FENTANYL CITRATE 25 MCG: 50 INJECTION INTRAMUSCULAR; INTRAVENOUS at 07:46

## 2024-09-24 RX ADMIN — ENTACAPONE 200 MG: 200 TABLET ORAL at 17:24

## 2024-09-24 RX ADMIN — PROPOFOL 30 MG: 10 INJECTION, EMULSION INTRAVENOUS at 07:49

## 2024-09-24 RX ADMIN — CARBIDOPA AND LEVODOPA 0.5 TABLET: 25; 100 TABLET ORAL at 21:56

## 2024-09-24 RX ADMIN — PROPOFOL 30 MG: 10 INJECTION, EMULSION INTRAVENOUS at 08:15

## 2024-09-24 RX ADMIN — FENTANYL CITRATE 25 MCG: 50 INJECTION INTRAMUSCULAR; INTRAVENOUS at 10:20

## 2024-09-24 RX ADMIN — LEVETIRACETAM 750 MG: 750 TABLET, FILM COATED ORAL at 13:37

## 2024-09-24 RX ADMIN — SODIUM CHLORIDE, SODIUM LACTATE, POTASSIUM CHLORIDE, AND CALCIUM CHLORIDE: .6; .31; .03; .02 INJECTION, SOLUTION INTRAVENOUS at 10:09

## 2024-09-24 RX ADMIN — LABETALOL HYDROCHLORIDE 5 MG: 5 INJECTION, SOLUTION INTRAVENOUS at 08:56

## 2024-09-24 RX ADMIN — AMANTADINE 137 MG: 137 CAPSULE, COATED PELLETS ORAL at 21:57

## 2024-09-24 RX ADMIN — FENTANYL CITRATE 25 MCG: 50 INJECTION INTRAMUSCULAR; INTRAVENOUS at 08:34

## 2024-09-24 NOTE — CONSULTS
Consultation - Critical Care/ICU   Name: Malaika Chavis 73 y.o. female I MRN: [id number]  Unit/Bed#: [facility] 713-01 I Date of Admission: 9/24/2024   Date of Service: 9/24/2024 I Hospital Day: 0   Inpatient consult to Medical Critical Care  Consult performed by: Valeria Ortiz  Consult ordered by: Carlotta Denny PA-C      Physician Requesting Evaluation: Jacek Gonsales MD   Reason for Evaluation / Principal Problem: Post-op care    Please contact the SecureChat role for the Neurosurgery service with any questions/concerns.    Assessment & Plan   Neuro:   Diagnosis: Parkinson's disease, s/p DBS  Plan: seizure precautions  Q1H neuro checks  Cranial incisional care  SBP goals <160 and >90  Ancef 1g for surgical prophylaxis x 3; then Keflex 500mg Q6H for 5 days  Keppra 750mg BID for 7 days  Pain regimen per neurosurgery  Continue home meds:   entacapone 200mg Q2H while awake  sinemet 25-100mg 0.5 tablet Q2H while awake  Gabapentin 300mg at bedtime  Rasagiline 1mg daily    CV:   Diagnosis: no active issues  Plan: Monitor SBP, HR, SpO2 per neurosurgery    Pulm:  Diagnosis: no active issues  Plan: Monitor SpO2, RR per neurosurgery    GI:   Diagnosis: no active issues  Plan: Bowel regimen: senokot daily, Tums, Pepcid PRN  Zofran 4mg as needed for nausea  Regular diet    :   Diagnosis: no active issues    F/E/N:   Diagnosis: no active issues  F: 125mL/hr 0.9% NS, monitor Is & Os  E: BMP, Mg, Phos tomorrow, replete electrolytes as needed  N: PO intake    Heme/Onc:   Diagnosis: no active issues  Plan: check APTT, INR, CBC tomorrow  VTE prophylaxis contraindicated post-op per neurosurgery    Endo:   Diagnosis: no active issues    ID:   Diagnosis: no active issues  Plan: Ancef 1g for surgical prophylaxis x 3; then Keflex 500mg Q6H for 5 days  Monitor CBC, trend WBC and fever curve    MSK/Skin:   Diagnosis: no active issues  Plan: Mobilize as able  Disposition: Stepdown Level 1    History of Present Illness   Malaika Chavis  "is a 73 y.o. with a PMH of Parkinson disease, GERD, HLD, Parkinson disease, DVT, and dementia presents on post-op day 0 after placement of bilateral GPI deep brain stimulator leads with bilateral awake neurophysiologic microelectrode recording by Neurosurgery team. Vitals have been stable since admission and there were no acute events perioperatively.     History obtained from the patient.  Review of Systems: Review of Systems   Constitutional:  Negative for appetite change and fever.   HENT:  Negative for facial swelling and trouble swallowing.    Eyes:  Negative for photophobia and visual disturbance.   Respiratory:  Negative for cough and shortness of breath.    Cardiovascular:  Negative for chest pain and leg swelling.   Gastrointestinal:  Negative for abdominal pain, nausea and vomiting.   Genitourinary:  Negative for difficulty urinating and dysuria.   Musculoskeletal:  Negative for arthralgias and neck pain.   Neurological:  Positive for tremors and headaches. Negative for dizziness and numbness.        Mild bilateral hand tremors  Endorses moderate-severe generalized \"helmet-like\" head pain   Psychiatric/Behavioral:  Negative for confusion.        Historical Information   Past Medical History:  No date: Dementia (HCC)  No date: DVT (deep venous thrombosis) ([facility])  No date: GERD (gastroesophageal reflux disease)  No date: Hyperlipidemia  No date: Osteoporosis  No date: Parkinson disease Past Surgical History:  07/10/2019: ARTERIOGRAM; N/A      Comment:  Procedure: KYPHOPLASTY;  Surgeon: Toni Stephens MD;                 Location: [facility];  Service: Interventional Radiology  No date:  SECTION      Comment:  X 5  No date: COLONOSCOPY  07/10/2019: IR KYPHOPLASTY/VERTEBROPLASTY  2022: LEG SURGERY; Left  9/10/2024: VT INSJ/RPLCMT CRANIAL NEUROSTIM GENER 2/> ELTRDS; Left      Comment:  Procedure: Placement of left sided deep brain stimulator               battery, extension wires and skull fiducials; "  Surgeon:                Jacek Gonsales MD;  Location: [facility];  Service:                Neurosurgery  No date: TONSILLECTOMY   Current Outpatient Medications   Medication Instructions    Amantadine HCl ER (Gocovri) 137 MG CP24 1 tab daily  (Name: Gocovri)    busPIRone (BUSPAR) 5 mg, Oral, As needed    Calcium Carbonate-Vit D-Min (CALCIUM 1200 PO) 1,200 mg, Oral, Daily    carbidopa-levodopa-entacapone (STALEVO) 12.5- MG per tablet TAKE 1 TABLET BY MOUTH EVERY 2 HOURS 7-8 TIMES A DAY    denosumab (PROLIA) 60 mg, Once, EVERY 6 MO    famotidine (PEPCID) 40 mg, Oral, Daily at bedtime    gabapentin (NEURONTIN) 300 mg, Oral, Daily at bedtime    rasagiline (AZILECT) 1 MG TAKE 1 TABLET BY MOUTH EVERY DAY    Xdemvy 0.25 % SOLN     Allergies   Allergen Reactions    Sulfa Antibiotics Anaphylaxis     Other reaction(s): family history - anaphylaxis  Category: Allergy;     Doxycycline Rash     Category: Allergy;       Social History     Tobacco Use    Smoking status: Never     Passive exposure: Never    Smokeless tobacco: Never   Vaping Use    Vaping status: Never Used   Substance Use Topics    Alcohol use: Not Currently     Comment: Rare    Drug use: Never    Family History   Problem Relation Age of Onset    Dementia Mother     Heart disease Father     No Known Problems Daughter     No Known Problems Maternal Grandmother     No Known Problems Maternal Grandfather     No Known Problems Paternal Grandmother     Prostate cancer Paternal Grandfather     No Known Problems Daughter     No Known Problems Daughter     Alcohol abuse Neg Hx     Mental illness Neg Hx     Substance Abuse Neg Hx     Depression Neg Hx           Objective                          Vitals I/O      Most Recent Min/Max in 24hrs   Temp 98.7 °F (37.1 °C) Temp  Min: 96.8 °F (36 °C)  Max: 98.7 °F (37.1 °C)   Pulse 78 Pulse  Min: 75  Max: 86   Resp 20 Resp  Min: 12  Max: 20   /71 BP  Min: 98/72  Max: 128/65   O2 Sat 96 % SpO2  Min: 95 %  Max: 100 %       Intake/Output Summary (Last 24 hours) at 9/24/2024 1419  Last data filed at 9/24/2024 1245  Gross per 24 hour   Intake 1150 ml   Output 360 ml   Net 790 ml       Diet Regular; Regular House    Invasive Monitoring           Physical Exam   Physical Exam  Vitals reviewed.   Eyes:      Extraocular Movements: Extraocular movements intact.      Conjunctiva/sclera: Conjunctivae normal.      Pupils: Pupils are equal, round, and reactive to light.   Skin:     General: Skin is warm and dry.      Capillary Refill: Capillary refill takes less than 2 seconds.      Coloration: Skin is not jaundiced.   HENT:      Head: Laceration present.      Comments: Bandaged incision sites on frontal and temporal scalp. Hair shaved.      Nose: No congestion.      Mouth/Throat:      Mouth: Mucous membranes are moist.   Cardiovascular:      Rate and Rhythm: Normal rate and regular rhythm.      Pulses: Normal pulses.      Heart sounds: Normal heart sounds.   Musculoskeletal:         General: No tenderness or signs of injury. Normal range of motion.      Right lower leg: No edema.      Left lower leg: No edema.   Abdominal: General: Bowel sounds are normal.      Palpations: Abdomen is soft.      Tenderness: There is no abdominal tenderness. There is no guarding.   Constitutional:       General: She is not in acute distress.     Appearance: She is not ill-appearing.   Pulmonary:      Effort: Pulmonary effort is normal.      Breath sounds: Normal breath sounds.   Neurological:      General: No focal deficit present.      Mental Status: She is alert and oriented to person, place and time. Mental status is at baseline.      Cranial Nerves: No facial asymmetry.      Sensory: Sensation is intact.      Motor: gross motor function is at baseline for patient. Strength full and intact in all extremities.          Diagnostic Studies        Lab Results: I have reviewed the following results:      Medications:  Scheduled PRN   acetaminophen, 975 mg, Q8H  JOSE  Amantadine HCl ER, 137 mg, Daily  entacapone, 200 mg, Q2H While awake   And  carbidopa-levodopa, 0.5 tablet, Q2H While awake  cefazolin, 1,000 mg, Q8H  [START ON 9/25/2024] cephalexin, 500 mg, Q6H JOSE  gabapentin, 300 mg, HS  levETIRAcetam, 750 mg, Q12H JOSE  [START ON 9/25/2024] polyethylene glycol, 17 g, Daily  rasagiline, 1 mg, Daily  senna-docusate sodium, 1 tablet, HS      calcium carbonate, 1,000 mg, Daily PRN  famotidine, 40 mg, HS PRN  HYDROmorphone, 0.2 mg, Q2H PRN  ondansetron, 4 mg, Q6H PRN  oxyCODONE, 2.5 mg, Q4H PRN   Or  oxyCODONE, 5 mg, Q4H PRN       Continuous    lactated ringers, 100 mL/hr, Last Rate: Stopped (09/24/24 1227)  sodium chloride, 125 mL/hr         Labs:   CBC    No recent results  BMP    No recent results    Coags    No recent results     Additional Electrolytes  No recent results       Blood Gas    No recent results  No recent results LFTs  No recent results    Infectious  No recent results  Glucose  No recent results     Valeria Ortiz, MS4  Critical Care Medicine

## 2024-09-24 NOTE — OP NOTE
OPERATIVE REPORT  PATIENT NAME: Malaika Chavis    :  1950  MRN: [id number]  Pt Location:  OR ROOM 17    SURGERY DATE: 2024    Surgeons and Role:  Jcaek Gonsales MD - Primary  Carlotta JOHANSEN - Assisting    Preop Diagnosis:  Parkinson's disease [G20.A1]    Post-Op Diagnosis Codes:     * Parkinson's disease [G20.A1]    Procedure(s):  Placement of bilateral GPI deep brain stimulator leads  With bilateral awake neurophysiologic microelectrode recording    Specimen(s):  * No specimens in log *    Estimated Blood Loss:   Minimal    Drains:  * No LDAs found *    Anesthesia Type:   Conscious Sedation     Operative Indications:  Parkinson's disease [G20.A1]    Operative Findings:  Minimal symptom improvement but done with medication taken in the AM. We were able to induce bilateral dyskinesia with stim. Good physiologic mapping with wide thresholds to side effects.       Complications:   None    Procedure and Technique:    The patient was brought to the OR and placed supine on a regular bed. The bed was rotated nearly 180 degrees. She was sedated by anesthesia. The scalp was clipped and prepped insuring access to the 4 skull fiducial sites and the prior post-auricular incision on the right. Her scalp was then draped. A timeout was held confirming the patient's identity and intended procedure. To begin the 4 fiducial sites were infiltrated with local anesthetic. The suture at each site was removed and the skull fiducials exposed. The frame footpost attachment screws were screwed on to each fiducial. The frame was then brought on the field and attached to the footpost screws to plan bilateral incisions. The frame was removed and each incision site was infiltrated with local anesthetic. These were opened sharply and carried down to the skull. The periosteum was swept away. The frame was resecured to the head and the maximo hole sites planned. The frame was removed and each maximo hole was made with a   drill. The dura at each maximo hole was coagulated. Maximo hole caps were secured with screws at each site. The frame was resecured to the head and the stardrive assembled and attached to the frame starting with the left side. The lead cannula was lowered into the brain to begin microelectrode recording at 15mm above target. The microelectrode reducing tube was placed in the cannula followed by the microelectrode which was then hooked up to a cable connected off the field to the neurophysiologist. The GPI was then mapped via microelectrode recording. With microelectrode stimulation we were able to induce dyskinesia's but as she came to the Or medicated we had minimal reduction of symptoms in her on state. We did decide to place the lead at target once mapped and we had wide thresholds to side effects. Satisfied with this placement the lead was secured using the lead cap after removal of the inner stylet. The stardrive was then moved to the right sided trajectory. The lead cannula was lowered into the brain to begin microelectrode recording at 15mm above target. The microelectrode reducing tube was placed in the cannula followed by the microelectrode which was then hook up to a cable connected off the field to the neurophysiologist. The GPI was mapped with microelectrode recording. We decided to place the lead at target. The first lead was bent while being handled and the decision was made to open a third lead as the inner stylet of the bent lead might not have travelled straight. Satisfied with this placement the lead was secured using the lead cap after removal of the inner stylet. The frame was then removed from the scalp. The apex of the prior post-auricular incision with the 2 extension wires was opened and the bilateral leads were tunneled under the scalp to this opening. Both leads were connected to the implanted extension wires. The system was interrogated and found to be functioning appropriately with  impedences within normal limits. All sites were copiously irrigated. The 2 lead entry incisions were closed with interrupted 2-0 vicryls and staples. The post-auricular incision was closed with staples. The skull fiducials were all removed and these sites were closed with staples. The drapes were taken down and she was rotated back to anesthesia. All counts were correct at the conclusion of the case. He was transported to the PACU in stable condition.     No qualified resident was available. Carlotta JOHANSEN was present for the entire procedure, and provided essential assistance with with proper exposure, retraction, hemostasis, and wound closure.      I was present for the entire procedure.    Patient Disposition:  PACU     Implant Name Type Inv. Item Serial No.  Lot No. LRB No. Used Action   LOG [id number] - BIOMET CRANIAL FIXATION SET - 1           ELISA HOLE DVC SENSIGHT - [id number]  ELISA HOLE DVC SENSIGHT  [employer] [id number] Right 1 Implanted   ELISA HOLE ACCY KIT SENSIGHT - [id number]  ELISA HOLE ACCY KIT SENSIGHT  [employer] [id number] Left 1 Implanted   LEAD SENSIGHT 0.5MM  42CM BILAT MRK - [id number]  LEAD SENSIGHT 0.5MM  42CM BILAT MRK [id number] [employer]  Left 1 Wasted   LEAD SENSIGHT 0.5MM  42CM BILAT MRK - [id number]  LEAD SENSIGHT 0.5MM  42CM BILAT MRK [id number] [employer]  Left 1 Implanted   LEAD SENSIGHT 0.5MM  42CM NO MRK - [id number]  LEAD SENSIGHT 0.5MM  42CM NO MRK [id number] [employer]  Right 1 Implanted   ANCHOR WAYPOINT 5MM - [id number]  ANCHOR WAYPOINT 5MM  [facility]  Left 4 Explanted              SIGNATURE: Jacek Gonsales MD  DATE: September 24, 2024  TIME: 10:24 AM

## 2024-09-24 NOTE — ANESTHESIA POSTPROCEDURE EVALUATION
Post-Op Assessment Note    CV Status:  Stable  Pain Score: 0    Pain management: adequate       Mental Status:  Alert and awake   Hydration Status:  Euvolemic   PONV Controlled:  Controlled   Airway Patency:  Patent     Post Op Vitals Reviewed: Yes    No anethesia notable event occurred.    Staff: Anesthesiologist, CRNA   Comments: Report given to chelsey RN, VSS. Pt states she is comfortable            BP   123/59   Temp (!) 97 °F (36.1 °C) (09/24/24 1038)    Pulse  71   Resp   16   SpO2   98

## 2024-09-24 NOTE — H&P
"Neurosurgery History and Physical    Prior clinic encounter from 6/5/24 reviewed. After examining the patient I find no changes in the patients condition since the encounter.    Patient personally seen and examined.  Neurological examination unchanged compared to last office/progress note, with the following exceptions:    BP 98/72 (BP Location: Right arm)   Pulse 86   Temp (!) 96.8 °F (36 °C) (Temporal)   Resp 18   Ht 5' 3\" (1.6 m)   Wt 63 kg (139 lb)   SpO2 98%   BMI 24.62 kg/m²      Awake and alert.  Regular cardiac rate and rhythm.  No respiratory distress.  Abdomen nontender.  Normocephalic. Extremities warm, well perfused.    Post operative instructions and medications have been reviewed with the patient.    Assessment and Plan:    All questions have been answered to the patient satisfaction.  Plan to proceed with placement of bilateral GPI deep brain stimulator electrodes. They are in agreement with proceeding.    "

## 2024-09-24 NOTE — ANESTHESIA PREPROCEDURE EVALUATION
Procedure:  Placement of bilateral GPI deep brain stimulator leads (Bilateral: Head)    Relevant Problems   CARDIO   (+) Hyperlipidemia      MUSCULOSKELETAL   (+) Back pain   (+) Cervical spondylosis   (+) Torticollis      Neurology/Sleep   (+) Mild cognitive impairment   (+) Parkinson's disease   (+) REM sleep behavior disorder      FEN/Gastrointestinal   (+) Esophagitis, reflux        Physical Exam    Airway    Mallampati score: I  TM Distance: >3 FB  Neck ROM: full     Dental       Cardiovascular      Pulmonary      Other Findings  post-pubertal.      Anesthesia Plan  ASA Score- 3     Anesthesia Type- IV sedation with anesthesia with ASA Monitors.         Additional Monitors: arterial line.    Airway Plan:            Plan Factors-Exercise tolerance (METS): >4 METS.    Chart reviewed.   Existing labs reviewed. Patient summary reviewed.                  Induction- intravenous.    Postoperative Plan-         Informed Consent- Anesthetic plan and risks discussed with patient.  I personally reviewed this patient with the CRNA. Discussed and agreed on the Anesthesia Plan with the CRNA..

## 2024-09-24 NOTE — ANESTHESIA PROCEDURE NOTES
Arterial Line Insertion    Performed by: George Hardin CRNA  Authorized by: Aditya Garcia MD  Consent: Verbal consent obtained. Written consent obtained.  Risks and benefits: risks, benefits and alternatives were discussed  Consent given by: patient  Patient understanding: patient states understanding of the procedure being performed  Patient consent: the patient's understanding of the procedure matches consent given  Procedure consent: procedure consent matches procedure scheduled  Relevant documents: relevant documents present and verified  Test results: test results available and properly labeled  Site marked: the operative site was marked  Patient identity confirmed: verbally with patient and arm band  Preparation: Patient was prepped and draped in the usual sterile fashion.  Indications: hemodynamic monitoring  Orientation:  Left  Location: radial arterylidocaine (PF) (XYLOCAINE-MPF) 1 % - Infiltration   0.5 mL - 9/24/2024 7:52:00 AM  Procedure Details:  Needle gauge: 20  Seldinger technique: Seldinger technique used  Number of attempts: 2    Post-procedure:  Post-procedure: dressing applied  Waveform: good waveform  Post-procedure CNS: normal and unchanged  Patient tolerance: Patient tolerated the procedure well with no immediate complications

## 2024-09-24 NOTE — RESPIRATORY THERAPY NOTE
"RT Protocol Note  Malaika Chavis 73 y.o. female MRN: [id number]  Unit/Bed#: [facility] 713-01 Encounter: [id number]    Assessment    Principal Problem:    Parkinson's disease      Home Pulmonary Medications:  none       Past Medical History:   Diagnosis Date    Dementia (HCC)     DVT (deep venous thrombosis) (HCC)     GERD (gastroesophageal reflux disease)     Hyperlipidemia     Osteoporosis     Parkinson disease      Social History     Socioeconomic History    Marital status: /Civil Union     Spouse name: None    Number of children: None    Years of education: None    Highest education level: None   Occupational History    None   Tobacco Use    Smoking status: Never     Passive exposure: Never    Smokeless tobacco: Never   Vaping Use    Vaping status: Never Used   Substance and Sexual Activity    Alcohol use: Not Currently     Comment: Rare    Drug use: Never    Sexual activity: None   Other Topics Concern    None   Social History Narrative    5 children     Social Determinants of Health     Financial Resource Strain: Not on file   Food Insecurity: Not on file   Transportation Needs: No Transportation Needs (11/21/2022)    Received from [facility], [facility]     Lack of Transportation (Medical): No     Lack of Transportation (Non-Medical): No   Physical Activity: Not on file   Stress: Not on file   Social Connections: Not on file   Intimate Partner Violence: Not on file   Housing Stability: Not on file       Subjective         Objective    Physical Exam:   Assessment Type: Assess only  General Appearance: Awake  Respiratory Pattern: Normal  Chest Assessment: Chest expansion symmetrical  Bilateral Breath Sounds: Clear    Vitals:  Blood pressure 127/71, pulse 78, temperature 98.7 °F (37.1 °C), resp. rate 20, height 5' 3\" (1.6 m), weight 63 kg (139 lb), SpO2 96%, not currently breastfeeding.          Imaging and other studies: Reviewed radiology " reports from this admission including: chest xray.          Plan    Respiratory Plan: Discontinue Protocol        Resp Comments: (P) Pt admitted w/ hx of parkinsons disease. Arrived post op for Placement of bilateral GPI deep brain stimulator leads. Pt denies any pulm hx nor takes any breathing medications at home .BS clear. Pt c/o of pain RN is aware. No resp interventions are needed at this time

## 2024-09-25 ENCOUNTER — HOME HEALTH ADMISSION (OUTPATIENT)
Dept: HOME HEALTH SERVICES | Facility: HOME HEALTHCARE | Age: 74
End: 2024-09-25
Payer: MEDICARE

## 2024-09-25 VITALS
RESPIRATION RATE: 15 BRPM | TEMPERATURE: 97.5 F | HEIGHT: 63 IN | SYSTOLIC BLOOD PRESSURE: 129 MMHG | DIASTOLIC BLOOD PRESSURE: 58 MMHG | WEIGHT: 139 LBS | OXYGEN SATURATION: 97 % | BODY MASS INDEX: 24.63 KG/M2 | HEART RATE: 82 BPM

## 2024-09-25 LAB
ANION GAP SERPL CALCULATED.3IONS-SCNC: 8 MMOL/L (ref 4–13)
APTT PPP: 25 SECONDS (ref 23–34)
BUN SERPL-MCNC: 16 MG/DL (ref 5–25)
CALCIUM SERPL-MCNC: 8 MG/DL (ref 8.4–10.2)
CHLORIDE SERPL-SCNC: 103 MMOL/L (ref 96–108)
CO2 SERPL-SCNC: 24 MMOL/L (ref 21–32)
CREAT SERPL-MCNC: 0.58 MG/DL (ref 0.6–1.3)
DME PARACHUTE DELIVERY DATE REQUESTED: NORMAL
DME PARACHUTE ITEM DESCRIPTION: NORMAL
DME PARACHUTE ORDER STATUS: NORMAL
DME PARACHUTE SUPPLIER NAME: NORMAL
DME PARACHUTE SUPPLIER PHONE: NORMAL
ERYTHROCYTE [DISTWIDTH] IN BLOOD BY AUTOMATED COUNT: 12 % (ref 11.6–15.1)
FERRITIN SERPL-MCNC: 53 NG/ML (ref 11–307)
GFR SERPL CREATININE-BSD FRML MDRD: 92 ML/MIN/1.73SQ M
GLUCOSE SERPL-MCNC: 119 MG/DL (ref 65–140)
HCT VFR BLD AUTO: 40 % (ref 34.8–46.1)
HGB BLD-MCNC: 13.6 G/DL (ref 11.5–15.4)
INR PPP: 0.96 (ref 0.85–1.19)
IRON SATN MFR SERPL: 19 % (ref 15–50)
IRON SERPL-MCNC: 67 UG/DL (ref 50–212)
MAGNESIUM SERPL-MCNC: 2.3 MG/DL (ref 1.9–2.7)
MCH RBC QN AUTO: 33.5 PG (ref 26.8–34.3)
MCHC RBC AUTO-ENTMCNC: 34 G/DL (ref 31.4–37.4)
MCV RBC AUTO: 99 FL (ref 82–98)
PHOSPHATE SERPL-MCNC: 2.8 MG/DL (ref 2.3–4.1)
PLATELET # BLD AUTO: 248 THOUSANDS/UL (ref 149–390)
PMV BLD AUTO: 10.4 FL (ref 8.9–12.7)
POTASSIUM SERPL-SCNC: 3.8 MMOL/L (ref 3.5–5.3)
PROTHROMBIN TIME: 13.1 SECONDS (ref 12.3–15)
RBC # BLD AUTO: 4.06 MILLION/UL (ref 3.81–5.12)
SODIUM SERPL-SCNC: 135 MMOL/L (ref 135–147)
TIBC SERPL-MCNC: 354 UG/DL (ref 250–450)
UIBC SERPL-MCNC: 287 UG/DL (ref 155–355)
WBC # BLD AUTO: 18.79 THOUSAND/UL (ref 4.31–10.16)

## 2024-09-25 PROCEDURE — 85610 PROTHROMBIN TIME: CPT | Performed by: PHYSICIAN ASSISTANT

## 2024-09-25 PROCEDURE — 80048 BASIC METABOLIC PNL TOTAL CA: CPT | Performed by: PHYSICIAN ASSISTANT

## 2024-09-25 PROCEDURE — 82728 ASSAY OF FERRITIN: CPT | Performed by: PSYCHIATRY & NEUROLOGY

## 2024-09-25 PROCEDURE — NC001 PR NO CHARGE: Performed by: PHYSICIAN ASSISTANT

## 2024-09-25 PROCEDURE — 99024 POSTOP FOLLOW-UP VISIT: CPT | Performed by: STUDENT IN AN ORGANIZED HEALTH CARE EDUCATION/TRAINING PROGRAM

## 2024-09-25 PROCEDURE — 83735 ASSAY OF MAGNESIUM: CPT

## 2024-09-25 PROCEDURE — 85730 THROMBOPLASTIN TIME PARTIAL: CPT | Performed by: PHYSICIAN ASSISTANT

## 2024-09-25 PROCEDURE — 85027 COMPLETE CBC AUTOMATED: CPT | Performed by: PHYSICIAN ASSISTANT

## 2024-09-25 PROCEDURE — 83540 ASSAY OF IRON: CPT | Performed by: PSYCHIATRY & NEUROLOGY

## 2024-09-25 PROCEDURE — 83550 IRON BINDING TEST: CPT | Performed by: PSYCHIATRY & NEUROLOGY

## 2024-09-25 PROCEDURE — 84100 ASSAY OF PHOSPHORUS: CPT

## 2024-09-25 PROCEDURE — 99233 SBSQ HOSP IP/OBS HIGH 50: CPT | Performed by: PSYCHIATRY & NEUROLOGY

## 2024-09-25 RX ORDER — AMOXICILLIN 250 MG
1 CAPSULE ORAL DAILY PRN
Qty: 7 TABLET | Refills: 0 | Status: SHIPPED | OUTPATIENT
Start: 2024-09-25 | End: 2024-10-02

## 2024-09-25 RX ORDER — CEPHALEXIN 500 MG/1
500 CAPSULE ORAL EVERY 6 HOURS SCHEDULED
Qty: 20 CAPSULE | Refills: 0 | Status: SHIPPED | OUTPATIENT
Start: 2024-09-25 | End: 2024-09-30

## 2024-09-25 RX ORDER — OXYCODONE HYDROCHLORIDE 5 MG/1
5 TABLET ORAL EVERY 6 HOURS PRN
Qty: 10 TABLET | Refills: 0 | Status: SHIPPED | OUTPATIENT
Start: 2024-09-25

## 2024-09-25 RX ORDER — ACETAMINOPHEN 325 MG/1
975 TABLET ORAL EVERY 6 HOURS PRN
Start: 2024-09-25

## 2024-09-25 RX ORDER — LEVETIRACETAM 750 MG/1
750 TABLET ORAL EVERY 12 HOURS SCHEDULED
Qty: 11 TABLET | Refills: 0 | Status: SHIPPED | OUTPATIENT
Start: 2024-09-25 | End: 2024-10-01

## 2024-09-25 RX ADMIN — CEPHALEXIN 500 MG: 500 CAPSULE ORAL at 12:48

## 2024-09-25 RX ADMIN — POLYETHYLENE GLYCOL 3350 17 G: 17 POWDER, FOR SOLUTION ORAL at 08:43

## 2024-09-25 RX ADMIN — CARBIDOPA AND LEVODOPA 0.5 TABLET: 25; 100 TABLET ORAL at 10:27

## 2024-09-25 RX ADMIN — OXYCODONE HYDROCHLORIDE 5 MG: 5 TABLET ORAL at 01:48

## 2024-09-25 RX ADMIN — CARBIDOPA AND LEVODOPA 0.5 TABLET: 25; 100 TABLET ORAL at 08:42

## 2024-09-25 RX ADMIN — LEVETIRACETAM 750 MG: 750 TABLET, FILM COATED ORAL at 08:48

## 2024-09-25 RX ADMIN — ENTACAPONE 200 MG: 200 TABLET ORAL at 10:27

## 2024-09-25 RX ADMIN — ACETAMINOPHEN 975 MG: 325 TABLET ORAL at 14:44

## 2024-09-25 RX ADMIN — RASAGILINE 1 MG: 1 TABLET ORAL at 08:52

## 2024-09-25 RX ADMIN — POTASSIUM PHOSPHATE, MONOBASIC POTASSIUM PHOSPHATE, DIBASIC 15 MMOL: 224; 236 INJECTION, SOLUTION, CONCENTRATE INTRAVENOUS at 08:43

## 2024-09-25 RX ADMIN — HYDROMORPHONE HYDROCHLORIDE 0.5 MG: 1 INJECTION, SOLUTION INTRAMUSCULAR; INTRAVENOUS; SUBCUTANEOUS at 02:40

## 2024-09-25 RX ADMIN — ENTACAPONE 200 MG: 200 TABLET ORAL at 08:51

## 2024-09-25 RX ADMIN — CARBIDOPA AND LEVODOPA 0.5 TABLET: 25; 100 TABLET ORAL at 05:30

## 2024-09-25 RX ADMIN — CARBIDOPA AND LEVODOPA 0.5 TABLET: 25; 100 TABLET ORAL at 14:44

## 2024-09-25 RX ADMIN — ACETAMINOPHEN 975 MG: 325 TABLET ORAL at 08:48

## 2024-09-25 RX ADMIN — ACETAMINOPHEN 975 MG: 325 TABLET ORAL at 01:34

## 2024-09-25 RX ADMIN — CEFAZOLIN SODIUM 1000 MG: 1 SOLUTION INTRAVENOUS at 00:02

## 2024-09-25 RX ADMIN — CARBIDOPA AND LEVODOPA 0.5 TABLET: 25; 100 TABLET ORAL at 12:48

## 2024-09-25 RX ADMIN — ENTACAPONE 200 MG: 200 TABLET ORAL at 14:44

## 2024-09-25 RX ADMIN — ENTACAPONE 200 MG: 200 TABLET ORAL at 12:48

## 2024-09-25 RX ADMIN — OXYCODONE HYDROCHLORIDE 5 MG: 5 TABLET ORAL at 08:47

## 2024-09-25 RX ADMIN — ENTACAPONE 200 MG: 200 TABLET ORAL at 05:30

## 2024-09-25 RX ADMIN — CEFAZOLIN SODIUM 1000 MG: 1 SOLUTION INTRAVENOUS at 08:46

## 2024-09-25 RX ADMIN — Medication 2.5 MG: at 13:01

## 2024-09-25 NOTE — CASE MANAGEMENT
Case Management Assessment & Discharge Planning Note    Patient name Malaika Chavis  Location [facility]-01 MRN [id number]  : 1950 Date 2024       Current Admission Date: 2024  Current Admission Diagnosis:Parkinson's disease   Patient Active Problem List    Diagnosis Date Noted Date Diagnosed    Incidental pulmonary nodule 2022     Abnormal chest x-ray 2022     Vitamin B12 deficiency 2022     Cervical spondylosis 02/15/2022     Cervical radiculopathy 2021     Stress fracture of femur 2020     Hearing loss, bilateral 2020     Compression fracture of T12 first lumbar vertebra (HCC) 2019     Back pain 2019     Ambulatory dysfunction 2019     Abnormal thyroid function test 2019     Hyperlipidemia 2017     Vitamin D deficiency 2017     Mild cognitive impairment 2014     REM sleep behavior disorder 2014     Esophagitis, reflux 2012     Localized osteoporosis with current pathological fracture with routine healing 2012     Parkinson's disease 2012     Torticollis 10/21/2009       LOS (days): 1  Geometric Mean LOS (GMLOS) (days): 1.6  Days to GMLOS:0.6     OBJECTIVE:    Risk of Unplanned Readmission Score: 9.69         Current admission status: Inpatient       Preferred Pharmacy:   [facility]/pharmacy #[id number] - [place], [place] - [address] [place] [address]  Phone: [phone] Fax: [phone]    [facility] - [place], [place] - [address]  [address] [facility] [address]  Phone: [phone] Fax: [phone]    [facility] - [place], [place] - [address]  [address]  [address]  [facility] [address]  Phone: [phone] Fax: [phone]    Primary Care Provider: Nona Guerrero DO    Primary Insurance: MEDICARE  Secondary Insurance: BLUE CROSS    ASSESSMENT:  Active Health Care Proxies        Diane Casiano [facility] Health Care Representative - Daughter   Primary Phone: [phone] (Mobile)                 Advance Directives  Does patient have a Health Care POA?: Yes  Does patient have Advance Directives?: Yes  Advance Directives: Living will  Primary Contact: Cathryn Chavis (daughter)              Patient Information  Admitted from:: Home  Mental Status: Alert  During Assessment patient was accompanied by: Daughter  Primary Caregiver: Self  Support Systems: Self, Children  County of Residence: [place]  What city do you live in?: [place]  Home entry access options. Select all that apply.: Stairs  Number of steps to enter home.: 2  Do the steps have railings?: No  Type of Current Residence: 2 [address] home  Upon entering residence, is there a bedroom on the main floor (no further steps)?: Yes  Upon entering residence, is there a bathroom on the main floor (no further steps)?: Yes  Living Arrangements: Lives Alone  Is patient a ?: No    Activities of Daily Living Prior to Admission  Functional Status: Assistance  Completes ADLs independently?: No  Level of ADL dependence: Assistance  Ambulates independently?: No  Level of ambulatory dependence: Assistance  Does patient use assisted devices?: Yes  Assisted Devices (DME) used: Bedside Commode, Shower Chair, Walker, Straight Cane  Does patient currently own DME?: Yes  What DME does the patient currently own?: Bedside Commode, Straight Cane, Walker, Shower Chair  Does patient have a history of Outpatient Therapy (PT/OT)?: No  Does the patient have a history of Short-Term Rehab?: Yes ([facility])  Does patient have a history of HHC?: No  Does patient currently have HHC?: No         Patient Information Continued  Income Source: Pension/halfway  Does patient have prescription coverage?: Yes  Does patient receive dialysis treatments?: No  Does patient have a history of substance abuse?: No  Does patient have a history of Mental Health Diagnosis?:  No         Means of Transportation  Means of Transport to Appts:: Family transport      Social Determinants of Health (SDOH)      Flowsheet Row Most Recent Value   Housing Stability    In the last 12 months, was there a time when you were not able to pay the mortgage or rent on time? N   In the past 12 months, how many times have you moved where you were living? 0   At any time in the past 12 months, were you homeless or living in a shelter (including now)? N   Transportation Needs    In the past 12 months, has lack of transportation kept you from medical appointments or from getting medications? no   In the past 12 months, has lack of transportation kept you from meetings, work, or from getting things needed for daily living? No   Food Insecurity    Within the past 12 months, you worried that your food would run out before you got the money to buy more. Never true   Within the past 12 months, the food you bought just didn't last and you didn't have money to get more. Never true   Utilities    In the past 12 months has the electric, gas, oil, or water company threatened to shut off services in your home? No            DISCHARGE DETAILS:    Discharge planning discussed with:: Patient, daughter Ramya  Freedom of Choice: Yes  Comments - Freedom of Choice: Discussed FOC  CM contacted family/caregiver?: Yes  Were Treatment Team discharge recommendations reviewed with patient/caregiver?: Yes  Did patient/caregiver verbalize understanding of patient care needs?: Yes  Were patient/caregiver advised of the risks associated with not following Treatment Team discharge recommendations?: Yes    Contacts  Patient Contacts: Cathryn Chavis (daughter)  Relationship to Patient:: Family  Contact Method: Phone  Phone Number: [phone]  Reason/Outcome: Continuity of Care, Emergency Contact, Discharge Planning    Requested Home Health Care         Is the patient interested in HHC at discharge?: Yes  Home Health Discipline requested::  Nursing, Occupational Therapy, Physical Therapy  Home Health Agency Name:: [facility]  Home Health Follow-Up Provider:: JAMAR  Home Health Services Needed:: Evaluate Functional Status and Safety, Post-Op Care and Assessment, Restore Joint Function Post Joint Replacement, Strengthening/Theraputic Exercises to Improve Function  Homebound Criteria Met:: Requires the Assistance of Another Person for Safe Ambulation or to Leave the Home, Uses an Assist Device (i.e. cane, walker, etc)  Supporting Clincal Findings:: Fatigues Easliy in Short Distances, Limited Endurance         Additional Comments: Patient for possible discharge today- would like home care.   Provider made aware via secure chat.    This CM introduced self and role to patient and her daughter, at bedside.  Patient lives alone in a 2 story house- [address], she lives on first floor only.  Patients daughters live next door to her, she reports that her   last month. Patient's family helps her with ADLs as needed and patient uses a walker for ambulation. Referral in [facility] for home care.

## 2024-09-25 NOTE — CASE MANAGEMENT
Case Management Discharge Planning Note    Patient name Malaika Chavis  Location [facility]-01 MRN [id number]  : 1950 Date 2024       Current Admission Date: 2024  Current Admission Diagnosis:Parkinson's disease   Patient Active Problem List    Diagnosis Date Noted Date Diagnosed    Incidental pulmonary nodule 2022     Abnormal chest x-ray 2022     Vitamin B12 deficiency 2022     Cervical spondylosis 02/15/2022     Cervical radiculopathy 2021     Stress fracture of femur 2020     Hearing loss, bilateral 2020     Compression fracture of T12 first lumbar vertebra (HCC) 2019     Back pain 2019     Ambulatory dysfunction 2019     Abnormal thyroid function test 2019     Hyperlipidemia 2017     Vitamin D deficiency 2017     Mild cognitive impairment 2014     REM sleep behavior disorder 2014     Esophagitis, reflux 2012     Localized osteoporosis with current pathological fracture with routine healing 2012     Parkinson's disease 2012     Torticollis 10/21/2009       LOS (days): 1  Geometric Mean LOS (GMLOS) (days): 1.6  Days to GMLOS:0.4     OBJECTIVE:  Risk of Unplanned Readmission Score: 9.72         Current admission status: Inpatient   Preferred Pharmacy:   [facility]/pharmacy #[id number] - [place], [place] - [address] [place] [address]  Phone: [phone] Fax: [phone]    [facility] - [place], [place] - [address]  [address] [facility] [address]  Phone: [phone] Fax: [phone]    [facility] - [place], [place] - [address]  [address]  [address]  [facility] [address]  Phone: [phone] Fax: [phone]    Primary Care Provider: Nona Guerrero DO    Primary Insurance: MEDICARE  Secondary Insurance: BLUE CROSS    DISCHARGE DETAILS:    Discharge planning discussed with::  Patient, pacheco Carey        DME Referral Provided  Referral made for DME?: Yes  DME referral completed for the following items:: Bedside Commode, Hospital Bed          IMM Given (Date):: 09/25/24  IMM Given to:: Family  IMM reviewed with patient and caregiver, patient and caregiver agrees with discharge determination.

## 2024-09-25 NOTE — PROGRESS NOTES
Patient:    MRN:  [id number]    Tyrese Request ID:  [id number]    Level of care reserved:  Home Health Agency    Partner Reserved:  [facility], [place] [address] [phone]    Clinical needs requested:    Geography searched:  [address]    Start of Service:    Request sent:  12:06pm EDT on 9/25/2024 by Hailey Robert    Partner reserved:  12:27pm EDT on 9/25/2024 by Hailey Robert    Choice list shared:  12:27pm EDT on 9/25/2024 by Hailey Robert

## 2024-09-25 NOTE — ASSESSMENT & PLAN NOTE
1 Day Post-Op Procedure(s):  Placement of bilateral GPI deep brain stimulator leads (Dr. Gonsales 9/25/24)    Imaging:   CT head without 9/24/24: Expected postoperative findings with associated pneumocephalus.  Status post placement of interval bilateral DBS lead placement.    Plan:   Continue to monitor neurological exam.   CT head imaging results reviewed yesterday and reviewed with the patient.  Continue home medications with baseline Parkinson's medication regimen.  Pain control as needed  Postop antibiotics as ordered  Mobilize out of bed  DVT prophylaxis: Bilateral SCDs.  Anticipate discharge today.  Will plan for outpatient follow-up in 2 weeks for an incision check as well as with neurology for DBS programming.

## 2024-09-25 NOTE — PLAN OF CARE
Problem: PAIN - ADULT  Goal: Verbalizes/displays adequate comfort level or baseline comfort level  Description: Interventions:  - Encourage patient to monitor pain and request assistance  - Assess pain using appropriate pain scale  - Administer analgesics based on type and severity of pain and evaluate response  - Implement non-pharmacological measures as appropriate and evaluate response  - Consider cultural and social influences on pain and pain management  - Notify physician/advanced practitioner if interventions unsuccessful or patient reports new pain  Outcome: Progressing     Problem: INFECTION - ADULT  Goal: Absence or prevention of progression during hospitalization  Description: INTERVENTIONS:  - Assess and monitor for signs and symptoms of infection  - Monitor lab/diagnostic results  - Monitor all insertion sites, i.e. indwelling lines, tubes, and drains  - Monitor endotracheal if appropriate and nasal secretions for changes in amount and color  - [place] appropriate cooling/warming therapies per order  - Administer medications as ordered  - Instruct and encourage patient and family to use good hand hygiene technique  - Identify and instruct in appropriate isolation precautions for identified infection/condition  Outcome: Progressing     Problem: INFECTION - ADULT  Goal: Absence of fever/infection during neutropenic period  Description: INTERVENTIONS:  - Monitor WBC    Outcome: Progressing

## 2024-09-25 NOTE — PLAN OF CARE
Problem: PAIN - ADULT  Goal: Verbalizes/displays adequate comfort level or baseline comfort level  Description: Interventions:  - Encourage patient to monitor pain and request assistance  - Assess pain using appropriate pain scale  - Administer analgesics based on type and severity of pain and evaluate response  - Implement non-pharmacological measures as appropriate and evaluate response  - Consider cultural and social influences on pain and pain management  - Notify physician/advanced practitioner if interventions unsuccessful or patient reports new pain  Outcome: Progressing     Problem: INFECTION - ADULT  Goal: Absence or prevention of progression during hospitalization  Description: INTERVENTIONS:  - Assess and monitor for signs and symptoms of infection  - Monitor lab/diagnostic results  - Monitor all insertion sites, i.e. indwelling lines, tubes, and drains  - Monitor endotracheal if appropriate and nasal secretions for changes in amount and color  - [place] appropriate cooling/warming therapies per order  - Administer medications as ordered  - Instruct and encourage patient and family to use good hand hygiene technique  - Identify and instruct in appropriate isolation precautions for identified infection/condition  Outcome: Progressing     Problem: SAFETY ADULT  Goal: Patient will remain free of falls  Description: INTERVENTIONS:  - Educate patient/family on patient safety including physical limitations  - Instruct patient to call for assistance with activity   - Consult OT/PT to assist with strengthening/mobility   - Keep Call bell within reach  - Keep bed low and locked with side rails adjusted as appropriate  - Keep care items and personal belongings within reach  - Initiate and maintain comfort rounds  - Make Fall Risk Sign visible to staff  - Apply yellow socks and bracelet for high fall risk patients  - Consider moving patient to room near nurses station  Outcome: Progressing     Problem:  NEUROSENSORY - ADULT  Goal: Achieves stable or improved neurological status  Description: INTERVENTIONS  - Monitor and report changes in neurological status  - Monitor vital signs such as temperature, blood pressure, glucose, and any other labs ordered   - Initiate measures to prevent increased intracranial pressure  - Monitor for seizure activity and implement precautions if appropriate      Outcome: Progressing     Problem: GASTROINTESTINAL - ADULT  Goal: Minimal or absence of nausea and/or vomiting  Description: INTERVENTIONS:  - Administer IV fluids if ordered to ensure adequate hydration  - Maintain NPO status until nausea and vomiting are resolved  - Nasogastric tube if ordered  - Administer ordered antiemetic medications as needed  - Provide nonpharmacologic comfort measures as appropriate  - Advance diet as tolerated, if ordered  - Consider nutrition services referral to assist patient with adequate nutrition and appropriate food choices  Outcome: Progressing

## 2024-09-25 NOTE — DISCHARGE INSTR - AVS FIRST PAGE
Discharge Instructions  Deep Brain Stimulator (phase 1)  Electrode placement    Activity:  Do not lift more than 10 pounds for 6 weeks.  Avoid bending, lifting and twisting for 6 weeks.  No driving for at least 2 weeks or until cleared by Neurosurgery.   When able to shower, continue to use clean towel and washcloth for 2 weeks post-op.  Do not use a hair dryer, and avoid hair products such as mousse, oils, and gels. Do not brush your hair away from the incision since this will put strain on the suture line.  Do not dye or perm hair for 6 weeks or until cleared by physician.  Continue to change bed linens and pajamas more frequently. Wear clean clothes daily.   Stimulator programming will occur with your neurologist approximately 2 weeks following second procedure. Please call to confirm a scheduled appointment.   Resume home medications for Parkinson's or essential tremor immediately following surgery.    Surgical incision care:  Keep dressings in place for 2 days.  Keep incisions dry for 2 days.  May shower after 2 days using a baby shampoo including head incision. Rinse off shampoo and pat dry.   Do not immerse the incisions in water for 6 weeks.  After 2 days, incisions may be left open to air, but should remain clean.  Do not apply any creams or ointments to the incision, unless otherwise instructed by [facility].  Contact office if increasing redness, drainage, pain or swelling occurs around the incisions or if you develop a fever greater than 101F  Do not dye/perm hair or use any hair products until cleared by Neurosurgery.     Postoperative medication:  Complete course of 5 day postoperative course of antibiotic as directed.  [facility] will provide pain medication in the postoperative period. All prescriptions must come from a single provider.   Take medications as prescribed.  Call office with any questions/concerns.  May use over the counter Tylenol. No  NSAIDs (ie. Ibuprofen, Aleve, Advil, Naproxen)  Please contact office for questions regarding dosage and modifications.  No antiplatelet or anticoagulation medication until cleared by . [facility], unless otherwise instructed. Please contact . [facility] if you have any questions about the effects of any of your medications on blood clotting.  Do not operate heavy machinery or vehicles while taking sedating medications.  Use a bowel regimen while on opioids as they induce constipation. Ie. Senokot-S, Miralax, Colace, etc. Increase fiber and water intake.

## 2024-09-25 NOTE — DISCHARGE SUMMARY
Discharge Summary - Neurosurgery   Name: Malaika Chavis 73 y.o. female I MRN: [id number]  Unit/Bed#: [facility]-01 I Date of Admission: 9/24/2024   Date of Service: 9/25/2024 I Hospital Day: 1     Admission Date:   Admission Orders (From admission, onward)       Ordered        09/24/24 1046  Inpatient Admission  Once                           Discharge Date:  9/25/24    Admitting Diagnosis: Parkinson's disease (HCC) [G20.A1]  Discharge Diagnosis: same as above    Medical Problems       Resolved Problems  Date Reviewed: 3/15/2024   None         Attending: Dr. Jacek Gonsales    Consulting Physician(s): Dr. Soniya Johnson, [facility]    Procedures Performed: Placement of bilateral GPI deep brain stimulator leads with bilateral awake neurophysiological microelectrode recording    Hospital Course: 73-year-old female with past medical history significant for idiopathic Parkinson's he was found to be an appropriate candidate for deep brain stimulation.  She presents today for placement of bilateral GPI implantation.  Surgery was without complications.  Operative findings include minimum symptom improvement as medication was taken morning of surgery.  Good physiological mapping with wide thresholds to side effects.  Patient was admitted to stepdown with close monitoring overnight.  She did have significant headaches overnight likely secondary to pneumocephalus.  This was controlled on medications.  On the day of discharge patient states her headaches are significantly improved and mild.  She offers no additional neurological complaints.  Patient's exam at baseline.  She was cleared for discharge home with outpatient follow-up.  Discharge directions were reviewed in detail with the patient and her family present.  All questions were answered.  Plan outpatient follow-up in 2 weeks with Dr. Gonsales followed by appointment with Dr. Roger for DBS programming.  They are aware to call our office with any questions concerns.     Condition  at Discharge: good     Discharge instructions/Information to patient and family:   See after visit summary for information provided to patient and family.      Provisions for Follow-Up Care:  See after visit summary for information related to follow-up care and any pertinent home health orders.      Disposition: Home with home health care    Planned Readmission: No    Discharge Statement:  I have spent a total time of 20 minutes in caring for this patient on the day of the visit/encounter. .    Discharge Medications:  See after visit summary for reconciled discharge medications provided to patient and family.

## 2024-09-25 NOTE — PROGRESS NOTES
Progress Note - Critical Care/ICU   Name: Malaika Chavis 73 y.o. female I MRN: [id number]  Unit/Bed#: [facility] 713-01 I Date of Admission: 9/24/2024   Date of Service: 9/25/2024 I Hospital Day: 1       Assessment & Plan   Neuro:    Diagnosis: Parkinson's disease, s/p DBS  Plan: seizure precautions  Q4H neuro checks  Cranial incisional care  SBP goals between 90 and 160  Ancef 1g for surgical prophylaxis x 3 (currently on day 2); then Keflex 500mg Q6H for 5 days  Keppra 750mg BID for 7 days (currently on day 2)  Pain regimen per neurosurgery  Continue home meds:   entacapone 200mg Q2H while awake  sinemet 25-100mg 0.5 tablet Q2H while awake  Gabapentin 300mg at bedtime  Rasagiline 1mg daily  Plan for discharge    CV:   No active issues   Plan: Monitor SBP, HR, SpO2 per neurosurgery    Pulm:  No active issues  Plan: Monitor SpO2, RR per neurosurgery    GI:   No active issues  Plan:   Bowel regimen senokot daily, Tums, Pepcid PRN  Zofran 4mg as needed for nausea  Regular diet    :   No active issues    F/E/N:   No active issues  Plan:   F: No maintenance fluids  E: Mg>2, Phos>3, K>4, replete as needed  N: Regular diet    Heme/Onc:   No active issues, PTT and PT/INR WNL  Plan:   Continue to monitor APTT, INR, CBC  VTE prophylaxis contraindicated post-op per neurosurgery    Endo:   No active issues    ID:   No active issues  9/25: Leukocytosis of 18.8, likely reactive from procedure  Plan: Ancef 1g for surgical prophylaxis x 3; then Keflex 500mg Q6H for 5 days  Monitor CBC, trend WBC and fever curve      MSK/Skin:   No active issues  Plan: Mobilize as able  Disposition: Stepdown Level 1    ICU Core Measures     A: Assess, Prevent, and Manage Pain Has pain been assessed? Yes  Need for changes to pain regimen? No   B: Both SAT/SAT  N/A   C: Choice of Sedation RASS Goal: 0 Alert and Calm  Need for changes to sedation or analgesia regimen? No   D: Delirium CAM-ICU: Negative   E: Early Mobility  Plan for early mobility? Yes    F: Family Engagement Plan for family engagement today? Yes       Antibiotic Review: Antibiotics to be discontinued today    Review of Invasive Devices:            Prophylaxis:  VTE Contraindicated secondary to: post-op, per neurosurgery   Stress Ulcer  covered byfamotidine (PEPCID) 20 mg tablet [[id number]] (Long-Term Med), famotidine (PEPCID) tablet 40 mg [[id number]]         24 Hour Events   24hr events: No acute events overnight  Subjective   Review of Systems: Review of Systems   Neurological:  Positive for headaches.   All other systems reviewed and are negative.      Objective                          Vitals I/O      Most Recent Min/Max in 24hrs   Temp 97.5 °F (36.4 °C) Temp  Min: 97 °F (36.1 °C)  Max: 98.7 °F (37.1 °C)   Pulse 76 Pulse  Min: 75  Max: 84   Resp 13 Resp  Min: 12  Max: 20   /56 BP  Min: 110/56  Max: 159/69   O2 Sat 98 % SpO2  Min: 95 %  Max: 100 %      Intake/Output Summary (Last 24 hours) at 9/25/2024 0642  Last data filed at 9/24/2024 1245  Gross per 24 hour   Intake 1150 ml   Output 360 ml   Net 790 ml       Diet Regular; Regular House    Invasive Monitoring           Physical Exam   Physical Exam  Vitals and nursing note reviewed.   Eyes:      General: No scleral icterus.        Right eye: No discharge.         Left eye: No discharge.      Conjunctiva/sclera: Conjunctivae normal.      Pupils: Pupils are equal, round, and reactive to light.   Skin:     General: Skin is warm and dry.      Coloration: Skin is not jaundiced.   HENT:      Head:      Comments: Post-op bandage in place  Cardiovascular:      Rate and Rhythm: Normal rate and regular rhythm.      Pulses: Normal pulses.      Heart sounds: Normal heart sounds.   Musculoskeletal:         General: Normal range of motion.      Right lower leg: No edema.      Left lower leg: No edema.   Abdominal: General: There is no distension.      Palpations: Abdomen is soft.      Tenderness: There is no abdominal tenderness. There is no guarding.    Constitutional:       General: She is not in acute distress.  Pulmonary:      Effort: Pulmonary effort is normal. No accessory muscle usage, respiratory distress or accessory muscle usage.      Breath sounds: Normal breath sounds. No stridor. No wheezing, rhonchi or rales.   Psychiatric:         Speech: Speech is not no expressive aphasia.   Neurological:      General: No focal deficit present.      Mental Status: She is alert and oriented to person, place and time. Mental status is at baseline.      Cranial Nerves: No facial asymmetry.      Sensory: No sensory deficit.      Motor: Strength full and intact in all extremities. No motor deficit.          Diagnostic Studies        Lab Results: I have reviewed the following results:      Medications:  Scheduled PRN   acetaminophen, 975 mg, Q6H  Amantadine HCl ER, 137 mg, HS  entacapone, 200 mg, Q2H While awake   And  carbidopa-levodopa, 0.5 tablet, Q2H While awake  cefazolin, 1,000 mg, Q8H  cephalexin, 500 mg, Q6H JOSE  gabapentin, 300 mg, HS  levETIRAcetam, 750 mg, Q12H JOSE  polyethylene glycol, 17 g, Daily  rasagiline, 1 mg, Daily  senna-docusate sodium, 1 tablet, HS      calcium carbonate, 1,000 mg, Daily PRN  famotidine, 40 mg, HS PRN  HYDROmorphone, 0.5 mg, Q2H PRN  ondansetron, 4 mg, Q6H PRN  oxyCODONE, 2.5 mg, Q4H PRN   Or  oxyCODONE, 5 mg, Q4H PRN       Continuous          Labs:   CBC    Recent Labs     09/25/24  0545   WBC 18.79*   HGB 13.6   HCT 40.0        BMP    No recent results    Coags    Recent Labs     09/25/24  0545   INR 0.96   PTT 25        Additional Electrolytes  No recent results       Blood Gas    No recent results  No recent results LFTs  No recent results    Infectious  No recent results  Glucose  No recent results      6

## 2024-09-25 NOTE — PLAN OF CARE
Problem: PAIN - ADULT  Goal: Verbalizes/displays adequate comfort level or baseline comfort level  Description: Interventions:  - Encourage patient to monitor pain and request assistance  - Assess pain using appropriate pain scale  - Administer analgesics based on type and severity of pain and evaluate response  - Implement non-pharmacological measures as appropriate and evaluate response  - Consider cultural and social influences on pain and pain management  - Notify physician/advanced practitioner if interventions unsuccessful or patient reports new pain  Outcome: Adequate for Discharge     Problem: INFECTION - ADULT  Goal: Absence or prevention of progression during hospitalization  Description: INTERVENTIONS:  - Assess and monitor for signs and symptoms of infection  - Monitor lab/diagnostic results  - Monitor all insertion sites, i.e. indwelling lines, tubes, and drains  - Monitor endotracheal if appropriate and nasal secretions for changes in amount and color  - [place] appropriate cooling/warming therapies per order  - Administer medications as ordered  - Instruct and encourage patient and family to use good hand hygiene technique  - Identify and instruct in appropriate isolation precautions for identified infection/condition  Outcome: Adequate for Discharge  Goal: Absence of fever/infection during neutropenic period  Description: INTERVENTIONS:  - Monitor WBC    Outcome: Adequate for Discharge     Problem: SAFETY ADULT  Goal: Patient will remain free of falls  Description: INTERVENTIONS:  - Educate patient/family on patient safety including physical limitations  - Instruct patient to call for assistance with activity   - Consult OT/PT to assist with strengthening/mobility   - Keep Call bell within reach  - Keep bed low and locked with side rails adjusted as appropriate  - Keep care items and personal belongings within reach  - Initiate and maintain comfort rounds  - Make Fall Risk Sign visible to  staff  - Offer Toileting every 2 Hours, in advance of need  - Apply yellow socks and bracelet for high fall risk patients  - Consider moving patient to room near nurses station  Outcome: Adequate for Discharge  Goal: Maintain or return to baseline ADL function  Description: INTERVENTIONS:  -  Assess patient's ability to carry out ADLs; assess patient's baseline for ADL function and identify physical deficits which impact ability to perform ADLs (bathing, care of mouth/teeth, toileting, grooming, dressing, etc.)  - Assess/evaluate cause of self-care deficits   - Assess range of motion  - Assess patient's mobility; develop plan if impaired  - Assess patient's need for assistive devices and provide as appropriate  - Encourage maximum independence but intervene and supervise when necessary  - Involve family in performance of ADLs  - Assess for home care needs following discharge   - Consider OT consult to assist with ADL evaluation and planning for discharge  - Provide patient education as appropriate  Outcome: Adequate for Discharge  Goal: Maintains/Returns to pre admission functional level  Description: INTERVENTIONS:  - Perform AM-PAC 6 Click Basic Mobility/ Daily Activity assessment daily.  - Set and communicate daily mobility goal to care team and patient/family/caregiver.   - Collaborate with rehabilitation services on mobility goals if consulted  - Perform Range of Motion 3 times a day.  - Reposition patient every 2 hours.  - Dangle patient 3 times a day  - Stand patient 3 times a day  - Ambulate patient 3 times a day  - Out of bed to chair 3 times a day   - Out of bed for meals 3 times a day  - Out of bed for toileting  - Record patient progress and toleration of activity level   Outcome: Adequate for Discharge     Problem: DISCHARGE PLANNING  Goal: Discharge to home or other facility with appropriate resources  Description: INTERVENTIONS:  - Identify barriers to discharge w/patient and caregiver  - Arrange for  needed discharge resources and transportation as appropriate  - Identify discharge learning needs (meds, wound care, etc.)  - Arrange for interpretive services to assist at discharge as needed  - Refer to Case Management Department for coordinating discharge planning if the patient needs post-hospital services based on physician/advanced practitioner order or complex needs related to functional status, cognitive ability, or social support system  Outcome: Adequate for Discharge     Problem: Knowledge Deficit  Goal: Patient/family/caregiver demonstrates understanding of disease process, treatment plan, medications, and discharge instructions  Description: Complete learning assessment and assess knowledge base.  Interventions:  - Provide teaching at level of understanding  - Provide teaching via preferred learning methods  Outcome: Adequate for Discharge     Problem: NEUROSENSORY - ADULT  Goal: Achieves stable or improved neurological status  Description: INTERVENTIONS  - Monitor and report changes in neurological status  - Monitor vital signs such as temperature, blood pressure, glucose, and any other labs ordered   - Initiate measures to prevent increased intracranial pressure  - Monitor for seizure activity and implement precautions if appropriate      Outcome: Adequate for Discharge  Goal: Remains free of injury related to seizures activity  Description: INTERVENTIONS  - Maintain airway, patient safety  and administer oxygen as ordered  - Monitor patient for seizure activity, document and report duration and description of seizure to physician/advanced practitioner  - If seizure occurs,  ensure patient safety during seizure  - Reorient patient post seizure  - Seizure pads on all 4 side rails  - Instruct patient/family to notify RN of any seizure activity including if an aura is experienced  - Instruct patient/family to call for assistance with activity based on nursing assessment  - Administer anti-seizure  medications if ordered    Outcome: Adequate for Discharge  Goal: Achieves maximal functionality and self care  Description: INTERVENTIONS  - Monitor swallowing and airway patency with patient fatigue and changes in neurological status  - Encourage and assist patient to increase activity and self care.   - Encourage visually impaired, hearing impaired and aphasic patients to use assistive/communication devices  Outcome: Adequate for Discharge     Problem: CARDIOVASCULAR - ADULT  Goal: Maintains optimal cardiac output and hemodynamic stability  Description: INTERVENTIONS:  - Monitor I/O, vital signs and rhythm  - Monitor for S/S and trends of decreased cardiac output  - Administer and titrate ordered vasoactive medications to optimize hemodynamic stability  - Assess quality of pulses, skin color and temperature  - Assess for signs of decreased coronary artery perfusion  - Instruct patient to report change in severity of symptoms  Outcome: Adequate for Discharge  Goal: Absence of cardiac dysrhythmias or at baseline rhythm  Description: INTERVENTIONS:  - Continuous cardiac monitoring, vital signs, obtain 12 lead EKG if ordered  - Administer antiarrhythmic and heart rate control medications as ordered  - Monitor electrolytes and administer replacement therapy as ordered  Outcome: Adequate for Discharge     Problem: GASTROINTESTINAL - ADULT  Goal: Minimal or absence of nausea and/or vomiting  Description: INTERVENTIONS:  - Administer IV fluids if ordered to ensure adequate hydration  - Maintain NPO status until nausea and vomiting are resolved  - Nasogastric tube if ordered  - Administer ordered antiemetic medications as needed  - Provide nonpharmacologic comfort measures as appropriate  - Advance diet as tolerated, if ordered  - Consider nutrition services referral to assist patient with adequate nutrition and appropriate food choices  Outcome: Adequate for Discharge  Goal: Maintains or returns to baseline bowel  function  Description: INTERVENTIONS:  - Assess bowel function  - Encourage oral fluids to ensure adequate hydration  - Administer IV fluids if ordered to ensure adequate hydration  - Administer ordered medications as needed  - Encourage mobilization and activity  - Consider nutritional services referral to assist patient with adequate nutrition and appropriate food choices  Outcome: Adequate for Discharge  Goal: Maintains adequate nutritional intake  Description: INTERVENTIONS:  - Monitor percentage of each meal consumed  - Identify factors contributing to decreased intake, treat as appropriate  - Assist with meals as needed  - Monitor I&O, weight, and lab values if indicated  - Obtain nutrition services referral as needed  Outcome: Adequate for Discharge  Goal: Establish and maintain optimal ostomy function  Description: INTERVENTIONS:  - Assess bowel function  - Encourage oral fluids to ensure adequate hydration  - Administer IV fluids if ordered to ensure adequate hydration   - Administer ordered medications as needed  - Encourage mobilization and activity  - Nutrition services referral to assist patient with appropriate food choices  - Assess stoma site  - Consider wound care consult   Outcome: Adequate for Discharge  Goal: Oral mucous membranes remain intact  Description: INTERVENTIONS  - Assess oral mucosa and hygiene practices  - Implement preventative oral hygiene regimen  - Implement oral medicated treatments as ordered  - Initiate Nutrition services referral as needed  Outcome: Adequate for Discharge     Problem: METABOLIC, FLUID AND ELECTROLYTES - ADULT  Goal: Electrolytes maintained within normal limits  Description: INTERVENTIONS:  - Monitor labs and assess patient for signs and symptoms of electrolyte imbalances  - Administer electrolyte replacement as ordered  - Monitor response to electrolyte replacements, including repeat lab results as appropriate  - Instruct patient on fluid and nutrition as  appropriate  Outcome: Adequate for Discharge  Goal: Fluid balance maintained  Description: INTERVENTIONS:  - Monitor labs   - Monitor I/O and WT  - Instruct patient on fluid and nutrition as appropriate  - Assess for signs & symptoms of volume excess or deficit  Outcome: Adequate for Discharge  Goal: Glucose maintained within target range  Description: INTERVENTIONS:  - Monitor Blood Glucose as ordered  - Assess for signs and symptoms of hyperglycemia and hypoglycemia  - Administer ordered medications to maintain glucose within target range  - Assess nutritional intake and initiate nutrition service referral as needed  Outcome: Adequate for Discharge     Problem: SKIN/TISSUE INTEGRITY - ADULT  Goal: Skin Integrity remains intact(Skin Breakdown Prevention)  Description: Assess:  -Clean and moisturize skin every day  -Inspect skin when repositioning, toileting, and assisting with ADLS  -Assess extremities for adequate circulation and sensation     Bed Management:  -Have minimal linens on bed & keep smooth, unwrinkled  -Change linens as needed when moist or perspiring  -Avoid sitting or lying in one position for more than 2 hours while in bed  -Keep HOB at 30 degrees     Toileting:  -Offer bedside commode  -Assess for incontinence every 2 hours    Activity:  -Mobilize patient 3 times a day  -Encourage activity and walks on unit  -Encourage or provide ROM exercises   -Turn and reposition patient every 2 Hours  -Use appropriate equipment to lift or move patient in bed  -Instruct/ Assist with weight shifting every 2 hours when out of bed in chair  -Consider limitation of chair time 2 hour intervals    Skin Care:  -Avoid use of baby powder, tape, friction and shearing, hot water or constrictive clothing  -Do not massage red bony areas      Outcome: Adequate for Discharge  Goal: Incision(s), wounds(s) or drain site(s) healing without S/S of infection  Description: INTERVENTIONS  - Assess and document dressing, incision,  wound bed, drain sites and surrounding tissue  - Provide patient and family education  - Perform skin care/dressing changes as needed  Outcome: Adequate for Discharge  Goal: Pressure injury heals and does not worsen  Description: Interventions:  - Implement low air loss mattress or specialty surface (Criteria met)  - Apply silicone foam dressing  - Instruct/assist with weight shifting every 30 minutes when in chair   - Limit chair time to 2 hour intervals  - Apply fecal or urinary incontinence containment device   - Perform passive or active ROM every meal  - Turn and reposition patient & offload bony prominences every 2 hours   - Utilize friction reducing device or surface for transfers   - Consider nutrition services referral as needed  Outcome: Adequate for Discharge     Problem: Nutrition/Hydration-ADULT  Goal: Nutrient/Hydration intake appropriate for improving, restoring or maintaining nutritional needs  Description: Monitor and assess patient's nutrition/hydration status for malnutrition. Collaborate with interdisciplinary team and initiate plan and interventions as ordered.  Monitor patient's weight and dietary intake as ordered or per policy. Utilize nutrition screening tool and intervene as necessary. Determine patient's food preferences and provide high-protein, high-caloric foods as appropriate.     INTERVENTIONS:  - Monitor oral intake, urinary output, labs, and treatment plans  - Assess nutrition and hydration status and recommend course of action  - Evaluate amount of meals eaten  - Assist patient with eating if necessary   - Allow adequate time for meals  - Recommend/ encourage appropriate diets, oral nutritional supplements, and vitamin/mineral supplements  - Order, calculate, and assess calorie counts as needed  - Recommend, monitor, and adjust tube feedings and TPN/PPN based on assessed needs  - Assess need for intravenous fluids  - Provide specific nutrition/hydration education as appropriate  -  Include patient/family/caregiver in decisions related to nutrition  Outcome: Adequate for Discharge

## 2024-09-25 NOTE — PROGRESS NOTES
"Progress Note - Neurosurgery   Name: Malaika Chavis 73 y.o. female I MRN: [id number]  Unit/Bed#: [facility] 713-01 I Date of Admission: 9/24/2024   Date of Service: 9/25/2024 I Hospital Day: 1    Assessment & Plan  Parkinson's disease (HCC)  1 Day Post-Op Procedure(s):  Placement of bilateral GPI deep brain stimulator leads (Dr. Gonsales 9/25/24)    Imaging:   CT head without 9/24/24: Expected postoperative findings with associated pneumocephalus.  Status post placement of interval bilateral DBS lead placement.    Plan:   Continue to monitor neurological exam.   CT head imaging results reviewed yesterday and reviewed with the patient.  Continue home medications with baseline Parkinson's medication regimen.  Pain control as needed  Postop antibiotics as ordered  Mobilize out of bed  DVT prophylaxis: Bilateral SCDs.  Anticipate discharge today.  Will plan for outpatient follow-up in 2 weeks for an incision check as well as with neurology for DBS programming.        History of Present Illness   Patient endorsed significant headache yesterday which is \" 300%\" improved today.  She states she is overall doing well.  No additional complaints at this time.  Sitting up in bed.  Tolerating meals.  Voiding.    Objective      Temp:  [97 °F (36.1 °C)-98.7 °F (37.1 °C)] 97.5 °F (36.4 °C)  HR:  [75-84] 82  Resp:  [12-20] 15  BP: (110-159)/(56-91) 129/58  Arterial Line BP: ()/(57-80) 97/80  O2 Device: None (Room air)          I/O         09/23 0701 09/24 0700 09/24 0701  09/25 0700 09/25 0701 09/26 0700    I.V. (mL/kg)  1150 (18.3)     Total Intake(mL/kg)  1150 (18.3)     Urine (mL/kg/hr)  310 (0.2) 750 (3.9)    Blood  50     Total Output  360 750    Net  +790 -750           Unmeasured Urine Occurrence  2 x 1 x          Lines/Drains/Airways       Active Status       None                  Physical Exam  Constitutional:       General: She is not in acute distress.     Appearance: Normal appearance. She is not ill-appearing. "   HENT:      Head: Normocephalic and atraumatic.      Comments: Dressings dry and intact     Right Ear: External ear normal.      Left Ear: External ear normal.      Nose: Nose normal.      Mouth/Throat:      Mouth: Mucous membranes are moist.   Eyes:      General: No scleral icterus.        Right eye: No discharge.         Left eye: No discharge.      Extraocular Movements: Extraocular movements intact.      Conjunctiva/sclera: Conjunctivae normal.   Cardiovascular:      Rate and Rhythm: Normal rate.   Pulmonary:      Effort: Pulmonary effort is normal. No respiratory distress.   Skin:     General: Skin is warm and dry.   Neurological:      General: No focal deficit present.      Mental Status: She is alert and oriented to person, place, and time. Mental status is at baseline.      Cranial Nerves: No cranial nerve deficit.      Sensory: No sensory deficit.      Motor: No weakness.      Comments: Baseline tremors   Psychiatric:         Mood and Affect: Mood normal.         Behavior: Behavior normal.         Thought Content: Thought content normal.         Judgment: Judgment normal.          Lab Results: I have reviewed the following results:   Recent Labs     09/24/24  1859 09/25/24  0545   WBC  --  18.79*   HGB  --  13.6   HCT  --  40.0   PLT  --  248   SODIUM  --  135   K  --  3.8   CL  --  103   CO2  --  24   BUN  --  16   CREATININE  --  0.58*   GLUC  --  119   MG  --  2.3   PHOS  --  2.8   PTT  --  25   INR  --  0.96   HSTNI0 6  --      Imaging Review: Personally reviewed the following image studies in PACS and associated radiology reports: CT head. My interpretation of the radiology images/reports is: As above.  Other Studies: No additional pertinent studies reviewed.    VTE Pharmacologic Prophylaxis: Sequential compression device (Venodyne)

## 2024-09-26 ENCOUNTER — TELEPHONE (OUTPATIENT)
Dept: NEUROSURGERY | Facility: CLINIC | Age: 74
End: 2024-09-26

## 2024-09-26 ENCOUNTER — TELEPHONE (OUTPATIENT)
Age: 74
End: 2024-09-26

## 2024-09-26 ENCOUNTER — HOME CARE VISIT (OUTPATIENT)
Dept: HOME HEALTH SERVICES | Facility: HOME HEALTHCARE | Age: 74
End: 2024-09-26

## 2024-09-26 LAB
ATRIAL RATE: 80 BPM
P AXIS: 59 DEGREES
PR INTERVAL: 160 MS
QRS AXIS: 49 DEGREES
QRSD INTERVAL: 82 MS
QT INTERVAL: 408 MS
QTC INTERVAL: 470 MS
T WAVE AXIS: 40 DEGREES
VENTRICULAR RATE: 80 BPM

## 2024-09-26 PROCEDURE — 93010 ELECTROCARDIOGRAM REPORT: CPT | Performed by: INTERNAL MEDICINE

## 2024-09-26 NOTE — TELEPHONE ENCOUNTER
BERKLEY, PT'S DAUGHTER ON CONSENT, CALLED WITH PT REQUESTING TO SPEAK WITH A NURSE TO DISCUSS POST OP QUESTIONS/CONCERNS SINCE PT'S NSX BK 9/24/24.    ATTEMPTED TO TRANSFER BERKLEY AND PT TO NURSES TO DISCUSS FURTHER AND INSTRUCTED TO LEAVE A VM FOR CB IF NO ANSWER.

## 2024-09-26 NOTE — TELEPHONE ENCOUNTER
Received call on nurseline from patient's daughter Ritika wanting to report that patient has severe headache and is nauseous. She is 2 days s/p Placement of bilateral GPI deep brain stimulator leads with Dr. Gonsales. Patient's speech is at baseline, she reports diffuse headache and rates it a 9/10. She is taking oxycodone 5mg Q6  and alternating with 500mg tylenol. This RN reviewed with Dr. Gonsales who would like patient to try taking 2 tablets of oxycodone for one or two doses and if she continues to feel relief to come to the ED in [place] for evaluation. Relayed this information to patient and daughter. All questions answered at this time, she was appreciative for the assistance..

## 2024-09-27 ENCOUNTER — HOME CARE VISIT (OUTPATIENT)
Dept: HOME HEALTH SERVICES | Facility: HOME HEALTHCARE | Age: 74
End: 2024-09-27
Payer: MEDICARE

## 2024-09-27 VITALS
DIASTOLIC BLOOD PRESSURE: 68 MMHG | HEART RATE: 80 BPM | OXYGEN SATURATION: 97 % | RESPIRATION RATE: 20 BRPM | SYSTOLIC BLOOD PRESSURE: 100 MMHG | TEMPERATURE: 97.8 F

## 2024-09-27 PROCEDURE — G0299 HHS/HOSPICE OF RN EA 15 MIN: HCPCS

## 2024-09-27 PROCEDURE — 10330081 VN NO-PAY CLAIM PROCEDURE

## 2024-09-27 PROCEDURE — 400013 VN SOC

## 2024-09-29 ENCOUNTER — HOME CARE VISIT (OUTPATIENT)
Dept: HOME HEALTH SERVICES | Facility: HOME HEALTHCARE | Age: 74
End: 2024-09-29
Payer: MEDICARE

## 2024-09-29 NOTE — CASE COMMUNICATION
9.29.24   pt called asking when her next nursing visit is    SN scheduled for tues 10.1 and she is agreeable with same.

## 2024-09-30 ENCOUNTER — TELEPHONE (OUTPATIENT)
Dept: OTHER | Facility: OTHER | Age: 74
End: 2024-09-30

## 2024-09-30 ENCOUNTER — HOME CARE VISIT (OUTPATIENT)
Dept: HOME HEALTH SERVICES | Facility: HOME HEALTHCARE | Age: 74
End: 2024-09-30
Payer: MEDICARE

## 2024-09-30 ENCOUNTER — TELEPHONE (OUTPATIENT)
Dept: NEUROSURGERY | Facility: CLINIC | Age: 74
End: 2024-09-30

## 2024-09-30 VITALS — OXYGEN SATURATION: 97 % | DIASTOLIC BLOOD PRESSURE: 76 MMHG | HEART RATE: 88 BPM | SYSTOLIC BLOOD PRESSURE: 119 MMHG

## 2024-09-30 PROCEDURE — G0151 HHCP-SERV OF PT,EA 15 MIN: HCPCS

## 2024-09-30 NOTE — TELEPHONE ENCOUNTER
Called patient to see how she is doing after surgery. Patient reports she is doing better with regards to her headaches and nausea however she is having significant discomfort at the incision where the staples are. She was under the impression her visit for removal was tomorrow and is upset that she has to wait until next week for removal. She still has staples placed from her original surgery on 9/10 and would like to know if at they can be removed. Will route a message to Dr. Gonsales regarding this and contact her back tomorrow.     Otherwise she is able to ambulate around the house and complete ADLs. Educated the patient about the importance of preventing blood clots and provided measures how to prevent them.     Reviewed incision care with the patient. She is washing in the shower with soap and water and is aware to avoid soaking or tub bathing.     Patient is aware to call the office if any concerns or questions may arise. Patient was appreciative for the call.

## 2024-10-01 ENCOUNTER — HOME CARE VISIT (OUTPATIENT)
Dept: HOME HEALTH SERVICES | Facility: HOME HEALTHCARE | Age: 74
End: 2024-10-01
Payer: MEDICARE

## 2024-10-01 VITALS
HEART RATE: 90 BPM | SYSTOLIC BLOOD PRESSURE: 110 MMHG | RESPIRATION RATE: 20 BRPM | TEMPERATURE: 98.2 F | OXYGEN SATURATION: 99 % | DIASTOLIC BLOOD PRESSURE: 78 MMHG

## 2024-10-01 PROCEDURE — G0299 HHS/HOSPICE OF RN EA 15 MIN: HCPCS

## 2024-10-01 NOTE — TELEPHONE ENCOUNTER
Patient's daughter called, requesting a callback from on call provider, regarding med management. Provider paged via ESC    There were no immediate complications during the procedure.

## 2024-10-01 NOTE — TELEPHONE ENCOUNTER
Received the following response from Dr. Gonsales:     Jacek Gonsales MD   to Me       10/1/24 11:56 AM  We did re-open that original incision to make the connection with the cranial leads -so those are brand new staples for a freshly opened wound, not the original staples - ideally they should stay in for 2ish weeks    Reached back out to Malaika to advise. Left a detailed message encouraging a call back if she had any additional questions or concerns.

## 2024-10-01 NOTE — TELEPHONE ENCOUNTER
Called pt's daughter back. Discussed with her that  okay for patient to take both Gabapentin and Oxycodone 5mg. To monitor for any change in symptoms and contact us if any new concerns.     She was appreciative for the call back.

## 2024-10-02 ENCOUNTER — HOME CARE VISIT (OUTPATIENT)
Dept: HOME HEALTH SERVICES | Facility: HOME HEALTHCARE | Age: 74
End: 2024-10-02
Payer: MEDICARE

## 2024-10-03 ENCOUNTER — HOME CARE VISIT (OUTPATIENT)
Dept: HOME HEALTH SERVICES | Facility: HOME HEALTHCARE | Age: 74
End: 2024-10-03
Payer: MEDICARE

## 2024-10-03 VITALS — OXYGEN SATURATION: 97 % | HEART RATE: 87 BPM | DIASTOLIC BLOOD PRESSURE: 70 MMHG | SYSTOLIC BLOOD PRESSURE: 110 MMHG

## 2024-10-03 PROCEDURE — G0151 HHCP-SERV OF PT,EA 15 MIN: HCPCS

## 2024-10-04 ENCOUNTER — TELEPHONE (OUTPATIENT)
Dept: HOME HEALTH SERVICES | Facility: HOME HEALTHCARE | Age: 74
End: 2024-10-04

## 2024-10-04 ENCOUNTER — HOME CARE VISIT (OUTPATIENT)
Dept: HOME HEALTH SERVICES | Facility: HOME HEALTHCARE | Age: 74
End: 2024-10-04
Payer: MEDICARE

## 2024-10-04 ENCOUNTER — TELEPHONE (OUTPATIENT)
Age: 74
End: 2024-10-04

## 2024-10-04 NOTE — TELEPHONE ENCOUNTER
Pt called to see if 1130 time slot was open on same day as her upcoming appt on 10/8. Advised it was not. Pt decided to keep her appt as is. Appt was confirmed.

## 2024-10-07 ENCOUNTER — HOME CARE VISIT (OUTPATIENT)
Dept: HOME HEALTH SERVICES | Facility: HOME HEALTHCARE | Age: 74
End: 2024-10-07
Payer: MEDICARE

## 2024-10-07 VITALS — DIASTOLIC BLOOD PRESSURE: 64 MMHG | SYSTOLIC BLOOD PRESSURE: 108 MMHG | HEART RATE: 79 BPM | OXYGEN SATURATION: 98 %

## 2024-10-07 PROCEDURE — G0151 HHCP-SERV OF PT,EA 15 MIN: HCPCS

## 2024-10-08 ENCOUNTER — HOME CARE VISIT (OUTPATIENT)
Dept: HOME HEALTH SERVICES | Facility: HOME HEALTHCARE | Age: 74
End: 2024-10-08
Payer: MEDICARE

## 2024-10-08 ENCOUNTER — CLINICAL SUPPORT (OUTPATIENT)
Dept: NEUROSURGERY | Facility: CLINIC | Age: 74
End: 2024-10-08

## 2024-10-08 VITALS
TEMPERATURE: 98 F | SYSTOLIC BLOOD PRESSURE: 124 MMHG | OXYGEN SATURATION: 98 % | DIASTOLIC BLOOD PRESSURE: 66 MMHG | RESPIRATION RATE: 16 BRPM | HEART RATE: 79 BPM

## 2024-10-08 VITALS — TEMPERATURE: 97.6 F | DIASTOLIC BLOOD PRESSURE: 60 MMHG | SYSTOLIC BLOOD PRESSURE: 102 MMHG

## 2024-10-08 DIAGNOSIS — Z98.890 POST-OPERATIVE STATE: Primary | ICD-10-CM

## 2024-10-08 PROCEDURE — 99024 POSTOP FOLLOW-UP VISIT: CPT | Performed by: STUDENT IN AN ORGANIZED HEALTH CARE EDUCATION/TRAINING PROGRAM

## 2024-10-08 PROCEDURE — G0299 HHS/HOSPICE OF RN EA 15 MIN: HCPCS

## 2024-10-08 NOTE — PATIENT INSTRUCTIONS
1. Wash incision gently in the shower. Avoid submerging in tub, hot tub, or pool for 2 more weeks. May submerge after as long as incision is well healed. (No scabbing or spreading)   2. Leave incision open to air when ever possible, may cover loosely with gauze and paper tape for comfort. Do not seal incision under bandages.    3. Do not apply any creams, ointments, or lotions directly to incision for 4 more weeks.  4. Maintain activity restrictions of lifting, pushing, pulling no more than 5-10 pounds. Avoid strenuous activity/straining.   5. Ambulate as tolerated.   6. Return for follow-up visit on 11/7/2024.  7. Contact the office with any questions or concerns.

## 2024-10-08 NOTE — PROGRESS NOTES
Post-Op Visit- Neurosurgery    Malaika Chavis 73 y.o. female MRN: [id number]    Chief Complaint:  Patient presents post: Placement of bilateral GPI deep brain stimulator leads - Bilateral    History of Present Illness:  Patient presents for 2 week POV for incision check.     Malaika arrived accompanied by her daughter and ambulated well without assistive device. She reports pain is minimal today and rated 1/10. She is overall doing well and anxious to program. Her appointment with neurology is already scheduled 10/28/2024.    Assessment:   Vitals:    10/08/24 1111   BP: 102/60   Temp: 97.6 °F (36.4 °C)       Wound Exam: Incisions are clean, dry, and intact, well approximated, without edema, erythema, or drainage. See below:                 Procedure:  Staple/suture removal.   Procedure Note: Cleansed all cranial incisions with CHG swab before all staples were removed.   Patient Status: the patient tolerated the procedure well.  Complications: None.       Discussion/Summary:  Reviewed incision care with patient including daily observation for s/s infection including: increased erythema, edema, drainage, dehiscence of incision or fever >101.  Should these be observed, she understands that she is to call and/or return immediately for reassessment.  Advised patient to continue cleansing area with mild soap and water and pat dry. Not to apply any lotions, creams, or ointments, & not to submerge in any water for 2 more weeks and should be mindful of the state of the incision before getting in to a body of water. If there is any scabbing should avoid this until healed more.     Activity levels were also reviewed with the patient in detail, she is to lift no greater than 10 pounds and ambulation is encouraged as tolerated.     Verified date/time/location of upcoming POV. She is to call the office with any further questions or concerns, or if any  incisional issues or fevers would arise.

## 2024-10-09 ENCOUNTER — HOME CARE VISIT (OUTPATIENT)
Dept: HOME HEALTH SERVICES | Facility: HOME HEALTHCARE | Age: 74
End: 2024-10-09
Payer: MEDICARE

## 2024-10-09 VITALS — OXYGEN SATURATION: 99 % | HEART RATE: 70 BPM | SYSTOLIC BLOOD PRESSURE: 120 MMHG | DIASTOLIC BLOOD PRESSURE: 60 MMHG

## 2024-10-09 PROCEDURE — G0151 HHCP-SERV OF PT,EA 15 MIN: HCPCS

## 2024-10-10 ENCOUNTER — HOME CARE VISIT (OUTPATIENT)
Dept: HOME HEALTH SERVICES | Facility: HOME HEALTHCARE | Age: 74
End: 2024-10-10
Payer: MEDICARE

## 2024-10-11 ENCOUNTER — HOME CARE VISIT (OUTPATIENT)
Dept: HOME HEALTH SERVICES | Facility: HOME HEALTHCARE | Age: 74
End: 2024-10-11
Payer: MEDICARE

## 2024-10-15 ENCOUNTER — HOME CARE VISIT (OUTPATIENT)
Dept: HOME HEALTH SERVICES | Facility: HOME HEALTHCARE | Age: 74
End: 2024-10-15
Payer: MEDICARE

## 2024-10-23 ENCOUNTER — TELEPHONE (OUTPATIENT)
Age: 74
End: 2024-10-23

## 2024-10-23 NOTE — TELEPHONE ENCOUNTER
Ny Mcintyre MD  Neurology Neurovascular Team 425 minutes ago (3:13 PM)       Also please remind her to skip all PD medications Morning of her visit and bring them with her to take in office. Thanks.      Ny Mcintyre MD  Neurology Neurovascular Team 425 minutes ago (3:12 PM)       They may have left her device on sensing and taught her how to track when she take her medications using her patient . . Could have her call [employer] so they can walk her through it but  if she cannot that is ok. We will still program on Mon without any issue

## 2024-10-23 NOTE — ANESTHESIA POSTPROCEDURE EVALUATION
Post-Op Assessment Note    CV Status:  Stable    Pain management: adequate       Mental Status:  Alert and awake   Hydration Status:  Euvolemic   PONV Controlled:  Controlled   Airway Patency:  Patent     Post Op Vitals Reviewed: Yes    No anethesia notable event occurred.    Staff: Anesthesiologist           Last Filed PACU Vitals:  Vitals Value Taken Time   Temp 98.7 °F (37.1 °C) 09/24/24 1245   Pulse 78 09/24/24 1245   /71 09/24/24 1245   Resp 20 09/24/24 1245   SpO2 96 % 09/24/24 1245       Modified Jose:  No data recorded    Late entry note due to newly required postop documentation

## 2024-10-23 NOTE — TELEPHONE ENCOUNTER
Pt called. She had DBS placement on 9/24/24 and has an OV scheduled with Dr. Roger on 10/28/24. She stated that at the hospital, someone told her daughter that she would have to track when she took medication. Pt stated that she doesn't know how to charge the device and isn't sure if something needs to be done prior to Monday's OV. Routed to provider to advise.

## 2024-10-23 NOTE — TELEPHONE ENCOUNTER
Spoke with pt and made her aware of the provider's recommendations. Provided her with the phone number for the [employer] Ricki bell. Also reminded her not to take her medications for PD the morning of the appointment, and to be sure to bring them along to the office. Pt verbalized understanding of all.

## 2024-10-28 ENCOUNTER — TELEPHONE (OUTPATIENT)
Age: 74
End: 2024-10-28

## 2024-10-28 ENCOUNTER — PROCEDURE VISIT (OUTPATIENT)
Dept: NEUROLOGY | Facility: CLINIC | Age: 74
End: 2024-10-28
Payer: MEDICARE

## 2024-10-28 VITALS — DIASTOLIC BLOOD PRESSURE: 64 MMHG | HEART RATE: 86 BPM | SYSTOLIC BLOOD PRESSURE: 120 MMHG

## 2024-10-28 DIAGNOSIS — G31.84 MILD COGNITIVE IMPAIRMENT: ICD-10-CM

## 2024-10-28 DIAGNOSIS — G20.B2 PARKINSON'S DISEASE WITH DYSKINESIA AND FLUCTUATING MANIFESTATIONS (HCC): Primary | ICD-10-CM

## 2024-10-28 PROCEDURE — 95984 ALYS BRN NPGT PRGRMG ADDL 15: CPT | Performed by: PSYCHIATRY & NEUROLOGY

## 2024-10-28 PROCEDURE — 95983 ALYS BRN NPGT PRGRMG 15 MIN: CPT | Performed by: PSYCHIATRY & NEUROLOGY

## 2024-10-28 NOTE — TELEPHONE ENCOUNTER
Pt's daughter Diane Chavis called to advise that pt has been having flu like symptoms since DBS was turned on this morning. She confirmed she is not physically with pt but would like any advise/recommendations on what pt should do as she believes it may be a side effect of DBS.

## 2024-10-28 NOTE — PROGRESS NOTES
Ambulatory Visit  Name: Malaika Chavis      : 1950      MRN: [id number]  Encounter Provider: Ny Mcintyre MD  Encounter Date: 10/28/2024   Encounter department: [facility]    Assessment & Plan  Parkinson's disease with dyskinesia and fluctuating manifestations (HCC)  Patient with a 25 year history of Parkinson's disease complicated by motor fluctuations, dyskinesias, MCI, and postural instability with intermittent freezing seen s/p bilateral Gpi placement, here for initial programming.      Goals of surgery which were to reduce peak dose dyskinesia, and reduction in her on/ff fluctuations.Unlikely to help freezing of gait.     Approximate 90 minutes were spent on deep insulator interrogation and programming, no evaluation and partial documentation.    Will have her  continue on rasagiline, Stalevo 50 and discontinue Gocovri (given cost). Will then if doing well may try to work on spacing doses of Stalevo further apart (consistently 3 hours versus every 2-3 hours.) .            Mild cognitive impairment  Mild cognitive symptoms noted on neuropsychological testing.  Not impacting daily function.  Will continue to monitor.           History of Present Illness   Malaika Chavis is a 73 year-old woman with young onset, levodopa responsive Parkinson's disease who presents for movement follow up. Review of chart reveals PD symptom onset  (47yo) with left shoulder stiffness, later complicated by motor fluctuations, wearing off, unpredictable offs, delayed on, mild dyskinesias and off freezing of gait.   Neuropsychological testing revealed MCI diagnosis was likely accurate although there were some nuances to interpretation. Question of anxiety playing a role as well, referred to [facility] rehab psychology group. Goals of surgery would include improvement in tremor, peak dose dyskinesia, and reduction in her on/ff fluctuations.Unlikely to help freezing of gait.      She tends today status post deep brain stimulation surgery with bilateral Gpi placement on 9/24/2024, for initial programming.  Overall surgery went well.  She presents today off medications having difficulty with slowness of movement.  No tremors.  There are some concerns with regards to the cost of Gocovri.  She continues on 1 tablet nightly.  Overall functions independently with difficulty when off.  Able to ambulate independently with great difficulty when off, improved when on.  Continues to have motor fluctuations with medications wearing off between 2 and 3 hours.  Higher doses of levodopa lead to peak dose dyskinesia.      Current medications and timing:   - Gocovri 1 po qhs - worsened hallucinations on higher dosing, helps with wearing off and dyskinesia   - Stalevo 12.5/50/200 8-10 times per day about Q2-3hours - less frequent higher dose increase dyskinesia  - rasagiline 1mg daily   -Buspar prn anxiety     Prior medication trials:  Nourianz - caused worsening freezing, dyskinesia and hallucinations   selegiline   Sinemet IR  Ropinirole IR   ropinirole XL 6mg daily - dose reduced due to hallucinations and dyskinesia.   Depakote - started inpatient         Review of Systems  I have personally reviewed the MA's review of systems and made changes as necessary.      Objective     /64 (BP Location: Right arm, Patient Position: Standing, Cuff Size: Standard)   Pulse 86     Physical Exam  Vitals reviewed.   Eyes:      Extraocular Movements: EOM normal.   Neurological:      Motor: Motor strength is normal.      Neurologic Exam     Mental Status   Disoriented to person.   Disoriented to place.   Follows 3 step commands.   Attention: normal. Concentration: normal.   Speech: (hypophonia)  Level of consciousness: alert  Normal comprehension.     Cranial Nerves     CN III, IV, VI   Extraocular motions are normal.     CN VII   Facial expression full, symmetric.     CN VIII   Hearing: intact    CN XI   Right  trapezius strength: normal  Left trapezius strength: normal    Motor Exam   Right arm tone: normal  Left arm tone: cogwheel rigidity  Right leg tone: normal  Left leg tone: increased    Strength   Strength 5/5 throughout.     Gait, Coordination, and Reflexes     Tremor   Resting tremor: absent  Action tremor: absentReduced stride.  No freezing of festination.        MDS UPDRS III                                        Time since last dose: off On stim   Speech  1 1   Facial Expression  2 2   Rigidity - Neck      Rigidity - Upper Extremity (R)  0 0   Rigidity - Upper Extremity (L)   1 0   Rigidity - Lower Extremity (R)  0 0   Rigidity - Lower Extremity (L)   1 0   Finger Taps (R)   1 0   Finger Taps (L)   2 1   Hand Movement (R)  0 0   Hand Movement (L)   1 0   Pronation/Supination (R)  1 0   Pronation/Supination (L)   2 1   Toe Tapping (R)     Toe Tapping (L)     Leg Agility (R)  2 1   Leg Agility (L)   2 1   Arising from Chair   2 2   Gait   2 2   Freezing of Gait 0 0   Postural Stability       Posture 1 1   Global spontaneity of movement 2 1   Postural Tremor (Ri 0 0   Postural Tremor (L) 0 0   Kinetic Tremor (R)  0 0   Kinetic Tremor (L)  0 0   Rest tremor amplitude RUE 0 0   Rest tremor amplitude LUE 0 0   Rest tremor amplitude RLE 0 0   Reset tremor amplitude LLE 0 0   Lip/Jaw Tremor  0 0   Consistency of tremor 0 0   Motor Exam Total:          Deep brain stimulator interrogation and programming:  Brain sense survey performed.  Programmed with streaming.    [employer] PC Left chest  [id number]  Lead: [id number]  Implanted: 9/10/24  Impedance: ok     Left Gpi  LFP freq 9.77  1 : 1-1.5mA, tolerated up to 2.5mA   2: 1-1.5mA, tolerated up to 2.5mA  PW60 145Hz    1a:0.6  1b:0.6  1c: 0.6  PW60, 145Hz  1.8mA (0.6- 2.4mA)    Right Gpi    LFP 11.72Hz  9a:0.7  9b:0.7  9c:0.7  PW60, 145Hz  2mA (0.5-2.6mA)      Reexamined after taking Stalevo 50.  No development of dyskinesia.  More ease of stride with ambulation

## 2024-10-28 NOTE — PROGRESS NOTES
Review of Systems   Constitutional: Negative.  Negative for appetite change, fatigue and fever.   HENT: Negative.  Negative for hearing loss, tinnitus, trouble swallowing and voice change.    Eyes: Negative.  Negative for photophobia, pain and visual disturbance.   Respiratory: Negative.  Negative for shortness of breath.    Cardiovascular: Negative.  Negative for palpitations.   Gastrointestinal: Negative.  Negative for nausea and vomiting.   Endocrine: Negative.  Negative for cold intolerance.   Genitourinary: Negative.  Negative for dysuria, frequency and urgency.   Musculoskeletal:  Positive for gait problem and myalgias ([place]). Negative for back pain, neck pain and neck stiffness.        Balance Issues, has gotten worse     Skin: Negative.  Negative for rash.   Allergic/Immunologic: Negative.    Neurological:  Positive for dizziness and tremors (Arms into hands and Legs, has stayed the same). Negative for seizures, syncope, facial asymmetry, speech difficulty, weakness, light-headedness, numbness and headaches.   Hematological: Negative.  Does not bruise/bleed easily.   Psychiatric/Behavioral:  Positive for sleep disturbance (At times). Negative for confusion and hallucinations.         Memory Issues     All other systems reviewed and are negative.

## 2024-10-28 NOTE — ASSESSMENT & PLAN NOTE
Mild cognitive symptoms noted on neuropsychological testing.  Not impacting daily function.  Will continue to monitor.

## 2024-10-28 NOTE — ASSESSMENT & PLAN NOTE
Patient with a 25 year history of Parkinson's disease complicated by motor fluctuations, dyskinesias, MCI, and postural instability with intermittent freezing seen s/p bilateral Gpi placement, here for initial programming.      Goals of surgery which were to reduce peak dose dyskinesia, and reduction in her on/ff fluctuations.Unlikely to help freezing of gait.     Approximate 90 minutes were spent on deep insulator interrogation and programming, no evaluation and partial documentation.    Will have her  continue on rasagiline, Stalevo 50 and discontinue Gocovri (given cost). Will then if doing well may try to work on spacing doses of Stalevo further apart (consistently 3 hours versus every 2-3 hours.) .

## 2024-10-28 NOTE — PATIENT INSTRUCTIONS
Parkinson's disease complicated by motor fluctuations, dyskinesias, MCI, and postural instability with intermittent freezing seen s/p bilateral Gpi placement, here for initial programming.    Goals of surgery which were to reduce peak dose dyskinesia, and reduction in her on/ff fluctuations.Unlikely to help freezing of gait.     Will have her  continue on rasagiline, Stalevo 50 and discontinue Gocovri (given cost).   After a week if no wearing off try to consistently space Stalevo further apart (consistently 3 hours versus every 2-3 hours.) .       A week prior to next visit start noting when you take medication on patient . If you have dyskinesia, start noting it on patient .

## 2024-10-29 ENCOUNTER — NURSE TRIAGE (OUTPATIENT)
Age: 74
End: 2024-10-29

## 2024-10-29 NOTE — TELEPHONE ENCOUNTER
"S/p DBS initial programming, s/p placement 2024    Patient's daughter called yesterday to report patient had flu like symptoms since DBS was turned on this morning.    I called patient/daughter to further assess.    Patient is reporting feeling \"woozy, dizzy and wobbly\" when changing position from sitting to standing, nausea, \"cold feet\" with cramping; said she had those symptoms prior to DBS programming yesterday and feels may be related to stress as her  recently  and she has felt \"nervous and unsettled\"  also said she may be dehydrated from not drinking enough fluids.  BP in office yesterday was documented: 120/64, 126/70  HR 86 and 88.    She reports feeling a little better today. Unsure if she has fever (no thermometer at home, will start checking bp/hr at home and record) , denies cough, congestion, SOB). I encouraged her to contact her PCP to discuss/assess symptoms; she verbalized understanding and will do same.      Please provide recommendation, thank you.    Reason for Disposition   MILD dizziness (e.g., walking normally) and has NOT been evaluated by physician for this (Exception: Dizziness caused by heat exposure, sudden standing, or poor fluid intake.)    Answer Assessment - Initial Assessment Questions  1. DESCRIPTION: \"Describe your dizziness.\"      Reporting feeling \"woozy and dizzy and wobbly \"when changing position from sitting to standing with \"cold feet\" and feet cramping.  2. LIGHTHEADED: \"Do you feel lightheaded?\" (e.g., somewhat faint, woozy, weak upon standing)      See above  3. VERTIGO: \"Do you feel like either you or the room is spinning or tilting?\" (i.e., vertigo)     Describes as woozy, dizzy and wobbly  4. SEVERITY: \"How bad is it?\"  \"Do you feel like you are going to faint?\" \"Can you stand and walk?\"      Able to walk and stand  5. ONSET:  \"When did the dizziness begin?\"      Reports her   recently and may be that she is emotionally strained from that and " "from s/p DBS placement  6. AGGRAVATING FACTORS: \"Does anything make it worse?\" (e.g., standing, change in head position)      Sitting to standing  7. HEART RATE: \"Can you tell me your heart rate?\" \"How many beats in 15 seconds?\"  (Note: Not all patients can do this.)        Vitals taken while in office yesterday, bp 120/64, 126/70  HR 86 and 88  8. CAUSE: \"What do you think is causing the dizziness?\" (e.g., decreased fluids or food, diarrhea, emotional distress, heat exposure, new medicine, sudden standing, vomiting; unknown)      Reports I \"might be dehydrated\" encouraged to increase fluids  9. RECURRENT SYMPTOM: \"Have you had dizziness before?\" If Yes, ask: \"When was the last time?\" \"What happened that time?\"      Was present prior to DBS appointment  10. OTHER SYMPTOMS: \"Do you have any other symptoms?\" (e.g., fever, chest pain, vomiting, diarrhea, bleeding)        + nausea, denies chills, sob, chest congestion, nasal congestion, cough, unsure if has fever as does not have a thermometer  11. PREGNANCY: \"Is there any chance you are pregnant?\" \"When was your last menstrual period?\"        N/A    Protocols used: Dizziness-Adult-[place]    "

## 2024-10-29 NOTE — TELEPHONE ENCOUNTER
Inbound call received from Patient Daughter Cathryn following up with her sister's report earlier. Patient Daughter Cathryn states her sister said she had these symptoms before the DBS was turned on. Cathryn thinks these symptoms are coinciding with the DBS being turned on and was questioning if they should turn the DBS off. Paitent daughter said it is more of a sudden emotional response. Patient states sometimes she feels fine and other times she has panic attacks.  Patient daughter made aware we do not have advisement from the provider yet but we will let her know they called again.     Patient daughter asked if they would still get a response to day and was made aware as it was after hours the provider will most likely respond tomorrow. CTS advised ED for any urgent concerns.     Patient daughter would like the Patient to be seen. In office appointment in [place] location offered but too far. Patient daughter requested if they can have the time as a virtual appointment. Virtual appointment made for Thursday October 31 at 12:30 pm.     Dr. Kana Husain Please advise, please also be aware appointment scheduled 10/31/2024 at 12:30 pm. Thank you!    Josh Ruiz Clerical: Virtual appointment made 10/31/2024 at 12:30 pm. Thank you!

## 2024-10-30 ENCOUNTER — TELEPHONE (OUTPATIENT)
Dept: NEUROLOGY | Facility: CLINIC | Age: 74
End: 2024-10-30

## 2024-10-30 NOTE — TELEPHONE ENCOUNTER
Called Patient and Daughters Cathryn and Diane with no answer. I then LMOM for Malaika and Cathryn in regards to having to move appt date to Tuesday for her Virtual appt due to error in schedule. I then asked the patient and / or Daughter Diane to give us a call to inform us that she is aware and if the date is okay.

## 2024-10-30 NOTE — TELEPHONE ENCOUNTER
Ny Mcintyre MD  Neurology Neurovascular Team 433 minutes ago (11:14 AM)       I am unable to call her back personally today. None of the symptoms describes are typical symptoms related to deep brain stimulation so I do agree with see PCP if there are flu like symptoms.  Even emotional changes like anxiety are not typical with where her leads are placed (Gpi). If  they truly think it is secondary to  DBS and is not improving they can  turn stimulation off using the patient  as we have not made much if a change in her medications.  We can then see how she does overnight and I will talk to them virtually tomorrow.   _________  I called Cathryn to discuss (her phone number is listed as a child on consent); reached , left message to please cb for Dr. Roger recommendation (visit also scheduled 10/31).   Nurse Triage felt it was better to discuss with Cathryn rather than leave detailed message.

## 2024-10-30 NOTE — TELEPHONE ENCOUNTER
Inbound call received from Patient daughter Cathryn responding to voicemail. Please see telephone encounter from 10/30/2024 as well. Patient daughter confirmed virtual visit change to 11/05/2024 at 10 am.     Patient daughter was provided Dr. Roger's advisement and she verbalized understanding.     Patient daughter Cathryn states they spoke with the PCP and they are not thinking it is flu related, it does seem mnore emotional and mental. Patient is getting panic attacks when it gets dark. Patient daughter questions if it might be grieving from her husbands recent passing. Patient was having these feelings before the DBS was turned on. The daughter that normally sees her is out of town and her absence is when some of these symptoms started. Patient daughter asked if there a chance she is sundowning and if so, what can they do to help it.     Dr. Roger please advise, thank you!

## 2024-10-30 NOTE — TELEPHONE ENCOUNTER
Inbound call received from Patient Daughter Cathryn and she confirmed virtual appointment change for 11/05/2024 at 10 am. Please also see documentation in 10/29/2024 nurse triage.     Josh Aburto MA: Please be aware, thank you!

## 2024-11-01 NOTE — TELEPHONE ENCOUNTER
Fermin Mitchell PA-C  You8 minutes ago (1:35 PM)       Arjun Dominguez.  I think it would be best for the patient to hold on making further medication changes until her visit with Dr Roger next week. I feel like she is having a lot of current complaints and concerns, and it would be best to hold off on making any further medication adjustments at this time.  This way we can see how her body responds to her current dosing of medication over the next few days.  At the visit with Dr. Roger they can further discuss what symptoms she is experiencing, when she is having the symptoms in comparison to when taking her meds, and further medication adjustments can be made.  They can also discuss further what can be done in regards to her DBS. Thanks!

## 2024-11-01 NOTE — TELEPHONE ENCOUNTER
Daughter Cathryn and Patient called in to inform Dr Roger that they turned off the DBS yesterday 10/31/24 at 3 pm (it was turned on Mon 10/28/24). They turned it off due to the increase in her emotional symptoms of the patient experiencing depression with anxiety and panic attacks, patient could not do it anymore. Patient is staying with Cathryn right now, as Dtr is trying to manage and oversee the patient more closely, with making sure she is taking meds at correct times and document her symptoms better, as well as  to see if pt not being alone may help improve her symptoms.    Since turning off the DBS her Anxiety, panic attacks, depressed symptoms have gone away (pt denies any emotional symptoms since DBS off). She also felt very wobbly when DBS was on as she had a great difficulty getting to the bathroom, but since DBS has been off, pt feels her Wobbly symptom has improved slightly, as she is now able to get to the bathroom, even though its been less than 24 hours so far. She still feels she can't get warm as she did prior when DBS was on, but that may just be her normal personality, as they are not sure its related to DBS at all. Daughter unsure if also being with the patient now helped her improve her symptoms.     Patient was informed when turning on the DBS, that she could no longer take Gocovri, and at that time she used the last of the medication. Patient wants to know since she turned off the DBS. Should she start taking Gocovri again? If so, she would need a new script sent to [facility] as she has no more left (it may take a few days to get that in the mail).    Currently pt informed she does not really have Dyskinesia symptoms. However, she feels very hyper like her whole body , and her legs and feet bother her more now since DBS is off , and patient needing to take her Carbidopa med more frequently as well as needing to get up and walk around more during those times to get some relief.  Once she sits, it may return at some point, and then she needs to get up to get some relief again.     Patient was taking her Carbidopa medication during her DBS being turned on, 1 tab every 3 hours while awake (630 am - 9 pm). But since DBS has been turned off yesterday, pt is back to taking Carbidopa  med  (as prev)  1 tab every 1.5 hours while awake (630 am - 9 pm) and 2-3 doses taken in the middle of the night if she gets up and needs it due to her symptoms).     Pt informed she is sensitive to the Carbidopa  medication as in past provider had to watch with adjusting this medication as if its increased the symptoms worsened, so pt unsure if pt needs this medication to be decreased or give it more time to re-acculmuate to her again, since she just turned off DBS and  started taking Carbidopa med 1 tab every 1.5 hrs (more freg), or should she just give this more time to see if it  is helping her since taking it again as previously, and discuss with provider at  [facility] this coming Tues 11/5/24.    Patient also wanted provider to know (FYI- as pt not sure if related to DBS or not):   She has had hives (could not get location from patient) since initial surgery 9/10/24, but they have been improving drastically since then, and they are not as bad as they were but still slightly present and itchy a bit.  2.She feels she has a vein (harden area) on back of neck protruding since surgery. Patient could not give exact location, or if in fact a vein vs a muscle. Denies pain.     I informed patient and daughter Cathryn I will get all this info to Dr Roger to provide recommendations and advise, or if she rather wait to advise at upcoming [facility] appt Tues 11/5, or if Dr Roger feels pt needs an inperson visit instead.     Cathryn Requesting call back today at , may leave a detailed msg

## 2024-11-01 NOTE — TELEPHONE ENCOUNTER
Pt's daughter, Cathryn called back to follow up on this message. Made her aware that we are still waiting for a response. Cathryn also stated that pt is requesting an order for a hospital bed as well.

## 2024-11-03 ENCOUNTER — APPOINTMENT (EMERGENCY)
Dept: RADIOLOGY | Facility: HOSPITAL | Age: 74
End: 2024-11-03
Payer: MEDICARE

## 2024-11-03 ENCOUNTER — HOSPITAL ENCOUNTER (EMERGENCY)
Facility: HOSPITAL | Age: 74
Discharge: HOME | End: 2024-11-03
Attending: EMERGENCY MEDICINE
Payer: MEDICARE

## 2024-11-03 VITALS
HEART RATE: 92 BPM | OXYGEN SATURATION: 98 % | TEMPERATURE: 98.3 F | SYSTOLIC BLOOD PRESSURE: 122 MMHG | DIASTOLIC BLOOD PRESSURE: 58 MMHG | RESPIRATION RATE: 20 BRPM

## 2024-11-03 DIAGNOSIS — R26.2 AMBULATORY DYSFUNCTION: ICD-10-CM

## 2024-11-03 DIAGNOSIS — G20.B2 PARKINSON'S DISEASE WITH DYSKINESIA AND FLUCTUATING MANIFESTATIONS (HCC): Primary | ICD-10-CM

## 2024-11-03 LAB
ANION GAP SERPL CALCULATED.3IONS-SCNC: 6 MMOL/L (ref 4–13)
BASOPHILS # BLD AUTO: 0.07 THOUSANDS/ΜL (ref 0–0.1)
BASOPHILS NFR BLD AUTO: 1 % (ref 0–1)
BUN SERPL-MCNC: 14 MG/DL (ref 5–25)
CALCIUM SERPL-MCNC: 8.9 MG/DL (ref 8.4–10.2)
CHLORIDE SERPL-SCNC: 106 MMOL/L (ref 96–108)
CO2 SERPL-SCNC: 24 MMOL/L (ref 21–32)
CREAT SERPL-MCNC: 0.74 MG/DL (ref 0.6–1.3)
EOSINOPHIL # BLD AUTO: 0.31 THOUSAND/ΜL (ref 0–0.61)
EOSINOPHIL NFR BLD AUTO: 3 % (ref 0–6)
ERYTHROCYTE [DISTWIDTH] IN BLOOD BY AUTOMATED COUNT: 11.9 % (ref 11.6–15.1)
FOLATE SERPL-MCNC: 12.4 NG/ML
GFR SERPL CREATININE-BSD FRML MDRD: 81 ML/MIN/1.73SQ M
GLUCOSE SERPL-MCNC: 94 MG/DL (ref 65–140)
HCT VFR BLD AUTO: 43.1 % (ref 34.8–46.1)
HGB BLD-MCNC: 14.9 G/DL (ref 11.5–15.4)
IMM GRANULOCYTES # BLD AUTO: 0.13 THOUSAND/UL (ref 0–0.2)
IMM GRANULOCYTES NFR BLD AUTO: 1 % (ref 0–2)
LYMPHOCYTES # BLD AUTO: 1.14 THOUSANDS/ΜL (ref 0.6–4.47)
LYMPHOCYTES NFR BLD AUTO: 12 % (ref 14–44)
MCH RBC QN AUTO: 34.2 PG (ref 26.8–34.3)
MCHC RBC AUTO-ENTMCNC: 34.6 G/DL (ref 31.4–37.4)
MCV RBC AUTO: 99 FL (ref 82–98)
MONOCYTES # BLD AUTO: 0.65 THOUSAND/ΜL (ref 0.17–1.22)
MONOCYTES NFR BLD AUTO: 7 % (ref 4–12)
NEUTROPHILS # BLD AUTO: 7.33 THOUSANDS/ΜL (ref 1.85–7.62)
NEUTS SEG NFR BLD AUTO: 76 % (ref 43–75)
NRBC BLD AUTO-RTO: 0 /100 WBCS
PLATELET # BLD AUTO: 221 THOUSANDS/UL (ref 149–390)
PMV BLD AUTO: 10.4 FL (ref 8.9–12.7)
POTASSIUM SERPL-SCNC: 4 MMOL/L (ref 3.5–5.3)
RBC # BLD AUTO: 4.36 MILLION/UL (ref 3.81–5.12)
SODIUM SERPL-SCNC: 136 MMOL/L (ref 135–147)
T4 FREE SERPL-MCNC: 0.35 NG/DL (ref 0.61–1.12)
TSH SERPL DL<=0.05 MIU/L-ACNC: 24.11 UIU/ML (ref 0.45–4.5)
VIT B12 SERPL-MCNC: 162 PG/ML (ref 180–914)
WBC # BLD AUTO: 9.63 THOUSAND/UL (ref 4.31–10.16)

## 2024-11-03 PROCEDURE — 82607 VITAMIN B-12: CPT

## 2024-11-03 PROCEDURE — 99285 EMERGENCY DEPT VISIT HI MDM: CPT | Performed by: EMERGENCY MEDICINE

## 2024-11-03 PROCEDURE — 80048 BASIC METABOLIC PNL TOTAL CA: CPT

## 2024-11-03 PROCEDURE — 84443 ASSAY THYROID STIM HORMONE: CPT

## 2024-11-03 PROCEDURE — 70450 CT HEAD/BRAIN W/O DYE: CPT

## 2024-11-03 PROCEDURE — 82746 ASSAY OF FOLIC ACID SERUM: CPT

## 2024-11-03 PROCEDURE — 84439 ASSAY OF FREE THYROXINE: CPT

## 2024-11-03 PROCEDURE — 99024 POSTOP FOLLOW-UP VISIT: CPT | Performed by: NEUROLOGICAL SURGERY

## 2024-11-03 PROCEDURE — 99284 EMERGENCY DEPT VISIT MOD MDM: CPT

## 2024-11-03 PROCEDURE — 99284 EMERGENCY DEPT VISIT MOD MDM: CPT | Performed by: PSYCHIATRY & NEUROLOGY

## 2024-11-03 PROCEDURE — 85025 COMPLETE CBC W/AUTO DIFF WBC: CPT

## 2024-11-03 PROCEDURE — 36415 COLL VENOUS BLD VENIPUNCTURE: CPT

## 2024-11-03 RX ORDER — CARBIDOPA AND LEVODOPA 25; 100 MG/1; MG/1
1 TABLET ORAL
Status: DISCONTINUED | OUTPATIENT
Start: 2024-11-03 | End: 2024-11-03

## 2024-11-03 RX ORDER — CARBIDOPA AND LEVODOPA 25; 100 MG/1; MG/1
0.5 TABLET ORAL
Status: DISCONTINUED | OUTPATIENT
Start: 2024-11-03 | End: 2024-11-03 | Stop reason: HOSPADM

## 2024-11-03 RX ORDER — ENTACAPONE 200 MG/1
200 TABLET ORAL ONCE
Status: DISCONTINUED | OUTPATIENT
Start: 2024-11-03 | End: 2024-11-03 | Stop reason: HOSPADM

## 2024-11-03 NOTE — CONSULTS
Neurosurgery   Malaika Chavis 73 y.o. female MRN: [id number]      Assessment & Plan     Parkinson's s/p DBS placement.  No issue with leads.  No acute intracranial processes to explain recent gait issues  Continue management per neurology    All questions answered    Chief Complaint:  gait issues    HPI    DBS placed approx 10 days ago  Turned on and became more symptomatic so turned off yesterday, then felt like legs would give out while walking prompting trip to ED     ROS  ROS personally reviewed and updated.  Review of Systems    Vitals:    /58 (BP Location: Right arm)   Pulse 92   Temp 98.3 °F (36.8 °C) (Oral)   Resp 20   SpO2 98%     Past Medical History:   Diagnosis Date    Dementia (HCC)     DVT (deep venous thrombosis) (HCC)     GERD (gastroesophageal reflux disease)     Hyperlipidemia     Osteoporosis     Parkinson disease (HCC)        Past Surgical History:   Procedure Laterality Date    ARTERIOGRAM N/A 07/10/2019    Procedure: KYPHOPLASTY;  Surgeon: Toni Stephens MD;  Location: [facility];  Service: Interventional Radiology     SECTION      X 5    COLONOSCOPY      IR KYPHOPLASTY/VERTEBROPLASTY  07/10/2019    LEG SURGERY Left 2022    NV INSJ/RPLCMT CRANIAL NEUROSTIM GENER 2/> ELTRDS Left 9/10/2024    Procedure: Placement of left sided deep brain stimulator battery, extension wires and skull fiducials;  Surgeon: Jacek Gonsales MD;  Location: [facility];  Service: Neurosurgery    NV STRTCTC IMPLTJ NSTIM ELTRD W/RECORD 1ST ARRAY Bilateral 2024    Procedure: Placement of bilateral GPI deep brain stimulator leads;  Surgeon: Jacek Gonsales MD;  Location: [facility];  Service: Neurosurgery    TONSILLECTOMY         No current facility-administered medications for this encounter.    Current Outpatient Medications:     acetaminophen (TYLENOL) 325 mg tablet, Take 3 tablets (975 mg total) by mouth every 6 (six) hours as needed for mild pain, Disp: , Rfl:     busPIRone (BUSPAR) 5 mg  tablet, Take 5 mg by mouth if needed (anxiety ) daily as needed for anxiety , Disp: , Rfl:     Calcium Carbonate-Vit D-Min (CALCIUM 1200 PO), Take 1,200 mg by mouth daily NOT TAKING , Disp: , Rfl:     carbidopa-levodopa-entacapone (STALEVO) 12.5- MG per tablet, TAKE 1 TABLET BY MOUTH EVERY 2 HOURS 7-8 TIMES A DAY, Disp: 720 tablet, Rfl: 3    denosumab (PROLIA) 60 mg/mL, Inject 60 mg under the skin once EVERY 6 MO, Disp: , Rfl:     famotidine (PEPCID) 20 mg tablet, TAKE 2 TABLETS (40 MG TOTAL) BY MOUTH DAILY AT BEDTIME (Patient taking differently: Take 40 mg by mouth if needed), Disp: 180 tablet, Rfl: 1    gabapentin (Neurontin) 300 mg capsule, Take 1 capsule (300 mg total) by mouth daily at bedtime, Disp: 30 capsule, Rfl: 5    levETIRAcetam (KEPPRA) 750 mg tablet, Take 1 tablet (750 mg total) by mouth every 12 (twelve) hours for 11 doses (Patient not taking: Reported on 10/28/2024), Disp: 11 tablet, Rfl: 0    oxyCODONE (ROXICODONE) 5 immediate release tablet, Take 1 tablet (5 mg total) by mouth every 6 (six) hours as needed for severe pain or moderate pain (1/2 tab po prn moderate pain, 1 tab po prn severe pain) Max Daily Amount: 20 mg (Patient not taking: Reported on 10/8/2024), Disp: 10 tablet, Rfl: 0    rasagiline (AZILECT) 1 MG, TAKE 1 TABLET BY MOUTH EVERY DAY, Disp: 90 tablet, Rfl: 4    senna-docusate sodium (SENOKOT S) 8.6-50 mg per tablet, Take 1 tablet by mouth daily as needed for constipation for up to 7 days, Disp: 7 tablet, Rfl: 0    Xdemvy 0.25 % SOLN, , Disp: , Rfl:       Allergies   Allergen Reactions    Sulfa Antibiotics Anaphylaxis     Other reaction(s): family history - anaphylaxis  Category: Allergy;     Doxycycline Rash     Category: Allergy;     Wound Dressing Adhesive Rash       Social History     Socioeconomic History    Marital status: /Civil Union     Spouse name: Not on file    Number of children: Not on file    Years of education: Not on file    Highest education level: Not on  file   Occupational History    Not on file   Tobacco Use    Smoking status: Never     Passive exposure: Never    Smokeless tobacco: Never   Vaping Use    Vaping status: Never Used   Substance and Sexual Activity    Alcohol use: Not Currently     Comment: Rare    Drug use: Never    Sexual activity: Not on file   Other Topics Concern    Not on file   Social History Narrative    5 children     Social Determinants of Health     Financial Resource Strain: Not on file   Food Insecurity: No Food Insecurity (9/25/2024)    Nursing - Inadequate Food Risk Classification     Worried About Running Out of Food in the Last Year: Never true     Ran Out of Food in the Last Year: Never true     Ran Out of Food in the Last Year: Not on file   Transportation Needs: Unmet Transportation Needs (10/11/2024)    OASIS : Transportation     Lack of Transportation (Medical): Yes     Lack of Transportation (Non-Medical): Yes     Patient Unable or Declines to Respond: No   Physical Activity: Not on file   Stress: Not on file   Social Connections: Not on file   Intimate Partner Violence: Not on file   Housing Stability: Low Risk  (9/25/2024)    Housing Stability Vital Sign     Unable to Pay for Housing in the Last Year: No     Number of Times Moved in the Last Year: 0     Homeless in the Last Year: No            Imaging:  CT Images and Report of the Brain show leads in place.  No acute findings

## 2024-11-03 NOTE — DISCHARGE INSTRUCTIONS
Dear Malaika,    You were seen at the [facility] emergency department for inability to ambulate after insertion of your deep brain stimulator.  While you were here, we did a workup on you which was discussed with you and your daughter.  We recommended that he stay in the hospital which you decided against and wanted to leave AGAINST MEDICAL ADVICE.    If you have any worsening symptoms like sudden weakness, worsening symptoms, loss of consciousness, dizziness, headaches, or any other symptoms, please come back to the emergency room.    Thank you for trusting us with your care.

## 2024-11-03 NOTE — ASSESSMENT & PLAN NOTE
Patient reports episode of sudden onset bilateral lower extremity weakness on a routine walk this morning, symptoms have somewhat improved since presentation to hospital without any particular intervention.  This is in the setting of recent deep brain stimulator lead placement 1 to 2 months ago and recent turning on and off in the past week.  At this time low suspicion for neurologic concerns such as stroke or spinal cord pathology, although not entirely ruled out but patient history and physical exam findings, the latter of which did not find objective weakness to cooperate with patient's report of subjective weakness.  Patient reports episode of incontinence urinary at the time of the event, but since then only increased urgency.  Possible that patient complaints relate to DBS recent placement and use, although will need to reach out to patient's outpatient movement specialist Dr. Roger to discuss the likelihood of such source and if likely what intervention would be pursued.  Recommend B12 and TSH abnormalities be corrected by IM team in addition to pursuing any other possible metabolic or toxic abnormalities and derangements while awaiting advice from outpatient established neurologist.  CT head did not show any acute pathology.  Folate within normal limits.  Rec admitting to hobs so discussion with neurologist established can occur tomorrow, Monday.

## 2024-11-03 NOTE — ED PROVIDER NOTES
Time reflects when diagnosis was documented in both MDM as applicable and the Disposition within this note       Time User Action Codes Description Comment    11/3/2024  6:37 AM Brenda Broderick Add [G20.B2] Parkinson's disease with dyskinesia and fluctuating manifestations (HCC)     11/3/2024  1:49 PM George Wilde Add [R26.2] Ambulatory dysfunction           ED Disposition       ED Disposition   AMA    Condition   --    Date/Time   Sun Nov 3, 2024  1:49 PM    Comment   Date: 11/3/2024  Patient: Malaika Chavis  Admitted: 11/3/2024  5:30 AM  Attending Provider: Juarez Ramirez MD    Malaika Chavis or her authorized caregiver has made the decision for the patient to leave the emergency department against the adv ice of the emergency department staff. She or her authorized caregiver has been informed and understands the inherent risks, including death, inability to care for self, worsening mental status.  She or her authorized caregiver has decided to accept  the responsibility for this decision. Malaika Chavis and all necessary parties have been advised that she may return for further evaluation or treatment. Her condition at time of discharge was stable, unable to ambulate.  Malaika Chavis had cur rent vital signs as follows:  /58 (BP Location: Right arm)   Pulse 92   Temp 98.3 °F (36.8 °C) (Oral)   Resp 20                Assessment & Plan       Medical Decision Making  Amount and/or Complexity of Data Reviewed  Labs: ordered. Decision-making details documented in ED Course.  Radiology: ordered.    Risk  Prescription drug management.      Patient is 73 y.o. female with PMH of Dementia, Parkinson's, hld who presents to the ED with generalized weakness and incontinence. See history and physical documented above.       Differential diagnosis included but not limited to progression of Parkinson's, complication related to recent DBS, electrolyte disturbance, anemia, thyroid dysfunction, doubt  stroke, doubt cauda equina . Plan CBC, BMP, TSH, CT head. Will consult neurosurgery and neurology.     View ED course above for further discussion on patient workup.       All labs reviewed and utilized in the medical decision making process  All radiology studies independently viewed by me and interpreted by the radiologist.  I reviewed all testing with the patient.       Signed out to Dr. Wilde at end of my shift pending labs, CT and consults. Will likely require admission due to ambulatory dysfunction.     ED Course as of 11/03/24 1541   Sun Nov 03, 2024   0719 WBC: 9.63   0719 Hemoglobin: 14.9   0719 Platelet Count: 221       Medications   entacapone (COMTAN) tablet 200 mg (has no administration in time range)   carbidopa-levodopa (SINEMET)  mg per tablet 0.5 tablet (has no administration in time range)       ED Risk Strat Scores                           SBIRT 22yo+      Flowsheet Row Most Recent Value   Initial Alcohol Screen: US AUDIT-C     1. How often do you have a drink containing alcohol? 0 Filed at: 11/03/2024 0545   2. How many drinks containing alcohol do you have on a typical day you are drinking?  0 Filed at: 11/03/2024 0545   3b. FEMALE Any Age, or MALE 65+: How often do you have 4 or more drinks on one occassion? 0 Filed at: 11/03/2024 0545   Audit-C Score 0 Filed at: 11/03/2024 0545   REBEKAH: How many times in the past year have you...    Used an illegal drug or used a prescription medication for non-medical reasons? Never Filed at: 11/03/2024 0545                            History of Present Illness       Chief Complaint   Patient presents with    Medical Problem      Patient coming from home, has a history of parkinson's and a deep brain stimulator, was turned on 2 days ago, then was turned off for issues with dexterity, patient was walking yesterday and felt both legs give out, patient said she is also experiencing a loss of bladder and bowel control and having trouble finding words        Past Medical History:   Diagnosis Date    Dementia (HCC)     DVT (deep venous thrombosis) (HCC)     GERD (gastroesophageal reflux disease)     Hyperlipidemia     Osteoporosis     Parkinson disease (HCC)       Past Surgical History:   Procedure Laterality Date    ARTERIOGRAM N/A 07/10/2019    Procedure: KYPHOPLASTY;  Surgeon: Toni Stephens MD;  Location: [facility];  Service: Interventional Radiology     SECTION      X 5    COLONOSCOPY      IR KYPHOPLASTY/VERTEBROPLASTY  07/10/2019    LEG SURGERY Left 2022    CO INSJ/RPLCMT CRANIAL NEUROSTIM GENER 2/> ELTRDS Left 9/10/2024    Procedure: Placement of left sided deep brain stimulator battery, extension wires and skull fiducials;  Surgeon: Jacek Gonsales MD;  Location: [facility];  Service: Neurosurgery    CO STRTCTC IMPLTJ NSTIM ELTRD W/RECORD 1ST ARRAY Bilateral 2024    Procedure: Placement of bilateral GPI deep brain stimulator leads;  Surgeon: Jacek Gonsales MD;  Location: [facility];  Service: Neurosurgery    TONSILLECTOMY        Family History   Problem Relation Age of Onset    Dementia Mother     Heart disease Father     No Known Problems Daughter     No Known Problems Maternal Grandmother     No Known Problems Maternal Grandfather     No Known Problems Paternal Grandmother     Prostate cancer Paternal Grandfather     No Known Problems Daughter     No Known Problems Daughter     Alcohol abuse Neg Hx     Mental illness Neg Hx     Substance Abuse Neg Hx     Depression Neg Hx       Social History     Tobacco Use    Smoking status: Never     Passive exposure: Never    Smokeless tobacco: Never   Vaping Use    Vaping status: Never Used   Substance Use Topics    Alcohol use: Not Currently     Comment: Rare    Drug use: Never      E-Cigarette/Vaping    E-Cigarette Use Never User       E-Cigarette/Vaping Substances    Nicotine No     THC No     CBD No     Flavoring No     Other No     Unknown No       I have reviewed and agree with the history as  "documented.     HPI  Patient is 73 y.o. female with PMH of Dementia, Parkinson's, hld who presents to the ED with generalized weakness and incontinence. Patient reports recent DBS placement on 9/24, recently turned on and turned off in the last week due to worsening symptoms when it was on. Patient reports yesterday morning during walk with family member had sudden onset b/l leg weakness, feeling legs would give out so sat on ground. Patient also endorses urine and bowel incontinence since last night after returning from visiting children. Patient reports ultimately presented to ED due to incontinence with inability to get to bathroom due to leg weakness. Also endorses feeling \"foggy\" since DBS placed. Denies fever, chills, chest pain, SOB, abdominal pain, n/v, back pain.     Review of Systems   Constitutional:  Negative for chills and fever.   HENT:  Negative for congestion and sore throat.    Respiratory:  Negative for cough and shortness of breath.    Cardiovascular:  Negative for chest pain and leg swelling.   Gastrointestinal:  Negative for abdominal pain, nausea and vomiting.        Bowel incontinence    Genitourinary:  Positive for urgency. Negative for dysuria and hematuria.        Urine incontinence   Musculoskeletal:  Negative for back pain.   Skin:  Negative for rash.   Neurological:  Positive for weakness. Negative for dizziness and headaches.        Brain fog           Objective       ED Triage Vitals [11/03/24 0540]   Temperature Pulse Blood Pressure Respirations SpO2 Patient Position - Orthostatic VS   98.3 °F (36.8 °C) 94 145/81 20 98 % Lying      Temp Source Heart Rate Source BP Location FiO2 (%) Pain Score    Oral Monitor Right arm -- --      Vitals      Date and Time Temp Pulse SpO2 Resp BP Pain Score FACES Pain Rating User   11/03/24 0600 -- 92 98 % 20 122/58 -- -- [place]   11/03/24 0540 98.3 °F (36.8 °C) 94 98 % 20 145/81 -- -- [place]            Physical Exam  Vitals and nursing note reviewed. " "  Constitutional:       General: She is not in acute distress.  HENT:      Head: Normocephalic and atraumatic.      Mouth/Throat:      Mouth: Mucous membranes are moist.   Eyes:      Extraocular Movements: Extraocular movements intact.      Conjunctiva/sclera: Conjunctivae normal.      Pupils: Pupils are equal, round, and reactive to light.   Cardiovascular:      Rate and Rhythm: Normal rate and regular rhythm.      Heart sounds: No murmur heard.     No friction rub. No gallop.   Pulmonary:      Effort: Pulmonary effort is normal. No respiratory distress.      Breath sounds: No wheezing, rhonchi or rales.   Abdominal:      General: Abdomen is flat. There is no distension.      Palpations: Abdomen is soft.      Tenderness: There is no abdominal tenderness.   Musculoskeletal:      Right lower leg: No edema.      Left lower leg: No edema.      Comments: No midline or paraspinal lumbar TTP   Skin:     General: Skin is warm and dry.   Neurological:      Mental Status: She is alert and oriented to person, place, and time.      GCS: GCS eye subscore is 4. GCS verbal subscore is 5. GCS motor subscore is 6.      Comments: 5/5 strength in all extremities, sensation intact.          Results Reviewed       Procedure Component Value Units Date/Time    Vitamin B12 [[id number]]  (Abnormal) Collected: 11/03/24 0702    Lab Status: Final result Specimen: Blood from Arm, Right Updated: 11/03/24 1320     Vitamin B-12 162 pg/mL     Folate [[id number]]  (Normal) Collected: 11/03/24 0702    Lab Status: Final result Specimen: Blood from Arm, Right Updated: 11/03/24 1320     Folate 12.4 ng/mL     T4, free [[id number]]  (Abnormal) Collected: 11/03/24 0702    Lab Status: Final result Specimen: Blood from Arm, Right Updated: 11/03/24 0821     Free T4 0.35 ng/dL     Narrative:        \"Therapeutic range for patients medicated with thyroid disorders: 0.61-1.24 ng/dL.\"    TSH, 3rd generation with Free T4 reflex [[id number]]  (Abnormal) Collected: " 11/03/24 0702    Lab Status: Final result Specimen: Blood from Arm, Right Updated: 11/03/24 0747     TSH 3RD GENERATON 24.108 uIU/mL     Basic metabolic panel [[id number]] Collected: 11/03/24 0702    Lab Status: Final result Specimen: Blood from Arm, Right Updated: 11/03/24 0733     Sodium 136 mmol/L      Potassium 4.0 mmol/L      Chloride 106 mmol/L      CO2 24 mmol/L      ANION GAP 6 mmol/L      BUN 14 mg/dL      Creatinine 0.74 mg/dL      Glucose 94 mg/dL      Calcium 8.9 mg/dL      eGFR 81 ml/min/1.73sq m     Narrative:      [employer] guidelines for Chronic Kidney Disease (CKD):     Stage 1 with normal or high GFR (GFR > 90 mL/min/1.73 square meters)    Stage 2 Mild CKD (GFR = 60-89 mL/min/1.73 square meters)    Stage 3A Moderate CKD (GFR = 45-59 mL/min/1.73 square meters)    Stage 3B Moderate CKD (GFR = 30-44 mL/min/1.73 square meters)    Stage 4 Severe CKD (GFR = 15-29 mL/min/1.73 square meters)    Stage 5 End Stage CKD (GFR <15 mL/min/1.73 square meters)  Note: GFR calculation is accurate only with a steady state creatinine    CBC and differential [[id number]]  (Abnormal) Collected: 11/03/24 0702    Lab Status: Final result Specimen: Blood from Arm, Right Updated: 11/03/24 0715     WBC 9.63 Thousand/uL      RBC 4.36 Million/uL      Hemoglobin 14.9 g/dL      Hematocrit 43.1 %      MCV 99 fL      MCH 34.2 pg      MCHC 34.6 g/dL      RDW 11.9 %      MPV 10.4 fL      Platelets 221 Thousands/uL      nRBC 0 /100 WBCs      Segmented % 76 %      Immature Grans % 1 %      Lymphocytes % 12 %      Monocytes % 7 %      Eosinophils Relative 3 %      Basophils Relative 1 %      Absolute Neutrophils 7.33 Thousands/µL      Absolute Immature Grans 0.13 Thousand/uL      Absolute Lymphocytes 1.14 Thousands/µL      Absolute Monocytes 0.65 Thousand/µL      Eosinophils Absolute 0.31 Thousand/µL      Basophils Absolute 0.07 Thousands/µL             CT head wo contrast   Final Interpretation by Narda Daniel  MD Ike (11/03 0838)      No acute intracranial abnormality.      Unchanged positioning of the bilateral deep brain stimulator leads terminating within the central lentiform nuclei.      Stable chronic microangiopathic changes within the brain.            Workstation performed: [id number]             Complex Venous Access Line    Date/Time: 11/3/2024 6:50 AM    Performed by: Brenda Broderick DO  Authorized by: Brenda Broderick DO    Patient location:  ED  Consent:     Consent obtained:  Verbal  Pre-procedure details:     Hand hygiene: Hand hygiene performed prior to insertion      Skin preparation:  Alcohol  Procedure details:     Complex Venous Access Line Type: US Guided Peripheral IV      Orientation:  Right    Location:  Antecubital    Catheter size:  18 gauge    Ultrasound image availability:  Not saved    Sterile ultrasound techniques: Sterile gel and sterile probe covers were used      Number of attempts:  1    Successful placement: yes    Post-procedure details:     Post-procedure:  Dressing applied    Assessment:  Blood return through all ports    Patient tolerance of procedure:  Tolerated well, no immediate complications      ED Medication and Procedure Management   Prior to Admission Medications   Prescriptions Last Dose Informant Patient Reported? Taking?   Calcium Carbonate-Vit D-Min (CALCIUM 1200 PO)  Self Yes No   Sig: Take 1,200 mg by mouth daily NOT TAKING    Xdemvy 0.25 % SOLN  Self Yes No   Patient not taking: Reported on 10/8/2024   acetaminophen (TYLENOL) 325 mg tablet   No No   Sig: Take 3 tablets (975 mg total) by mouth every 6 (six) hours as needed for mild pain   busPIRone (BUSPAR) 5 mg tablet  Self Yes No   Sig: Take 5 mg by mouth if needed (anxiety ) daily as needed for anxiety    carbidopa-levodopa-entacapone (STALEVO) 12.5- MG per tablet   No No   Sig: TAKE 1 TABLET BY MOUTH EVERY 2 HOURS 7-8 TIMES A DAY   denosumab (PROLIA) 60 mg/mL  Self Yes No   Sig: Inject 60 mg under the skin  once EVERY 6 MO   famotidine (PEPCID) 20 mg tablet  Self No No   Sig: TAKE 2 TABLETS (40 MG TOTAL) BY MOUTH DAILY AT BEDTIME   Patient taking differently: Take 40 mg by mouth if needed   gabapentin (Neurontin) 300 mg capsule   No No   Sig: Take 1 capsule (300 mg total) by mouth daily at bedtime   levETIRAcetam (KEPPRA) 750 mg tablet   No No   Sig: Take 1 tablet (750 mg total) by mouth every 12 (twelve) hours for 11 doses   Patient not taking: Reported on 10/28/2024   oxyCODONE (ROXICODONE) 5 immediate release tablet   No No   Sig: Take 1 tablet (5 mg total) by mouth every 6 (six) hours as needed for severe pain or moderate pain (1/2 tab po prn moderate pain, 1 tab po prn severe pain) Max Daily Amount: 20 mg   Patient not taking: Reported on 10/8/2024   rasagiline (AZILECT) 1 MG  Self No No   Sig: TAKE 1 TABLET BY MOUTH EVERY DAY   senna-docusate sodium (SENOKOT S) 8.6-50 mg per tablet   No No   Sig: Take 1 tablet by mouth daily as needed for constipation for up to 7 days      Facility-Administered Medications: None     Discharge Medication List as of 11/3/2024  1:50 PM        CONTINUE these medications which have NOT CHANGED    Details   acetaminophen (TYLENOL) 325 mg tablet Take 3 tablets (975 mg total) by mouth every 6 (six) hours as needed for mild pain, Starting Wed 9/25/2024, No Print      busPIRone (BUSPAR) 5 mg tablet Take 5 mg by mouth if needed (anxiety ) daily as needed for anxiety , Starting Wed 1/18/2023, Historical Med      Calcium Carbonate-Vit D-Min (CALCIUM 1200 PO) Take 1,200 mg by mouth daily NOT TAKING , Historical Med      carbidopa-levodopa-entacapone (STALEVO) 12.5- MG per tablet TAKE 1 TABLET BY MOUTH EVERY 2 HOURS 7-8 TIMES A DAY, Normal      denosumab (PROLIA) 60 mg/mL Inject 60 mg under the skin once EVERY 6 MO, Historical Med      famotidine (PEPCID) 20 mg tablet TAKE 2 TABLETS (40 MG TOTAL) BY MOUTH DAILY AT BEDTIME, Starting Wed 11/17/2021, Normal      gabapentin (Neurontin) 300 mg  capsule Take 1 capsule (300 mg total) by mouth daily at bedtime, Starting Mon 4/22/2024, Normal      levETIRAcetam (KEPPRA) 750 mg tablet Take 1 tablet (750 mg total) by mouth every 12 (twelve) hours for 11 doses, Starting Wed 9/25/2024, Until Tue 10/1/2024, Normal      oxyCODONE (ROXICODONE) 5 immediate release tablet Take 1 tablet (5 mg total) by mouth every 6 (six) hours as needed for severe pain or moderate pain (1/2 tab po prn moderate pain, 1 tab po prn severe pain) Max Daily Amount: 20 mg, Starting Wed 9/25/2024, Normal      rasagiline (AZILECT) 1 MG TAKE 1 TABLET BY MOUTH EVERY DAY, Normal      senna-docusate sodium (SENOKOT S) 8.6-50 mg per tablet Take 1 tablet by mouth daily as needed for constipation for up to 7 days, Starting Wed 9/25/2024, Until Wed 10/2/2024 at 2359, Normal      Xdemvy 0.25 % SOLN Historical Med           No discharge procedures on file.  ED SEPSIS DOCUMENTATION   Time reflects when diagnosis was documented in both MDM as applicable and the Disposition within this note       Time User Action Codes Description Comment    11/3/2024  6:37 AM Brenda Broderick Add [G20.B2] Parkinson's disease with dyskinesia and fluctuating manifestations (HCC)     11/3/2024  1:49 PM George Wilde [R26.2] Ambulatory dysfunction                  Brenda Broderick DO  11/03/24 8936

## 2024-11-03 NOTE — CONSULTS
Consultation - Neurology   Name: Malaika Chavis 73 y.o. female I MRN: [id number]  Unit/Bed#: ED 25 I Date of Admission: 11/3/2024   Date of Service: 11/3/2024 I Hospital Day: 0   Inpatient consult to Neurology  Consult performed by: Anthony Elias DO  Consult ordered by: Juarez Ramirez MD        Physician Requesting Evaluation: Juarez Ramirez MD   Reason for Evaluation / Principal Problem: b/l leg weakness    Assessment & Plan  Ambulatory dysfunction  Patient reports episode of sudden onset bilateral lower extremity weakness on a routine walk this morning, symptoms have somewhat improved since presentation to hospital without any particular intervention.  This is in the setting of recent deep brain stimulator lead placement 1 to 2 months ago and recent turning on and off in the past week.  At this time low suspicion for neurologic concerns such as stroke or spinal cord pathology, although not entirely ruled out but patient history and physical exam findings, the latter of which did not find objective weakness to cooperate with patient's report of subjective weakness.  Patient reports episode of incontinence urinary at the time of the event, but since then only increased urgency.  Possible that patient complaints relate to DBS recent placement and use, although will need to reach out to patient's outpatient movement specialist Dr. Roger to discuss the likelihood of such source and if likely what intervention would be pursued.  Recommend B12 and TSH abnormalities be corrected by IM team in addition to pursuing any other possible metabolic or toxic abnormalities and derangements while awaiting advice from outpatient established neurologist.  CT head did not show any acute pathology.  Folate within normal limits.  Rec admitting to hobs so discussion with neurologist established can occur tomorrow, Monday.    I have discussed with Dr. Huggins the above plan to treat pt. She agrees with the plan.  Please contact the  [employer] role for the Neurology service with any questions/concerns.    Recommendations for outpatient neurological follow up have yet to be determined.    History of Present Illness   Malaika Chavis is a 73 y.o.  female who presents with an episode of sudden onset bilateral leg weakness where patient felt that her legs are about to give out while on a walk this morning along uneven terrain, accompanied by an episode of urinary incontinence in the setting of no history of any kind of incontinence.  Patient notes no history of similar symptoms.  Patient notes a 20+ year history of Parkinson's disease for which she follows with Dr. Roger a movement specialist in the outpatient setting, reports that she has had recent DBS lead placement intracranially 1 to 2 months ago, turned on about a week ago today, turned off a couple days ago.  Patient reports difficulty with gait perhaps considered shuffling, but does not report a history of recent falls nor of gait stability in general.  No family member present at time of neuro visit to corroborate patient's story, but supposedly patient family member present during the incident, reported no loss of consciousness nor head strike during the time of sudden leg weakness.  Patient notes that symptoms perhaps have improved somewhat since the incident, and that she does not feel as weak as earlier, without any particular intervention thus far.  Workup thus far is notable for hypothyroidism with a TSH of 24.  B12 also notable for less than 200, and abnormal finding to any specialty, but neurology recs greater than 400 in general.    Review of Systems   Constitutional:  Negative for activity change, appetite change, fatigue and fever.   HENT: Negative.  Negative for hearing loss, tinnitus, trouble swallowing and voice change.    Eyes: Negative.  Negative for photophobia, pain and visual disturbance.   Respiratory: Negative.  Negative for cough and shortness of breath.     Cardiovascular: Negative.  Negative for chest pain, palpitations and leg swelling.   Gastrointestinal: Negative.  Negative for nausea and vomiting.   Endocrine: Negative.  Negative for cold intolerance and heat intolerance.   Genitourinary:  Positive for urgency. Negative for dysuria and frequency.   Musculoskeletal:  Negative for back pain, gait problem, myalgias, neck pain and neck stiffness.   Skin: Negative.  Negative for color change and rash.   Allergic/Immunologic: Negative.  Negative for environmental allergies, food allergies and immunocompromised state.   Neurological:  Positive for weakness. Negative for dizziness, tremors, seizures, syncope, facial asymmetry, speech difficulty, light-headedness, numbness and headaches.   Hematological: Negative.  Negative for adenopathy. Does not bruise/bleed easily.   Psychiatric/Behavioral: Negative.  Negative for confusion, hallucinations and sleep disturbance.         I have reviewed the patient's PMH, PSH, Social History, Family History, Meds, and Allergies  Historical Information   Past Medical History:   Diagnosis Date    Dementia (HCC)     DVT (deep venous thrombosis) (HCC)     GERD (gastroesophageal reflux disease)     Hyperlipidemia     Osteoporosis     Parkinson disease (HCC)      Past Surgical History:   Procedure Laterality Date    ARTERIOGRAM N/A 07/10/2019    Procedure: KYPHOPLASTY;  Surgeon: Toni Stephens MD;  Location: [facility];  Service: Interventional Radiology     SECTION      X 5    COLONOSCOPY      IR KYPHOPLASTY/VERTEBROPLASTY  07/10/2019    LEG SURGERY Left 2022    UT INSJ/RPLCMT CRANIAL NEUROSTIM GENER 2/> ELTRDS Left 9/10/2024    Procedure: Placement of left sided deep brain stimulator battery, extension wires and skull fiducials;  Surgeon: Jacek Gonsales MD;  Location: [facility];  Service: Neurosurgery    UT STRTCTC IMPLTJ NSTIM ELTRD W/RECORD 1ST ARRAY Bilateral 2024    Procedure: Placement of bilateral GPI deep brain  stimulator leads;  Surgeon: Jacek Gonsales MD;  Location: [facility];  Service: Neurosurgery    TONSILLECTOMY       Social History     Tobacco Use    Smoking status: Never     Passive exposure: Never    Smokeless tobacco: Never   Vaping Use    Vaping status: Never Used   Substance and Sexual Activity    Alcohol use: Not Currently     Comment: Rare    Drug use: Never    Sexual activity: Not on file     E-Cigarette/Vaping    E-Cigarette Use Never User      E-Cigarette/Vaping Substances    Nicotine No     THC No     CBD No     Flavoring No     Other No     Unknown No      Family History   Problem Relation Age of Onset    Dementia Mother     Heart disease Father     No Known Problems Daughter     No Known Problems Maternal Grandmother     No Known Problems Maternal Grandfather     No Known Problems Paternal Grandmother     Prostate cancer Paternal Grandfather     No Known Problems Daughter     No Known Problems Daughter     Alcohol abuse Neg Hx     Mental illness Neg Hx     Substance Abuse Neg Hx     Depression Neg Hx      Social History     Tobacco Use    Smoking status: Never     Passive exposure: Never    Smokeless tobacco: Never   Vaping Use    Vaping status: Never Used   Substance and Sexual Activity    Alcohol use: Not Currently     Comment: Rare    Drug use: Never    Sexual activity: Not on file       Current Facility-Administered Medications:     carbidopa-levodopa (SINEMET)  mg per tablet 0.5 tablet, Q2H While awake    entacapone (COMTAN) tablet 200 mg, Once  Prior to Admission Medications   Prescriptions Last Dose Informant Patient Reported? Taking?   Calcium Carbonate-Vit D-Min (CALCIUM 1200 PO)  Self Yes No   Sig: Take 1,200 mg by mouth daily NOT TAKING    Xdemvy 0.25 % SOLN  Self Yes No   Patient not taking: Reported on 10/8/2024   acetaminophen (TYLENOL) 325 mg tablet   No No   Sig: Take 3 tablets (975 mg total) by mouth every 6 (six) hours as needed for mild pain   busPIRone (BUSPAR) 5 mg tablet   Self Yes No   Sig: Take 5 mg by mouth if needed (anxiety ) daily as needed for anxiety    carbidopa-levodopa-entacapone (STALEVO) 12.5- MG per tablet   No No   Sig: TAKE 1 TABLET BY MOUTH EVERY 2 HOURS 7-8 TIMES A DAY   denosumab (PROLIA) 60 mg/mL  Self Yes No   Sig: Inject 60 mg under the skin once EVERY 6 MO   famotidine (PEPCID) 20 mg tablet  Self No No   Sig: TAKE 2 TABLETS (40 MG TOTAL) BY MOUTH DAILY AT BEDTIME   Patient taking differently: Take 40 mg by mouth if needed   gabapentin (Neurontin) 300 mg capsule   No No   Sig: Take 1 capsule (300 mg total) by mouth daily at bedtime   levETIRAcetam (KEPPRA) 750 mg tablet   No No   Sig: Take 1 tablet (750 mg total) by mouth every 12 (twelve) hours for 11 doses   Patient not taking: Reported on 10/28/2024   oxyCODONE (ROXICODONE) 5 immediate release tablet   No No   Sig: Take 1 tablet (5 mg total) by mouth every 6 (six) hours as needed for severe pain or moderate pain (1/2 tab po prn moderate pain, 1 tab po prn severe pain) Max Daily Amount: 20 mg   Patient not taking: Reported on 10/8/2024   rasagiline (AZILECT) 1 MG  Self No No   Sig: TAKE 1 TABLET BY MOUTH EVERY DAY   senna-docusate sodium (SENOKOT S) 8.6-50 mg per tablet   No No   Sig: Take 1 tablet by mouth daily as needed for constipation for up to 7 days      Facility-Administered Medications: None     Sulfa antibiotics, Doxycycline, and Wound dressing adhesive    Objective :  Temp:  [98.3 °F (36.8 °C)] 98.3 °F (36.8 °C)  HR:  [92-94] 92  BP: (122-145)/(58-81) 122/58  Resp:  [20] 20  SpO2:  [98 %] 98 %  O2 Device: None (Room air)    Physical Exam  Vitals and nursing note reviewed.   Constitutional:       General: She is not in acute distress.     Appearance: She is not ill-appearing, toxic-appearing or diaphoretic.   HENT:      Head: Normocephalic.      Right Ear: External ear normal.      Left Ear: External ear normal.      Nose: Nose normal.      Mouth/Throat:      Pharynx: Oropharynx is clear.    Eyes:      General: Lids are normal. No scleral icterus.        Right eye: No discharge.         Left eye: No discharge.      Extraocular Movements: Extraocular movements intact.      Conjunctiva/sclera: Conjunctivae normal.      Pupils: Pupils are equal, round, and reactive to light.   Neurological:      Mental Status: She is alert.      Cranial Nerves: No cranial nerve deficit.      Sensory: No sensory deficit.      Coordination: Coordination is intact. Coordination normal.      Gait: Gait normal.      Deep Tendon Reflexes: Reflexes normal.   Psychiatric:         Speech: Speech normal.     Neurological Exam  Mental Status  Alert. Oriented to person, place, time and situation. Speech is normal. Language is fluent with no aphasia.    Cranial Nerves  CN I: Sense of smell is normal.  CN II: Visual acuity is normal. Visual fields full to confrontation.  CN III, IV, VI: Extraocular movements intact bilaterally. Normal lids and orbits bilaterally. Pupils equal round and reactive to light bilaterally.  CN V: Facial sensation is normal.  CN VII: Full and symmetric facial movement.  CN VIII: Hearing is normal.  CN IX, X: Palate elevates symmetrically. Normal gag reflex.  CN XI: Shoulder shrug strength is normal.  CN XII: Tongue midline without atrophy or fasciculations.    Motor  Normal muscle bulk throughout. No fasciculations present. Normal muscle tone. No abnormal involuntary movements. Strength is 5/5 in all four extremities except as noted.    Sensory  Sensation is intact to light touch, pinprick, vibration and proprioception in all four extremities.    Reflexes  Deep tendon reflexes are 2+ and symmetric except as noted.    Coordination    Finger-to-nose, rapid alternating movements and heel-to-shin normal bilaterally without dysmetria.    Gait   Normal gait.  Defer.        Lab Results: I have reviewed the following results:CBC:   Results from last 7 days   Lab Units 11/03/24  0702   WBC Thousand/uL 9.63   RBC  Million/uL 4.36   HEMOGLOBIN g/dL 14.9   HEMATOCRIT % 43.1   MCV fL 99*   PLATELETS Thousands/uL 221   , BMP/CMP:   Results from last 7 days   Lab Units 11/03/24  0702   SODIUM mmol/L 136   POTASSIUM mmol/L 4.0   CHLORIDE mmol/L 106   CO2 mmol/L 24   BUN mg/dL 14   CREATININE mg/dL 0.74   CALCIUM mg/dL 8.9   EGFR ml/min/1.73sq m 81   , Vitamin B12:   Results from last 7 days   Lab Units 11/03/24  0702   VITAMIN B 12 pg/mL 162*   , TSH:   Results from last 7 days   Lab Units 11/03/24  0702   TSH 3RD GENERATON uIU/mL 24.108*     Recent Labs     11/03/24  0702   WBC 9.63   HGB 14.9   HCT 43.1      SODIUM 136   K 4.0      CO2 24   BUN 14   CREATININE 0.74   GLUC 94     Imaging Results Review: I personally reviewed the following image studies in PACS and associated radiology reports: CT head. My interpretation of the radiology images/reports is: no acute pathology.  Other Study Results Review: EKG was reviewed.     VTE Prophylaxis: VTE covered by:    None       Administrative Statements   I have spent a total time of 50 minutes in caring for this patient on the day of the visit/encounter including Diagnostic results, Impressions, Counseling / Coordination of care, Documenting in the medical record, Reviewing / ordering tests, medicine, procedures  , Obtaining or reviewing history  , and Communicating with other healthcare professionals .

## 2024-11-03 NOTE — ED ATTENDING ATTESTATION
11/3/2024  I, Juarez Ramirez MD, saw and evaluated the patient. I have discussed the patient with the resident/non-physician practitioner and agree with the resident's/non-physician practitioner's findings, Plan of Care, and MDM as documented in the resident's/non-physician practitioner's note, except where noted. All available labs and Radiology studies were reviewed.  I was present for key portions of any procedure(s) performed by the resident/non-physician practitioner and I was immediately available to provide assistance.       At this point I agree with the current assessment done in the Emergency Department.  I have conducted an independent evaluation of this patient a history and physical is as follows:    ED Course         Critical Care Time  Procedures    72 yo female with hx of parkinsons, dbs, turned on two days ago and was more symptomatic so turned off yesterday and while going on a walk this morning and felt legs would give out.  Pt with increased urinary urgency and incontinence and stool as well.  Pt feels more foggy. Vss, afebrile, lungs cta, rrr, abdomen soft nontender, no focal neuro deficits. Ct head, labs, discuss with neuro surgery

## 2024-11-03 NOTE — ED CARE HANDOFF
Emergency Department Sign Out Note        Sign out and transfer of care from Dr. Broderick. See Separate Emergency Department note.     The patient, Malaika Chavis, was evaluated by the previous provider for difficulty with ambulation.    ED Course / Workup Pending (followup):  CT head  Neurosurgery consult  Neurology consult    Patient decided to leave AMA despite neurology recommending patient stay in hospital for observation. Patient says she didn't get enough warm blankets.  Patient was made aware of her low T4 levels and high TSH levels and told to follow-up with her primary care provider before being discharged.  Patient was discharged to the care of her daughter with good return precautions and has a follow-up with her neurologist on Tuesday.                                  ED Course as of 11/03/24 1531   Sun Nov 03, 2024   0703 Signout:  73 hx of Parkinsons with deep brain stimulator placed. Complains of worsening confusion. Turned off stimulator. Complained of confusion, leg weakness, bowel incontinence.   Currently not ambulatory.     Reached out to neuro and nsgy.     Waiting on noncontrast CT of head. Will be added to conversations for neuro and nsgy.  Will stay if nonambulatory.     Procedures  Medical Decision Making  Amount and/or Complexity of Data Reviewed  Labs: ordered.  Radiology: ordered.    Risk  Prescription drug management.            Disposition  Final diagnoses:   Parkinson's disease with dyskinesia and fluctuating manifestations (HCC)   Ambulatory dysfunction     Time reflects when diagnosis was documented in both MDM as applicable and the Disposition within this note       Time User Action Codes Description Comment    11/3/2024  6:37 AM Brenda Broderick Add [G20.B2] Parkinson's disease with dyskinesia and fluctuating manifestations (HCC)     11/3/2024  1:49 PM George Wilde Add [R26.2] Ambulatory dysfunction           ED Disposition       ED Disposition   AMA    Condition   --     Date/Time   Sun Nov 3, 2024  1:49 PM    Comment   Date: 11/3/2024  Patient: Malaika Chavis  Admitted: 11/3/2024  5:30 AM  Attending Provider: Juarez Ramirez MD    Malaika Chavis or her authorized caregiver has made the decision for the patient to leave the emergency department against the adv ice of the emergency department staff. She or her authorized caregiver has been informed and understands the inherent risks, including death, inability to care for self, worsening mental status.  She or her authorized caregiver has decided to accept  the responsibility for this decision. Malaika Chavis and all necessary parties have been advised that she may return for further evaluation or treatment. Her condition at time of discharge was stable, unable to ambulate.  Malaika Chavis had cur rent vital signs as follows:  /58 (BP Location: Right arm)   Pulse 92   Temp 98.3 °F (36.8 °C) (Oral)   Resp 20                Follow-up Information       Follow up With Specialties Details Why Contact Info Additional Information    Nona Guerrero, DO Family Medicine Go to   [address] [place] [id number] [phone]       [facility] Emergency Department Emergency Medicine  As needed, If symptoms worsen [address] [place] [id number] [phone] [facility] Emergency Department, [address] [place] [address], [address] [phone]          Discharge Medication List as of 11/3/2024  1:50 PM        CONTINUE these medications which have NOT CHANGED    Details   acetaminophen (TYLENOL) 325 mg tablet Take 3 tablets (975 mg total) by mouth every 6 (six) hours as needed for mild pain, Starting Wed 9/25/2024, No Print      busPIRone (BUSPAR) 5 mg tablet Take 5 mg by mouth if needed (anxiety ) daily as needed for anxiety , Starting Wed 1/18/2023, Historical Med      Calcium Carbonate-Vit D-Min (CALCIUM 1200 PO) Take 1,200 mg by  mouth daily NOT TAKING , Historical Med      carbidopa-levodopa-entacapone (STALEVO) 12.5- MG per tablet TAKE 1 TABLET BY MOUTH EVERY 2 HOURS 7-8 TIMES A DAY, Normal      denosumab (PROLIA) 60 mg/mL Inject 60 mg under the skin once EVERY 6 MO, Historical Med      famotidine (PEPCID) 20 mg tablet TAKE 2 TABLETS (40 MG TOTAL) BY MOUTH DAILY AT BEDTIME, Starting Wed 11/17/2021, Normal      gabapentin (Neurontin) 300 mg capsule Take 1 capsule (300 mg total) by mouth daily at bedtime, Starting Mon 4/22/2024, Normal      levETIRAcetam (KEPPRA) 750 mg tablet Take 1 tablet (750 mg total) by mouth every 12 (twelve) hours for 11 doses, Starting Wed 9/25/2024, Until Tue 10/1/2024, Normal      oxyCODONE (ROXICODONE) 5 immediate release tablet Take 1 tablet (5 mg total) by mouth every 6 (six) hours as needed for severe pain or moderate pain (1/2 tab po prn moderate pain, 1 tab po prn severe pain) Max Daily Amount: 20 mg, Starting Wed 9/25/2024, Normal      rasagiline (AZILECT) 1 MG TAKE 1 TABLET BY MOUTH EVERY DAY, Normal      senna-docusate sodium (SENOKOT S) 8.6-50 mg per tablet Take 1 tablet by mouth daily as needed for constipation for up to 7 days, Starting Wed 9/25/2024, Until Wed 10/2/2024 at 2359, Normal      Xdemvy 0.25 % SOLN Historical Med           No discharge procedures on file.       ED Provider  Electronically Signed by     George Wilde MD  11/03/24 1864       George Wilde MD  11/03/24 0280

## 2024-11-05 ENCOUNTER — TELEPHONE (OUTPATIENT)
Dept: NEUROLOGY | Facility: CLINIC | Age: 74
End: 2024-11-05

## 2024-11-05 ENCOUNTER — TELEMEDICINE (OUTPATIENT)
Dept: NEUROLOGY | Facility: CLINIC | Age: 74
End: 2024-11-05
Payer: MEDICARE

## 2024-11-05 DIAGNOSIS — G31.84 MILD COGNITIVE IMPAIRMENT: ICD-10-CM

## 2024-11-05 DIAGNOSIS — G20.B2 PARKINSON'S DISEASE WITH DYSKINESIA AND FLUCTUATING MANIFESTATIONS (HCC): Primary | ICD-10-CM

## 2024-11-05 PROCEDURE — 99215 OFFICE O/P EST HI 40 MIN: CPT | Performed by: PSYCHIATRY & NEUROLOGY

## 2024-11-05 RX ORDER — AMANTADINE 137 MG/1
1 CAPSULE, COATED PELLETS ORAL
Qty: 30 CAPSULE | Refills: 5 | Status: SHIPPED | OUTPATIENT
Start: 2024-11-05

## 2024-11-05 RX ORDER — CARBIDOPA, LEVODOPA AND ENTACAPONE 12.5; 200; 5 MG/1; MG/1; MG/1
TABLET, FILM COATED ORAL
Start: 2024-11-05

## 2024-11-05 NOTE — PROGRESS NOTES
Virtual Regular Visit  Name: Malaika Chavis      : 1950      MRN: [id number]  Encounter Provider: Ny Mcintyre MD  Encounter Date: 2024   Encounter department: [facility]    Verification of patient location:    Patient is located at Home in the following state in which I hold an active license [place]    Assessment & Plan  Parkinson's disease with dyskinesia and fluctuating manifestations (HCC)  Parkinson disease complicated by motor fluctuations with dyskinesia's and postural instability along with mild cognitive impairment and anxiety.  Seen today in urgent follow-up after developing panic attacks with periods of off time and freezing causing her to get stuck on the floor and soil herself.  Although panic attacks have been present for some time now and thought to be related to her grieving her 's passing, this is worsened over the past week after stimulation was turned on.  Only other change was discontinuation of amantadine ER and a change in her being alone the first few nights.     After much discussion we opted to:  Adjust the Stalevo 50 back to 1 tablet every 2 hours during the waking day which she was taking 8 times daily with an occasional 1 to 2 tablets overnight if needed.  This seems to have been unintentionally increased to every 1.5 hours last week as it was not clear what her prior dosing schedule was.  Will continue on rasagiline daily  Will restart Gocovri 137 mg 1 nightly which was her previous dose.    Will keep her stimulation off over the next 2 weeks to see if she will return back to her baseline.  Will then consider lower stimulation and slow titration with adjustments of meds.    Encouraged to get her back in with her psychologist.   Will reach out to her palliative team to discuss treatment of anxiety.  She has home PT.  They are not clear if she has OT but she would benefit from this since he is having more difficulty.  Previously  "had a semielectric bed ordered approved but patient declined it at that time.  She is willing to have this at this time and they for order to be reentered.  Orders:    carbidopa-levodopa-entacapone (STALEVO) 12.5- MG per tablet; TAKE 1 TABLET BY MOUTH EVERY 2 HOURS up to 10 TIMES A DAY    Amantadine HCl ER (Gocovri) 137 MG CP24; Take 1 capsule by mouth daily at bedtime    Mild cognitive impairment  Questions as to whether cognitive symptoms may have declined secondary to surgery.  It is too early to state this is the case as there are multiple factors that are currently leading to her current state.           Encounter provider Ny Mcitnyre MD    The patient was identified by name and date of birth. Malaika Chavis was informed that this is a telemedicine visit and that the visit is being conducted through the Epic Embedded platform. She agrees to proceed..  My office door was closed. No one else was in the room.  She acknowledged consent and understanding of privacy and security of the video platform. The patient has agreed to participate and understands they can discontinue the visit at any time.    Patient is aware this is a billable service.     History of Present Illness     Malaika Chavis is a 73 y.o. female with early onset  Parkinson's disease who status post deep brain stimulation surgery with bilateral Gpi placement on 9/24/2024, initially programmed./28/24 who present for urgent follow up after recent calls regarding  \"flu -like \" symptoms of feeling woozy and wobbly, cold feet cramping which may have been present prior to DBS programming per one daughter and sudden emotional response with panic attacks per daughter Cathryn which was felt to possibly be related to DBS. Given this she was instructed to turn of DBS to see if symptoms improve.     Mon night - called her daughter stating she felt sick like she had the flu. She was alone.she was found on the floor having difficulty arising " having peed herself.  She continues to have freezing overnight and will have urinary accidents. She has been having panic attacks regarding this. Patient has been emotional since her  passing with periods of panic attacks but freezing is certainly worse over the past week with her be getting stuck on the floor.   Cathryn had her mom at her house for a few day. Administered medications.  Stalevo was being taken every 1-1/2 hours while awake and occasionally overnight.  On Saturday she was back home alone and was found stuck on the floor again sitting in her pee and poop. She was taken to the ER.  Faction ruled out.    Malaika is now with her daughter Ritika who typically does look after her.   Device turned off Thursday afternoon.  Continues to have panic attacks but freezing overnight.   She appears more confused.    Initial history:   Review of chart reveals PD symptom onset 1999 (49yo) with left shoulder stiffness, later complicated by motor fluctuations, wearing off, unpredictable offs, delayed on, mild dyskinesias and off freezing of gait.   Neuropsychological testing revealed MCI diagnosis was likely accurate although there were some nuances to interpretation. Question of anxiety playing a role as well, referred to [facility] rehab psychology group. Goals of surgery were improvement in tremor, peak dose dyskinesia, and reduction in her on/ff fluctuations. Unlikely to help freezing of gait.      Current medications and timing:   - Stalevo 12.5/50/200 8-10 times per day about Q1.5hours - less frequent higher dose increase dyskinesia  - rasagiline 1mg daily   -Buspar prn anxiety      Prior medication trials:  Nourianz - caused worsening freezing, dyskinesia and hallucinations   selegiline   Sinemet IR  Ropinirole IR   ropinirole XL 6mg daily - dose reduced due to hallucinations and dyskinesia.   Depakote - started inpatient   - Gocovri 1 po qhs - worsened hallucinations on higher dosing, helps with wearing  off and dyskinesia        History obtained from : patient and patient's 3 daughters Diane Lockett, and Ritika.  Review of Systems        Objective     There were no vitals taken for this visit.  Physical Exam  Vitals: unable to obtain   Mental status: Patient is awake, alert oriented to person and situation.  Speech with  hypophonia.  Needs to repeat herself at times.  Cranial Nerves:   CN VII- symmetric facial movements (smile, eye closure )   CN VIII- normal to voice   CN IX- symmetric shoulder shrug   Coordination: No tremor with hands outstretched.  Full amplitude on handgrips and rapid altering movements 0, 0 decrement on heel taps 1, 1  Gait: Arose using her hands.  Good speed and stride.  Able to take a step up without difficulty.  No freezing or festination.    Examined about 1.5 hours after her dose of Stalevo.  Visit Time  Total Visit Duration: 50 minutes

## 2024-11-05 NOTE — TELEPHONE ENCOUNTER
MSW phoned [facility] and spoke with Zelda, she informed that provider can place an external order for OT.       This writer phoned [facility] at [phone] and was informed that   Patient can call to to order the hospital bed as order is still active in their system. Patient will have a $75 copay       Msw phoned pt and lvm informing that if she is interested in obtaining hospital bed she can call [phone].     Dr. Roger-    Could you please add external order for OT and I can fax to [facility]. Thanks

## 2024-11-05 NOTE — PROGRESS NOTES
Review of Systems   Constitutional:  Negative for appetite change, fatigue and fever.   HENT: Negative.  Negative for hearing loss, tinnitus, trouble swallowing and voice change.    Eyes: Negative.  Negative for photophobia, pain and visual disturbance.   Respiratory: Negative.  Negative for shortness of breath.    Cardiovascular: Negative.  Negative for palpitations.   Gastrointestinal: Negative.  Negative for nausea and vomiting.   Endocrine: Negative.  Negative for cold intolerance.   Genitourinary: Negative.  Negative for dysuria, frequency and urgency.   Musculoskeletal:  Positive for gait problem (Shuffles Feet, Stumbles, and Freezing, has gotten worse), myalgias, neck pain and neck stiffness. Negative for back pain.        Balance Issues, has gotten worse     Skin: Negative.  Negative for rash.   Allergic/Immunologic: Negative.    Neurological:  Positive for dizziness (Increase), tremors (General , but states not one of her main symptoms), weakness (Arms and Legs, ongoing) and light-headedness. Negative for seizures, syncope, facial asymmetry, speech difficulty, numbness and headaches.   Hematological: Negative.  Does not bruise/bleed easily.   Psychiatric/Behavioral:  Positive for confusion (Has gotten worse). Negative for hallucinations and sleep disturbance.    All other systems reviewed and are negative.

## 2024-11-05 NOTE — TELEPHONE ENCOUNTER
Called patient to schedule her future appt. No answer. Left a detailed message to call back along with our number.

## 2024-11-06 DIAGNOSIS — G20.B2 PARKINSON'S DISEASE WITH DYSKINESIA AND FLUCTUATING MANIFESTATIONS (HCC): Primary | ICD-10-CM

## 2024-11-08 ENCOUNTER — TELEPHONE (OUTPATIENT)
Age: 74
End: 2024-11-08

## 2024-11-08 NOTE — TELEPHONE ENCOUNTER
11/8/24 CALLED PT AND LMOM FOR HER TO CB TO OFFER TO RESCHEDULE CANCELLED POV.    Appointment canceled for Malaika HAWK Partha ([id number])   Visit type: POST OP [facility]   11/7/2024 4:00 PM (30 minutes) with Jacek Gonsales MD in [facility] [place]      Reason for cancellation: Canceled via MyChart

## 2024-11-25 ENCOUNTER — PROCEDURE VISIT (OUTPATIENT)
Dept: NEUROLOGY | Facility: CLINIC | Age: 74
End: 2024-11-25
Payer: MEDICARE

## 2024-11-25 VITALS
BODY MASS INDEX: 24.94 KG/M2 | WEIGHT: 140.8 LBS | SYSTOLIC BLOOD PRESSURE: 120 MMHG | TEMPERATURE: 97.8 F | DIASTOLIC BLOOD PRESSURE: 60 MMHG | HEART RATE: 82 BPM

## 2024-11-25 DIAGNOSIS — G31.84 MILD COGNITIVE IMPAIRMENT: ICD-10-CM

## 2024-11-25 DIAGNOSIS — G20.B2 PARKINSON'S DISEASE WITH DYSKINESIA AND FLUCTUATING MANIFESTATIONS (HCC): Primary | ICD-10-CM

## 2024-11-25 PROCEDURE — 99212 OFFICE O/P EST SF 10 MIN: CPT | Performed by: PSYCHIATRY & NEUROLOGY

## 2024-11-25 PROCEDURE — 95983 ALYS BRN NPGT PRGRMG 15 MIN: CPT | Performed by: PSYCHIATRY & NEUROLOGY

## 2024-11-25 PROCEDURE — 95984 ALYS BRN NPGT PRGRMG ADDL 15: CPT | Performed by: PSYCHIATRY & NEUROLOGY

## 2024-11-25 NOTE — ASSESSMENT & PLAN NOTE
Cognitive symptoms appear to be posterior to baseline.  Will continue to monitor.  We opted to not make any other changes at this point until we can have stable stimulation settings.

## 2024-11-25 NOTE — ASSESSMENT & PLAN NOTE
Patient returns today back at baseline taking Stalevo 50 about every 2 hours, noting wearing off if medications taken 2 and half to 3 hours apart.  During off time she has difficulty with walking, mobility and freezing of gait.    Approximately 45 minutes spent on deep brain stimulation programming, evaluation and documentation.  See body of the clinical note for details.    Stimulator was turned on at lower settings.  Plan was for her to continue current medications with no change.  Instructions :  The last week of December they are to use patient  and increase stimulation from   Left Gpi / right body in  from 1.2mA to 1.4mA  Right Gpi/ left body from 1mA to 1.2mA    If doing well try to spread Stalevo to 2.5 hours again and then 3 hours aprt.        A week prior to next visit start entering when you take medication and if there is any off time/ dyskinesia into patient . If having trouble prior to next visit reach out .    If she develops any issues before then she is to contact the office.    Concerns with regards to speech. Will refer for [place] speech therapy.     Orders:    Ambulatory Referral to Speech Therapy; Future

## 2024-11-25 NOTE — PROGRESS NOTES
Review of Systems   Constitutional:  Negative for appetite change, fatigue and fever.   HENT: Negative.  Negative for hearing loss, tinnitus, trouble swallowing and voice change.    Eyes: Negative.  Negative for photophobia, pain and visual disturbance.   Respiratory: Negative.  Negative for shortness of breath.    Cardiovascular: Negative.  Negative for palpitations.   Gastrointestinal: Negative.  Negative for nausea and vomiting.   Endocrine: Negative.  Negative for cold intolerance.   Genitourinary: Negative.  Negative for dysuria, frequency and urgency.   Musculoskeletal:  Positive for gait problem (Shuffles Feet, Stumbles, Freezing, has overall stayed the same since last visit) and myalgias (Calf muscle). Negative for back pain, neck pain and neck stiffness.        Balance Issues , ongoing have stayed the same     Skin: Negative.  Negative for rash.   Allergic/Immunologic: Negative.    Neurological:  Positive for dizziness and speech difficulty. Negative for tremors, seizures, syncope, facial asymmetry, weakness, light-headedness, numbness and headaches.   Hematological: Negative.  Does not bruise/bleed easily.   Psychiatric/Behavioral: Negative.  Negative for confusion, hallucinations and sleep disturbance.    All other systems reviewed and are negative.

## 2024-11-25 NOTE — PATIENT INSTRUCTIONS
Continue current medications  End of Dec use patient  and increase stimulation from   Left Gpi / right body in  from 1.2mA to 1.4mA  Right Gpi/ left body from 1mA to 1.2mA  If doing well try to spread Stalevo to 2.5 hours again and then 3 hours aprt.      A week prior to next visit start entering when you take medication and if there is any off time/ dyskinesia into patient . If having trouble prior to next visit reach out

## 2024-11-25 NOTE — PROGRESS NOTES
Name: Malaika Chavis      : 1950      MRN: [id number]  Encounter Provider: Ny Mcintyre MD  Encounter Date: 2024   Encounter department: [facility] [place]    :  Assessment & Plan  Parkinson's disease with dyskinesia and fluctuating manifestations (HCC)  Patient returns today back at baseline taking Stalevo 50 about every 2 hours, noting wearing off if medications taken 2 and half to 3 hours apart.  During off time she has difficulty with walking, mobility and freezing of gait.    Approximately 45 minutes spent on deep brain stimulation programming, evaluation and documentation.  See body of the clinical note for details.    Stimulator was turned on at lower settings.  Plan was for her to continue current medications with no change.  Instructions :  The last week of December they are to use patient  and increase stimulation from   Left Gpi / right body in  from 1.2mA to 1.4mA  Right Gpi/ left body from 1mA to 1.2mA    If doing well try to spread Stalevo to 2.5 hours again and then 3 hours aprt.        A week prior to next visit start entering when you take medication and if there is any off time/ dyskinesia into patient . If having trouble prior to next visit reach out .    If she develops any issues before then she is to contact the office.    Concerns with regards to speech. Will refer for [place] speech therapy.     Orders:    Ambulatory Referral to Speech Therapy; Future    Mild cognitive impairment  Cognitive symptoms appear to be posterior to baseline.  Will continue to monitor.  We opted to not make any other changes at this point until we can have stable stimulation settings.           History of Present Illness   Malaika Chavis is a 74 year-old woman with young onset, levodopa responsive Parkinson's disease who presents for movement follow up.   Review of chart reveals PD symptom onset  (47yo) with left shoulder stiffness,  later complicated by motor fluctuations, wearing off, unpredictable offs, delayed on, mild dyskinesias and off freezing of gait.   Neuropsychological testing revealed MCI diagnosis was likely accurate although there were some nuances to interpretation. Question of anxiety playing a role as well, referred to [facility] rehab psychology group. Goals of surgery would include improvement in tremor, peak dose dyskinesia, and reduction in her on/ff fluctuations.Unlikely to help freezing of gait. She is status post deep brain stimulation surgery with bilateral Gpi placement on 9/24/2024 given end dose wearing off between 2 and 3 hours and higher doses of levodopa leading to peak dose dyskinesia.    Initial programming occurred 10/28/2024.  About 4 days and later the reports of her feeling like she had the flu but it was determined this was not the case.  Family states she had developed panic attacks with periods of off time and freezing causing her to get stuck on the floor and soil herself.  Although panic attacks have been present for some time now and thought to be related to her grieving her 's passing, this is worsened over the past week after stimulation was turned on.  Only other change was discontinuation of amantadine ER and a change in her being alone in the home the first few nights.  They seem to unintentionally have increased her Stalevo as well.  Prior to visit we had them turn off DBS stimulation.   After much discussion we opted to:  Adjust the Stalevo 50 back to 1 tablet every 2 hours during the waking day which she was taking 8 times daily with an occasional 1 to 2 tablets overnight if needed.  Continue rasagiline daily and restart Gocovri 137 mg 1 nightly.   Encouraged to get her back in with her psychologist.  Reach out to her palliative team to discuss treatment of anxiety.    Presents today for further evaluation and DBS programming.  Back at baseline. No longer having panic attacks. No  events of immobility.   Am onset of levodopa about 30 minutes. She has wearing off between 2h 15min and 3 hours. She will develop difficulty with gait with freezing.   She has trouble taking medication at 2.5 hours so returned to every 2 hours.     Speech is soft. There is no drooling. No difficulty chewing and swallowing.   Dressing independently.  Hygiene acts performed independently without difficulty  There is no difficulty arising out of chair.   Ambulates  Denies any stiffness or pain.      Sleep  There is no daytime sedation.   There have been no symptoms of REM sleep behavior disorder or RLS.   There are no issues with urination or constipation.   There are no experiences with lightheadedness on standing.     Cognition is unchanged.   No hallucinations.    Mood has been stable.     .     Current medications and timing:   - Gocovri 1 po qhs - worsened hallucinations on higher dosing, helps with wearing off and dyskinesia - costly  - Stalevo 12.5/50/200 6-7am- about 11pm) about 9  times per day about Q2-2.5hours - less frequent higher dose increase dyskinesia  - rasagiline 1mg daily   -Buspar prn anxiety     Prior medication trials:  Nourianz - caused worsening freezing, dyskinesia and hallucinations   selegiline   Sinemet IR  Ropinirole IR   ropinirole XL 6mg daily - dose reduced due to hallucinations and dyskinesia.   Depakote - started inpatient         Review of Systems  I have personally reviewed the MA's review of systems and made changes as necessary.         Objective   /60 (BP Location: Right arm, Patient Position: Standing, Cuff Size: Standard)   Pulse 82   Temp 97.8 °F (36.6 °C)   Wt 63.9 kg (140 lb 12.8 oz)   BMI 24.94 kg/m²     Physical Exam  Neurologic Exam  MDS UPDRS III                                    10/28/24  10/28/24 11/25/24   Time since last dose: off On stim    Speech  1 1 1   Facial Expression  2 2 2   Rigidity - Neck      0   Rigidity - Upper Extremity (R)  0 0 0   Rigidity  - Upper Extremity (L)   1 0 0   Rigidity - Lower Extremity (R)  0 0 0   Rigidity - Lower Extremity (L)   1 0 0   Finger Taps (R)   1 0 0   Finger Taps (L)   2 1 1   Hand Movement (R)  0 0 0   Hand Movement (L)   1 0 1   Pronation/Supination (R)  1 0 1   Pronation/Supination (L)   2 1 1   Toe Tapping (R)     2   Toe Tapping (L)     2   Leg Agility (R)  2 1 1   Leg Agility (L)   2 1 1   Arising from Chair   2 2 0   Gait   2 2 2   Freezing of Gait 0 0 0   Postural Stability          Posture 1 1 2   Global spontaneity of movement 2 1 2   Postural Tremor (Ri 0 0 0   Postural Tremor (L) 0 0 0   Kinetic Tremor (R)  0 0 0   Kinetic Tremor (L)  0 0 0   Rest tremor amplitude RUE 0 0 0   Rest tremor amplitude LUE 0 0 0   Rest tremor amplitude RLE 0 0 0   Reset tremor amplitude LLE 0 0 0   Lip/Jaw Tremor  0 0 0   Consistency of tremor 0 0 0   Motor Exam Total:            Deep brain stimulator interrogation and programming:     [facility] Left chest  [id number]  Lead: [id number]  Implanted: 9/10/24  Impedance: ok      Left Gpi    :   1a:0.6  1b:0.6  1c: 0.6  PW60, 145Hz  1.8mA (0.6- 2.4mA)     Right Gpi    LFP 11.72Hz  9a:0.7  9b:0.7  9c:0.7  PW60, 145Hz  2mA (0.5-2.6mA)    Today:   Left Gpi  LFP freq 9.77  Sensing    1a:0.4  1b:0.4  1c: 0.4  PW60, 145Hz  1.2mA (0.5- 1.8mA)     Right Gpi    LFP 11.72Hz  9a:0.3  9b:0.3  9c:0.3  PW60, 145Hz  1mA (0.5- 1.6mA)

## 2024-11-26 ENCOUNTER — TELEPHONE (OUTPATIENT)
Dept: NEUROLOGY | Facility: CLINIC | Age: 74
End: 2024-11-26

## 2024-11-26 NOTE — TELEPHONE ENCOUNTER
Order for [facility] was sent to [facility]        This writer phoned Ritika vieira and informed that [facility] [phone] informed that   Patient can call to to order the hospital bed as order is still active in their system. Patient will have a $75 copay .    She is aware to contact this writer if she has any questions

## 2024-11-27 NOTE — TELEPHONE ENCOUNTER
CARMEN phoned Ritika at [phone] and she informed that she didn't get the chance to contact Michael but she will when able. She was also made aware that order for ST was sent to [facility]. She was appreciative.

## 2024-12-04 DIAGNOSIS — F41.9 ANXIETY DISORDER, UNSPECIFIED TYPE: Primary | ICD-10-CM

## 2024-12-04 NOTE — TELEPHONE ENCOUNTER
Reason for call:   [x] Refill   [] Prior Auth  [] Other:     Office:   [] PCP/Provider -   [x] Specialty/Provider - Ny Mcintyre MD     Medication: (BUSPAR) 5 mg     Dose/Frequency: 1 tab daily PRN    Quantity: 30 tabs    Pharmacy: [facility]/pharmacy #[id number] - [place], [place] - [address] [place] [address]      Does the patient have enough for 3 days?   [x] Yes   [] No - Send as HP to POD

## 2024-12-05 LAB
DME PARACHUTE DELIVERY DATE REQUESTED: NORMAL
DME PARACHUTE ITEM DESCRIPTION: NORMAL
DME PARACHUTE ORDER STATUS: NORMAL
DME PARACHUTE SUPPLIER NAME: NORMAL
DME PARACHUTE SUPPLIER PHONE: NORMAL

## 2024-12-05 NOTE — TELEPHONE ENCOUNTER
CARMEN phoned phoned [facility]    At [phone] and spoke with Carmen who informed that they received a call from patient's daughter informing that they will call them back as they needed to think about the copay for the hospital bed.     At this time order to be resubmitted as it was voided.     Dr. Roger,   Are you agreeable to send new hospital bed order to [facility]. The one you placed before

## 2024-12-09 RX ORDER — BUSPIRONE HYDROCHLORIDE 5 MG/1
5 TABLET ORAL AS NEEDED
Qty: 30 TABLET | Refills: 2 | Status: SHIPPED | OUTPATIENT
Start: 2024-12-09

## 2024-12-11 ENCOUNTER — TELEPHONE (OUTPATIENT)
Age: 74
End: 2024-12-11

## 2024-12-11 NOTE — TELEPHONE ENCOUNTER
Gabi calling from [facility] to check on the status if we received the Initial Plan of Care documents that were sent on 11/21/24 and 12/6/24. I advised I did not see we received them as of yet.    I confirmed our fax number and Gabi will resend them with Attn Dr Roger

## 2025-01-27 ENCOUNTER — PROCEDURE VISIT (OUTPATIENT)
Dept: NEUROLOGY | Facility: CLINIC | Age: 75
End: 2025-01-27
Payer: MEDICARE

## 2025-01-27 VITALS
DIASTOLIC BLOOD PRESSURE: 62 MMHG | TEMPERATURE: 97.8 F | WEIGHT: 142.6 LBS | SYSTOLIC BLOOD PRESSURE: 120 MMHG | HEART RATE: 87 BPM | BODY MASS INDEX: 25.26 KG/M2

## 2025-01-27 DIAGNOSIS — G47.52 REM SLEEP BEHAVIOR DISORDER: ICD-10-CM

## 2025-01-27 DIAGNOSIS — G20.B2 PARKINSON'S DISEASE WITH DYSKINESIA AND FLUCTUATING MANIFESTATIONS (HCC): Primary | ICD-10-CM

## 2025-01-27 DIAGNOSIS — G31.84 MILD COGNITIVE IMPAIRMENT: ICD-10-CM

## 2025-01-27 PROCEDURE — 95984 ALYS BRN NPGT PRGRMG ADDL 15: CPT | Performed by: PSYCHIATRY & NEUROLOGY

## 2025-01-27 PROCEDURE — 95983 ALYS BRN NPGT PRGRMG 15 MIN: CPT | Performed by: PSYCHIATRY & NEUROLOGY

## 2025-01-27 NOTE — ASSESSMENT & PLAN NOTE
Overall doing better since last visit when Deep brain stimulation was turned back on.  She has been able to reduce her Stalevo to 8 or less tablets in a day.     Deep brain stimulation was interrogated.  Over 30 minutes was spent on Deep brain stimulation programming and evaluation.  See body of the note for details.    Overall we have seen improvement in dyskinesia and off time with stimulation.  With increasing stimulation she was instructed to spread out Stalevo 50 dose out to q 2.5 hours apart consistently through the waking day.  After a week, if she is not having any wearing off then she is to try spreading doses out q 3 hours  apart.   Instructed to note how many pills of Stalevo is taken in a day.      If not having any wearing off or dyskinesia, we could consider discontinuing Gocovri.    If this leads to wearing off  we would then increase stimulation settings which could be done utilizing  the patient .

## 2025-01-27 NOTE — PROGRESS NOTES
Name: Malaika Chavis      : 1950      MRN: [id number]  Encounter Provider: Ny Mcintyre MD  Encounter Date: 2025   Encounter department: [facility] [place]  :  Assessment & Plan  Parkinson's disease with dyskinesia and fluctuating manifestations (HCC)  Overall doing better since last visit when Deep brain stimulation was turned back on.  She has been able to reduce her Stalevo to 8 or less tablets in a day.     Deep brain stimulation was interrogated.  Over 30 minutes was spent on Deep brain stimulation programming and evaluation.  See body of the note for details.    Overall we have seen improvement in dyskinesia and off time with stimulation.  With increasing stimulation she was instructed to spread out Stalevo 50 dose out to q 2.5 hours apart consistently through the waking day.  After a week, if she is not having any wearing off then she is to try spreading doses out q 3 hours  apart.   Instructed to note how many pills of Stalevo is taken in a day.      If not having any wearing off or dyskinesia, we could consider discontinuing Gocovri.    If this leads to wearing off  we would then increase stimulation settings which could be done utilizing  the patient .        REM sleep behavior disorder         Mild cognitive impairment  Overall stable.  Has been better at managing things utilizing patient dispenser.             History of Present Illness   Malaika Chavis is a woman with young onset, levodopa responsive Parkinson's disease s/p bilateral Gpi DBS (Percept PC ) L, 24, who presents for movement follow up.     Review of chart reveals PD symptom onset  (49yo) with left shoulder stiffness, later complicated by motor fluctuations, wearing off, unpredictable offs, delayed on, mild dyskinesias and off freezing of gait. Neuropsychological testing revealed MCI. Question of anxiety playing a role as well, referred to [facility] rehab psychology  group. Goals of surgery would include improvement in tremor, peak dose dyskinesia, and reduction in end dose wearing off 2-3 hours.Unlikely to help freezing of gait.    10/28/2024- initial programming.  About 4 days with periods of severe anxiety. DBS turned off. We adjusted the Stalevo 50 back to 1 tablet every 2 hours during the waking day which she was taking 8 times daily with an occasional 1 to 2 tablets overnight if needed.  Continue rasagiline daily and restart Gocovri 137 mg 1 nightly. Encouraged to get her back in with her psychologist.  Reach out to her palliative team to discuss treatment of anxiety.    Seen - 11/25/24- at baseline. Wearing off between 2h 15min and 3 hours. DBS programmed at low setting. Plans for increase end of Dec and then attempt to increase time between doses of Stalevo.     1/27/25- has reduced Stalevo. Less than 8 a day total but not clearly on schedule, Anywhere from 2-3 hours.. (Doses between 6am and 6-9pm).  Overall less dyskinesia.   She wears off if she is late for a dose. Onset then 30 minutes or longer if she ate.    activities of daily living's performed independently.  Sleeps well. Sometimes has RLS and will awaken. She will take an extra Stalevo at that time.       Current medications and timing:   - Gocovri 1 po qhs - worsened hallucinations on higher dosing, helps with wearing off and dyskinesia - costly  - Stalevo 12.5/50/200 6-7am- about 11pm) about 9  times per day about Q2-2.5hours - less frequent higher dose increase dyskinesia  - rasagiline 1mg daily   -Buspar prn anxiety     Prior medication trials:  Nourianz - caused worsening freezing, dyskinesia and hallucinations   selegiline   Sinemet IR  Ropinirole IR   ropinirole XL 6mg daily - dose reduced due to hallucinations and dyskinesia.   Depakote - started inpatient         Review of Systems I have personally reviewed the MA's review of systems and made changes as necessary.         Objective   /62 (BP  Location: Right arm, Patient Position: Standing, Cuff Size: Standard)   Pulse 87   Temp 97.8 °F (36.6 °C)   Wt 64.7 kg (142 lb 9.6 oz)   BMI 25.26 kg/m²     Physical Exam  Eyes:      Extraocular Movements: Extraocular movements intact.      Pupils: Pupils are equal, round, and reactive to light.   Neurological:      Mental Status: She is alert.      Motor: Motor strength is normal.      Neurological Exam  Mental Status  Alert. Oriented only to person, place and situation. Speech: hypophonia. Language is fluent with no aphasia. Attention and concentration are normal.    Cranial Nerves  CN III, IV, VI: Extraocular movements intact bilaterally. Pupils equal round and reactive to light bilaterally.  CN VII: Full and symmetric facial movement.  CN VIII: Hearing is normal.  CN IX, X: Palate elevates symmetrically  CN XI: Shoulder shrug strength is normal.  CN XII: Tongue midline without atrophy or fasciculations.    Motor   Normal muscle tone. Strength is 5/5 throughout all four extremities.    Coordination    See motor UPDRS.    Gait   Able to rise from chair without using arms.  Able to` arise independently.  Likely shortened stride.  Ambulated without walker with mild imbalance on turn.  No freezing..    MDS UPDRS III                              1/27/25       10/28/24  10/28/24 11/25/24   Time since last dose:  off On stim     Speech  1 1 1 1   Facial Expression  2 2 2 2   Rigidity - Neck       0   Rigidity - Upper Extremity (R)  0 0 0 0   Rigidity - Upper Extremity (L)   0 1 0 0   Rigidity - Lower Extremity (R)  0 0 0 0   Rigidity - Lower Extremity (L)   0 1 0 0   Finger Taps (R)   0 1 0 0   Finger Taps (L)   0 2 1 1   Hand Movement (R)  0 0 0 0   Hand Movement (L)   0 1 0 1   Pronation/Supination (R)  0 1 0 1   Pronation/Supination (L)   1 2 1 1   Toe Tapping (R) 1     2   Toe Tapping (L) 1     2   Leg Agility (R)  1 2 1 1   Leg Agility (L)   1 2 1 1   Arising from Chair   0 2 2 0   Gait   1 2 2 2   Freezing of  Gait 0 0 0 0   Postural Stability            Posture 2 1 1 2   Global spontaneity of movement 1 2 1 2   Postural Tremor (Ri 0 0 0 0   Postural Tremor (L) 0 0 0 0   Kinetic Tremor (R)  0 0 0 0   Kinetic Tremor (L)  0 0 0 0   Rest tremor amplitude RUE 0 0 0 0   Rest tremor amplitude LUE 0 0 0 0   Rest tremor amplitude RLE 0 0 0 0   Reset tremor amplitude LLE 0 0 0 0   Lip/Jaw Tremor  0 0 0 0   Consistency of tremor 0 0 0 0   Motor Exam Total:           Dyskinesia: mild generalized      Deep brain stimulator interrogation and programming:     InstyBook PC Left chest  [id number]  Lead: [id number]  Implanted: 9/10/24  EBL: 7 years, 2 months   Impedance: ok     Left Gpi  LFP freq 9.77  Sensing    1a:0.4  1b:0.4  1c: 0.4  PW60, 145Hz  1.2mA   Increased to 1.4mA (0.5-1.8mA)      Right Gpi    LFP 11.72Hz  9a:0.3  9b:0.3  9c:0.3  PW60, 145Hz  1mA   Increased to 1.2mA (0.5- 1.6mA)

## 2025-01-27 NOTE — PROGRESS NOTES
Review of Systems   Constitutional:  Negative for appetite change, fatigue and fever.   HENT: Negative.  Negative for hearing loss, tinnitus, trouble swallowing and voice change.    Eyes: Negative.  Negative for photophobia, pain and visual disturbance.   Respiratory: Negative.  Negative for shortness of breath.    Cardiovascular: Negative.  Negative for palpitations.   Gastrointestinal: Negative.  Negative for nausea and vomiting.   Endocrine: Negative.  Negative for cold intolerance.   Genitourinary: Negative.  Negative for dysuria, frequency and urgency.   Musculoskeletal:  Positive for gait problem (Has stayed the same, Shuffles Feet and Freezing). Negative for back pain, myalgias, neck pain and neck stiffness.   Skin: Negative.  Negative for rash.   Allergic/Immunologic: Negative.    Neurological:  Negative for dizziness, tremors, seizures, syncope, facial asymmetry, speech difficulty, weakness, light-headedness, numbness and headaches.   Hematological: Negative.  Does not bruise/bleed easily.   Psychiatric/Behavioral: Negative.  Negative for confusion, hallucinations and sleep disturbance.    All other systems reviewed and are negative.

## 2025-02-03 ENCOUNTER — TELEPHONE (OUTPATIENT)
Age: 75
End: 2025-02-03

## 2025-02-03 NOTE — TELEPHONE ENCOUNTER
Gabi from [facility] called. He will be faxing over 3 forms that need to be signed by the provider and faxed back to [facility]. One is an initial evaluation and POC for ST, and the other two are forms regarding OT. Routed to the MA team to monitor for receipt of the forms and to assist with obtaining the provider's signatures.

## 2025-02-03 NOTE — ASSESSMENT & PLAN NOTE
Parkinson disease complicated by motor fluctuations with dyskinesia's and postural instability along with mild cognitive impairment and anxiety.  Seen today in urgent follow-up after developing panic attacks with periods of off time and freezing causing her to get stuck on the floor and soil herself.  Although panic attacks have been present for some time now and thought to be related to her grieving her 's passing, this is worsened over the past week after stimulation was turned on.  Only other change was discontinuation of amantadine ER and a change in her being alone the first few nights.     After much discussion we opted to:  Adjust the Stalevo 50 back to 1 tablet every 2 hours during the waking day which she was taking 8 times daily with an occasional 1 to 2 tablets overnight if needed.  This seems to have been unintentionally increased to every 1.5 hours last week as it was not clear what her prior dosing schedule was.  Will continue on rasagiline daily  Will restart Gocovri 137 mg 1 nightly which was her previous dose.    Will keep her stimulation off over the next 2 weeks to see if she will return back to her baseline.  Will then consider lower stimulation and slow titration with adjustments of meds.    Encouraged to get her back in with her psychologist.   Will reach out to her palliative team to discuss treatment of anxiety.  She has home PT.  They are not clear if she has OT but she would benefit from this since he is having more difficulty.  Previously had a semielectric bed ordered approved but patient declined it at that time.  She is willing to have this at this time and they for order to be reentered.  Orders:    carbidopa-levodopa-entacapone (STALEVO) 12.5- MG per tablet; TAKE 1 TABLET BY MOUTH EVERY 2 HOURS up to 10 TIMES A DAY    Amantadine HCl ER (Gocovri) 137 MG CP24; Take 1 capsule by mouth daily at bedtime    
Questions as to whether cognitive symptoms may have declined secondary to surgery.  It is too early to state this is the case as there are multiple factors that are currently leading to her current state.       
Collin

## 2025-02-14 ENCOUNTER — FOLLOW UP (OUTPATIENT)
Dept: URBAN - METROPOLITAN AREA CLINIC 6 | Facility: CLINIC | Age: 75
End: 2025-02-14

## 2025-02-14 DIAGNOSIS — H02.881: ICD-10-CM

## 2025-02-14 DIAGNOSIS — Z96.1: ICD-10-CM

## 2025-02-14 DIAGNOSIS — H02.884: ICD-10-CM

## 2025-02-14 DIAGNOSIS — H04.123: ICD-10-CM

## 2025-02-14 PROCEDURE — 92015 DETERMINE REFRACTIVE STATE: CPT

## 2025-02-14 PROCEDURE — 92012 INTRM OPH EXAM EST PATIENT: CPT

## 2025-02-14 ASSESSMENT — VISUAL ACUITY
OD_SC: 20/50-2
OS_PH: 20/40-1
OS_SC: 20/70

## 2025-02-14 ASSESSMENT — TONOMETRY
OD_IOP_MMHG: 14
OS_IOP_MMHG: 16

## 2025-03-07 DIAGNOSIS — G20.A1 PARKINSON'S DISEASE (HCC): ICD-10-CM

## 2025-03-07 RX ORDER — RASAGILINE 1 MG/1
1 TABLET ORAL DAILY
Qty: 90 TABLET | Refills: 1 | Status: SHIPPED | OUTPATIENT
Start: 2025-03-07

## 2025-04-03 ENCOUNTER — NURSE TRIAGE (OUTPATIENT)
Age: 75
End: 2025-04-03

## 2025-04-03 NOTE — TELEPHONE ENCOUNTER
" Ny Mcintyre MD  Neurology Neurovascular Team 411 minutes ago (2:49 PM)       Has she used her patient  to check her device to assure it is on.  If so she can utilize the patient  to increase her settings from right body/left brain 1.4-1.6 and left body/right brain from 1.2-1.4.  Scheduled to be seen on Monday and we can do any further changes at that time   ____________________________________________________    Called pt. Pt stated that she used the  to record when she took medication. She did this twice and then the 3rd time it did not accept her information. She said there was a message that stated \"the communicator is not connected\". Pt said she would not know how to check her device and adjust the settings.  "

## 2025-04-03 NOTE — TELEPHONE ENCOUNTER
"  FOLLOW UP: Dr. Kana Husain, please advise, thank you! Next appointment is 04/07/2025.     REASON FOR CONVERSATION: Worsening symptoms    SYMPTOMS: difficulty walking, freezing after stopping, cramping, hardly any \"on time.\"     OTHER: Patient requests to be called back with advisement at [phone] and we may leave a voicemail if needed.     DISPOSITION: Discuss With PCP and Callback by Nurse Today    Reason for Disposition   Nursing judgment    Answer Assessment - Initial Assessment Questions  Patient said things have been going well bu in the last 2 weeks she noticed she was having issues. Palliative care is aware.         .PARKINSONS   Parkinsons Freezing? Yes  If yes, intermittent, constant, with turning or obstacles? Mary Jo said it is intermittent and it happens if she stops, she can't restart walking right away. She feels unsafe when it happens. Has trouble getting up out of chairs or couch. It is like she sticks to the seat.     Shuffling? \"Some\"  If yes, constant, intermittent? Intermittent    Difficulty Walking? Yes, she cannot walk far, her body feels heavy. She has had to crawl to bathroom once. Patient said that only happens a night.   If yes, need for assistive devices? Mary Jo uses walker more often currently. Used to just use at night. Outside she uses a walking stick.     Constant, intermittent Recent falls? No    Any extra movement? No    Any Hallucinations? No     What time of day are symptoms worse? \"Across the whole day\"    Current medications confirmed as:     rasagiline (AZILECT) 1 MG  daily    carbidopa-levodopa-entacapone (STALEVO) 12.5- MG per tablet Patient was taking it once every 2.5 to 3 hours, up to 10 times a day. But now she feels the DBS is also not working as well so she is taking it every 2 hours again up to 10 times a day.     Amantadine HCl ER (Gocovri) 137 MG CP24  daily at bedtime    gabapentin (Neurontin) 300 mg capsule     Ask how long after each dose they feel " that it wears off? Patient said she does not even think they kick in but maybe only lasts a half hour if it does.     Does patient have DBS? Yes, 04/07/2025 appointment.     Patient is noticing leg cramps at night which she had before but she is not sure if it is different.    Protocols used: No Protocol Available-Adult-OH

## 2025-04-07 ENCOUNTER — PROCEDURE VISIT (OUTPATIENT)
Dept: NEUROLOGY | Facility: CLINIC | Age: 75
End: 2025-04-07
Payer: MEDICARE

## 2025-04-07 VITALS — TEMPERATURE: 97.9 F | DIASTOLIC BLOOD PRESSURE: 60 MMHG | HEART RATE: 90 BPM | SYSTOLIC BLOOD PRESSURE: 121 MMHG

## 2025-04-07 DIAGNOSIS — G20.B2 PARKINSON'S DISEASE WITH DYSKINESIA AND FLUCTUATING MANIFESTATIONS (HCC): Primary | ICD-10-CM

## 2025-04-07 PROCEDURE — 95983 ALYS BRN NPGT PRGRMG 15 MIN: CPT | Performed by: PSYCHIATRY & NEUROLOGY

## 2025-04-07 PROCEDURE — 95984 ALYS BRN NPGT PRGRMG ADDL 15: CPT | Performed by: PSYCHIATRY & NEUROLOGY

## 2025-04-07 NOTE — PROGRESS NOTES
Name: Malaika Chavis      : 1950      MRN: [id number]  Encounter Provider: Ny Mcintyre MD  Encounter Date: 2025   Encounter department: [facility] [place]  :  Assessment & Plan  Parkinson's disease with dyskinesia and fluctuating manifestations (HCC)  Parkinson is complicated by motor fluctuations with dyskinesia's.  Overall has been doing well up until about 2 weeks ago when she developed increasing ofttimes described as a sensation of heaviness of the legs and return of freezing particularly when in small spaces.  She started taking her medications closer together about every 2 hours versus every 3-4 hours.  Presents today about 3-3.5 hours after her last dose with a sensation of heaviness, freezing only while in the room, no rigidity, mild bradykinesia, no tremor.  Spent more than 45 minutes on Deep brain stimulation interrogation, evaluation, programming, assessing patient  connection and documentation.  See body of the clinical note for details.    Simulation increased.  Will see if this reduces off time.  She will try to return to prior dosing schedule of Stalevo 50 every 3 hours if possible.  Will continue on amantadine ER 1 tablet nightly and rasagiline daily.    Will reach out to [employer] rep/tech to see if they can help with patient  connecting issues she is having.             History of Present Illness   Malaika Chavis is a woman with young onset, levodopa responsive Parkinson's disease s/p bilateral Gpi DBS (Percept PC ) L, 24, who presents for movement follow up.      Review of chart reveals PD symptom onset  (47yo) with left shoulder stiffness, later complicated by motor fluctuations, wearing off, unpredictable offs, delayed on, mild dyskinesias and off freezing of gait. Neuropsychological testing revealed MCI. Question of anxiety playing a role as well, referred to [facility] rehab psychology group. Goals of surgery  would include improvement in tremor, peak dose dyskinesia, and reduction in end dose wearing off 2-3 hours.Unlikely to help freezing of gait. See note      Last seen in Jan on Stalevo to 8 or less tablets in a day, overall improvement in dyskinesia and reduced off time with stimulation.  With increasing stimulation she was instructed to spread out Stalevo 50 dose out to q 2.5 hours apart consistently through the waking day.  After a week, if she is not having any wearing off then she is to try spreading doses out q 3 hours  apart.     Called 4/3/25 stating she had been doing well up until 3 weeks ago. She developed intermittent freezing, also has been taking Stalevo 2.5-3 hours apart but now is back q 2 hours as she feels she is having off time.     Here today with her daughter. She had been doing well up until 2 weeks ago. She started to note reduce stamina when walking. She feels medications are not kicking in and lasting as long as she notes she is freezing more.  She also is having trouble arising when wearing off. It had been lasting 3-4 hours but now feels it is lasting 1-2 hours.    ADL's performed independently.  Sleep initiation can be difficult. She has ruminating thoughts. Misses her  and dog.  She has cramps in the legs around 2am which can be hard to fall asleep with .  She has an urge to stretch out her legs. Massaging her calves helps.  Taking Tylenol and Stalevo helps.      When she called last week I asked her to try increasing stimulation.  She has been having difficulty with patient  being able to connect to the communicator.      Current medications and timing:   - Gocovri 1 po qhs - worsened hallucinations on higher dosing, helps with wearing off and dyskinesia - costly  - Stalevo 12.5/50/200 at 2am and then 6am-10pm) about 9  times per day about Q2-hours - less frequent higher dose increase dyskinesia  - rasagiline 1mg daily   -Buspar prn anxiety      Prior medication  trials:  Nourianz - caused worsening freezing, dyskinesia and hallucinations   selegiline   Sinemet IR  Ropinirole IR   ropinirole XL 6mg daily - dose reduced due to hallucinations and dyskinesia.   Depakote - started inpatient      Prior recent history:    10/28/2024- initial programming.  About 4 days with periods of severe anxiety. DBS turned off. We adjusted the Stalevo 50 back to 1 tablet every 2 hours during the waking day which she was taking 8 times daily with an occasional 1 to 2 tablets overnight if needed.  Continue rasagiline daily and restart Gocovri 137 mg 1 nightly. Encouraged to get her back in with her psychologist.  Reach out to her palliative team to discuss treatment of anxiety.     Seen - 11/25/24- at baseline. Wearing off between 2h 15min and 3 hours. DBS programmed at low setting. Plans for increase end of Dec and then attempt to increase time between doses of Stalevo.      1/27/25- has reduced Stalevo. Less than 8 a day total but not clearly on schedule, Anywhere from 2-3 hours.. (Doses between 6am and 6-9pm).  Overall less dyskinesia.   She wears off if she is late for a dose. Onset then 30 minutes or longer if she ate.    activities of daily living's performed independently.  Sleeps well. Sometimes has RLS and will awaken. She will take an extra Stalevo at that time.         History of Present Illness       Review of Systems I have personally reviewed the MA's review of systems and made changes as necessary.         Objective   /60 (BP Location: Left arm, Patient Position: Standing, Cuff Size: Standard)   Pulse 90   Temp 97.9 °F (36.6 °C)     Physical Exam  Vitals reviewed.   Eyes:      Extraocular Movements: Extraocular movements intact.   Neurological:      Mental Status: She is alert.      Motor: Motor strength is normal.      Neurological Exam  Mental Status  Alert. Oriented only to person, place and situation. Speech: hypophonia. Language is fluent with no aphasia. Attention  and concentration are normal.    Cranial Nerves  CN III, IV, VI: Extraocular movements intact bilaterally.   Right pupil: 1 mm.   Left pupil: 1 mm.  CN VII: Full and symmetric facial movement.  CN VIII: Hearing is normal.  CN IX, X: Palate elevates symmetrically  CN XI: Shoulder shrug strength is normal.  CN XII: Tongue midline without atrophy or fasciculations.    Motor   Normal muscle tone. Strength is 5/5 throughout all four extremities.    Coordination    See motor UPDRS.    Gait   Able to rise from chair without using arms.  Reduction in right stride.  Freezing in small space..    MDS UPDRS III                              4/7/25 1/27/25       10/28/24  10/28/24 11/25/24   Time since last dose:    off On stim     Speech  1 1 1 1 1   Facial Expression  2 2 2 2 2   Rigidity - Neck         0   Rigidity - Upper Extremity (R)  0 0 0 0 0   Rigidity - Upper Extremity (L)   0 0 1 0 0   Rigidity - Lower Extremity (R)  0 0 0 0 0   Rigidity - Lower Extremity (L)   0 0 1 0 0   Finger Taps (R)   1 0 1 0 0   Finger Taps (L)   1 0 2 1 1   Hand Movement (R)  0 0 0 0 0   Hand Movement (L)   0 0 1 0 1   Pronation/Supination (R)  1 0 1 0 1   Pronation/Supination (L)   1 1 2 1 1   Toe Tapping (R) 0 1     2   Toe Tapping (L) 1 1     2   Leg Agility (R)  1 1 2 1 1   Leg Agility (L)   1 1 2 1 1   Arising from Chair   0 0 2 2 0   Gait   1 1 2 2 2   Freezing of Gait 1 0 0 0 0   Postural Stability              Posture 2 2 1 1 2   Global spontaneity of movement 1 1 2 1 2   Postural Tremor (Ri 0 0 0 0 0   Postural Tremor (L) 0 0 0 0 0   Kinetic Tremor (R)  0 0 0 0 0   Kinetic Tremor (L)  0 0 0 0 0   Rest tremor amplitude RUE 0 0 0 0 0   Rest tremor amplitude LUE 0 0 0 0 0   Rest tremor amplitude RLE 0 0 0 0 0   Reset tremor amplitude LLE 0 0 0 0 0   Lip/Jaw Tremor  0 0 0 0 0   Consistency of tremor 0 0 0 0 0   Motor Exam Total:             Dyskinesia: none     Deep brain stimulator interrogation and programming: Approximately 40 minutes      Percept PC Left chest  [id number]  Lead: [id number]  Implanted: 9/10/24  EBL: 6 years, 9 months   Impedance: ok      Left Gpi  LFP freq 9.77  Sensing    1a:0.5  1b:0.5  1c: 0.5  PW60, 145Hz  1.4mA   Increased 1.7mA   (0.5-2.1mA)      Right Gpi    LFP 11.72Hz  9a:0.4  9b:0.4  9c:0.4  PW60, 145Hz  1.2mA    Increased to 1.6mA  (0.5- 2.0mA)              Physical Exam      Results

## 2025-04-07 NOTE — PROGRESS NOTES
Review of Systems   Constitutional: Negative.  Negative for appetite change, fatigue and fever.   HENT: Negative.  Negative for hearing loss, tinnitus, trouble swallowing and voice change.    Eyes: Negative.  Negative for photophobia, pain and visual disturbance.   Respiratory: Negative.  Negative for shortness of breath.    Cardiovascular: Negative.  Negative for palpitations.   Gastrointestinal: Negative.  Negative for nausea and vomiting.   Endocrine: Negative.  Negative for cold intolerance.   Genitourinary: Negative.  Negative for dysuria, frequency and urgency.   Musculoskeletal:  Positive for gait problem (Shuffles Feet, Stumbles , and Freezing, has gotten worse) and myalgias (Calf muscles at night). Negative for back pain, neck pain and neck stiffness.        Balance Issues a little bit, believes it is a little worse     Skin: Negative.  Negative for rash.   Allergic/Immunologic: Negative.    Neurological:  Positive for speech difficulty (At times). Negative for dizziness, tremors, seizures, syncope, facial asymmetry, weakness, light-headedness, numbness and headaches.   Hematological: Negative.  Does not bruise/bleed easily.   Psychiatric/Behavioral:  Positive for sleep disturbance. Negative for confusion and hallucinations.    All other systems reviewed and are negative.

## 2025-04-07 NOTE — ASSESSMENT & PLAN NOTE
Parkinson is complicated by motor fluctuations with dyskinesia's.  Overall has been doing well up until about 2 weeks ago when she developed increasing ofttimes described as a sensation of heaviness of the legs and return of freezing particularly when in small spaces.  She started taking her medications closer together about every 2 hours versus every 3-4 hours.  Presents today about 3-3.5 hours after her last dose with a sensation of heaviness, freezing only while in the room, no rigidity, mild bradykinesia, no tremor.  Spent more than 45 minutes on Deep brain stimulation interrogation, evaluation, programming, assessing patient  connection and documentation.  See body of the clinical note for details.    Simulation increased.  Will see if this reduces off time.  She will try to return to prior dosing schedule of Stalevo 50 every 3 hours if possible.  Will continue on amantadine ER 1 tablet nightly and rasagiline daily.    Will reach out to [employer] rep/tech to see if they can help with patient  connecting issues she is having.

## 2025-04-08 DIAGNOSIS — G20.B2 PARKINSON'S DISEASE WITH DYSKINESIA AND FLUCTUATING MANIFESTATIONS (HCC): ICD-10-CM

## 2025-04-09 RX ORDER — AMANTADINE 137 MG/1
1 CAPSULE, COATED PELLETS ORAL
Qty: 30 CAPSULE | Refills: 5 | Status: SHIPPED | OUTPATIENT
Start: 2025-04-09

## 2025-06-01 DIAGNOSIS — G20.B2 PARKINSON'S DISEASE WITH DYSKINESIA AND FLUCTUATING MANIFESTATIONS (HCC): ICD-10-CM

## 2025-06-02 RX ORDER — CARBIDOPA, LEVODOPA AND ENTACAPONE 12.5; 200; 5 MG/1; MG/1; MG/1
TABLET, FILM COATED ORAL
Qty: 720 TABLET | Refills: 1 | Status: SHIPPED | OUTPATIENT
Start: 2025-06-02

## 2025-06-03 ENCOUNTER — TELEPHONE (OUTPATIENT)
Dept: NEUROLOGY | Facility: CLINIC | Age: 75
End: 2025-06-03

## 2025-06-03 NOTE — TELEPHONE ENCOUNTER
Called patient to confirm 06/09/2025 appointment with Dr. Roger at the [facility]. Patient did confirm.

## 2025-06-09 ENCOUNTER — PROCEDURE VISIT (OUTPATIENT)
Dept: NEUROLOGY | Facility: CLINIC | Age: 75
End: 2025-06-09
Payer: MEDICARE

## 2025-06-09 VITALS — TEMPERATURE: 98.3 F | DIASTOLIC BLOOD PRESSURE: 78 MMHG | SYSTOLIC BLOOD PRESSURE: 122 MMHG

## 2025-06-09 DIAGNOSIS — G31.84 MILD COGNITIVE IMPAIRMENT: ICD-10-CM

## 2025-06-09 DIAGNOSIS — G20.B2 PARKINSON'S DISEASE WITH DYSKINESIA AND FLUCTUATING MANIFESTATIONS (HCC): Primary | ICD-10-CM

## 2025-06-09 PROCEDURE — 95983 ALYS BRN NPGT PRGRMG 15 MIN: CPT | Performed by: PSYCHIATRY & NEUROLOGY

## 2025-06-09 PROCEDURE — 99213 OFFICE O/P EST LOW 20 MIN: CPT | Performed by: PSYCHIATRY & NEUROLOGY

## 2025-06-09 PROCEDURE — G2211 COMPLEX E/M VISIT ADD ON: HCPCS | Performed by: PSYCHIATRY & NEUROLOGY

## 2025-06-09 NOTE — PROGRESS NOTES
Name: Malaika Chavis      : 1950      MRN: [id number]  Encounter Provider: Ny Mcintyre MD  Encounter Date: 2025   Encounter department: [facility] [place]  :  Assessment & Plan  Parkinson's disease with dyskinesia and fluctuating manifestations (HCC)  Parkinson disease with overall improvement in motor fluctuations with adjustments in stimulation made last visit.  She is however still taking her Stalevo 50 2 to 3 hours apart depending on when she feels wearing off coming on.    Deep brain stimulation was interrogated and increasing stimulation were made in attempt to improve on time.    She will maintain her current regimen of rasagiline 1 mg nightly and Stalevo 50 mg 5-6 times daily.  She has wished to see if she can discontinue coverage given cost.  She is no longer having much in the form of dyskinesia's.  She does have reduced off time so we can consider this.  She was also concerned with regards to daytime sleepiness.  She has occasional leg cramps.  She questions whether gabapentin be can be contributing to daytime sedation.  Currently on gabapentin 300 mg nightly.  She is not sure if this is having any effect.      The current plan is discontinue gabapentin  If no worsening of nighttime cramps or sleep after 1-2 weeks she can try discontinuing Gocovri 1 po qhs  If off Gocovri she notes and increase off time, contact the office as we may adjust stimulation.  She was instructed to contact the office immediately.       Mild cognitive impairment  Symptoms appear to be overall stable.  She does mention infrequent episodes of chest tightness lasting less than a few minutes.  This is occurred a couple times over the past few months.  She discussed this with the PCP.  She did have a cardiac workup in the fall.  Questions whether could be related to anxiety as she is able to kind of calm herself down and it dissipates.         Assessment & Plan          History of  Present Illness   Malaika Chavis is a woman with young onset, levodopa responsive Parkinson's disease s/p bilateral Gpi DBS (Percept PC ) L, 9/24/24, who presents for movement follow up.      Review of chart reveals PD symptom onset 1999 (47yo) with left shoulder stiffness, later complicated by motor fluctuations, wearing off, unpredictable offs, delayed on, mild dyskinesias and off freezing of gait. Neuropsychological testing revealed MCI. Question of anxiety playing a role as well, referred to [facility] rehab psychology group. Goals of surgery would include improvement in tremor, peak dose dyskinesia, and reduction in end dose wearing off 2-3 hours.Unlikely to help freezing of gait.\    Recent hx:  Jan 2025 on Stalevo to 8 or less tablets in a day, overall improvement in dyskinesia and reduced off time with stimulation.  With increasing stimulation she was instructed to spread out Stalevo 50 dose out to q 2.5 to then 3 hours apart consistently through the waking day.   Called 4/3/25 stating she had been doing well up until 3 weeks ago. She developed intermittent freezing, also has been taking Stalevo 2.5-3 hours apart but now is back q 2 hours. Seen 4/7/25 with reduce stamina when walking and increased off time with freezing states medication lasting 1-2 hours.Stim increased , instructed to take Stalevo 50 q 3 hours.       Current medications and timing:   - Gocovri 1 po qhs - worsened hallucinations on higher dosing, helps with wearing off and dyskinesia - costly  - Stalevo 12.5/50/200 at 2am and then 6am-10pm) about 5-6  times per day about Q2-3-hours - less frequent higher dose increase dyskinesia  - rasagiline 1mg daily   -Buspar prn anxiety      Prior medication trials:  Nourianz - caused worsening freezing, dyskinesia and hallucinations   selegiline   Sinemet IR  Ropinirole IR   ropinirole XL 6mg daily - dose reduced due to hallucinations and dyskinesia.   Depakote - started inpatient        History of  Present Illness  The patient presents for evaluation of Parkinson's disease.    She is currently on Stalevo 50 mg, which she takes every 2 hours, although she occasionally extends the interval between doses if she feels it is not necessary. She also takes rasagiline 1 tablet at night and Gocovri once at night. She has reduced her intake of Stalevo from 9 times to 5 or 6 times daily. She experiences fluctuations in her symptoms, with periods of complete relief and others where the medication seems less effective. She also reports experiencing freezing of gait when her medication is not active, particularly in the morning.    Increased daytime sleepiness has been noted, but she maintains good sleep quality at night. She has not experienced any hallucinations recently, although she has had illusions such as perceiving a blanket as a person. She recalls having significant hallucinations a few years ago.    Leg cramps at night have been a concern, which she attributes to her medication. She has not taken gabapentin for the past few days due to a lack of prescription but reports no cramping during this period. She typically takes gabapentin 300 mg at night.       Review of Systems I have personally reviewed the MA's review of systems and made changes as necessary.         Objective   There were no vitals taken for this visit.    Physical Exam    Eyes:      Extraocular Movements: Extraocular movements intact.      Pupils: Pupils are equal, round, and reactive to light.       Neurological:      Mental Status: She is alert.      Motor: Motor strength is normal.      Neurological Exam  Mental Status  Alert. Oriented only to person, place and situation. Speech: hypophonia. Language is fluent with no aphasia. Attention and concentration are normal.    Cranial Nerves  CN III, IV, VI: Extraocular movements intact bilaterally. Pupils equal round and reactive to light bilaterally.  CN VII: Full and symmetric facial movement.  CN  VIII: Hearing is normal.  CN IX, X: Palate elevates symmetrically  CN XI: Shoulder shrug strength is normal.  CN XII: Tongue midline without atrophy or fasciculations.    Motor   Normal muscle tone. Strength is 5/5 throughout all four extremities.    Coordination    See motor UPDRS.    Gait    Reduced stride.  No freezing.  .    Physical Exam  Cranial Nerve Examination    CN III IV VI: Extraocular movements intact.  MDS UPDRS III                              6/9/25 4/7/25 1/27/25       10/28/24  10/28/24 11/25/24   Time since last dose:      off On stim     Speech  1 1 1 1 1 1   Facial Expression  2 2 2 2 2 2   Rigidity - Neck  0         0   Rigidity - Upper Extremity (R)  0 0 0 0 0 0   Rigidity - Upper Extremity (L)   0 0 0 1 0 0   Rigidity - Lower Extremity (R)  0 0 0 0 0 0   Rigidity - Lower Extremity (L)   0 0 0 1 0 0   Finger Taps (R)   0 1 0 1 0 0   Finger Taps (L)   1 1 0 2 1 1   Hand Movement (R)  0 0 0 0 0 0   Hand Movement (L)   0 0 0 1 0 1   Pronation/Supination (R)  1 1 0 1 0 1   Pronation/Supination (L)   1 1 1 2 1 1   Toe Tapping (R) 1 0 1     2   Toe Tapping (L) 2 1 1     2   Leg Agility (R)  0 1 1 2 1 1   Leg Agility (L)   1 1 1 2 1 1   Arising from Chair   0 0 0 2 2 0   Gait   1 1 1 2 2 2   Freezing of Gait 0 1 0 0 0 0   Postural Stability                Posture 2 2 2 1 1 2   Global spontaneity of movement 1 1 1 2 1 2   Postural Tremor (Ri 0 0 0 0 0 0   Postural Tremor (L) 0 0 0 0 0 0   Kinetic Tremor (R)  0 0 0 0 0 0   Kinetic Tremor (L)  0 0 0 0 0 0   Rest tremor amplitude RUE 0 0 0 0 0 0   Rest tremor amplitude LUE 0 0 0 0 0 0   Rest tremor amplitude RLE 0 0 0 0 0 0   Reset tremor amplitude LLE 0 0 0 0 0 0   Lip/Jaw Tremor  0 0 0 0 0 0   Consistency of tremor 0 0 0 0 0 0   Motor Exam Total:               Dyskinesia: none     Deep brain stimulator interrogation and programming: Approximately 40 minutes     Percept PC Left chest  [id number]  Lead: [id number]  Implanted: 9/10/24  EBL: 5 years, 10 months    Impedance: ok      Left Gpi  LFP freq 9.77  Sensing    1a:0.6  1b:0.6  1c: 0.6  PW60, 145Hz  1.7mA   Increased 1.9mA   (0.5-2.1mA)      Right Gpi    LFP 11.72Hz  9a:0.5  9b:0.5  9c:0.5  PW60, 145Hz  1.6mA    Increased to 1.8mA  (0.5- 2.2mA)        Results

## 2025-06-09 NOTE — ASSESSMENT & PLAN NOTE
Parkinson disease with overall improvement in motor fluctuations with adjustments in stimulation made last visit.  She is however still taking her Stalevo 50 2 to 3 hours apart depending on when she feels wearing off coming on.    Deep brain stimulation was interrogated and increasing stimulation were made in attempt to improve on time.    She will maintain her current regimen of rasagiline 1 mg nightly and Stalevo 50 mg 5-6 times daily.  She has wished to see if she can discontinue coverage given cost.  She is no longer having much in the form of dyskinesia's.  She does have reduced off time so we can consider this.  She was also concerned with regards to daytime sleepiness.  She has occasional leg cramps.  She questions whether gabapentin be can be contributing to daytime sedation.  Currently on gabapentin 300 mg nightly.  She is not sure if this is having any effect.      The current plan is discontinue gabapentin  If no worsening of nighttime cramps or sleep after 1-2 weeks she can try discontinuing Gocovri 1 po qhs  If off Gocovri she notes and increase off time, contact the office as we may adjust stimulation.  She was instructed to contact the office immediately.

## 2025-06-09 NOTE — PROGRESS NOTES
Review of Systems   Constitutional: Negative.  Negative for appetite change, fatigue and fever.   HENT: Negative.  Negative for hearing loss, tinnitus, trouble swallowing and voice change.    Eyes: Negative.  Negative for photophobia, pain and visual disturbance.   Respiratory: Negative.  Negative for shortness of breath.    Cardiovascular: Negative.  Negative for palpitations.   Gastrointestinal: Negative.  Negative for nausea and vomiting.   Endocrine: Negative.  Negative for cold intolerance.   Genitourinary: Negative.  Negative for dysuria, frequency and urgency.   Musculoskeletal:  Positive for gait problem (Shuffles Feet, Stumbles, Freezing, states has stayed about the same), myalgias (Calf muscles at night times) and neck stiffness. Negative for back pain and neck pain.        Balance Issues, has stayed about the same to a little worse     Skin: Negative.  Negative for rash.   Allergic/Immunologic: Negative.    Neurological:  Positive for dizziness and speech difficulty. Negative for tremors, seizures, syncope, facial asymmetry, weakness, light-headedness, numbness and headaches.   Hematological: Negative.  Does not bruise/bleed easily.   Psychiatric/Behavioral:  Positive for sleep disturbance (Trouble going to sleep at times). Negative for confusion and hallucinations.         Sleeping more     All other systems reviewed and are negative.

## 2025-06-09 NOTE — ASSESSMENT & PLAN NOTE
Symptoms appear to be overall stable.  She does mention infrequent episodes of chest tightness lasting less than a few minutes.  This is occurred a couple times over the past few months.  She discussed this with the PCP.  She did have a cardiac workup in the fall.  Questions whether could be related to anxiety as she is able to kind of calm herself down and it dissipates.

## 2025-06-09 NOTE — PATIENT INSTRUCTIONS
Discontinue gabapentin  If no worsening of nighttime cramps or sleep after 1-2 weeks can try discontinuing Gocovri 1 po qhs  If off Gocovri you developed increase off time, contact the office as we may adjust stimulation.

## 2025-06-13 ENCOUNTER — NURSE TRIAGE (OUTPATIENT)
Age: 75
End: 2025-06-13

## 2025-06-13 NOTE — TELEPHONE ENCOUNTER
"REASON FOR CONVERSATION: Advice Only    SYMPTOMS: balance shaky, less stamina, feels feet are sticking when attempts to ambulate; feels worse since DBS adjustment 6/9 however patient also stopped gabapentin around the same time.    OTHER HEALTH INFORMATION: s/p visit 6/9; office note documents: Deep brain stimulation was interrogated and increasing stimulation were made in attempt to improve on time; Feels leg cramps resolved since stopping gabapentin so is hesitant to restart.    PROTOCOL DISPOSITION: Discuss with Provider and Call Back Patient (overriding Home Care)    CARE ADVICE PROVIDED: Call Rep for assistance with transmitter    PRACTICE FOLLOW-UP: Discuss with provider and call patient with recommendation.      Reason for Disposition   Patient's symptoms are safe to treat at home per nursing judgment    Answer Assessment - Initial Assessment Questions  Freezing? Yes; describes feeling of \"feet sticking\" when initially starts to ambulate    Shuffling? See above    Difficulty Walking? Describes feeling \"like my balance is shaky\", less stamina    Recent Falls? denies    Any extra movements? Describes tremors are unchanged    Any Hallucinations? N/A    What time of day are symptoms worse? Wearing off occurs prior to dose of stalevo    Current medications confirmed as:  Stalevo, one tab every 2-2.5 hours, approx 5-6 doses/day  Rasagiline 1 mg daily  Gocovri 137 mg daily  Stopped gabapentin 300 mg approx 1 week ago and reporting leg cramps resolved      Ask how long after each dose do they feel that it \"wears off\"? Wears off close to the 2 hours when due for next dose    Does patient have a DBS? Yes, patient is s/p visit 6/9, and patient feels was \"turned down\" instead of increased; patient said she is not able to check settings herself as her transmitter is not working; recommended she contact Rep for assistance, she will do same.    Protocols used: No Protocol Available-Adult-[place]    "

## 2025-06-18 NOTE — TELEPHONE ENCOUNTER
Ny Mcintyre MD to Neurology Neurovascular Team 4  (Selected Message)        6/18/25  2:19 PM  Received communication from the [employer] rep 6/13/25. Patient had contacted him. Thought process described as scattered and she was having some challenges in expressing what she was trying to accomplish with her patient .  After attempting to try and talk her through things on the phone, he asked her if I could stop by to review things.  She agreed. Noted to have generalized dyskinesia and described feeling on edge. Her [employer] handset was depleted so we found the  cord for it and also for the communicator, which she thought both were lost.  After charging patient  to her DBS.  He reviewed all the features and how to connect and charge all components.  She reported dyskinesias being at highest level about 30 min after each PD medication dose.  She was encouraged to share this type of information with me.  Together a small reduction in her setting were made using patient . Left body setting from 1.8mA to 1.7mA and the right body setting from 1.9mA to 1.8mA.  Within 5 seconds she significantly calmed and she recognized it.  Right body side further reduced to 1.7mA.     Please contact patient to assure she continues to do well on current settings.

## 2025-06-24 NOTE — Clinical Note
--DO NOT REPLY - Sent from PACT - If sent to wrong pool, reroute to [facility] Reroute pool --    General Message:   Patient states she just did something to her back and would like a script for flexeril to be sent in to her pharmacy. Please call back and pharmacy is verified.   Callback #: [phone]   Can a detailed message be left? Yes - Voicemail   Caller has been advised this message will be addressed within:2-3 business days [routine]         Talk to dr Marquez Morales about Rytary dose

## 2025-07-01 NOTE — TELEPHONE ENCOUNTER
MSW checked this date with the following results:     [employer] - no PD funding  [facility] closed  [facility] - patient was advised to join the waitlist  [facility] - patient was advised to join the waitlist  [facility] [address] - need for PD funding identified but no funding is available       MSW will continue to check for copay assistance each business day      MSW attempted to reach patient to see if she was able to join the above waiting lists  No answer at [phone]  MSW left a message requesting callback  Awaiting same  Patient notified of below information. Verbalized understanding and all questions answered.     ----- Message from Tammy Schladweiler, NP sent at 7/1/2025  6:08 PM CDT -----  Please notify BV, yeast or trich testing.  If persists to follow up with PCP  or OB/GYN.  Tammy L Schladweiler, NP

## 2025-07-10 ENCOUNTER — HOSPITAL ENCOUNTER (OUTPATIENT)
Dept: RADIOLOGY | Age: 75
Discharge: HOME | End: 2025-07-10
Payer: MEDICARE

## 2025-07-10 VITALS — WEIGHT: 143 LBS | HEIGHT: 62 IN | BODY MASS INDEX: 26.31 KG/M2

## 2025-07-10 VITALS — WEIGHT: 144 LBS | BODY MASS INDEX: 25.52 KG/M2 | HEIGHT: 63 IN

## 2025-07-10 DIAGNOSIS — Z12.31 ENCOUNTER FOR SCREENING MAMMOGRAM FOR MALIGNANT NEOPLASM OF BREAST: ICD-10-CM

## 2025-07-10 DIAGNOSIS — Z13.820 ENCOUNTER FOR SCREENING FOR OSTEOPOROSIS: ICD-10-CM

## 2025-07-10 PROCEDURE — 77080 DXA BONE DENSITY AXIAL: CPT

## 2025-07-10 PROCEDURE — 77063 BREAST TOMOSYNTHESIS BI: CPT

## 2025-07-10 PROCEDURE — 77067 SCR MAMMO BI INCL CAD: CPT

## 2025-08-07 ENCOUNTER — TELEPHONE (OUTPATIENT)
Age: 75
End: 2025-08-07

## 2025-08-18 ENCOUNTER — PROCEDURE VISIT (OUTPATIENT)
Dept: NEUROLOGY | Facility: CLINIC | Age: 75
End: 2025-08-18
Payer: MEDICARE

## 2025-08-18 VITALS
OXYGEN SATURATION: 97 % | WEIGHT: 146.8 LBS | HEART RATE: 88 BPM | SYSTOLIC BLOOD PRESSURE: 126 MMHG | BODY MASS INDEX: 27.29 KG/M2 | DIASTOLIC BLOOD PRESSURE: 82 MMHG | TEMPERATURE: 98 F

## 2025-08-18 DIAGNOSIS — F41.9 ANXIETY DISORDER: ICD-10-CM

## 2025-08-18 DIAGNOSIS — G20.B2 PARKINSON'S DISEASE WITH DYSKINESIA AND FLUCTUATING MANIFESTATIONS (HCC): Primary | ICD-10-CM

## 2025-08-18 DIAGNOSIS — G47.52 REM SLEEP BEHAVIOR DISORDER: ICD-10-CM

## 2025-08-18 DIAGNOSIS — G20.A1 PARKINSON'S DISEASE WITH USE OF ELECTRICAL BRAIN STIMULATION (HCC): ICD-10-CM

## 2025-08-18 DIAGNOSIS — G31.84 MILD COGNITIVE IMPAIRMENT: ICD-10-CM

## 2025-08-18 PROCEDURE — G2211 COMPLEX E/M VISIT ADD ON: HCPCS | Performed by: PSYCHIATRY & NEUROLOGY

## 2025-08-18 PROCEDURE — 95970 ALYS NPGT W/O PRGRMG: CPT | Performed by: PSYCHIATRY & NEUROLOGY

## 2025-08-18 PROCEDURE — 99215 OFFICE O/P EST HI 40 MIN: CPT | Performed by: PSYCHIATRY & NEUROLOGY

## 2025-08-18 RX ORDER — ERGOCALCIFEROL 1.25 MG/1
50000 CAPSULE, LIQUID FILLED ORAL WEEKLY
COMMUNITY
Start: 2025-06-05

## 2025-08-18 RX ORDER — ROPINIROLE HYDROCHLORIDE 6 MG/1
TABLET, FILM COATED, EXTENDED RELEASE ORAL
COMMUNITY
End: 2025-08-18 | Stop reason: CLARIF

## 2025-08-21 DIAGNOSIS — G20.A1 PARKINSON'S DISEASE (HCC): ICD-10-CM

## 2025-08-22 RX ORDER — RASAGILINE 1 MG/1
1 TABLET ORAL DAILY
Qty: 90 TABLET | Refills: 1 | Status: SHIPPED | OUTPATIENT
Start: 2025-08-22

## (undated) DEVICE — DURASEAL EXTENDED APPLICATOR TIP 15CM

## (undated) DEVICE — TOOL MR8-9MH30 MR8 9CM MATCH 3MM: Brand: MIDAS REX MR8

## (undated) DEVICE — OSTEODRIVER™ DISPOSABLE AUTODRIVER, STERILE: Brand: OSTEOMED

## (undated) DEVICE — PACK BURR HOLE PBDS RF

## (undated) DEVICE — DRAPE SHEET X-LG

## (undated) DEVICE — SUT SILK 2-0 18 IN A185H

## (undated) DEVICE — 6617 IOBAN II PATIENT ISOLATION DRAPE 5/BX,4BX/CS: Brand: STERI-DRAPE™ IOBAN™ 2

## (undated) DEVICE — ANTIBACTERIAL VIOLET BRAIDED (POLYGLACTIN 910), SYNTHETIC ABSORBABLE SUTURE: Brand: COATED VICRYL

## (undated) DEVICE — SURGIFOAM 8.5 X 12.5

## (undated) DEVICE — INTENDED FOR TISSUE SEPARATION, AND OTHER PROCEDURES THAT REQUIRE A SHARP SURGICAL BLADE TO PUNCTURE OR CUT.: Brand: BARD-PARKER ® CARBON RIB-BACK BLADES

## (undated) DEVICE — Device

## (undated) DEVICE — 3M™ STERI-STRIP™ REINFORCED ADHESIVE SKIN CLOSURES, R1547, 1/2 IN X 4 IN (12 MM X 100 MM), 6 STRIPS/ENVELOPE: Brand: 3M™ STERI-STRIP™

## (undated) DEVICE — SPECIMEN CONTAINER STERILE PEEL PACK

## (undated) DEVICE — SKIN MARKER DUAL TIP WITH RULER CAP, FLEXIBLE RULER AND LABELS: Brand: DEVON

## (undated) DEVICE — DRESSING MEPILEX AG BORDER POST-OP 4 X 6 IN

## (undated) DEVICE — 3M™ IOBAN™ 2 ANTIMICROBIAL INCISE DRAPE 6650EZ: Brand: IOBAN™ 2

## (undated) DEVICE — SPONGE SCRUB 4 PCT CHLORHEXIDINE

## (undated) DEVICE — MONITORING SPINAL IMPULSE CASE FEE

## (undated) DEVICE — GLOVE SRG BIOGEL 7.5

## (undated) DEVICE — HEAVY DUTY TABLE COVER: Brand: CONVERTORS

## (undated) DEVICE — DURASEAL 5ML

## (undated) DEVICE — SUT MONOCRYL 5-0 P-3 18 IN Y493G

## (undated) DEVICE — DBS MICROTARGETING ELECTRODE CABLE

## (undated) DEVICE — TIBURON SPLIT SHEET: Brand: CONVERTORS

## (undated) DEVICE — SUT SILK 2-0 SH 30 IN K833H

## (undated) DEVICE — CHLORAPREP HI-LITE 26ML ORANGE

## (undated) DEVICE — TRAY FOLEY 16FR URIMETER SURESTEP

## (undated) DEVICE — TUNNLER EXT DBS SENSIGHT

## (undated) DEVICE — GLOVE INDICATOR PI UNDERGLOVE SZ 7.5 BLUE

## (undated) DEVICE — SUPPLY FEE STD

## (undated) DEVICE — EXOFIN PRECISION PEN HIGH VISCOSITY TOPICAL SKIN ADHESIVE: Brand: EXOFIN PRECISION PEN, 1G

## (undated) DEVICE — PAD GROUNDING DUAL ADULT

## (undated) DEVICE — SUT CHROMIC 5-0 P-3 18 IN 687G

## (undated) DEVICE — LIGHT HANDLE COVER SLEEVE DISP BLUE STELLAR

## (undated) DEVICE — PERFORATOR CRANIAL 14MM

## (undated) DEVICE — ELECTRODE BLADE MOD E-Z CLEAN 2.5IN 6.4CM -0012M

## (undated) DEVICE — DRAPE SLEEVE DBS MICROTARGETING DRIVE

## (undated) DEVICE — HEMOSTATIC MATRIX SURGIFLO 8ML W/THROMBIN

## (undated) DEVICE — LEAD B3300542M SENSIGHT 0.5MM EMAN LJ53
Type: IMPLANTABLE DEVICE | Site: BRAIN | Status: NON-FUNCTIONAL
Brand: SENSIGHT™

## (undated) DEVICE — DRESSING MEPILEX AG BORDER 4 X 4 IN

## (undated) DEVICE — PROXIMATE SKIN STAPLERS (35 WIDE) CONTAINS 35 STAINLESS STEEL STAPLES (FIXED HEAD): Brand: PROXIMATE

## (undated) DEVICE — DRAPE INTESTINAL ISOLATION BAG

## (undated) DEVICE — SUT ETHILON 3-0 FS-1 18 IN 663G

## (undated) DEVICE — TUBING SUCTION 5MM X 12 FT

## (undated) DEVICE — PROXIMATE SKIN STAPLE EXTRACTORS: Brand: PROXIMATE

## (undated) DEVICE — CASE PATIENT PROGRAMMER ACCY

## (undated) RX ORDER — LOTILANER OPHTHALMIC SOLUTION 2.5 MG/ML: 1 SOLUTION/ DROPS OPHTHALMIC TWICE A DAY